# Patient Record
Sex: MALE | Race: WHITE | NOT HISPANIC OR LATINO | URBAN - METROPOLITAN AREA
[De-identification: names, ages, dates, MRNs, and addresses within clinical notes are randomized per-mention and may not be internally consistent; named-entity substitution may affect disease eponyms.]

---

## 2024-03-13 ENCOUNTER — EMERGENCY (EMERGENCY)
Facility: HOSPITAL | Age: 24
LOS: 1 days | Discharge: HOME | End: 2024-03-13
Attending: EMERGENCY MEDICINE
Payer: MEDICARE

## 2024-03-13 VITALS
HEART RATE: 94 BPM | TEMPERATURE: 98 F | OXYGEN SATURATION: 98 % | HEIGHT: 68 IN | WEIGHT: 309.31 LBS | DIASTOLIC BLOOD PRESSURE: 74 MMHG | RESPIRATION RATE: 18 BRPM | SYSTOLIC BLOOD PRESSURE: 109 MMHG

## 2024-03-13 LAB
ALBUMIN SERPL ELPH-MCNC: 4.3 G/DL — SIGNIFICANT CHANGE UP (ref 3.3–5.2)
ALP SERPL-CCNC: 110 U/L — SIGNIFICANT CHANGE UP (ref 40–120)
ALT FLD-CCNC: 145 U/L — HIGH
ANION GAP SERPL CALC-SCNC: 20 MMOL/L — HIGH (ref 5–17)
AST SERPL-CCNC: 82 U/L — HIGH
BASOPHILS # BLD AUTO: 0.03 K/UL — SIGNIFICANT CHANGE UP (ref 0–0.2)
BASOPHILS NFR BLD AUTO: 0.6 % — SIGNIFICANT CHANGE UP (ref 0–2)
BILIRUB SERPL-MCNC: 1.5 MG/DL — SIGNIFICANT CHANGE UP (ref 0.4–2)
BUN SERPL-MCNC: 8.8 MG/DL — SIGNIFICANT CHANGE UP (ref 8–20)
CALCIUM SERPL-MCNC: 9.4 MG/DL — SIGNIFICANT CHANGE UP (ref 8.4–10.5)
CHLORIDE SERPL-SCNC: 90 MMOL/L — LOW (ref 96–108)
CO2 SERPL-SCNC: 25 MMOL/L — SIGNIFICANT CHANGE UP (ref 22–29)
CREAT SERPL-MCNC: 1.07 MG/DL — SIGNIFICANT CHANGE UP (ref 0.5–1.3)
EGFR: 98 ML/MIN/1.73M2 — SIGNIFICANT CHANGE UP
EOSINOPHIL # BLD AUTO: 0.09 K/UL — SIGNIFICANT CHANGE UP (ref 0–0.5)
EOSINOPHIL NFR BLD AUTO: 1.7 % — SIGNIFICANT CHANGE UP (ref 0–6)
GLUCOSE SERPL-MCNC: 79 MG/DL — SIGNIFICANT CHANGE UP (ref 70–99)
HCT VFR BLD CALC: 46 % — SIGNIFICANT CHANGE UP (ref 39–50)
HGB BLD-MCNC: 15.6 G/DL — SIGNIFICANT CHANGE UP (ref 13–17)
IMM GRANULOCYTES NFR BLD AUTO: 0.2 % — SIGNIFICANT CHANGE UP (ref 0–0.9)
LIDOCAIN IGE QN: 23 U/L — SIGNIFICANT CHANGE UP (ref 22–51)
LYMPHOCYTES # BLD AUTO: 2.09 K/UL — SIGNIFICANT CHANGE UP (ref 1–3.3)
LYMPHOCYTES # BLD AUTO: 40 % — SIGNIFICANT CHANGE UP (ref 13–44)
MCHC RBC-ENTMCNC: 26.8 PG — LOW (ref 27–34)
MCHC RBC-ENTMCNC: 33.9 GM/DL — SIGNIFICANT CHANGE UP (ref 32–36)
MCV RBC AUTO: 79 FL — LOW (ref 80–100)
MONOCYTES # BLD AUTO: 0.53 K/UL — SIGNIFICANT CHANGE UP (ref 0–0.9)
MONOCYTES NFR BLD AUTO: 10.2 % — SIGNIFICANT CHANGE UP (ref 2–14)
NEUTROPHILS # BLD AUTO: 2.47 K/UL — SIGNIFICANT CHANGE UP (ref 1.8–7.4)
NEUTROPHILS NFR BLD AUTO: 47.3 % — SIGNIFICANT CHANGE UP (ref 43–77)
PLATELET # BLD AUTO: 267 K/UL — SIGNIFICANT CHANGE UP (ref 150–400)
POTASSIUM SERPL-MCNC: 3.8 MMOL/L — SIGNIFICANT CHANGE UP (ref 3.5–5.3)
POTASSIUM SERPL-SCNC: 3.8 MMOL/L — SIGNIFICANT CHANGE UP (ref 3.5–5.3)
PROT SERPL-MCNC: 7.7 G/DL — SIGNIFICANT CHANGE UP (ref 6.6–8.7)
RBC # BLD: 5.82 M/UL — HIGH (ref 4.2–5.8)
RBC # FLD: 14.1 % — SIGNIFICANT CHANGE UP (ref 10.3–14.5)
SODIUM SERPL-SCNC: 135 MMOL/L — SIGNIFICANT CHANGE UP (ref 135–145)
WBC # BLD: 5.22 K/UL — SIGNIFICANT CHANGE UP (ref 3.8–10.5)
WBC # FLD AUTO: 5.22 K/UL — SIGNIFICANT CHANGE UP (ref 3.8–10.5)

## 2024-03-13 PROCEDURE — 96361 HYDRATE IV INFUSION ADD-ON: CPT

## 2024-03-13 PROCEDURE — 93971 EXTREMITY STUDY: CPT | Mod: 26,RT

## 2024-03-13 PROCEDURE — 71046 X-RAY EXAM CHEST 2 VIEWS: CPT | Mod: 26

## 2024-03-13 PROCEDURE — 71046 X-RAY EXAM CHEST 2 VIEWS: CPT

## 2024-03-13 PROCEDURE — 83690 ASSAY OF LIPASE: CPT

## 2024-03-13 PROCEDURE — 74177 CT ABD & PELVIS W/CONTRAST: CPT | Mod: 26,MC

## 2024-03-13 PROCEDURE — 80053 COMPREHEN METABOLIC PANEL: CPT

## 2024-03-13 PROCEDURE — 99285 EMERGENCY DEPT VISIT HI MDM: CPT | Mod: 25

## 2024-03-13 PROCEDURE — 36415 COLL VENOUS BLD VENIPUNCTURE: CPT

## 2024-03-13 PROCEDURE — 96375 TX/PRO/DX INJ NEW DRUG ADDON: CPT

## 2024-03-13 PROCEDURE — 99283 EMERGENCY DEPT VISIT LOW MDM: CPT

## 2024-03-13 PROCEDURE — 99285 EMERGENCY DEPT VISIT HI MDM: CPT | Mod: FS

## 2024-03-13 PROCEDURE — 96374 THER/PROPH/DIAG INJ IV PUSH: CPT | Mod: XU

## 2024-03-13 PROCEDURE — 85025 COMPLETE CBC W/AUTO DIFF WBC: CPT

## 2024-03-13 PROCEDURE — 93971 EXTREMITY STUDY: CPT

## 2024-03-13 PROCEDURE — 74177 CT ABD & PELVIS W/CONTRAST: CPT | Mod: MC

## 2024-03-13 RX ORDER — FAMOTIDINE 10 MG/ML
20 INJECTION INTRAVENOUS ONCE
Refills: 0 | Status: COMPLETED | OUTPATIENT
Start: 2024-03-13 | End: 2024-03-13

## 2024-03-13 RX ORDER — SODIUM CHLORIDE 9 MG/ML
1000 INJECTION INTRAMUSCULAR; INTRAVENOUS; SUBCUTANEOUS ONCE
Refills: 0 | Status: COMPLETED | OUTPATIENT
Start: 2024-03-13 | End: 2024-03-13

## 2024-03-13 RX ORDER — ONDANSETRON 8 MG/1
4 TABLET, FILM COATED ORAL ONCE
Refills: 0 | Status: COMPLETED | OUTPATIENT
Start: 2024-03-13 | End: 2024-03-13

## 2024-03-13 RX ORDER — METOCLOPRAMIDE HCL 10 MG
10 TABLET ORAL ONCE
Refills: 0 | Status: COMPLETED | OUTPATIENT
Start: 2024-03-13 | End: 2024-03-13

## 2024-03-13 RX ORDER — IOHEXOL 300 MG/ML
30 INJECTION, SOLUTION INTRAVENOUS ONCE
Refills: 0 | Status: COMPLETED | OUTPATIENT
Start: 2024-03-13 | End: 2024-03-13

## 2024-03-13 RX ORDER — ONDANSETRON 8 MG/1
4 TABLET, FILM COATED ORAL ONCE
Refills: 0 | Status: DISCONTINUED | OUTPATIENT
Start: 2024-03-13 | End: 2024-03-21

## 2024-03-13 RX ADMIN — SODIUM CHLORIDE 1000 MILLILITER(S): 9 INJECTION INTRAMUSCULAR; INTRAVENOUS; SUBCUTANEOUS at 17:38

## 2024-03-13 RX ADMIN — Medication 10 MILLIGRAM(S): at 20:06

## 2024-03-13 RX ADMIN — IOHEXOL 30 MILLILITER(S): 300 INJECTION, SOLUTION INTRAVENOUS at 19:08

## 2024-03-13 RX ADMIN — SODIUM CHLORIDE 1000 MILLILITER(S): 9 INJECTION INTRAMUSCULAR; INTRAVENOUS; SUBCUTANEOUS at 18:38

## 2024-03-13 RX ADMIN — ONDANSETRON 4 MILLIGRAM(S): 8 TABLET, FILM COATED ORAL at 17:38

## 2024-03-13 RX ADMIN — FAMOTIDINE 20 MILLIGRAM(S): 10 INJECTION INTRAVENOUS at 17:39

## 2024-03-13 RX ADMIN — SODIUM CHLORIDE 1000 MILLILITER(S): 9 INJECTION INTRAMUSCULAR; INTRAVENOUS; SUBCUTANEOUS at 19:15

## 2024-03-13 NOTE — ED ADULT TRIAGE NOTE - RESPIRATORY RATE (BREATHS/MIN)
I have personally seen and examined this patient.  I have fully participated in the care of this patient. I have reviewed all pertinent clinical information, including history, physical exam, plan and the Resident’s note and agree except as noted. 18

## 2024-03-13 NOTE — ED PROVIDER NOTE - CLINICAL SUMMARY MEDICAL DECISION MAKING FREE TEXT BOX
23 y/o male with recent gastric bypass surgery at [facility] presents to the ED c/o nausea and vomiting. us of right leg negative for dvt, pending ct of abdomen and pelvis with oral and iv contrast to evaluate for abdominal pathology after gastric sleeve, pending urine to determine if uti, pt signed out to oncoming team pending results. 25 y/o male with recent gastric bypass surgery at [facility] presents to the ED c/o nausea and vomiting. us of right leg negative for dvt, pending ct of abdomen and pelvis with oral and iv contrast to evaluate for abdominal pathology after gastric sleeve, pending urine to determine if uti, pt signed out to oncoming team pending results.    bariatrics consulted, recommended PO challnege. Pt able to tolerate PO can follow up with aura in office in AM and GI    Pt reassessed, pt feeling better at this time, vss, pt able to walk, talk and vocalized plan of action. Discussed in depth and explained to pt in depth the next steps that need to be taken including proper follow up with PCP or specialists. All incidental findings were discussed with pt as well. Pt verbalized their concerns and all questions were answered. Pt understands dispo and wants discharge. Given good instructions when to return to ED, strict return precautions and importance of f/u.

## 2024-03-13 NOTE — ED PROVIDER NOTE - PATIENT PORTAL LINK FT
You can access the FollowMyHealth Patient Portal offered by [facility] by registering at the following website: [email or web address]. By joining [employer]’s FollowMyHealth portal, you will also be able to view your health information using other applications (apps) compatible with our system.

## 2024-03-13 NOTE — ED ADULT NURSE REASSESSMENT NOTE - NS ED NURSE REASSESS COMMENT FT1
Report received from off-going RN at 19:30, VSS. Respirations even and unlabored. Patient safety maintained. Patient able to tolerate 300 ml of oral contrast. Provider aware.

## 2024-03-13 NOTE — ED PROVIDER NOTE - CARE PROVIDERS DIRECT ADDRESSES
,[email or web address]

## 2024-03-13 NOTE — ED PROVIDER NOTE - ATTENDING APP SHARED VISIT CONTRIBUTION OF CARE
I, Ronni Cabrera, performed the initial face to face bedside interview with this patient regarding history of present illness, review of symptoms and relevant past medical, social and family history.  I completed an independent physical examination.  I was the initial provider who evaluated this patient. I have signed out the follow up of any pending tests (i.e. labs, radiological studies) to the ACP.  I have communicated the patient’s plan of care and disposition with the ACP.

## 2024-03-13 NOTE — ED PROVIDER NOTE - CARE PROVIDER_API CALL
Yaakov Santamaria [place]  [facility]  [address] [place] [address]  Phone: [phone]  Fax: [phone]  Follow Up Time:     Prashant Islas  Gastroenterology  [address], [address]  [place] [address]  Phone: [phone]  Fax: [phone]  Follow Up Time:

## 2024-03-13 NOTE — ED PROVIDER NOTE - OBJECTIVE STATEMENT
25 y/o male with recent gastric bypass surgery at [facility] presents to the ED c/o nausea and vomiting. Notes after the surgery he had issues with eating, notes he followed up with the surgeon and was advised to eat smaller portions. Notes has been having issues tolerating po but worsening the past 2 days. Also notes after the surgery had thigh pain and was concerned about blood clot. Also has noted that after the surgery he had urinary retention which was told secondary to the anesthesia, had schaffer placed and followed up with urology. Notes to have a uti which he was treated for with abx, which he finished. Denies chest pain, sob, lightheadedness, dizziness.

## 2024-03-13 NOTE — ED ADULT TRIAGE NOTE - CHIEF COMPLAINT QUOTE
Pt c/o nausea and vomiting x 1 day, unable to tolerate PO or medications, had sleeve surgery 1 month ago

## 2024-03-13 NOTE — ED PROVIDER NOTE - PHYSICAL EXAMINATION
General-alert and oriented to person place and time, nontoxic appearing, pleasant cooperative, NAD  HEENT-normocephalic, atraumatic, NT to palp, EOMI, PERRLA, no conjunctival injections,   Neck- supple, trach midline, No JVD, no LAD  Chest- Nt to palp, no reproducible pain  Cardio-s1,s2 present, regular rate and rhythm  Resp- talks in full sentences, symmetrical chest rise, CTA bilat, no evidence of wheezes, rhonchi noted  Abdomen- bowel sounds presnt in all 4 quadrants, soft, NT/ND, no guarding, no rebound tenderness  MSK- moves all extremities, no tenderness to right thigh  Pulses- 2+ DP/PT  Back- nt to palp of cervical, thoracic, lumbar spine, nt to palp of paraspinal m., No CVA tenderness  Neuro- no focal deficits, sensation intact

## 2024-03-13 NOTE — CONSULT NOTE ADULT - ATTENDING COMMENTS
Seen in ED on evening of 3/13/24  Patient presents with difficulty tolerating solid food since sleeve gastrectomy, also frequent diarrhea  Abdomen soft  CT reviewed - no evidence of stricture or other abnormality related to sleeve gastrectomy  Tolerated liquid diet and was discharge home from ED  Patient to followup with me in office on 3/14/24

## 2024-03-13 NOTE — ED PROVIDER NOTE - ATTENDING SHARED VISIT SELECTOR YES
Patient's chart reviewed, please contact to schedule OA colonoscopy with Leonel Valdez MD.    Screening Criteria  Age: 48 year old Patient meets age criteria.  PCP:  Dawson River MD  Personal H/O Colon CA or Polyps: Patient does NOT have a personal history of colon polyps or colon cancer  Family H/O Colon CA: YES - Maternal aunt at age 60  GI Symptoms: No  Most recent colon procedure No prior Flexi or Colonoscopy  Concerns for taking prep at home(mobility, etc): No    ALLERGIES:  No Known Allergies    Medications:  Anticoagulation (examples - coumadin, heparin, lovenox, xarelto, pradaxa): No  Platelet Modifying (examples - Plavix, aspirin, nsaids, Aggrenox) : No  Concerns for sedation. On Chronic long acting narcotics, Methadone, Suboxone, antianxiety like xanax, klonazepam: No  Review of other medications indicate - diuretic, Lisinopril-HCTZ and Oral antidiabetic  BMI: Estimated body mass index is 36.52 kg/(m^2) as calculated from the following:    Height as of an earlier encounter on 3/17/17: 5' 4\" (1.626 m).    Weight as of an earlier encounter on 3/17/17: 96.5 kg.:more than 45 No  Is the patient over 300 lbs Weight:   Wt Readings from Last 1 Encounters:   03/17/17 96.5 kg   :  No  On Oxygen:  No    Past Medical History:  Past Medical History:   Diagnosis Date   • Dyslipidemia    • HTN (hypertension) 2009   • Low testosterone 2011    Hypogonadism, dose increase 2012   • Obesity    • Polycythemia 2013   • Type 2 diabetes mellitus with diabetic nephropathy      History of clotting disorder (i.e., VWF or ITP)? No    Past Surgical History:  Past Surgical History:   Procedure Laterality Date   • NO PAST SURGERIES         Other Concerns: none noted    Prior Labs: If abnormal creatinine/electrolytes, then will need Nulytely prep  Creatinine   Date Value Ref Range Status   03/03/2017 0.67 0.67 - 1.17 mg/dL Final     BUN   Date Value Ref Range Status   03/03/2017 11 6 - 20 mg/dL Final     Sodium   Date Value Ref Range  Status   03/03/2017 139 135 - 145 mmol/L Final     Potassium   Date Value Ref Range Status   03/03/2017 4.5 3.4 - 5.1 mmol/L Final     Chloride   Date Value Ref Range Status   03/03/2017 99 98 - 107 mmol/L Final     Carbon Dioxide   Date Value Ref Range Status   03/03/2017 28 21 - 32 mmol/L Final     Glucose   Date Value Ref Range Status   03/03/2017 142 (H) 65 - 99 mg/dL Final     CALCIUM   Date Value Ref Range Status   03/03/2017 9.1 8.4 - 10.2 mg/dL Final        SCHEDULING:  OK to schedule open access colonoscopy, if no then will need office visit: Yes  Physician Scheduled with: Leonel Valdez MD  Diagnosis code from O/A order:  Colon Cancer Screening Z12.11  Location: Patient Preference  Sedation: Moderate sedation    Prep: Nulytely  Pre-procedure orders placed for oa colonoscopy.      Yes

## 2024-03-13 NOTE — ED ADULT NURSE NOTE - OBJECTIVE STATEMENT
pt to ED c/o n/v. Pt states he has been unable to keep anything down since his gastric sleeve surgery Feb. 1st. Pt states his dr "is aware." pt to ED c/o n/v. Pt states he has been unable to keep anything down since his gastric sleeve surgery Feb. 1st. Pt states his dr "is aware." Pt not actively vomiting at this time.

## 2024-03-13 NOTE — CONSULT NOTE ADULT - ASSESSMENT
24M with PO intolerance s/p sleeve gastrectomy    - No acute surgical intervention at this time  - Recommend UGI to evaluate swallowing mechanics (ordered)  - GI consult for possible EGD  - Patient should subsequently f/u with own surgeon  - Will follow up above 24M with PO intolerance s/p sleeve gastrectomy    - No acute surgical intervention at this time  - Recommend UGI to evaluate swallowing mechanics (ordered)  - GI consult for possible EGD  - Patient should subsequently f/u with own surgeon  - Will follow up above    Update 3/14/24 @0059  Discussed with patient and family the option of attempting PO challenge and outpatient follow up with GI for further motility evaluation. Patient favored this option and was subsequently able to tolerate PO without issue.

## 2024-03-13 NOTE — CONSULT NOTE ADULT - SUBJECTIVE AND OBJECTIVE BOX
HPI:      PAST MEDICAL & SURGICAL HISTORY:      REVIEW OF SYSTEMS      General:	    Skin/Breast:  	  Ophthalmologic:  	  ENMT:	    Respiratory and Thorax:  	  Cardiovascular:	    Gastrointestinal:	    Genitourinary:	    Musculoskeletal:	    Neurological:	    Psychiatric:	    Hematology/Lymphatics:	    Endocrine:	    Allergic/Immunologic:	    MEDICATIONS  (STANDING):  ondansetron Injectable 4 milliGRAM(s) IV Push once    MEDICATIONS  (PRN):      Allergies    No Known Allergies    Intolerances        SOCIAL HISTORY:    FAMILY HISTORY:      Vital Signs Last 24 Hrs  T(C): 36.6 (13 Mar 2024 15:12), Max: 36.6 (13 Mar 2024 15:12)  T(F): 97.9 (13 Mar 2024 15:12), Max: 97.9 (13 Mar 2024 15:12)  HR: 94 (13 Mar 2024 15:12) (94 - 94)  BP: 109/74 (13 Mar 2024 15:12) (109/74 - 109/74)  BP(mean): --  RR: 18 (13 Mar 2024 15:12) (18 - 18)  SpO2: 98% (13 Mar 2024 15:12) (98% - 98%)    Parameters below as of 13 Mar 2024 15:12  Patient On (Oxygen Delivery Method): room air        PHYSICAL EXAM:      Constitutional:    Eyes:    ENMT:    Neck:    Breasts:    Back:    Respiratory:    Cardiovascular:    Gastrointestinal:    Genitourinary:    Rectal:    Extremities:    Vascular:    Neurological:    Skin:    Lymph Nodes:    Musculoskeletal:    Psychiatric:        LABS:                        15.6   5.22  )-----------( 267      ( 13 Mar 2024 16:50 )             46.0     03-13    135  |  90<L>  |  8.8  ----------------------------<  79  3.8   |  25.0  |  1.07    Ca    9.4      13 Mar 2024 16:50    TPro  7.7  /  Alb  4.3  /  TBili  1.5  /  DBili  x   /  AST  82<H>  /  ALT  145<H>  /  AlkPhos  110  03-13      Urinalysis Basic - ( 13 Mar 2024 16:50 )    Color: x / Appearance: x / SG: x / pH: x  Gluc: 79 mg/dL / Ketone: x  / Bili: x / Urobili: x   Blood: x / Protein: x / Nitrite: x   Leuk Esterase: x / RBC: x / WBC x   Sq Epi: x / Non Sq Epi: x / Bacteria: x      RADIOLOGY & ADDITIONAL STUDIES:    < from: CT Abdomen and Pelvis w/ Oral Cont and w/ IV Cont (03.13.24 @ 21:53) >  PROCEDURE:  CT of the Abdomen and Pelvis was performed.  Sagittal and coronalreformats were performed.    FINDINGS:  LOWER CHEST: There is a focal airspace opacity in the right lower lobe of   the lung. This is indeterminant. In the setting of recent surgery this   could represent a focus of aspiration. Recommend follow-up noncontrast   chest CT to assess for resolution.    LIVER: Within normal limits.  BILE DUCTS: Normal caliber.  GALLBLADDER: Within normal limits.  SPLEEN: Within normal limits.  PANCREAS: Within normal limits.  ADRENALS: Within normal limits.  KIDNEYS/URETERS: Within normal limits.    BLADDER: Within normal limits.  REPRODUCTIVE ORGANS: Prostate within normal limits.    BOWEL: Status post sleeve gastrectomy. Normal postsurgical appearance. No   bowel obstruction. Appendix is normal.  PERITONEUM: No ascites.  VESSELS: Within normal limits.  RETROPERITONEUM/LYMPH NODES: No lymphadenopathy.  ABDOMINAL WALL: Within normal limits.  BONES: Within normal limits.    IMPRESSION:    There is a focal airspace opacity in the right lower lobe of the lung.   This is indeterminant. In the setting of recent surgery this could   represent a focus of aspiration. Recommend follow-up noncontrast chest CT   to assess for resolution.    No acute finding in the abdomen and pelvis.    < end of copied text >   HPI: 24M with no significant PMH presents 6wk s/p sleeve gastrectomy performed at [facility] with progressive PO intolerance a/w N/V. Symptoms started approx 2 wks after surgery. When he was seen by his surgeon, they recommended he have full liquid diet and suggested he may require an UGI. He has not been able to tolerate the diet and now can barely hold down PO without vomiting shortly after. He is still making urine and has been having BMs. No hematemesis, fevers, chills.       PAST MEDICAL & SURGICAL HISTORY:  No significant PMH/PSH      MEDICATIONS  (STANDING):  ondansetron Injectable 4 milliGRAM(s) IV Push once    MEDICATIONS  (PRN):      Allergies  No Known Allergies  Intolerances      SOCIAL HISTORY:      Vital Signs Last 24 Hrs  T(C): 36.6 (13 Mar 2024 15:12), Max: 36.6 (13 Mar 2024 15:12)  T(F): 97.9 (13 Mar 2024 15:12), Max: 97.9 (13 Mar 2024 15:12)  HR: 94 (13 Mar 2024 15:12) (94 - 94)  BP: 109/74 (13 Mar 2024 15:12) (109/74 - 109/74)  BP(mean): --  RR: 18 (13 Mar 2024 15:12) (18 - 18)  SpO2: 98% (13 Mar 2024 15:12) (98% - 98%)    Parameters below as of 13 Mar 2024 15:12  Patient On (Oxygen Delivery Method): room air      PHYSICAL EXAM:  Constitutional: uncomfortable appearing  ENMT:  Respiratory: no increased work of breathing, no conversational dyspnea  Gastrointestinal: obese, nondistended, tender to palpation in epigastrium, well healed surgical scars      LABS:                        15.6   5.22  )-----------( 267      ( 13 Mar 2024 16:50 )             46.0     03-13    135  |  90<L>  |  8.8  ----------------------------<  79  3.8   |  25.0  |  1.07    Ca    9.4      13 Mar 2024 16:50    TPro  7.7  /  Alb  4.3  /  TBili  1.5  /  DBili  x   /  AST  82<H>  /  ALT  145<H>  /  AlkPhos  110  03-13      Urinalysis Basic - ( 13 Mar 2024 16:50 )    Color: x / Appearance: x / SG: x / pH: x  Gluc: 79 mg/dL / Ketone: x  / Bili: x / Urobili: x   Blood: x / Protein: x / Nitrite: x   Leuk Esterase: x / RBC: x / WBC x   Sq Epi: x / Non Sq Epi: x / Bacteria: x      RADIOLOGY & ADDITIONAL STUDIES:    < from: CT Abdomen and Pelvis w/ Oral Cont and w/ IV Cont (03.13.24 @ 21:53) >  PROCEDURE:  CT of the Abdomen and Pelvis was performed.  Sagittal and coronalreformats were performed.    FINDINGS:  LOWER CHEST: There is a focal airspace opacity in the right lower lobe of   the lung. This is indeterminant. In the setting of recent surgery this   could represent a focus of aspiration. Recommend follow-up noncontrast   chest CT to assess for resolution.    LIVER: Within normal limits.  BILE DUCTS: Normal caliber.  GALLBLADDER: Within normal limits.  SPLEEN: Within normal limits.  PANCREAS: Within normal limits.  ADRENALS: Within normal limits.  KIDNEYS/URETERS: Within normal limits.    BLADDER: Within normal limits.  REPRODUCTIVE ORGANS: Prostate within normal limits.    BOWEL: Status post sleeve gastrectomy. Normal postsurgical appearance. No   bowel obstruction. Appendix is normal.  PERITONEUM: No ascites.  VESSELS: Within normal limits.  RETROPERITONEUM/LYMPH NODES: No lymphadenopathy.  ABDOMINAL WALL: Within normal limits.  BONES: Within normal limits.    IMPRESSION:    There is a focal airspace opacity in the right lower lobe of the lung.   This is indeterminant. In the setting of recent surgery this could   represent a focus of aspiration. Recommend follow-up noncontrast chest CT   to assess for resolution.    No acute finding in the abdomen and pelvis.    < end of copied text >

## 2024-03-14 ENCOUNTER — APPOINTMENT (OUTPATIENT)
Dept: SURGERY | Facility: CLINIC | Age: 24
End: 2024-03-14
Payer: MEDICAID

## 2024-03-14 VITALS
SYSTOLIC BLOOD PRESSURE: 103 MMHG | TEMPERATURE: 98 F | OXYGEN SATURATION: 96 % | RESPIRATION RATE: 18 BRPM | HEART RATE: 76 BPM | DIASTOLIC BLOOD PRESSURE: 66 MMHG

## 2024-03-14 VITALS
WEIGHT: 308 LBS | BODY MASS INDEX: 48.34 KG/M2 | SYSTOLIC BLOOD PRESSURE: 109 MMHG | TEMPERATURE: 97.8 F | DIASTOLIC BLOOD PRESSURE: 75 MMHG | OXYGEN SATURATION: 98 % | HEART RATE: 63 BPM | RESPIRATION RATE: 14 BRPM | HEIGHT: 67 IN

## 2024-03-14 DIAGNOSIS — K90.49 MALABSORPTION DUE TO INTOLERANCE, NOT ELSEWHERE CLASSIFIED: ICD-10-CM

## 2024-03-14 DIAGNOSIS — Z98.84 BARIATRIC SURGERY STATUS: ICD-10-CM

## 2024-03-14 PROCEDURE — 99204 OFFICE O/P NEW MOD 45 MIN: CPT

## 2024-03-18 NOTE — HISTORY OF PRESENT ILLNESS
[de-identified] : Mr. MARIA is a 24 year old man s/p sleeve gastrectomy with difficulty tolerating oral solid food. He was seen in [facility] ED last night - he was discharged home after successfully tolerating liquids. Reports occasional reflux symptoms. Reports pain with solid food intake. Reports occasional regurgitation to solid food and, less frequently, with protein shake intake. Today, the patient denies pain. Denies fever/chills.   CT reviewed - no stricture or other abnormality identified to account for symptoms

## 2024-03-18 NOTE — ASSESSMENT
[FreeTextEntry1] : Mr. MARIA is a 24 year old man s/p sleeve gastrectomy with difficulty tolerating oral solid food.  Care discussed with patient, his mother, and his surgeon, Dr. Terry at [facility]. Will plan for upper GI x-ray.  Recommend full liquid diet - will then advance as tolerated at next visit

## 2024-03-19 ENCOUNTER — EMERGENCY (EMERGENCY)
Facility: HOSPITAL | Age: 24
LOS: 1 days | Discharge: HOME | End: 2024-03-19
Attending: EMERGENCY MEDICINE
Payer: MEDICARE

## 2024-03-19 VITALS
HEIGHT: 68 IN | WEIGHT: 307.99 LBS | RESPIRATION RATE: 17 BRPM | TEMPERATURE: 98 F | HEART RATE: 65 BPM | OXYGEN SATURATION: 100 % | SYSTOLIC BLOOD PRESSURE: 103 MMHG | DIASTOLIC BLOOD PRESSURE: 69 MMHG

## 2024-03-19 LAB
ALBUMIN SERPL ELPH-MCNC: 4.3 G/DL — SIGNIFICANT CHANGE UP (ref 3.3–5.2)
ALP SERPL-CCNC: 103 U/L — SIGNIFICANT CHANGE UP (ref 40–120)
ALT FLD-CCNC: 181 U/L — HIGH
ANION GAP SERPL CALC-SCNC: 21 MMOL/L — HIGH (ref 5–17)
AST SERPL-CCNC: 97 U/L — HIGH
BASOPHILS # BLD AUTO: 0.02 K/UL — SIGNIFICANT CHANGE UP (ref 0–0.2)
BASOPHILS NFR BLD AUTO: 0.4 % — SIGNIFICANT CHANGE UP (ref 0–2)
BILIRUB SERPL-MCNC: 1.6 MG/DL — SIGNIFICANT CHANGE UP (ref 0.4–2)
BUN SERPL-MCNC: 7.9 MG/DL — LOW (ref 8–20)
CALCIUM SERPL-MCNC: 9.1 MG/DL — SIGNIFICANT CHANGE UP (ref 8.4–10.5)
CHLORIDE SERPL-SCNC: 93 MMOL/L — LOW (ref 96–108)
CO2 SERPL-SCNC: 24 MMOL/L — SIGNIFICANT CHANGE UP (ref 22–29)
CREAT SERPL-MCNC: 0.87 MG/DL — SIGNIFICANT CHANGE UP (ref 0.5–1.3)
EGFR: 122 ML/MIN/1.73M2 — SIGNIFICANT CHANGE UP
EOSINOPHIL # BLD AUTO: 0.04 K/UL — SIGNIFICANT CHANGE UP (ref 0–0.5)
EOSINOPHIL NFR BLD AUTO: 0.8 % — SIGNIFICANT CHANGE UP (ref 0–6)
GLUCOSE SERPL-MCNC: 82 MG/DL — SIGNIFICANT CHANGE UP (ref 70–99)
HCT VFR BLD CALC: 45.1 % — SIGNIFICANT CHANGE UP (ref 39–50)
HGB BLD-MCNC: 15 G/DL — SIGNIFICANT CHANGE UP (ref 13–17)
IMM GRANULOCYTES NFR BLD AUTO: 0.4 % — SIGNIFICANT CHANGE UP (ref 0–0.9)
LYMPHOCYTES # BLD AUTO: 1.8 K/UL — SIGNIFICANT CHANGE UP (ref 1–3.3)
LYMPHOCYTES # BLD AUTO: 33.8 % — SIGNIFICANT CHANGE UP (ref 13–44)
MCHC RBC-ENTMCNC: 26.6 PG — LOW (ref 27–34)
MCHC RBC-ENTMCNC: 33.3 GM/DL — SIGNIFICANT CHANGE UP (ref 32–36)
MCV RBC AUTO: 80 FL — SIGNIFICANT CHANGE UP (ref 80–100)
MONOCYTES # BLD AUTO: 0.54 K/UL — SIGNIFICANT CHANGE UP (ref 0–0.9)
MONOCYTES NFR BLD AUTO: 10.2 % — SIGNIFICANT CHANGE UP (ref 2–14)
NEUTROPHILS # BLD AUTO: 2.9 K/UL — SIGNIFICANT CHANGE UP (ref 1.8–7.4)
NEUTROPHILS NFR BLD AUTO: 54.4 % — SIGNIFICANT CHANGE UP (ref 43–77)
PLATELET # BLD AUTO: 275 K/UL — SIGNIFICANT CHANGE UP (ref 150–400)
POTASSIUM SERPL-MCNC: 4.2 MMOL/L — SIGNIFICANT CHANGE UP (ref 3.5–5.3)
POTASSIUM SERPL-SCNC: 4.2 MMOL/L — SIGNIFICANT CHANGE UP (ref 3.5–5.3)
PROT SERPL-MCNC: 7.2 G/DL — SIGNIFICANT CHANGE UP (ref 6.6–8.7)
RBC # BLD: 5.64 M/UL — SIGNIFICANT CHANGE UP (ref 4.2–5.8)
RBC # FLD: 14.6 % — HIGH (ref 10.3–14.5)
SODIUM SERPL-SCNC: 137 MMOL/L — SIGNIFICANT CHANGE UP (ref 135–145)
WBC # BLD: 5.32 K/UL — SIGNIFICANT CHANGE UP (ref 3.8–10.5)
WBC # FLD AUTO: 5.32 K/UL — SIGNIFICANT CHANGE UP (ref 3.8–10.5)

## 2024-03-19 PROCEDURE — 99285 EMERGENCY DEPT VISIT HI MDM: CPT | Mod: GC

## 2024-03-19 PROCEDURE — 93010 ELECTROCARDIOGRAM REPORT: CPT

## 2024-03-19 RX ORDER — ONDANSETRON 8 MG/1
4 TABLET, FILM COATED ORAL ONCE
Refills: 0 | Status: COMPLETED | OUTPATIENT
Start: 2024-03-19 | End: 2024-03-19

## 2024-03-19 RX ORDER — SODIUM CHLORIDE 9 MG/ML
1000 INJECTION INTRAMUSCULAR; INTRAVENOUS; SUBCUTANEOUS ONCE
Refills: 0 | Status: COMPLETED | OUTPATIENT
Start: 2024-03-19 | End: 2024-03-19

## 2024-03-19 RX ORDER — IOHEXOL 300 MG/ML
30 INJECTION, SOLUTION INTRAVENOUS ONCE
Refills: 0 | Status: COMPLETED | OUTPATIENT
Start: 2024-03-19 | End: 2024-03-20

## 2024-03-19 RX ADMIN — ONDANSETRON 4 MILLIGRAM(S): 8 TABLET, FILM COATED ORAL at 19:59

## 2024-03-19 RX ADMIN — SODIUM CHLORIDE 1000 MILLILITER(S): 9 INJECTION INTRAMUSCULAR; INTRAVENOUS; SUBCUTANEOUS at 19:58

## 2024-03-19 NOTE — CONSULT NOTE ADULT - SUBJECTIVE AND OBJECTIVE BOX
SURGERY CONSULT  ==============================================================================================================  HPI: 24y Male s/p sleeve gastrectomy at the beginning of February re-consults the ED complaining of diffuse abdominal pain and PO intolerance.  States that since his surgery has not been able to tolerate food or liquids. Has tried to drink water ~3Oz at a time with poor tolerance and emesis/spit.   Reports last BM last week, passing gas today.  Seen on 3/13 in the ED with negative work-up and scheduled for UGIS in 2 days.        PAST MEDICAL & SURGICAL HISTORY:  S/P gastric sleeve procedure        Home Meds: Home Medications:    Allergies: Allergies    No Known Allergies    Intolerances      Soc:   Advanced Directives: Presumed Full Code     CURRENT MEDICATIONS:   --------------------------------------------------------------------------------------  Neurologic Medications    Respiratory Medications    Cardiovascular Medications    Gastrointestinal Medications    Genitourinary Medications    Hematologic/Oncologic Medications    Antimicrobial/Immunologic Medications    Endocrine/Metabolic Medications    Topical/Other Medications  iohexol 300 mG (iodine)/mL Oral Solution 30 milliLiter(s) Oral once    --------------------------------------------------------------------------------------    VITAL SIGNS, INS/OUTS (last 24 hours):  --------------------------------------------------------------------------------------  ICU Vital Signs Last 24 Hrs  T(C): 36.8 (19 Mar 2024 17:51), Max: 36.8 (19 Mar 2024 17:51)  T(F): 98.3 (19 Mar 2024 17:51), Max: 98.3 (19 Mar 2024 17:51)  HR: 65 (19 Mar 2024 17:51) (65 - 65)  BP: 103/69 (19 Mar 2024 17:51) (103/69 - 103/69)  BP(mean): --  ABP: --  ABP(mean): --  RR: 17 (19 Mar 2024 17:51) (17 - 17)  SpO2: 100% (19 Mar 2024 17:51) (100% - 100%)    O2 Parameters below as of 19 Mar 2024 17:51  Patient On (Oxygen Delivery Method): room air          I&O's Summary    --------------------------------------------------------------------------------------    PHYSICAL EXAM:  GENERAL: NAD, well-groomed, well-developed  HEAD:  Atraumatic, Normocephalic  EYES: EOMI, PERRLA, conjunctiva and sclera clear  NECK: Supple, No JVD  CHEST/LUNG: non-labored breathing on room air.   HEART: Regular rate and rhythm; S1/S2  ABDOMEN: Soft, Nondistended, diffusely tender without rebound, guarding or rigidity.   VASCULAR: Normal pulses, Normal capillary refill  EXTREMITIES:  2+ Peripheral Pulses, No cyanosis, No edema  SKIN: Warm, Intact     LABS  --------------------------------------------------------------------------------------  Labs:  CAPILLARY BLOOD GLUCOSE                              15.0   5.32  )-----------( 275      ( 19 Mar 2024 20:00 )             45.1       Auto Neutrophil %: 54.4 % (03-19-24 @ 20:00)  Auto Immature Granulocyte %: 0.4 % (03-19-24 @ 20:00)    03-19    137  |  93<L>  |  7.9<L>  ----------------------------<  82  4.2   |  24.0  |  0.87      Calcium: 9.1 mg/dL (03-19-24 @ 20:00)      LFTs:             7.2  | 1.6  | 97       ------------------[103     ( 19 Mar 2024 20:00 )  4.3  | x    | 181         Lipase:x      Amylase:x             Coags:            Urinalysis Basic - ( 19 Mar 2024 20:00 )    Color: x / Appearance: x / SG: x / pH: x  Gluc: 82 mg/dL / Ketone: x  / Bili: x / Urobili: x   Blood: x / Protein: x / Nitrite: x   Leuk Esterase: x / RBC: x / WBC x   Sq Epi: x / Non Sq Epi: x / Bacteria: x          --------------------------------------------------------------------------------------

## 2024-03-19 NOTE — ED PROVIDER NOTE - CLINICAL SUMMARY MEDICAL DECISION MAKING FREE TEXT BOX
24 year old male presents to the ED for eval. Patient is 7 weeks s/p gastric sleeve. Vitals stable. Patient appears uncomfortable but is alert and oriented. Examination benign. Lungs clear bilaterally. Will obtain labs, EKG and UA. GI consulted. Note that patient was not sent in by Dr. Taylor office. Recommend PO challenge at the ED. Will reassess. 24 year old male presents to the ED for eval. Patient is 7 weeks s/p gastric sleeve. Vitals stable. Patient appears uncomfortable but is alert and oriented. Examination benign. Lungs clear bilaterally. Will obtain labs, EKG and UA. GI consulted. Note that patient was not sent in by Dr. Taylor office. Recommend PO challenge at the ED. Will reassess.    Ruthy TOMAS: Patient with significant improvement of symptoms after Maalox.  Tolerating p.o.  CT without any acute findings.  Cleared by surgical team.  Patient ready for discharge, return precautions given.

## 2024-03-19 NOTE — ED PROVIDER NOTE - PATIENT PORTAL LINK FT
You can access the FollowMyHealth Patient Portal offered by [facility] by registering at the following website: [email or web address]. By joining [employer]’s FollowMyHealth portal, you will also be able to view your health information using other applications (apps) compatible with our system.

## 2024-03-19 NOTE — ED ADULT NURSE NOTE - OBJECTIVE STATEMENT
Presents to ed with complaints of abdominal pain, patient is status post gastric sleeve feb 2024, reports nausea after eating, patient reports dr. lagunas sent him to ed to be evaluated. Denies sob, chest pain, no other complaints at this time.

## 2024-03-19 NOTE — ED ADULT TRIAGE NOTE - CHIEF COMPLAINT QUOTE
pt aunt reports pt was sent by MD Hartley for "evaluation of gastroparesis" , +N/V, dehydration and decreased intake; hx gastric sleeve 2/2024 . pt hx "cognitive delays"

## 2024-03-19 NOTE — CONSULT NOTE ADULT - ASSESSMENT
23 yo M with PO intolerance after sleeve gastreomy 7 weeks ago.  Recent work-up negative and unable to tolerate PO since then  Scheduled for UGIS in 2 days.  Lbas review with improving hypochloremia slight anion gap 21, no evidence of infection or bleeding.     Plan:  No emergent surgical intervention  Recommend CT w/IV/PO contrast to re-asses for structural complication  IF CT negative can have PO challenge from surgical standpoint  Recommend GI evaluation

## 2024-03-19 NOTE — ED PROVIDER NOTE - NSFOLLOWUPINSTRUCTIONS_ED_ALL_ED_FT
- Follow up with your doctor within 2-3 days.   - Follow up with Surgeon.   - Bring results with you to the appointment.   - Take Maalox 30ml every 6 hours as needed for pain. Maalox is available over the counter.   - Return to the ED for any new or worsening symptoms.     Abdominal Pain    Many things can cause abdominal pain. Many times, abdominal pain is not caused by a disease and will improve without treatment. Your health care provider will do a physical exam to determine if there is a dangerous cause of your pain; blood tests and imaging may help determine the cause of your pain. However, in many cases, no cause may be found and you may need further testing as an outpatient. Monitor your abdominal pain for any changes.     SEEK IMMEDIATE MEDICAL CARE IF YOU HAVE ANY OF THE FOLLOWING SYMPTOMS: worsening abdominal pain, uncontrollable vomiting, profuse diarrhea, inability to have bowel movements or pass gas, black or bloody stools, fever accompanying chest pain or back pain, or fainting. These symptoms may represent a serious problem that is an emergency. Do not wait to see if the symptoms will go away. Get medical help right away. Call 911 and do not drive yourself to the hospital.

## 2024-03-19 NOTE — ED PROVIDER NOTE - PHYSICAL EXAMINATION
Gen: NAD, AOx3  Head: NCAT  HEENT: EOMI, oral mucosa moist, normal conjunctiva, neck supple  Lung: CTAB, no respiratory distress  CV: rrr, no murmur, Normal perfusion  Abd: soft, NTND  MSK: right thigh pain to palpation  Neuro: No focal neurologic deficits  Skin: No rash   Psych: normal affect Gen: NAD, AOx3  Head: NCAT  HEENT: EOMI, oral mucosa moist, normal conjunctiva, neck supple  Lung: CTAB, no respiratory distress  CV: rrr, no murmur, Normal perfusion  Abd: soft, NTND  Neuro: No focal neurologic deficits  Skin: No rash   Psych: normal affect

## 2024-03-19 NOTE — ED ADULT NURSE NOTE - CAS DISCH TRANSFER METHOD
Prelim angio    Preserved LV function - probable underlying LVH    No obstructive CAD    Focus needs to be on blood sugar control    IV hydration post procedure    Potentially home later today if all goes well with recovery - that will depend upon how she feels and how her sugars are doing uber/Transportation service

## 2024-03-19 NOTE — ED ADULT NURSE REASSESSMENT NOTE - NS ED NURSE REASSESS COMMENT FT1
Pt placed on CM as per MD order. NSR, rate 65, SpO2 100% on room air. No acute distress noted at this time.

## 2024-03-19 NOTE — ED PROVIDER NOTE - ATTENDING CONTRIBUTION TO CARE
I, Neville Francis MD, personally saw the patient with the resident, and completed the key components of the history and physical exam. I then discussed the management plan with the resident.  Patient with past medical history of recent gastric sleeve that [facility] is presenting with nausea, vomiting and decreased ability to tolerate p.o.  Symptoms are present after surgery.  Patient has been following up with Dr. lagunas, who has patient scheduled for endoscopy in 2 days.  Patient's aunt called the office today as patient was having trouble tolerating p.o. also told to come to ED for evaluation.  Patient recently had CT scan that did not show any acute intra-abdominal pathology.  There were no reported change in symptoms from when he was last in the hospital.  On exam here he has no significant abdominal tenderness to palpation.  Despite recent surgery and vomiting, with no change in symptoms and recent CT scan being benign, I do not suspect small bowel obstruction at this time.  He also has no abdominal tenderness on exam.  Surgical team concern for possible gastroparesis which may be causing his symptoms.  Will check for dehydration, BIBI.  Case was discussed with Dr. lagunas, recommend obtaining lab work and p.o. challenge with liquid diet.  If workup is unremarkable, patient can be discharged to home with follow-up for endoscopy in 2 days.  Case was discussed with patient's aunt who is agreeable.

## 2024-03-19 NOTE — ED PROVIDER NOTE - OBJECTIVE STATEMENT
24 year old male presents to the ED for eval. Patient is 7 weeks s/p gastric sleeve done at [facility] [email or web address]] with nausea, vomiting and PO intolerance. Symptoms started 2 weeks after the surgery and have progressively worsened. Was seen by his surgeon and told to go on a full liquid diet, which failed. Saw Dr. Hartley last week and told to attempt smaller portions - due for an UGI next week as per patient. Denies any fever, chills, chest pain, SOB, melena, or hematemesis. Last bowel movement a week ago. Also c/o right thigh "spasms". 24 year old male presents to the ED for eval. Patient is 7 weeks s/p gastric sleeve done at [facility] with nausea, vomiting and PO intolerance. Symptoms started 2 weeks after the surgery and have progressively worsened. Was seen by his surgeon and told to go on a full liquid diet, which failed. Saw Dr. Hartley last week and told to attempt smaller portions - due for an UGI next week as per patient. Denies any fever, chills, chest pain, SOB, melena, or hematemesis. Last bowel movement a week ago.

## 2024-03-20 VITALS
DIASTOLIC BLOOD PRESSURE: 74 MMHG | SYSTOLIC BLOOD PRESSURE: 120 MMHG | RESPIRATION RATE: 20 BRPM | OXYGEN SATURATION: 100 % | HEART RATE: 70 BPM | TEMPERATURE: 97 F

## 2024-03-20 PROCEDURE — 74177 CT ABD & PELVIS W/CONTRAST: CPT | Mod: MC

## 2024-03-20 PROCEDURE — 85025 COMPLETE CBC W/AUTO DIFF WBC: CPT

## 2024-03-20 PROCEDURE — 74177 CT ABD & PELVIS W/CONTRAST: CPT | Mod: 26,MC

## 2024-03-20 PROCEDURE — 99285 EMERGENCY DEPT VISIT HI MDM: CPT | Mod: 25

## 2024-03-20 PROCEDURE — 96375 TX/PRO/DX INJ NEW DRUG ADDON: CPT

## 2024-03-20 PROCEDURE — 96376 TX/PRO/DX INJ SAME DRUG ADON: CPT

## 2024-03-20 PROCEDURE — 36415 COLL VENOUS BLD VENIPUNCTURE: CPT

## 2024-03-20 PROCEDURE — 96374 THER/PROPH/DIAG INJ IV PUSH: CPT | Mod: XU

## 2024-03-20 PROCEDURE — 93005 ELECTROCARDIOGRAM TRACING: CPT

## 2024-03-20 PROCEDURE — 80053 COMPREHEN METABOLIC PANEL: CPT

## 2024-03-20 RX ORDER — ONDANSETRON 8 MG/1
4 TABLET, FILM COATED ORAL ONCE
Refills: 0 | Status: COMPLETED | OUTPATIENT
Start: 2024-03-20 | End: 2024-03-20

## 2024-03-20 RX ORDER — FAMOTIDINE 10 MG/ML
20 INJECTION INTRAVENOUS ONCE
Refills: 0 | Status: COMPLETED | OUTPATIENT
Start: 2024-03-20 | End: 2024-03-20

## 2024-03-20 RX ADMIN — Medication 30 MILLILITER(S): at 04:31

## 2024-03-20 RX ADMIN — IOHEXOL 30 MILLILITER(S): 300 INJECTION, SOLUTION INTRAVENOUS at 01:13

## 2024-03-20 RX ADMIN — FAMOTIDINE 20 MILLIGRAM(S): 10 INJECTION INTRAVENOUS at 04:30

## 2024-03-20 RX ADMIN — ONDANSETRON 4 MILLIGRAM(S): 8 TABLET, FILM COATED ORAL at 02:12

## 2024-03-21 ENCOUNTER — OUTPATIENT (OUTPATIENT)
Dept: OUTPATIENT SERVICES | Facility: HOSPITAL | Age: 24
LOS: 1 days | End: 2024-03-21
Payer: MEDICARE

## 2024-03-21 ENCOUNTER — NON-APPOINTMENT (OUTPATIENT)
Age: 24
End: 2024-03-21

## 2024-03-21 DIAGNOSIS — Z98.84 BARIATRIC SURGERY STATUS: ICD-10-CM

## 2024-03-21 PROBLEM — Z90.3 ACQUIRED ABSENCE OF STOMACH [PART OF]: Chronic | Status: ACTIVE | Noted: 2024-03-19

## 2024-03-21 PROCEDURE — 74240 X-RAY XM UPR GI TRC 1CNTRST: CPT

## 2024-03-21 PROCEDURE — 74240 X-RAY XM UPR GI TRC 1CNTRST: CPT | Mod: 26

## 2024-03-22 ENCOUNTER — NON-APPOINTMENT (OUTPATIENT)
Age: 24
End: 2024-03-22

## 2024-03-25 ENCOUNTER — INPATIENT (INPATIENT)
Facility: HOSPITAL | Age: 24
LOS: 9 days | Discharge: HOME | DRG: 391 | End: 2024-04-04
Attending: GENERAL ACUTE CARE HOSPITAL | Admitting: EMERGENCY MEDICINE
Payer: MEDICARE

## 2024-03-25 ENCOUNTER — TRANSCRIPTION ENCOUNTER (OUTPATIENT)
Age: 24
End: 2024-03-25

## 2024-03-25 VITALS
TEMPERATURE: 98 F | WEIGHT: 296.74 LBS | DIASTOLIC BLOOD PRESSURE: 80 MMHG | OXYGEN SATURATION: 98 % | SYSTOLIC BLOOD PRESSURE: 111 MMHG | HEART RATE: 88 BPM | RESPIRATION RATE: 20 BRPM | HEIGHT: 68 IN

## 2024-03-25 LAB
ALBUMIN SERPL ELPH-MCNC: 4.4 G/DL — SIGNIFICANT CHANGE UP (ref 3.3–5.2)
ALP SERPL-CCNC: 98 U/L — SIGNIFICANT CHANGE UP (ref 40–120)
ALT FLD-CCNC: 233 U/L — HIGH
ANION GAP SERPL CALC-SCNC: 21 MMOL/L — HIGH (ref 5–17)
APTT BLD: 29.5 SEC — SIGNIFICANT CHANGE UP (ref 24.5–35.6)
AST SERPL-CCNC: 135 U/L — HIGH
BASOPHILS # BLD AUTO: 0.02 K/UL — SIGNIFICANT CHANGE UP (ref 0–0.2)
BASOPHILS NFR BLD AUTO: 0.4 % — SIGNIFICANT CHANGE UP (ref 0–2)
BILIRUB SERPL-MCNC: 2.1 MG/DL — HIGH (ref 0.4–2)
BLD GP AB SCN SERPL QL: SIGNIFICANT CHANGE UP
BUN SERPL-MCNC: 8 MG/DL — SIGNIFICANT CHANGE UP (ref 8–20)
CALCIUM SERPL-MCNC: 9.3 MG/DL — SIGNIFICANT CHANGE UP (ref 8.4–10.5)
CHLORIDE SERPL-SCNC: 96 MMOL/L — SIGNIFICANT CHANGE UP (ref 96–108)
CO2 SERPL-SCNC: 25 MMOL/L — SIGNIFICANT CHANGE UP (ref 22–29)
CREAT SERPL-MCNC: 0.85 MG/DL — SIGNIFICANT CHANGE UP (ref 0.5–1.3)
EGFR: 123 ML/MIN/1.73M2 — SIGNIFICANT CHANGE UP
EOSINOPHIL # BLD AUTO: 0.03 K/UL — SIGNIFICANT CHANGE UP (ref 0–0.5)
EOSINOPHIL NFR BLD AUTO: 0.6 % — SIGNIFICANT CHANGE UP (ref 0–6)
GLUCOSE SERPL-MCNC: 83 MG/DL — SIGNIFICANT CHANGE UP (ref 70–99)
HCT VFR BLD CALC: 46.7 % — SIGNIFICANT CHANGE UP (ref 39–50)
HGB BLD-MCNC: 15.4 G/DL — SIGNIFICANT CHANGE UP (ref 13–17)
IMM GRANULOCYTES NFR BLD AUTO: 0.4 % — SIGNIFICANT CHANGE UP (ref 0–0.9)
INR BLD: 1.2 RATIO — HIGH (ref 0.85–1.18)
LIDOCAIN IGE QN: 24 U/L — SIGNIFICANT CHANGE UP (ref 22–51)
LYMPHOCYTES # BLD AUTO: 1.72 K/UL — SIGNIFICANT CHANGE UP (ref 1–3.3)
LYMPHOCYTES # BLD AUTO: 36.5 % — SIGNIFICANT CHANGE UP (ref 13–44)
MCHC RBC-ENTMCNC: 26.1 PG — LOW (ref 27–34)
MCHC RBC-ENTMCNC: 33 GM/DL — SIGNIFICANT CHANGE UP (ref 32–36)
MCV RBC AUTO: 79.3 FL — LOW (ref 80–100)
MONOCYTES # BLD AUTO: 0.52 K/UL — SIGNIFICANT CHANGE UP (ref 0–0.9)
MONOCYTES NFR BLD AUTO: 11 % — SIGNIFICANT CHANGE UP (ref 2–14)
NEUTROPHILS # BLD AUTO: 2.4 K/UL — SIGNIFICANT CHANGE UP (ref 1.8–7.4)
NEUTROPHILS NFR BLD AUTO: 51.1 % — SIGNIFICANT CHANGE UP (ref 43–77)
PLATELET # BLD AUTO: 292 K/UL — SIGNIFICANT CHANGE UP (ref 150–400)
POTASSIUM SERPL-MCNC: 3.6 MMOL/L — SIGNIFICANT CHANGE UP (ref 3.5–5.3)
POTASSIUM SERPL-SCNC: 3.6 MMOL/L — SIGNIFICANT CHANGE UP (ref 3.5–5.3)
PROT SERPL-MCNC: 7.6 G/DL — SIGNIFICANT CHANGE UP (ref 6.6–8.7)
PROTHROM AB SERPL-ACNC: 13.2 SEC — HIGH (ref 9.5–13)
RBC # BLD: 5.89 M/UL — HIGH (ref 4.2–5.8)
RBC # FLD: 14.8 % — HIGH (ref 10.3–14.5)
SODIUM SERPL-SCNC: 141 MMOL/L — SIGNIFICANT CHANGE UP (ref 135–145)
WBC # BLD: 4.71 K/UL — SIGNIFICANT CHANGE UP (ref 3.8–10.5)
WBC # FLD AUTO: 4.71 K/UL — SIGNIFICANT CHANGE UP (ref 3.8–10.5)

## 2024-03-25 PROCEDURE — 93010 ELECTROCARDIOGRAM REPORT: CPT

## 2024-03-25 PROCEDURE — 99285 EMERGENCY DEPT VISIT HI MDM: CPT | Mod: GC

## 2024-03-25 PROCEDURE — 71045 X-RAY EXAM CHEST 1 VIEW: CPT | Mod: 26

## 2024-03-25 RX ORDER — FAMOTIDINE 10 MG/ML
20 INJECTION INTRAVENOUS ONCE
Refills: 0 | Status: COMPLETED | OUTPATIENT
Start: 2024-03-25 | End: 2024-03-25

## 2024-03-25 RX ORDER — SODIUM CHLORIDE 9 MG/ML
2000 INJECTION, SOLUTION INTRAVENOUS ONCE
Refills: 0 | Status: COMPLETED | OUTPATIENT
Start: 2024-03-25 | End: 2024-03-25

## 2024-03-25 RX ORDER — DIATRIZOATE MEGLUMINE 180 MG/ML
30 INJECTION, SOLUTION INTRAVESICAL ONCE
Refills: 0 | Status: COMPLETED | OUTPATIENT
Start: 2024-03-25 | End: 2024-03-25

## 2024-03-25 RX ORDER — MORPHINE SULFATE 50 MG/1
4 CAPSULE, EXTENDED RELEASE ORAL ONCE
Refills: 0 | Status: DISCONTINUED | OUTPATIENT
Start: 2024-03-25 | End: 2024-03-25

## 2024-03-25 RX ORDER — ONDANSETRON 8 MG/1
4 TABLET, FILM COATED ORAL ONCE
Refills: 0 | Status: COMPLETED | OUTPATIENT
Start: 2024-03-25 | End: 2024-03-25

## 2024-03-25 RX ORDER — ACETAMINOPHEN 500 MG
1000 TABLET ORAL ONCE
Refills: 0 | Status: COMPLETED | OUTPATIENT
Start: 2024-03-25 | End: 2024-03-25

## 2024-03-25 RX ADMIN — FAMOTIDINE 20 MILLIGRAM(S): 10 INJECTION INTRAVENOUS at 21:38

## 2024-03-25 RX ADMIN — Medication 400 MILLIGRAM(S): at 21:12

## 2024-03-25 RX ADMIN — DIATRIZOATE MEGLUMINE 30 MILLILITER(S): 180 INJECTION, SOLUTION INTRAVESICAL at 21:38

## 2024-03-25 RX ADMIN — ONDANSETRON 4 MILLIGRAM(S): 8 TABLET, FILM COATED ORAL at 21:13

## 2024-03-25 RX ADMIN — MORPHINE SULFATE 4 MILLIGRAM(S): 50 CAPSULE, EXTENDED RELEASE ORAL at 21:12

## 2024-03-25 RX ADMIN — SODIUM CHLORIDE 2000 MILLILITER(S): 9 INJECTION, SOLUTION INTRAVENOUS at 21:12

## 2024-03-25 NOTE — ED PROVIDER NOTE - CLINICAL SUMMARY MEDICAL DECISION MAKING FREE TEXT BOX
24-year-old male past medical history including morbid obesity, gastric sleeve performed 1 month ago presenting for nausea, vomiting, epigastric pain.    Getting CT abdomen with PO/IV contrast. , labs, will treat symptoms.

## 2024-03-25 NOTE — ED PROVIDER NOTE - OBJECTIVE STATEMENT
24-year-old male past medical history including morbid obesity, gastric sleeve performed 1 month ago presenting for nausea, vomiting, epigastric pain.  Started roughly 2 weeks after his surgery.  Has been getting worse.  Reports he has been feeling lightheaded due to only able to keep down 4 oz of fluid a day.  Not eating.  Attempting milk mixed with banana but unable to tolerate even a protein shake.  Has been here multiple times.  Seen by bariatrics and recommending GI at last visit 6 days ago.  Denying any fever, chills, chest pain, blood in vomit.

## 2024-03-25 NOTE — ED ADULT NURSE NOTE - OBJECTIVE STATEMENT
Pt is AAOX4 but is mentally delayed. Pt mother present at bedside. Pt had a gastric sleeve one month ago and since then has been having nausea, vomiting and epigastric discomfort

## 2024-03-25 NOTE — ED PROVIDER NOTE - ATTENDING CONTRIBUTION TO CARE
24-year-old male past medical history including morbid obesity, gastric sleeve performed 1 month ago at [facility] p/w nausea, vomiting, and unable to tolerate PO. Patient was seen twice already for same thing. Patient had CT scan negative. Patient had outpatient upper GI series that was negative. patient unable to tolerate PO. Lab showed no leukocytosis, LFT and bilirubin up trending from prior. CT and ultrasound showed no acute finding. Patient seen by bariatric, no surgical intervention. patient placed in Obs for GI consult in the morning.

## 2024-03-25 NOTE — ED ADULT TRIAGE NOTE - CHIEF COMPLAINT QUOTE
From home c/o abdominal pain with nausea & vomiting for months, denies any diarrhea, No appetite, unable to tolerate food

## 2024-03-25 NOTE — ED PROVIDER NOTE - PHYSICAL EXAMINATION
General: uncomfortable appearing, NAD  Head: NC, AT  EENT: EOMI, no scleral icterus  Cardiac: RRR, no apparent murmurs, no lower extremity edema  Respiratory: CTABL, no respiratory distress   Abdomen: soft, ND, epigastric tenderness, nonperitonitic  MSK/Vascular: full ROM, soft compartments, warm extremities  Neuro: AAOx3, sensation to light touch intact  Psych: calm, cooperative

## 2024-03-26 DIAGNOSIS — R11.2 NAUSEA WITH VOMITING, UNSPECIFIED: ICD-10-CM

## 2024-03-26 LAB
ALBUMIN SERPL ELPH-MCNC: 3.6 G/DL — SIGNIFICANT CHANGE UP (ref 3.3–5.2)
ALBUMIN SERPL ELPH-MCNC: 4 G/DL — SIGNIFICANT CHANGE UP (ref 3.3–5.2)
ALP SERPL-CCNC: 82 U/L — SIGNIFICANT CHANGE UP (ref 40–120)
ALP SERPL-CCNC: 89 U/L — SIGNIFICANT CHANGE UP (ref 40–120)
ALT FLD-CCNC: 185 U/L — HIGH
ALT FLD-CCNC: 206 U/L — HIGH
AMORPH CRY # UR COMP ASSIST: PRESENT
ANION GAP SERPL CALC-SCNC: 20 MMOL/L — HIGH (ref 5–17)
APPEARANCE UR: ABNORMAL
AST SERPL-CCNC: 106 U/L — HIGH
AST SERPL-CCNC: 119 U/L — HIGH
BACTERIA # UR AUTO: ABNORMAL /HPF
BASOPHILS # BLD AUTO: 0.02 K/UL — SIGNIFICANT CHANGE UP (ref 0–0.2)
BASOPHILS NFR BLD AUTO: 0.5 % — SIGNIFICANT CHANGE UP (ref 0–2)
BILIRUB DIRECT SERPL-MCNC: 1.1 MG/DL — HIGH (ref 0–0.3)
BILIRUB INDIRECT FLD-MCNC: 0.8 MG/DL — SIGNIFICANT CHANGE UP (ref 0.2–1)
BILIRUB SERPL-MCNC: 1.8 MG/DL — SIGNIFICANT CHANGE UP (ref 0.4–2)
BILIRUB SERPL-MCNC: 2.2 MG/DL — HIGH (ref 0.4–2)
BILIRUB UR-MCNC: ABNORMAL
BUN SERPL-MCNC: 7.3 MG/DL — LOW (ref 8–20)
CALCIUM SERPL-MCNC: 8.9 MG/DL — SIGNIFICANT CHANGE UP (ref 8.4–10.5)
CAST: 4 /LPF — SIGNIFICANT CHANGE UP (ref 0–4)
CHLORIDE SERPL-SCNC: 95 MMOL/L — LOW (ref 96–108)
CO2 SERPL-SCNC: 23 MMOL/L — SIGNIFICANT CHANGE UP (ref 22–29)
COLOR SPEC: ABNORMAL
CREAT SERPL-MCNC: 0.76 MG/DL — SIGNIFICANT CHANGE UP (ref 0.5–1.3)
DIFF PNL FLD: ABNORMAL
EGFR: 127 ML/MIN/1.73M2 — SIGNIFICANT CHANGE UP
EOSINOPHIL # BLD AUTO: 0.02 K/UL — SIGNIFICANT CHANGE UP (ref 0–0.5)
EOSINOPHIL NFR BLD AUTO: 0.5 % — SIGNIFICANT CHANGE UP (ref 0–6)
GLUCOSE BLDC GLUCOMTR-MCNC: 86 MG/DL — SIGNIFICANT CHANGE UP (ref 70–99)
GLUCOSE SERPL-MCNC: 91 MG/DL — SIGNIFICANT CHANGE UP (ref 70–99)
GLUCOSE UR QL: NEGATIVE MG/DL — SIGNIFICANT CHANGE UP
HCT VFR BLD CALC: 43.2 % — SIGNIFICANT CHANGE UP (ref 39–50)
HGB BLD-MCNC: 14.3 G/DL — SIGNIFICANT CHANGE UP (ref 13–17)
IMM GRANULOCYTES NFR BLD AUTO: 0.3 % — SIGNIFICANT CHANGE UP (ref 0–0.9)
KETONES UR-MCNC: 80 MG/DL
LEUKOCYTE ESTERASE UR-ACNC: ABNORMAL
LIDOCAIN IGE QN: 20 U/L — LOW (ref 22–51)
LYMPHOCYTES # BLD AUTO: 1.37 K/UL — SIGNIFICANT CHANGE UP (ref 1–3.3)
LYMPHOCYTES # BLD AUTO: 37.5 % — SIGNIFICANT CHANGE UP (ref 13–44)
MCHC RBC-ENTMCNC: 26.5 PG — LOW (ref 27–34)
MCHC RBC-ENTMCNC: 33.1 GM/DL — SIGNIFICANT CHANGE UP (ref 32–36)
MCV RBC AUTO: 80 FL — SIGNIFICANT CHANGE UP (ref 80–100)
MONOCYTES # BLD AUTO: 0.47 K/UL — SIGNIFICANT CHANGE UP (ref 0–0.9)
MONOCYTES NFR BLD AUTO: 12.9 % — SIGNIFICANT CHANGE UP (ref 2–14)
NEUTROPHILS # BLD AUTO: 1.76 K/UL — LOW (ref 1.8–7.4)
NEUTROPHILS NFR BLD AUTO: 48.3 % — SIGNIFICANT CHANGE UP (ref 43–77)
NITRITE UR-MCNC: POSITIVE
PH UR: 6.5 — SIGNIFICANT CHANGE UP (ref 5–8)
PLATELET # BLD AUTO: 276 K/UL — SIGNIFICANT CHANGE UP (ref 150–400)
POTASSIUM SERPL-MCNC: 3.6 MMOL/L — SIGNIFICANT CHANGE UP (ref 3.5–5.3)
POTASSIUM SERPL-SCNC: 3.6 MMOL/L — SIGNIFICANT CHANGE UP (ref 3.5–5.3)
PROT SERPL-MCNC: 6.5 G/DL — LOW (ref 6.6–8.7)
PROT SERPL-MCNC: 7.1 G/DL — SIGNIFICANT CHANGE UP (ref 6.6–8.7)
PROT UR-MCNC: 30 MG/DL
RBC # BLD: 5.4 M/UL — SIGNIFICANT CHANGE UP (ref 4.2–5.8)
RBC # FLD: 15.1 % — HIGH (ref 10.3–14.5)
RBC CASTS # UR COMP ASSIST: 17 /HPF — HIGH (ref 0–4)
SODIUM SERPL-SCNC: 138 MMOL/L — SIGNIFICANT CHANGE UP (ref 135–145)
SP GR SPEC: >1.03 — HIGH (ref 1–1.03)
SQUAMOUS # UR AUTO: 2 /HPF — SIGNIFICANT CHANGE UP (ref 0–5)
UROBILINOGEN FLD QL: 2 MG/DL (ref 0.2–1)
WBC # BLD: 3.65 K/UL — LOW (ref 3.8–10.5)
WBC # FLD AUTO: 3.65 K/UL — LOW (ref 3.8–10.5)
WBC UR QL: 1 /HPF — SIGNIFICANT CHANGE UP (ref 0–5)

## 2024-03-26 PROCEDURE — 74177 CT ABD & PELVIS W/CONTRAST: CPT | Mod: 26,MC

## 2024-03-26 PROCEDURE — 76705 ECHO EXAM OF ABDOMEN: CPT | Mod: 26

## 2024-03-26 PROCEDURE — 93010 ELECTROCARDIOGRAM REPORT: CPT

## 2024-03-26 PROCEDURE — 43235 EGD DIAGNOSTIC BRUSH WASH: CPT | Mod: 59

## 2024-03-26 PROCEDURE — 99223 1ST HOSP IP/OBS HIGH 75: CPT

## 2024-03-26 PROCEDURE — 99223 1ST HOSP IP/OBS HIGH 75: CPT | Mod: FS,25

## 2024-03-26 RX ORDER — KETOROLAC TROMETHAMINE 30 MG/ML
15 SYRINGE (ML) INJECTION ONCE
Refills: 0 | Status: DISCONTINUED | OUTPATIENT
Start: 2024-03-26 | End: 2024-03-26

## 2024-03-26 RX ORDER — MORPHINE SULFATE 50 MG/1
4 CAPSULE, EXTENDED RELEASE ORAL ONCE
Refills: 0 | Status: DISCONTINUED | OUTPATIENT
Start: 2024-03-26 | End: 2024-03-26

## 2024-03-26 RX ORDER — SODIUM CHLORIDE 9 MG/ML
1000 INJECTION, SOLUTION INTRAVENOUS
Refills: 0 | Status: DISCONTINUED | OUTPATIENT
Start: 2024-03-26 | End: 2024-03-30

## 2024-03-26 RX ORDER — ONDANSETRON 8 MG/1
4 TABLET, FILM COATED ORAL ONCE
Refills: 0 | Status: COMPLETED | OUTPATIENT
Start: 2024-03-26 | End: 2024-03-27

## 2024-03-26 RX ORDER — PREGABALIN 225 MG/1
1000 CAPSULE ORAL DAILY
Refills: 0 | Status: DISCONTINUED | OUTPATIENT
Start: 2024-03-26 | End: 2024-03-26

## 2024-03-26 RX ORDER — ONDANSETRON 8 MG/1
4 TABLET, FILM COATED ORAL ONCE
Refills: 0 | Status: COMPLETED | OUTPATIENT
Start: 2024-03-26 | End: 2024-03-26

## 2024-03-26 RX ORDER — PREGABALIN 225 MG/1
1000 CAPSULE ORAL ONCE
Refills: 0 | Status: COMPLETED | OUTPATIENT
Start: 2024-03-26 | End: 2024-03-26

## 2024-03-26 RX ORDER — PANTOPRAZOLE SODIUM 20 MG/1
40 TABLET, DELAYED RELEASE ORAL EVERY 12 HOURS
Refills: 0 | Status: DISCONTINUED | OUTPATIENT
Start: 2024-03-26 | End: 2024-04-04

## 2024-03-26 RX ORDER — ACETAMINOPHEN 500 MG
1000 TABLET ORAL ONCE
Refills: 0 | Status: COMPLETED | OUTPATIENT
Start: 2024-03-26 | End: 2024-03-27

## 2024-03-26 RX ORDER — ACETAMINOPHEN 500 MG
1000 TABLET ORAL ONCE
Refills: 0 | Status: COMPLETED | OUTPATIENT
Start: 2024-03-26 | End: 2024-03-26

## 2024-03-26 RX ADMIN — MORPHINE SULFATE 4 MILLIGRAM(S): 50 CAPSULE, EXTENDED RELEASE ORAL at 00:22

## 2024-03-26 RX ADMIN — ONDANSETRON 4 MILLIGRAM(S): 8 TABLET, FILM COATED ORAL at 04:34

## 2024-03-26 RX ADMIN — SODIUM CHLORIDE 100 MILLILITER(S): 9 INJECTION, SOLUTION INTRAVENOUS at 13:19

## 2024-03-26 RX ADMIN — ONDANSETRON 4 MILLIGRAM(S): 8 TABLET, FILM COATED ORAL at 10:59

## 2024-03-26 RX ADMIN — Medication 400 MILLIGRAM(S): at 21:22

## 2024-03-26 RX ADMIN — Medication 15 MILLIGRAM(S): at 17:00

## 2024-03-26 RX ADMIN — PANTOPRAZOLE SODIUM 40 MILLIGRAM(S): 20 TABLET, DELAYED RELEASE ORAL at 17:00

## 2024-03-26 RX ADMIN — PREGABALIN 1000 MICROGRAM(S): 225 CAPSULE ORAL at 17:00

## 2024-03-26 RX ADMIN — Medication 0.5 MILLIGRAM(S): at 13:16

## 2024-03-26 NOTE — CONSULT NOTE ADULT - ASSESSMENT
- F/U RUQ U/S  A: 23 yo M s/p sleeve gastrectomy 2/2024 who presents with complaints of PO intolerance, nausea and small volume bilious emesis.    Plan:   - Recommend follow up with outpatient surgeon   - No acute surgical intervention at this time     Discussed with Bariatric  Surgeon- Dr. Santamaria A: 25 yo M s/p sleeve gastrectomy 2/2024 who presents with complaints of PO intolerance, nausea and small volume bilious emesis.    Plan:   - No acute surgical intervention at this time   - Will follow if admitted    Discussed with Bariatric  Surgeon- Dr. Santamaria

## 2024-03-26 NOTE — CONSULT NOTE ADULT - ASSESSMENT
23 yo M s/p sleeve gastrectomy 2/2024 who presents with complaints of PO intolerance, nausea and small volume bilious emesis.     Nausea, vomiting, epigastric pain  Unable to tolerate oral intake   CT  A/P wnl, no acute intrabdominal pathology. RUQ U/S with no evidence of cholelithiasis and /or acute cholecystitis. AST//185 , normal T bili, normal INR, albumin, no thrombocytopenia.         25 yo M s/p sleeve gastrectomy 2/2024 who presents with complaints of PO intolerance, nausea and small volume bilious emesis.     Nausea, vomiting, epigastric pain  Unable to tolerate oral intake   S/p sleeve gastrectomy 2/202, symptoms started 2 weeks post procedure. Unable to tolerate oral intake, minimal intake <3 oz at one sitting. NB bilious emesis, reported having blood in the emesis once and is now resolved per patient   CT  A/P wnl, no acute intrabdominal pathology. RUQ U/S with no evidence of cholelithiasis and /or acute cholecystitis. AST//185 , normal T bili, normal INR, albumin, no thrombocytopenia.     Plan:  keep NPO  IV fluids for hydration  Protonix 40 mg IV BID   Monitor renal function, electrolytes.  Replete electrolytes as needed. Avoid hypotension.   Zofran as needed for nausea, and vomiting  Will plan for EGD today pending endo suite logistics.   Avoid Narcotics, last dose of Morphine ~ 1AM this morning. Gastric emptying study ordered for tomorrow for further evaluation ?nerve injury post procedure. Gastric emptying study ordered for tomorrow to give a min of 24 hours post Morphine dose.  Reglan 10 mg every 8 hours around the clock, dose to initiate after gastric emptying study   Plan d/w the patient

## 2024-03-26 NOTE — PATIENT PROFILE ADULT - FALL HARM RISK - UNIVERSAL INTERVENTIONS
Bed in lowest position, wheels locked, appropriate side rails in place/Call bell, personal items and telephone in reach/Instruct patient to call for assistance before getting out of bed or chair/Non-slip footwear when patient is out of bed/Moro to call system/Physically safe environment - no spills, clutter or unnecessary equipment/Purposeful Proactive Rounding/Room/bathroom lighting operational, light cord in reach

## 2024-03-26 NOTE — H&P ADULT - NSHPLABSRESULTS_GEN_ALL_CORE
14.3   3.65  )-----------( 276      ( 26 Mar 2024 11:14 )             43.2   03-25    141  |  96  |  8.0  ----------------------------<  83  3.6   |  25.0  |  0.85    Ca    9.3      25 Mar 2024 21:05  Phos  3.9     03-25  Mg     1.8     03-25    TPro  6.5<L>  /  Alb  3.6  /  TBili  1.8  /  DBili  1.1<H>  /  AST  106<H>  /  ALT  185<H>  /  AlkPhos  82  03-26        < from: CT Abdomen and Pelvis w/ Oral Cont and w/ IV Cont (03.26.24 @ 00:16) >  Expected gastric sleeve postoperative changes.  No bowel obstruction.  Continued mild groundglass opacities in theright lower lobe similar to   prior study.    < end of copied text >

## 2024-03-26 NOTE — CONSULT NOTE ADULT - NS ATTEND AMEND GEN_ALL_CORE FT
I reviewed the upper GI series on [place].  The stomach appears to have a normal configuration post sleeve.  There is rapid emptying from the stomach into the duodenum.  This leads me to believe that the patient may have an element of gastroparesis perhaps from vagal nerve injury intraoperatively.  He also seems quite anxious and some of the symptoms could be supratentorial.  Therefore I will complete his evaluation with an endoscopy today and if unremarkable then a solid and liquid phase gastric emptying study tomorrow.

## 2024-03-26 NOTE — H&P ADULT - NSHPPHYSICALEXAM_GEN_ALL_CORE
Vital Signs Last 24 Hrs  T(C): 36.8 (26 Mar 2024 10:50), Max: 36.8 (25 Mar 2024 19:35)  T(F): 98.3 (26 Mar 2024 10:50), Max: 98.3 (26 Mar 2024 10:50)  HR: 81 (26 Mar 2024 10:50) (78 - 88)  BP: 123/96 (26 Mar 2024 10:50) (111/80 - 127/87)  BP(mean): --  RR: 20 (26 Mar 2024 10:50) (20 - 20)  SpO2: 97% (26 Mar 2024 10:50) (97% - 98%)    Parameters below as of 25 Mar 2024 19:35  Patient On (Oxygen Delivery Method): room air    PHYSICAL EXAM:   General: uncomfortable appearing,   HEENT: mm dry , no scleral icterus  Cardiac: RRR, no apparent murmurs, no lower extremity edema  Respiratory: CTABL, no respiratory distress   Abdomen: soft,  epigastric tenderness, nonperitonitic, not distended , bs present   MSK/Vascular: full ROM, soft compartments, warm extremities  Neuro: AAOx3, sensation to light touch intact, motor grossly intact   Psych: calm, cooperative

## 2024-03-26 NOTE — CONSULT NOTE ADULT - SUBJECTIVE AND OBJECTIVE BOX
Bariatric Surgery Consult    Consulting attending: Dr Santamaria       HPI: 23 yo M s/p sleeve gastrectomy 2/2024     Patient most recently seen in ED on 3/19 with similar complaints.  UGI study performed, wnl. Now with slightly elevated LFTs Tbili/Alk phos/AST/ALT of 2.1/98/135/233 respectively         PAST MEDICAL HISTORY:  Obesity           PAST SURGICAL HISTORY:  Sleeve gastrectomy       MEDICATIONS:        ALLERGIES:  No Known Allergies        VITALS & I/Os:  Vital Signs Last 24 Hrs  T(C): 36.8 (25 Mar 2024 19:35), Max: 36.8 (25 Mar 2024 19:35)  T(F): 98.2 (25 Mar 2024 19:35), Max: 98.2 (25 Mar 2024 19:35)  HR: 88 (25 Mar 2024 19:35) (88 - 88)  BP: 111/80 (25 Mar 2024 19:35) (111/80 - 111/80)  BP(mean): --  RR: 20 (25 Mar 2024 19:35) (20 - 20)  SpO2: 98% (25 Mar 2024 19:35) (98% - 98%)    Parameters below as of 25 Mar 2024 19:35  Patient On (Oxygen Delivery Method): room air        I&O's Summary        PHYSICAL EXAM:  Constitutional: patient resting comfortably in bed, in no acute distress  HEENT: EOMI / PERRL b/l, no active drainage or redness  Neck: No JVD, full ROM without pain  Respiratory: respirations are unlabored, no accessory muscle use, no conversational dyspnea  Cardiovascular: regular rate & rhythm  Gastrointestinal: Abdomen soft, non-tender, non-distended, no rebound tenderness / guarding  Neurological: GCS: 15 (4/5/6). A&O x 3; no gross sensory / motor / coordination deficits  Psychiatric: Normal mood, normal affect  Musculoskeletal: No joint pain, swelling or deformity; no limitation of movement  Vascular:        LABS:                        15.4   4.71  )-----------( 292      ( 25 Mar 2024 21:05 )             46.7     03-25    141  |  96  |  8.0  ----------------------------<  83  3.6   |  25.0  |  0.85    Ca    9.3      25 Mar 2024 21:05  Phos  3.9     03-25  Mg     1.8     03-25    TPro  7.6  /  Alb  4.4  /  TBili  2.1<H>  /  DBili  x   /  AST  135<H>  /  ALT  233<H>  /  AlkPhos  98  03-25    Lactate:    PT/INR - ( 25 Mar 2024 21:05 )   PT: 13.2 sec;   INR: 1.20 ratio         PTT - ( 25 Mar 2024 21:05 )  PTT:29.5 sec          Urinalysis Basic - ( 25 Mar 2024 21:05 )    Color: x / Appearance: x / SG: x / pH: x  Gluc: 83 mg/dL / Ketone: x  / Bili: x / Urobili: x   Blood: x / Protein: x / Nitrite: x   Leuk Esterase: x / RBC: x / WBC x   Sq Epi: x / Non Sq Epi: x / Bacteria: x          IMAGING:  < from: CT Abdomen and Pelvis w/ Oral Cont and w/ IV Cont (03.26.24 @ 00:16) >    FINDINGS:    LOWER CHEST:Continued mild groundglass opacities in the right lower lobe   similar to prior study.    LIVER: Steatosis. Stable left hepatic lobe hypodensities. Consider   nonemergent MR if not performed previously.  BILE DUCTS: Normal caliber.  GALLBLADDER: Within normal limits.  SPLEEN: Within normal limits.  PANCREAS: Within normal limits.  ADRENALS: Within normal limits.  KIDNEYS/URETERS: Within normal limits.    BLADDER: Within normal limits.  REPRODUCTIVE ORGANS: Prostate within normal limits.    BOWEL:Expected gastric sleeve postoperative changes. No acute bowel   inflammatory change or obstruction though evaluation of colonic loops is   limited secondary to inadequate distension. Normal appendix.  PERITONEUM: No ascites.  VESSELS:  Within normal limits.  RETROPERITONEUM: No lymphadenopathy.  ABDOMINAL WALL: Within normal limits.  BONES: Within normal limits.    IMPRESSION:    Expected gastric sleeve postoperative changes.    No bowel obstruction.    Continued mild groundglass opacities in theright lower lobe similar to   prior study.                                                                                 Bariatric Surgery Consult    Consulting attending: Dr Santamaria       HPI: 23 yo M s/p sleeve gastrectomy 2/2024 who presents with complaints of PO intolerance, nausea and small volume bilious emesis.  Denies any fever/chills at this time. Patient most recently seen in ED on 3/19 with similar complaints.  UGI study performed at this time , wnl. Now with slightly elevated LFTs Tbili/Alk phos/AST/ALT of 2.1/98/135/233 respectively. CT  A/P wnl, no acute intrabdominal pathology. RUQ U/S with no evidence of cholelithiasis and /or acute cholecystitis.         PAST MEDICAL HISTORY:  Obesity           PAST SURGICAL HISTORY:  Sleeve gastrectomy       MEDICATIONS:        ALLERGIES:  No Known Allergies        VITALS & I/Os:  Vital Signs Last 24 Hrs  T(C): 36.8 (25 Mar 2024 19:35), Max: 36.8 (25 Mar 2024 19:35)  T(F): 98.2 (25 Mar 2024 19:35), Max: 98.2 (25 Mar 2024 19:35)  HR: 88 (25 Mar 2024 19:35) (88 - 88)  BP: 111/80 (25 Mar 2024 19:35) (111/80 - 111/80)  BP(mean): --  RR: 20 (25 Mar 2024 19:35) (20 - 20)  SpO2: 98% (25 Mar 2024 19:35) (98% - 98%)    Parameters below as of 25 Mar 2024 19:35  Patient On (Oxygen Delivery Method): room air        I&O's Summary        PHYSICAL EXAM:  Constitutional: patient resting comfortably in bed, in no acute distress  Respiratory: respirations are unlabored, no accessory muscle use, no conversational dyspnea  Gastrointestinal: Abdomen soft, TTP in epigastrium,   non-distended, no rebound tenderness / guarding  Neurological: A&O x 3    LABS:                        15.4   4.71  )-----------( 292      ( 25 Mar 2024 21:05 )             46.7     03-25    141  |  96  |  8.0  ----------------------------<  83  3.6   |  25.0  |  0.85    Ca    9.3      25 Mar 2024 21:05  Phos  3.9     03-25  Mg     1.8     03-25    TPro  7.6  /  Alb  4.4  /  TBili  2.1<H>  /  DBili  x   /  AST  135<H>  /  ALT  233<H>  /  AlkPhos  98  03-25    Lactate:    PT/INR - ( 25 Mar 2024 21:05 )   PT: 13.2 sec;   INR: 1.20 ratio         PTT - ( 25 Mar 2024 21:05 )  PTT:29.5 sec          Urinalysis Basic - ( 25 Mar 2024 21:05 )    Color: x / Appearance: x / SG: x / pH: x  Gluc: 83 mg/dL / Ketone: x  / Bili: x / Urobili: x   Blood: x / Protein: x / Nitrite: x   Leuk Esterase: x / RBC: x / WBC x   Sq Epi: x / Non Sq Epi: x / Bacteria: x          IMAGING:  < from: CT Abdomen and Pelvis w/ Oral Cont and w/ IV Cont (03.26.24 @ 00:16) >    FINDINGS:    LOWER CHEST:Continued mild groundglass opacities in the right lower lobe   similar to prior study.    LIVER: Steatosis. Stable left hepatic lobe hypodensities. Consider   nonemergent MR if not performed previously.  BILE DUCTS: Normal caliber.  GALLBLADDER: Within normal limits.  SPLEEN: Within normal limits.  PANCREAS: Within normal limits.  ADRENALS: Within normal limits.  KIDNEYS/URETERS: Within normal limits.    BLADDER: Within normal limits.  REPRODUCTIVE ORGANS: Prostate within normal limits.    BOWEL:Expected gastric sleeve postoperative changes. No acute bowel   inflammatory change or obstruction though evaluation of colonic loops is   limited secondary to inadequate distension. Normal appendix.  PERITONEUM: No ascites.  VESSELS:  Within normal limits.  RETROPERITONEUM: No lymphadenopathy.  ABDOMINAL WALL: Within normal limits.  BONES: Within normal limits.    IMPRESSION:    Expected gastric sleeve postoperative changes.    No bowel obstruction.    Continued mild groundglass opacities in the right lower lobe similar to   prior study.

## 2024-03-26 NOTE — CONSULT NOTE ADULT - ATTENDING COMMENTS
Patient admitted with plan for gastric emptying study  Surgery team to follow Patient admitted to observation unit with plan for gastric emptying study  Surgery team to follow

## 2024-03-26 NOTE — H&P ADULT - ASSESSMENT
24-year-old male past medical history including morbid obesity, anxiety / depression , was previously on topiramate 25 mg po bid and bupropion  qd but patient states he stopped them a while ago prior to surgery because it didnt make him feel good. s/p gastric sleeve performed 1 month ago in end of feb 2024.  now presenting for nausea, vomiting, epigastric pain / intractable / consistent / unable to tolerate anything po.   seen by gi , plan for egd today and gastric emptying study in am       > intractable n/ v / s/p gastric sleeve surgery 1 month ago at [facility]   - < from: CT Abdomen and Pelvis w/ Oral Cont and w/ IV Cont (03.26.24 @ 00:16) >expected gastric sleeve postoperative changes.No bowel obstruction.Continued mild groundglass opacities in theright lower lobe similar to prior study  - < from: US Abdomen Upper Quadrant Right (03.26.24 @ 03:02) >Limited study due to extensive shadowing bowel gas.No evidence of cholelithiasis or cholecystitis. Hepatic steatosis.  - seen by bariatric sx dr. chavarria , no acute interventions ,    - monitor electrolytes/ replete   - has a follow up appointment tomorrow at [facility]   - seen by gi , plan for egd later today   - plan for gastric emptying study in am , NO NARCOTICS/ REGLAN  FOR 24 HOURS   - keep npo   - ivf   - zofran prn   - iv ppis   - unable to tolerate any pills but can take liquids   - will add ativan prn iv     >dehydration / elevated agap / likely due to n/v  - ivf   - repeat labs   - monitor     > hepatic steatosis   - noted on  imaging   - monitor lfts     >hx of anxiety / depression   - was previously on topiramate 25 mg po bid and bupropion  qd  patient states he stopped them a while ago prior to surgery because it didnt make him feel good.   - has not followed with his psychiatrist for couple of months   - will need op follow up with      > dvt ppx: scds     > plan of care discussed with patient, mother , aunt , gi

## 2024-03-26 NOTE — CONSULT NOTE ADULT - SUBJECTIVE AND OBJECTIVE BOX
HISTORY OF PRESENT ILLNESS: 23 yo M s/p sleeve gastrectomy 2/2024 who presents with complaints of PO intolerance, nausea and small volume bilious emesis.  Patient reports worsening symptoms which started 2 weeks after sleeve gastrectomy. He reports difficulty tolerating thick liquids (smoothie) and thin liquids, unable to tolerate 3 oz of liquids at one sitting. He denies  fever, chills, diarrhea, constipation. Last BM 4 days ago. Patient was recently seen in ED on 3/19 with similar complaints and his UGI study was reported normal at that time.   CT  A/P wnl, no acute intrabdominal pathology. RUQ U/S with no evidence of cholelithiasis and /or acute cholecystitis. AST//185 , normal T bili, normal INR, albumin, no thrombocytopenia.     Review of Systems:  . Constitutional: No fever, chills  . HEENT: Negative  · Respiratory and Thorax: No shortness of breath, no cough  · Cardiovascular: No chest pain, palpitation, no dizziness  · Gastrointestinal: see above  · Genitourinary: No hematuria  · Musculoskeletal: Negative  · Neurological: negative  · Psychiatric: no agitation, no anxiety      PAST MEDICAL/SURGICAL HISTORY:  S/P gastric sleeve procedure      SOCIAL HISTORY:  - TOBACCO: Denies  - ALCOHOL: Denies  - ILLICIT DRUG USE: Denies    FAMILY HISTORY:  No known history of gastrointestinal or liver disease;      HOME MEDICATIONS:    INPATIENT MEDICATIONS:  MEDICATIONS  (STANDING):  dextrose 5% + lactated ringers. 1000 milliLiter(s) (100 mL/Hr) IV Continuous <Continuous>    MEDICATIONS  (PRN):    ALLERGIES:  No Known Allergies    T(C): 36.4 (03-26-24 @ 08:21), Max: 36.8 (03-25-24 @ 19:35)  HR: 78 (03-26-24 @ 08:21) (78 - 88)  BP: 127/87 (03-26-24 @ 08:21) (111/80 - 127/87)  RR: 20 (03-26-24 @ 08:21) (20 - 20)  SpO2: 97% (03-26-24 @ 08:21) (97% - 98%)      PHYSICAL EXAM:    Constitutional: in no acute distress  Neuro: Awake alert, oriented  HEENT: anicteric sclerae  CV: regular rate, regular rhythm  Pulm/chest: lung sounds diminished bilaterally, no accessory muscle use noted  Abd: soft, obese, +epigastric tenderness, nontender, nondistended +bowel sounds. No rigidity, rebound tenderness, or guarding  Ext: no edema  Skin: warm, no jaundice   Psych: calm, cooperative      LABS:             15.4   4.71  )-----------( 292      ( 03-25 @ 21:05 )             46.7       PT/INR - ( 25 Mar 2024 21:05 )   PT: 13.2 sec;   INR: 1.20 ratio         PTT - ( 25 Mar 2024 21:05 )  PTT:29.5 sec  03-25    141  |  96  |  8.0  ----------------------------<  83  3.6   |  25.0  |  0.85    Ca    9.3      25 Mar 2024 21:05  Phos  3.9     03-25  Mg     1.8     03-25    TPro  6.5<L>  /  Alb  3.6  /  TBili  1.8  /  DBili  1.1<H>  /  AST  106<H>  /  ALT  185<H>  /  AlkPhos  82  03-26    LIVER FUNCTIONS - ( 26 Mar 2024 03:55 )  Alb: 3.6 g/dL / Pro: 6.5 g/dL / ALK PHOS: 82 U/L / ALT: 185 U/L / AST: 106 U/L / GGT: x             Lipase: 24 U/L (03-25-24 @ 21:05)      Urinalysis Basic - ( 25 Mar 2024 21:05 )    Color: x / Appearance: x / SG: x / pH: x  Gluc: 83 mg/dL / Ketone: x  / Bili: x / Urobili: x   Blood: x / Protein: x / Nitrite: x   Leuk Esterase: x / RBC: x / WBC x   Sq Epi: x / Non Sq Epi: x / Bacteria: x      < from: US Abdomen Upper Quadrant Right (03.26.24 @ 03:02) >  FINDINGS:  Liver: Homogeneously increased echotexture.  Bile ducts: Normal caliber. Common bile duct measures 3 mm.  Gallbladder: Within normal limits. No cholelithiasis, wall thickening or   pericholecystic fluid.  Pancreas: For the visualized due to shadowing bowel gas.  Right kidney: 11.8 cm. No hydronephrosis.  Ascites: None.  IVC: Visualized portions are within normal limits.    IMPRESSION:  Limited study due to extensive shadowing bowel gas.    No evidence of cholelithiasis or cholecystitis. Hepatic steatosis.    < end of copied text >      < from: CT Abdomen and Pelvis w/ Oral Cont and w/ IV Cont (03.26.24 @ 00:16) >  FINDINGS:    LOWER CHEST:Continued mild groundglass opacities in the right lower lobe   similar to prior study.    LIVER: Steatosis. Stable left hepatic lobe hypodensities. Consider   nonemergent MR if not performed previously.  BILE DUCTS: Normal caliber.  GALLBLADDER: Within normal limits.  SPLEEN: Within normal limits.  PANCREAS: Within normal limits.  ADRENALS: Within normal limits.  KIDNEYS/URETERS: Within normal limits.    BLADDER: Within normal limits.  REPRODUCTIVE ORGANS: Prostate within normal limits.    BOWEL: Expected gastric sleeve postoperative changes. No acute bowel   inflammatory change or obstruction though evaluation of colonic loops is   limited secondary to inadequate distension. Normal appendix.  PERITONEUM: No ascites.  VESSELS:  Within normal limits.  RETROPERITONEUM: No lymphadenopathy.  ABDOMINAL WALL: Within normal limits.  BONES: Within normal limits.    IMPRESSION:    Expected gastric sleeve postoperative changes.    No bowel obstruction.    Continued mild ground glass opacities in the right lower lobe similar to   prior study.    Additional findings as mentioned above.    < end of copied text >   HISTORY OF PRESENT ILLNESS: 25 yo M s/p sleeve gastrectomy 2/2024 who presents with complaints of PO intolerance, nausea and volume bilious emesis.  Patient reports worsening symptoms which started 2 weeks after sleeve gastrectomy. He reports difficulty tolerating thick liquids (smoothie) and thin liquids, unable to tolerate 3 oz of liquids at one sitting. Patient denies similar symptoms prior to surgery. He denies  fever, chills, diarrhea, constipation. Last BM 4 days ago. Patient was recently seen in ED on 3/19 with similar complaints and his UGI study was reported normal at that time.   In ED, CT  A/P wnl, no acute intrabdominal pathology. RUQ U/S with no evidence of cholelithiasis and /or acute cholecystitis. AST//185 , normal T bili, normal INR, albumin, no thrombocytopenia.     Review of Systems:  . Constitutional: No fever, chills  . HEENT: Negative  · Respiratory and Thorax: No shortness of breath, no cough  · Cardiovascular: No chest pain, palpitation, no dizziness  · Gastrointestinal: see above  · Genitourinary: No hematuria  · Musculoskeletal: Negative  · Neurological: negative  · Psychiatric: no agitation, no anxiety      PAST MEDICAL/SURGICAL HISTORY:  S/P gastric sleeve procedure      SOCIAL HISTORY:  - TOBACCO: Denies  - ALCOHOL: Denies  - ILLICIT DRUG USE: Denies    FAMILY HISTORY:  No known history of gastrointestinal or liver disease;      HOME MEDICATIONS:    INPATIENT MEDICATIONS:  MEDICATIONS  (STANDING):  dextrose 5% + lactated ringers. 1000 milliLiter(s) (100 mL/Hr) IV Continuous <Continuous>    MEDICATIONS  (PRN):    ALLERGIES:  No Known Allergies    T(C): 36.4 (03-26-24 @ 08:21), Max: 36.8 (03-25-24 @ 19:35)  HR: 78 (03-26-24 @ 08:21) (78 - 88)  BP: 127/87 (03-26-24 @ 08:21) (111/80 - 127/87)  RR: 20 (03-26-24 @ 08:21) (20 - 20)  SpO2: 97% (03-26-24 @ 08:21) (97% - 98%)      PHYSICAL EXAM:    Constitutional: in no acute distress  Neuro: Awake alert, oriented  HEENT: anicteric sclerae  CV: regular rate, regular rhythm  Pulm/chest: lung sounds diminished bilaterally, no accessory muscle use noted  Abd: soft, obese, +epigastric tenderness, nontender, nondistended +bowel sounds. No rigidity, rebound tenderness, or guarding  Ext: no edema  Skin: warm, no jaundice   Psych: calm, cooperative      LABS:             15.4   4.71  )-----------( 292      ( 03-25 @ 21:05 )             46.7       PT/INR - ( 25 Mar 2024 21:05 )   PT: 13.2 sec;   INR: 1.20 ratio         PTT - ( 25 Mar 2024 21:05 )  PTT:29.5 sec  03-25    141  |  96  |  8.0  ----------------------------<  83  3.6   |  25.0  |  0.85    Ca    9.3      25 Mar 2024 21:05  Phos  3.9     03-25  Mg     1.8     03-25    TPro  6.5<L>  /  Alb  3.6  /  TBili  1.8  /  DBili  1.1<H>  /  AST  106<H>  /  ALT  185<H>  /  AlkPhos  82  03-26    LIVER FUNCTIONS - ( 26 Mar 2024 03:55 )  Alb: 3.6 g/dL / Pro: 6.5 g/dL / ALK PHOS: 82 U/L / ALT: 185 U/L / AST: 106 U/L / GGT: x             Lipase: 24 U/L (03-25-24 @ 21:05)      Urinalysis Basic - ( 25 Mar 2024 21:05 )    Color: x / Appearance: x / SG: x / pH: x  Gluc: 83 mg/dL / Ketone: x  / Bili: x / Urobili: x   Blood: x / Protein: x / Nitrite: x   Leuk Esterase: x / RBC: x / WBC x   Sq Epi: x / Non Sq Epi: x / Bacteria: x      < from: US Abdomen Upper Quadrant Right (03.26.24 @ 03:02) >  FINDINGS:  Liver: Homogeneously increased echotexture.  Bile ducts: Normal caliber. Common bile duct measures 3 mm.  Gallbladder: Within normal limits. No cholelithiasis, wall thickening or   pericholecystic fluid.  Pancreas: For the visualized due to shadowing bowel gas.  Right kidney: 11.8 cm. No hydronephrosis.  Ascites: None.  IVC: Visualized portions are within normal limits.    IMPRESSION:  Limited study due to extensive shadowing bowel gas.    No evidence of cholelithiasis or cholecystitis. Hepatic steatosis.    < end of copied text >      < from: CT Abdomen and Pelvis w/ Oral Cont and w/ IV Cont (03.26.24 @ 00:16) >  FINDINGS:    LOWER CHEST:Continued mild groundglass opacities in the right lower lobe   similar to prior study.    LIVER: Steatosis. Stable left hepatic lobe hypodensities. Consider   nonemergent MR if not performed previously.  BILE DUCTS: Normal caliber.  GALLBLADDER: Within normal limits.  SPLEEN: Within normal limits.  PANCREAS: Within normal limits.  ADRENALS: Within normal limits.  KIDNEYS/URETERS: Within normal limits.    BLADDER: Within normal limits.  REPRODUCTIVE ORGANS: Prostate within normal limits.    BOWEL: Expected gastric sleeve postoperative changes. No acute bowel   inflammatory change or obstruction though evaluation of colonic loops is   limited secondary to inadequate distension. Normal appendix.  PERITONEUM: No ascites.  VESSELS:  Within normal limits.  RETROPERITONEUM: No lymphadenopathy.  ABDOMINAL WALL: Within normal limits.  BONES: Within normal limits.    IMPRESSION:    Expected gastric sleeve postoperative changes.    No bowel obstruction.    Continued mild ground glass opacities in the right lower lobe similar to   prior study.    Additional findings as mentioned above.    < end of copied text >

## 2024-03-26 NOTE — ED CDU PROVIDER DISPOSITION NOTE - ATTENDING CONTRIBUTION TO CARE
On observation for management of intractable vomiting and difficulty eating.  Seen by bariatric surgery and cleared.  GI consultation recommending nuclear gastric emptying study.  Admitted to hospital for IV fluid hydration and continued workup

## 2024-03-26 NOTE — H&P ADULT - HISTORY OF PRESENT ILLNESS
24-year-old male past medical history including morbid obesity, anxiety / depression , was previously on topiramate 25 mg po bid and bupropion  qd but patient states he stopped them a while ago prior to surgery because it didnt make him feel good. s/p gastric sleeve performed 1 month ago in end of feb 2024.  now presenting for nausea, vomiting, epigastric pain / intractable / consistent / unable to tolerate anything po. states Started roughly 2 weeks after his surgery.  Has been getting worse.  Reports he has been feeling lightheaded due to only able to keep down 4 oz of fluid a day.  Not eating.  Attempting milk mixed with banana but unable to tolerate even a protein shake.  Has been here multiple times.  Seen by bariatrics and recommending GI at last visit 6 days ago.  Denying any fever, chills, chest pain, blood in vomit. saw his  bariatric surgeon  couple of weeks ago at [facility]  , no concern for any perf as per patient. has a follow up appointment tomorrow at [facility].   mother at bedside / aunt on phone

## 2024-03-26 NOTE — ED CDU PROVIDER INITIAL DAY NOTE - CLINICAL SUMMARY MEDICAL DECISION MAKING FREE TEXT BOX
24-year-old male past medical history including morbid obesity, gastric sleeve performed 1 month ago at [facility] p/w nausea, vomiting, and unable to tolerate PO. Patient was seen twice already for same thing. Patient had CT scan negative. Patient had outpatient upper GI series that was negative. patient unable to tolerate PO. Lab showed no leukocytosis, LFT and bilirubin up trending from prior. CT and ultrasound showed no acute finding. Patient seen by bariatric, no surgical intervention. patient placed in Obs for GI consult in the morning.

## 2024-03-26 NOTE — ED CDU PROVIDER DISPOSITION NOTE - CLINICAL COURSE
Patient s/p gastric sleeve surgery x 1 month ago, here for intractable vomiting, inability to tolerate PO, labs indicative of elevated anion gap.  Seen by bariatric surgery and GI, plan for NM gastric emptying study 3/17.  Will plan for IVF hydration, rpt labs, IV zofran.  Of note patient has been in ED total of three visits this month with similar complaints.  CT and US unremarkable for any post operative complications.  As per patient after surgery went into urinary retention and developed UTI and PNA, treated with antibiotics.

## 2024-03-27 LAB
ALBUMIN SERPL ELPH-MCNC: 3.6 G/DL — SIGNIFICANT CHANGE UP (ref 3.3–5.2)
ALP SERPL-CCNC: 77 U/L — SIGNIFICANT CHANGE UP (ref 40–120)
ALT FLD-CCNC: 177 U/L — HIGH
ANION GAP SERPL CALC-SCNC: 12 MMOL/L — SIGNIFICANT CHANGE UP (ref 5–17)
APPEARANCE UR: CLEAR — SIGNIFICANT CHANGE UP
AST SERPL-CCNC: 94 U/L — HIGH
BILIRUB SERPL-MCNC: 1.9 MG/DL — SIGNIFICANT CHANGE UP (ref 0.4–2)
BILIRUB UR-MCNC: ABNORMAL
BUN SERPL-MCNC: 6.7 MG/DL — LOW (ref 8–20)
CALCIUM SERPL-MCNC: 8.5 MG/DL — SIGNIFICANT CHANGE UP (ref 8.4–10.5)
CHLORIDE SERPL-SCNC: 98 MMOL/L — SIGNIFICANT CHANGE UP (ref 96–108)
CO2 SERPL-SCNC: 28 MMOL/L — SIGNIFICANT CHANGE UP (ref 22–29)
COLOR SPEC: SIGNIFICANT CHANGE UP
CREAT SERPL-MCNC: 0.77 MG/DL — SIGNIFICANT CHANGE UP (ref 0.5–1.3)
CULTURE RESULTS: NO GROWTH — SIGNIFICANT CHANGE UP
DIFF PNL FLD: ABNORMAL
EGFR: 127 ML/MIN/1.73M2 — SIGNIFICANT CHANGE UP
GLUCOSE SERPL-MCNC: 119 MG/DL — HIGH (ref 70–99)
GLUCOSE UR QL: NEGATIVE MG/DL — SIGNIFICANT CHANGE UP
HCT VFR BLD CALC: 39.7 % — SIGNIFICANT CHANGE UP (ref 39–50)
HGB BLD-MCNC: 13.3 G/DL — SIGNIFICANT CHANGE UP (ref 13–17)
KETONES UR-MCNC: 15 MG/DL
LEUKOCYTE ESTERASE UR-ACNC: ABNORMAL
MAGNESIUM SERPL-MCNC: 1.8 MG/DL — SIGNIFICANT CHANGE UP (ref 1.8–2.6)
MCHC RBC-ENTMCNC: 26.8 PG — LOW (ref 27–34)
MCHC RBC-ENTMCNC: 33.5 GM/DL — SIGNIFICANT CHANGE UP (ref 32–36)
MCV RBC AUTO: 80 FL — SIGNIFICANT CHANGE UP (ref 80–100)
NITRITE UR-MCNC: POSITIVE
PH UR: 7 — SIGNIFICANT CHANGE UP (ref 5–8)
PHOSPHATE SERPL-MCNC: 4.4 MG/DL — SIGNIFICANT CHANGE UP (ref 2.4–4.7)
PLATELET # BLD AUTO: 214 K/UL — SIGNIFICANT CHANGE UP (ref 150–400)
POTASSIUM SERPL-MCNC: 3 MMOL/L — LOW (ref 3.5–5.3)
POTASSIUM SERPL-SCNC: 3 MMOL/L — LOW (ref 3.5–5.3)
PROT SERPL-MCNC: 6.1 G/DL — LOW (ref 6.6–8.7)
PROT UR-MCNC: SIGNIFICANT CHANGE UP MG/DL
RBC # BLD: 4.96 M/UL — SIGNIFICANT CHANGE UP (ref 4.2–5.8)
RBC # FLD: 15 % — HIGH (ref 10.3–14.5)
SODIUM SERPL-SCNC: 138 MMOL/L — SIGNIFICANT CHANGE UP (ref 135–145)
SP GR SPEC: 1.02 — SIGNIFICANT CHANGE UP (ref 1–1.03)
SPECIMEN SOURCE: SIGNIFICANT CHANGE UP
UROBILINOGEN FLD QL: 4 MG/DL (ref 0.2–1)
WBC # BLD: 3.64 K/UL — LOW (ref 3.8–10.5)
WBC # FLD AUTO: 3.64 K/UL — LOW (ref 3.8–10.5)

## 2024-03-27 PROCEDURE — 99232 SBSQ HOSP IP/OBS MODERATE 35: CPT

## 2024-03-27 PROCEDURE — 99233 SBSQ HOSP IP/OBS HIGH 50: CPT

## 2024-03-27 PROCEDURE — 99231 SBSQ HOSP IP/OBS SF/LOW 25: CPT | Mod: FS

## 2024-03-27 RX ORDER — LIDOCAINE HCL 20 MG/ML
5 VIAL (ML) INJECTION ONCE
Refills: 0 | Status: COMPLETED | OUTPATIENT
Start: 2024-03-27 | End: 2024-03-27

## 2024-03-27 RX ORDER — CEFTRIAXONE 500 MG/1
INJECTION, POWDER, FOR SOLUTION INTRAMUSCULAR; INTRAVENOUS
Refills: 0 | Status: DISCONTINUED | OUTPATIENT
Start: 2024-03-27 | End: 2024-03-27

## 2024-03-27 RX ORDER — CEFTRIAXONE 500 MG/1
1000 INJECTION, POWDER, FOR SOLUTION INTRAMUSCULAR; INTRAVENOUS ONCE
Refills: 0 | Status: COMPLETED | OUTPATIENT
Start: 2024-03-27 | End: 2024-03-27

## 2024-03-27 RX ORDER — LIDOCAINE 4 G/100G
1 CREAM TOPICAL ONCE
Refills: 0 | Status: DISCONTINUED | OUTPATIENT
Start: 2024-03-27 | End: 2024-03-27

## 2024-03-27 RX ORDER — POTASSIUM CHLORIDE 20 MEQ
10 PACKET (EA) ORAL
Refills: 0 | Status: COMPLETED | OUTPATIENT
Start: 2024-03-27 | End: 2024-03-27

## 2024-03-27 RX ORDER — TAMSULOSIN HYDROCHLORIDE 0.4 MG/1
0.4 CAPSULE ORAL AT BEDTIME
Refills: 0 | Status: DISCONTINUED | OUTPATIENT
Start: 2024-03-27 | End: 2024-04-04

## 2024-03-27 RX ORDER — ONDANSETRON 8 MG/1
4 TABLET, FILM COATED ORAL ONCE
Refills: 0 | Status: COMPLETED | OUTPATIENT
Start: 2024-03-28 | End: 2024-03-28

## 2024-03-27 RX ORDER — CEFTRIAXONE 500 MG/1
1000 INJECTION, POWDER, FOR SOLUTION INTRAMUSCULAR; INTRAVENOUS EVERY 24 HOURS
Refills: 0 | Status: COMPLETED | OUTPATIENT
Start: 2024-03-28 | End: 2024-03-30

## 2024-03-27 RX ORDER — CEFTRIAXONE 500 MG/1
INJECTION, POWDER, FOR SOLUTION INTRAMUSCULAR; INTRAVENOUS
Refills: 0 | Status: COMPLETED | OUTPATIENT
Start: 2024-03-27 | End: 2024-03-31

## 2024-03-27 RX ORDER — ONDANSETRON 8 MG/1
4 TABLET, FILM COATED ORAL EVERY 4 HOURS
Refills: 0 | Status: DISCONTINUED | OUTPATIENT
Start: 2024-03-27 | End: 2024-04-04

## 2024-03-27 RX ORDER — PROCHLORPERAZINE MALEATE 5 MG
5 TABLET ORAL EVERY 4 HOURS
Refills: 0 | Status: DISCONTINUED | OUTPATIENT
Start: 2024-03-27 | End: 2024-03-28

## 2024-03-27 RX ADMIN — Medication 100 MILLIEQUIVALENT(S): at 13:03

## 2024-03-27 RX ADMIN — PANTOPRAZOLE SODIUM 40 MILLIGRAM(S): 20 TABLET, DELAYED RELEASE ORAL at 17:57

## 2024-03-27 RX ADMIN — SODIUM CHLORIDE 100 MILLILITER(S): 9 INJECTION, SOLUTION INTRAVENOUS at 07:43

## 2024-03-27 RX ADMIN — ONDANSETRON 4 MILLIGRAM(S): 8 TABLET, FILM COATED ORAL at 22:45

## 2024-03-27 RX ADMIN — CEFTRIAXONE 1000 MILLIGRAM(S): 500 INJECTION, POWDER, FOR SOLUTION INTRAMUSCULAR; INTRAVENOUS at 13:06

## 2024-03-27 RX ADMIN — Medication 100 MILLIEQUIVALENT(S): at 11:10

## 2024-03-27 RX ADMIN — ONDANSETRON 4 MILLIGRAM(S): 8 TABLET, FILM COATED ORAL at 09:56

## 2024-03-27 RX ADMIN — Medication 5 MILLILITER(S): at 13:03

## 2024-03-27 RX ADMIN — ONDANSETRON 4 MILLIGRAM(S): 8 TABLET, FILM COATED ORAL at 00:18

## 2024-03-27 RX ADMIN — Medication 100 MILLIEQUIVALENT(S): at 15:11

## 2024-03-27 RX ADMIN — Medication 0.5 MILLIGRAM(S): at 11:38

## 2024-03-27 RX ADMIN — Medication 400 MILLIGRAM(S): at 10:38

## 2024-03-27 RX ADMIN — SODIUM CHLORIDE 100 MILLILITER(S): 9 INJECTION, SOLUTION INTRAVENOUS at 22:44

## 2024-03-27 RX ADMIN — PANTOPRAZOLE SODIUM 40 MILLIGRAM(S): 20 TABLET, DELAYED RELEASE ORAL at 04:56

## 2024-03-27 NOTE — PROGRESS NOTE ADULT - ASSESSMENT
25 y/o M s/p sleeve gastrectomy 2/2024 who presents with complaints of PO intolerance, nausea and small volume bilious emesis.     #Nausea, vomiting, epigastric pain  #S/P sleeve gastrectomy 2/2024   #Elevated liver enzymes   CT A/P w/ PO and IV Cont (03.26.24)- Stable left hepatic lobe hypodensities. Expected gastric sleeve postoperative changes.  US Abdomen Upper Quadrant Right (03.26.24)- Hepatic steatosis.  EGD (03.26.24)- normal mucosa in esophagus/duodenum, evidence of band gastroplasty in stomach.     - Will attempt NM gastric emptying study tomorrow 3/28/24. Please give 7am dose of Zofran prior to study   - IV fluids for hydration  - PPI BID  - Monitor renal function, electrolytes.  Replete electrolytes as needed. Avoid hypotension.   - Zofran as needed for nausea, and vomiting  - Avoid Narcotics  - Reglan 10 mg every 8 hours standing, dose to initiate after gastric emptying study   - Trend LFTs. Avoid hepatotoxins  - MRI as an outpatient to assess liver findings     #Hepatic steatosis   - Trend LFTs  - Maintain normal BMI  - Alcohol abstinence  - Low fat diet (when applicable)   - Follow up with hepatologist Dr. Johnson for further assessment and monitoring   _________________________________________________________________  Assessment and recommendations are final when note is signed by the attending physician.

## 2024-03-27 NOTE — PROGRESS NOTE ADULT - SUBJECTIVE AND OBJECTIVE BOX
CC: Follow up     INTERVAL HPI/OVERNIGHT EVENTS: Patient seen and examined, complaints of nausea and vomiting overnight. Abdominal pain. Urinary retention >450 with difficulty urinating and penile discharge      Vital Signs Last 24 Hrs  T(C): 36.5 (27 Mar 2024 10:41), Max: 36.9 (26 Mar 2024 16:44)  T(F): 97.7 (27 Mar 2024 10:41), Max: 98.5 (26 Mar 2024 16:44)  HR: 71 (27 Mar 2024 10:41) (57 - 84)  BP: 157/89 (27 Mar 2024 10:41) (115/66 - 157/89)  BP(mean): --  RR: 18 (27 Mar 2024 10:41) (18 - 20)  SpO2: 98% (27 Mar 2024 10:41) (94% - 99%)    Parameters below as of 27 Mar 2024 10:41  Patient On (Oxygen Delivery Method): room air        PHYSICAL EXAM:    GENERAL: NAD, AOX3  HEAD:  Atraumatic, Normocephalic  EYES: conjunctiva and sclera clear  ENMT: Moist mucous membranes  CHEST/LUNG: Clear to auscultation bilaterally; No rales, rhonchi, wheezing, or rubs  HEART: Regular rate and rhythm; No murmurs, rubs, or gallops  ABDOMEN: Soft, Suprapubic tenderness , Nondistended; Bowel sounds present  EXTREMITIES:  2+ Peripheral Pulses, No clubbing, cyanosis, or edema        MEDICATIONS  (STANDING):  dextrose 5% + lactated ringers. 1000 milliLiter(s) (100 mL/Hr) IV Continuous <Continuous>  lidocaine 2% Jelly 5 milliLiter(s) IntraUrethral once  pantoprazole  Injectable 40 milliGRAM(s) IV Push every 12 hours  potassium chloride  10 mEq/100 mL IVPB 10 milliEquivalent(s) IV Intermittent every 1 hour  tamsulosin 0.4 milliGRAM(s) Oral at bedtime    MEDICATIONS  (PRN):  LORazepam   Injectable 0.5 milliGRAM(s) IV Push every 8 hours PRN Anxiety  ondansetron Injectable 4 milliGRAM(s) IV Push every 4 hours PRN Nausea and/or Vomiting  prochlorperazine   Injectable 5 milliGRAM(s) IntraMuscular every 4 hours PRN Nausea/vomiting resistant to zofran      Allergies    No Known Allergies    Intolerances          LABS:                          13.3   3.64  )-----------( 214      ( 27 Mar 2024 08:44 )             39.7     03-27    138  |  98  |  6.7<L>  ----------------------------<  119<H>  3.0<L>   |  28.0  |  0.77    Ca    8.5      27 Mar 2024 08:44  Phos  4.4     03-27  Mg     1.8     03-27    TPro  6.1<L>  /  Alb  3.6  /  TBili  1.9  /  DBili  x   /  AST  94<H>  /  ALT  177<H>  /  AlkPhos  77  03-27    PT/INR - ( 25 Mar 2024 21:05 )   PT: 13.2 sec;   INR: 1.20 ratio         PTT - ( 25 Mar 2024 21:05 )  PTT:29.5 sec  Urinalysis Basic - ( 27 Mar 2024 08:44 )    Color: x / Appearance: x / SG: x / pH: x  Gluc: 119 mg/dL / Ketone: x  / Bili: x / Urobili: x   Blood: x / Protein: x / Nitrite: x   Leuk Esterase: x / RBC: x / WBC x   Sq Epi: x / Non Sq Epi: x / Bacteria: x        RADIOLOGY & ADDITIONAL TESTS:

## 2024-03-27 NOTE — PROGRESS NOTE ADULT - SUBJECTIVE AND OBJECTIVE BOX
Subjective: Patient seen and examined at bedside, + multiple episodes of vomiting overnight.     MEDICATIONS  (STANDING):  acetaminophen   IVPB .. 1000 milliGRAM(s) IV Intermittent once  dextrose 5% + lactated ringers. 1000 milliLiter(s) (100 mL/Hr) IV Continuous <Continuous>  pantoprazole  Injectable 40 milliGRAM(s) IV Push every 12 hours    MEDICATIONS  (PRN):  LORazepam   Injectable 0.5 milliGRAM(s) IV Push every 8 hours PRN Anxiety  ondansetron Injectable 4 milliGRAM(s) IV Push every 4 hours PRN Nausea and/or Vomiting  prochlorperazine   Injectable 5 milliGRAM(s) IntraMuscular every 4 hours PRN Nausea/vomiting resistant to zofran    Vital Signs Last 24 Hrs  T(C): 36.6 (27 Mar 2024 05:10), Max: 36.9 (26 Mar 2024 16:44)  T(F): 97.9 (27 Mar 2024 05:10), Max: 98.5 (26 Mar 2024 16:44)  HR: 57 (27 Mar 2024 05:10) (57 - 84)  BP: 128/82 (27 Mar 2024 05:10) (115/66 - 143/86)  BP(mean): --  RR: 18 (27 Mar 2024 05:10) (18 - 20)  SpO2: 95% (27 Mar 2024 05:10) (94% - 99%)  Parameters below as of 27 Mar 2024 05:10  Patient On (Oxygen Delivery Method): room air    Physical Exam:  Constitutional: NAD  HEENT: PERRL, EOMI  Respiratory: Respirations non-labored, no accessory muscle use  Gastrointestinal: Soft, + epigastric tenderness, non-distended  Extremities: Moves all 4 extremities     LABS:  3/27 labs pending     A: Patient is a 23 yo M with a hx of sleeve gastrectomy, with po intolerance.     Plan:   Gastric emptying study today   NPO, IVFs

## 2024-03-27 NOTE — PROGRESS NOTE ADULT - ASSESSMENT
The patient is a 24 year old male with a past medical history of obesity, anxiety and depression status post recent Gastric sleeve surgery who presented to the Er with complaints of abdominal pain, nausea vomiting and inability to tolerate PO. Noted to have transaminitis in the ER. CT of the abdomen and pelvis with no acute findings. RUQ was negative. Seen by GI, status post EGD normal.     Assessment/Plan:    1. Abdominal pain  - Post gastric sleeve 1 month prior  - CT abdomen and pelvis negative  - RUQ ultrasound negative  - EGD normal  - Seen by Bariatric surgery; no acute intervention   - Gastric emptying study cancelled this morning, unable to tolerate PO contrast. Rescheduled for tomorrow ( no narcotics/reglan 24 hours prior)   NPO after midnight   - IV PPI    2 Anion gap metabolic acidosis  - Secondary to hypovolemia  - Improved with IVF  - Monitor CMP    3. Acute urinary retention  - Suspected secondary to UTI  - Rodriguez placement, UA and Urine culture  - Start Empiric IV Rocephin  - Was recently prescribed antibiotics by urology however due to N/V was unable to complete course     4. Anxiety and depression  -  previously on topiramate 25 mg po bid and bupropion  qd stopped prior to surgery     5. Hepatic steatosis  - Elevation in LFTS  - CT abdomen and RUQ negative for acute process     VTE_ SCDS    Plan discussed with patient, RN and mother at bedside    The patient is a 24 year old male with a past medical history of obesity, anxiety and depression status post recent Gastric sleeve surgery who presented to the Er with complaints of abdominal pain, nausea vomiting and inability to tolerate PO. Noted to have transaminitis in the ER. CT of the abdomen and pelvis with no acute findings. RUQ was negative. Seen by GI, status post EGD normal.     Assessment/Plan:    1. Abdominal pain  - Post gastric sleeve 1 month prior  - CT abdomen and pelvis negative  - RUQ ultrasound negative  - EGD normal  - Seen by Bariatric surgery; no acute intervention   - Gastric emptying study cancelled this morning, unable to tolerate PO contrast. Rescheduled for tomorrow ( no narcotics/reglan 24 hours prior)   NPO after midnight   - IV PPI    2 Anion gap metabolic acidosis  - Secondary to hypovolemia  - Improved with IVF  - Monitor CMP    3. Acute urinary retention  - Suspected secondary to UTI  - Rodriguez placement, UA and Urine culture  - Start Empiric IV Rocephin  - Was recently prescribed antibiotics by urology however due to N/V was unable to complete course     4. Anxiety and depression  -  previously on topiramate 25 mg po bid and bupropion  qd stopped prior to surgery     5. Hepatic steatosis  - Elevation in LFTS  - CT abdomen and RUQ negative for acute process   - MRI as outpatient and follow up with hepatology     VTE_ SCDS    Plan discussed with patient, RN and mother at bedside

## 2024-03-27 NOTE — PROGRESS NOTE ADULT - SUBJECTIVE AND OBJECTIVE BOX
Chief Complaint:  Patient is a 24y old  Male who presents with a chief complaint of intractable n/v (27 Mar 2024 06:20)      HPI/ 24 hr events: Patient seen and examined at bedside. Gastric emptying study was postponed due to nausea and vomiting overnight. Patient feeling better now after Zofran, wants to try for study tomorrow. Denies diarrhea, abdominal pain. Vitals are overall stable. Yesterday an EGD was performed that showed normal mucosa in esophagus/duodenum, evidence of band gastroplasty in stomach.     REVIEW OF SYSTEMS:   General: Negative  HEENT: Negative  CV: Negative  Respiratory: Negative  GI: See HPI  : Negative  MSK: Negative  Hematologic: Negative  Skin: Negative    MEDICATIONS:   MEDICATIONS  (STANDING):  dextrose 5% + lactated ringers. 1000 milliLiter(s) (100 mL/Hr) IV Continuous <Continuous>  lidocaine 2% Jelly 5 milliLiter(s) IntraUrethral once  pantoprazole  Injectable 40 milliGRAM(s) IV Push every 12 hours  potassium chloride  10 mEq/100 mL IVPB 10 milliEquivalent(s) IV Intermittent every 1 hour  tamsulosin 0.4 milliGRAM(s) Oral at bedtime    MEDICATIONS  (PRN):  LORazepam   Injectable 0.5 milliGRAM(s) IV Push every 8 hours PRN Anxiety  ondansetron Injectable 4 milliGRAM(s) IV Push every 4 hours PRN Nausea and/or Vomiting  prochlorperazine   Injectable 5 milliGRAM(s) IntraMuscular every 4 hours PRN Nausea/vomiting resistant to zofran            DIET:  Diet, NPO after Midnight:      NPO Start Date: 27-Mar-2024,   NPO Start Time: 23:59  Except Medications (03-27-24 @ 11:18) [Active]          ALLERGIES:   Allergies    No Known Allergies    Intolerances        VITAL SIGNS:   Vital Signs Last 24 Hrs  T(C): 36.5 (27 Mar 2024 10:41), Max: 36.9 (26 Mar 2024 16:44)  T(F): 97.7 (27 Mar 2024 10:41), Max: 98.5 (26 Mar 2024 16:44)  HR: 71 (27 Mar 2024 10:41) (57 - 84)  BP: 157/89 (27 Mar 2024 10:41) (115/66 - 157/89)  BP(mean): --  RR: 18 (27 Mar 2024 10:41) (18 - 20)  SpO2: 98% (27 Mar 2024 10:41) (94% - 99%)    Parameters below as of 27 Mar 2024 10:41  Patient On (Oxygen Delivery Method): room air      I&O's Summary    26 Mar 2024 07:01  -  27 Mar 2024 07:00  --------------------------------------------------------  IN: 0 mL / OUT: 500 mL / NET: -500 mL        PHYSICAL EXAM:   GENERAL:  No acute distress  HEENT:  NC/AT, conjunctiva clear, sclera anicteric  CHEST:  No increased effort  HEART:  Regular rate  ABDOMEN:  Soft, epigastric ttp, non-distended, normoactive bowel sounds, no rebound or guarding  EXTREMITIES: No edema  SKIN:  Warm, dry  NEURO:  Calm, cooperative    LABS:                        13.3   3.64  )-----------( 214      ( 27 Mar 2024 08:44 )             39.7     Hemoglobin: 13.3 g/dL (03-27-24 @ 08:44)  Hemoglobin: 14.3 g/dL (03-26-24 @ 11:14)  Hemoglobin: 15.4 g/dL (03-25-24 @ 21:05)    03-27    138  |  98  |  6.7<L>  ----------------------------<  119<H>  3.0<L>   |  28.0  |  0.77    Ca    8.5      27 Mar 2024 08:44  Phos  4.4     03-27  Mg     1.8     03-27    TPro  6.1<L>  /  Alb  3.6  /  TBili  1.9  /  DBili  x   /  AST  94<H>  /  ALT  177<H>  /  AlkPhos  77  03-27    LIVER FUNCTIONS - ( 27 Mar 2024 08:44 )  Alb: 3.6 g/dL / Pro: 6.1 g/dL / ALK PHOS: 77 U/L / ALT: 177 U/L / AST: 94 U/L / GGT: x             PT/INR - ( 25 Mar 2024 21:05 )   PT: 13.2 sec;   INR: 1.20 ratio         PTT - ( 25 Mar 2024 21:05 )  PTT:29.5 sec                                        RADIOLOGY & ADDITIONAL STUDIES:      ACC: [id number] EXAM:  CT ABDOMEN AND PELVIS OC IC   ORDERED BY: GLENNA DUNCAN     PROCEDURE DATE:  03/26/2024          INTERPRETATION:  CLINICAL INFORMATION: Abdominal pain.    COMPARISON: CT abdomen and pelvis 3/20/2024.    PROCEDURE:  CT of the Abdomen and Pelvis was performed with intravenous contrast.  Intravenous contrast: 90 ml Omnipaque 350. 10 ml discarded.  Oral contrast: positive contrast was administered.  Sagittal and coronal reformats were performed.    FINDINGS:    LOWER CHEST:Continued mild groundglass opacities in the right lower lobe   similar to prior study.    LIVER: Steatosis. Stable left hepatic lobe hypodensities. Consider   nonemergent MR if not performed previously.  BILE DUCTS: Normal caliber.  GALLBLADDER: Within normal limits.  SPLEEN: Within normal limits.  PANCREAS: Within normal limits.  ADRENALS: Within normal limits.  KIDNEYS/URETERS: Within normal limits.    BLADDER: Within normal limits.  REPRODUCTIVE ORGANS: Prostate within normal limits.    BOWEL: Expected gastric sleeve postoperative changes. No acute bowel   inflammatory change or obstruction though evaluation of colonic loops is   limited secondary to inadequate distension. Normal appendix.  PERITONEUM: No ascites.  VESSELS:  Within normal limits.  RETROPERITONEUM: No lymphadenopathy.  ABDOMINAL WALL: Within normal limits.  BONES: Within normal limits.    IMPRESSION:    Expected gastric sleeve postoperative changes.    No bowel obstruction.    Continued mild groundglass opacities in theright lower lobe similar to   prior study.    Additional findings as mentioned above.    --- End of Report ---            AMIE GRECO MD; Attending Radiologist  This document has been electronically signed. Mar 26 2024  1:16AM  03-26-24 @ 00:16    -- -- --   ACC: [id number] EXAM:  US ABDOMEN RT UPR QUADRANT   ORDERED BY: GLENNA DUNCAN     PROCEDURE DATE:  03/26/2024          INTERPRETATION:  CLINICAL INFORMATION: Elevated bilirubin, LFTs and   epigastric pain.    COMPARISON: CT abdomen pelvis 3/26/2024 and 3/20/2024    TECHNIQUE: Sonography of the right upper quadrant.    FINDINGS:  Liver: Homogeneously increased echotexture.  Bile ducts: Normal caliber. Common bile duct measures 3 mm.  Gallbladder: Within normal limits. No cholelithiasis, wall thickening or   pericholecystic fluid.  Pancreas: For the visualized due to shadowing bowel gas.  Right kidney: 11.8 cm. No hydronephrosis.  Ascites: None.  IVC: Visualized portions are within normal limits.    IMPRESSION:  Limited study due to extensive shadowing bowel gas.    No evidence of cholelithiasis or cholecystitis. Hepatic steatosis.    --- End of Report ---            SAMMI SERNA MD; Attending Radiologist  This document has been electronically signed. Mar 26 2024  3:14AM              EGD Report  Date: 3/26/2024      Findings:    Esophagus Mucosa Normal mucosa was noted in the whole esophagus.    Stomach Lumen Evidence of a previous band gastroplasty was seen in the whole    stomach. Endoscopic findings were consistent with normal post-operative anatomy.    Duodenum Mucosa Normal mucosa was noted in the whole examined duodenum.        Impressions:    Normal mucosa in the whole esophagus.    Previous Surgery in the whole stomach.    Normal mucosa in the whole examined duodenum.        Plan:    Keep NPO for now      Dennis Caldwell MD    Version 1, Electronically signed on 3/26/2024 4:09:18 PM by Dennis Caldwell MD

## 2024-03-28 ENCOUNTER — APPOINTMENT (OUTPATIENT)
Dept: SURGERY | Facility: CLINIC | Age: 24
End: 2024-03-28

## 2024-03-28 DIAGNOSIS — R11.2 NAUSEA WITH VOMITING, UNSPECIFIED: ICD-10-CM

## 2024-03-28 LAB
ALBUMIN SERPL ELPH-MCNC: 3.5 G/DL — SIGNIFICANT CHANGE UP (ref 3.3–5.2)
ALP SERPL-CCNC: 76 U/L — SIGNIFICANT CHANGE UP (ref 40–120)
ALT FLD-CCNC: 174 U/L — HIGH
ANION GAP SERPL CALC-SCNC: 12 MMOL/L — SIGNIFICANT CHANGE UP (ref 5–17)
AST SERPL-CCNC: 96 U/L — HIGH
BASOPHILS # BLD AUTO: 0.03 K/UL — SIGNIFICANT CHANGE UP (ref 0–0.2)
BASOPHILS NFR BLD AUTO: 0.9 % — SIGNIFICANT CHANGE UP (ref 0–2)
BILIRUB SERPL-MCNC: 1.8 MG/DL — SIGNIFICANT CHANGE UP (ref 0.4–2)
BUN SERPL-MCNC: 4.1 MG/DL — LOW (ref 8–20)
CALCIUM SERPL-MCNC: 8.3 MG/DL — LOW (ref 8.4–10.5)
CHLORIDE SERPL-SCNC: 100 MMOL/L — SIGNIFICANT CHANGE UP (ref 96–108)
CO2 SERPL-SCNC: 27 MMOL/L — SIGNIFICANT CHANGE UP (ref 22–29)
CREAT SERPL-MCNC: 0.63 MG/DL — SIGNIFICANT CHANGE UP (ref 0.5–1.3)
CULTURE RESULTS: NO GROWTH — SIGNIFICANT CHANGE UP
EGFR: 135 ML/MIN/1.73M2 — SIGNIFICANT CHANGE UP
EOSINOPHIL # BLD AUTO: 0 K/UL — SIGNIFICANT CHANGE UP (ref 0–0.5)
EOSINOPHIL NFR BLD AUTO: 0 % — SIGNIFICANT CHANGE UP (ref 0–6)
GLUCOSE SERPL-MCNC: 111 MG/DL — HIGH (ref 70–99)
HCT VFR BLD CALC: 39.4 % — SIGNIFICANT CHANGE UP (ref 39–50)
HGB BLD-MCNC: 13.1 G/DL — SIGNIFICANT CHANGE UP (ref 13–17)
LYMPHOCYTES # BLD AUTO: 0.86 K/UL — LOW (ref 1–3.3)
LYMPHOCYTES # BLD AUTO: 25.4 % — SIGNIFICANT CHANGE UP (ref 13–44)
MAGNESIUM SERPL-MCNC: 1.6 MG/DL — SIGNIFICANT CHANGE UP (ref 1.6–2.6)
MCHC RBC-ENTMCNC: 26.7 PG — LOW (ref 27–34)
MCHC RBC-ENTMCNC: 33.2 GM/DL — SIGNIFICANT CHANGE UP (ref 32–36)
MCV RBC AUTO: 80.4 FL — SIGNIFICANT CHANGE UP (ref 80–100)
MONOCYTES # BLD AUTO: 0.21 K/UL — SIGNIFICANT CHANGE UP (ref 0–0.9)
MONOCYTES NFR BLD AUTO: 6.2 % — SIGNIFICANT CHANGE UP (ref 2–14)
NEUTROPHILS # BLD AUTO: 2.17 K/UL — SIGNIFICANT CHANGE UP (ref 1.8–7.4)
NEUTROPHILS NFR BLD AUTO: 64 % — SIGNIFICANT CHANGE UP (ref 43–77)
PLATELET # BLD AUTO: 212 K/UL — SIGNIFICANT CHANGE UP (ref 150–400)
POTASSIUM SERPL-MCNC: 3.2 MMOL/L — LOW (ref 3.5–5.3)
POTASSIUM SERPL-SCNC: 3.2 MMOL/L — LOW (ref 3.5–5.3)
PROT SERPL-MCNC: 5.9 G/DL — LOW (ref 6.6–8.7)
RBC # BLD: 4.9 M/UL — SIGNIFICANT CHANGE UP (ref 4.2–5.8)
RBC # FLD: 15 % — HIGH (ref 10.3–14.5)
SODIUM SERPL-SCNC: 139 MMOL/L — SIGNIFICANT CHANGE UP (ref 135–145)
SPECIMEN SOURCE: SIGNIFICANT CHANGE UP
WBC # BLD: 3.39 K/UL — LOW (ref 3.8–10.5)
WBC # FLD AUTO: 3.39 K/UL — LOW (ref 3.8–10.5)

## 2024-03-28 PROCEDURE — 99232 SBSQ HOSP IP/OBS MODERATE 35: CPT

## 2024-03-28 PROCEDURE — 99233 SBSQ HOSP IP/OBS HIGH 50: CPT

## 2024-03-28 PROCEDURE — 99233 SBSQ HOSP IP/OBS HIGH 50: CPT | Mod: FS

## 2024-03-28 RX ORDER — SENNA PLUS 8.6 MG/1
2 TABLET ORAL AT BEDTIME
Refills: 0 | Status: DISCONTINUED | OUTPATIENT
Start: 2024-03-28 | End: 2024-04-04

## 2024-03-28 RX ORDER — POTASSIUM CHLORIDE 20 MEQ
10 PACKET (EA) ORAL
Refills: 0 | Status: COMPLETED | OUTPATIENT
Start: 2024-03-28 | End: 2024-03-28

## 2024-03-28 RX ORDER — POLYETHYLENE GLYCOL 3350 17 G/17G
17 POWDER, FOR SOLUTION ORAL DAILY
Refills: 0 | Status: DISCONTINUED | OUTPATIENT
Start: 2024-03-28 | End: 2024-04-04

## 2024-03-28 RX ORDER — METOCLOPRAMIDE HCL 10 MG
10 TABLET ORAL EVERY 8 HOURS
Refills: 0 | Status: DISCONTINUED | OUTPATIENT
Start: 2024-03-28 | End: 2024-03-30

## 2024-03-28 RX ADMIN — ONDANSETRON 4 MILLIGRAM(S): 8 TABLET, FILM COATED ORAL at 22:53

## 2024-03-28 RX ADMIN — PANTOPRAZOLE SODIUM 40 MILLIGRAM(S): 20 TABLET, DELAYED RELEASE ORAL at 16:46

## 2024-03-28 RX ADMIN — ONDANSETRON 4 MILLIGRAM(S): 8 TABLET, FILM COATED ORAL at 09:06

## 2024-03-28 RX ADMIN — TAMSULOSIN HYDROCHLORIDE 0.4 MILLIGRAM(S): 0.4 CAPSULE ORAL at 23:26

## 2024-03-28 RX ADMIN — Medication 100 MILLIEQUIVALENT(S): at 10:12

## 2024-03-28 RX ADMIN — Medication 10 MILLIGRAM(S): at 14:20

## 2024-03-28 RX ADMIN — Medication 0.5 MILLIGRAM(S): at 22:53

## 2024-03-28 RX ADMIN — ONDANSETRON 4 MILLIGRAM(S): 8 TABLET, FILM COATED ORAL at 05:13

## 2024-03-28 RX ADMIN — Medication 0.5 MILLIGRAM(S): at 12:13

## 2024-03-28 RX ADMIN — SODIUM CHLORIDE 100 MILLILITER(S): 9 INJECTION, SOLUTION INTRAVENOUS at 17:02

## 2024-03-28 RX ADMIN — Medication 100 MILLIEQUIVALENT(S): at 08:41

## 2024-03-28 RX ADMIN — POLYETHYLENE GLYCOL 3350 17 GRAM(S): 17 POWDER, FOR SOLUTION ORAL at 15:39

## 2024-03-28 RX ADMIN — PANTOPRAZOLE SODIUM 40 MILLIGRAM(S): 20 TABLET, DELAYED RELEASE ORAL at 05:13

## 2024-03-28 RX ADMIN — Medication 10 MILLIGRAM(S): at 16:46

## 2024-03-28 RX ADMIN — Medication 100 MILLIEQUIVALENT(S): at 10:07

## 2024-03-28 RX ADMIN — CEFTRIAXONE 1000 MILLIGRAM(S): 500 INJECTION, POWDER, FOR SOLUTION INTRAMUSCULAR; INTRAVENOUS at 12:13

## 2024-03-28 RX ADMIN — Medication 10 MILLIGRAM(S): at 21:57

## 2024-03-28 NOTE — PROGRESS NOTE ADULT - ASSESSMENT
The patient is a 24 year old male with a past medical history of obesity, anxiety and depression status post recent Gastric sleeve surgery who presented to the Er with complaints of abdominal pain, nausea vomiting and inability to tolerate PO. Noted to have transaminitis in the ER. CT of the abdomen and pelvis with no acute findings. RUQ was negative. Seen by GI, status post EGD normal.     Assessment/Plan:    1. Abdominal pain  - Post gastric sleeve 1 month prior  - CT abdomen and pelvis negative  - RUQ ultrasound negative  - EGD normal  - Seen by Bariatric surgery; no acute intervention   - Gastric emptying study cancelled this morning, unable to tolerate PO contrast.  - Discussed with Dr Aldrich, Start Reglan IV  - IVF  - Bowel regimen with suppository for constipation     2 Anion gap metabolic acidosis  - Secondary to hypovolemia  - Improved with IVF  - Monitor CMP    3. Acute urinary retention  - Suspected secondary to UTI  - Rodriguez day 2  - IV Rocephin day 2  - FOllow up Blood and urine cultures  - TOV In AM   - Was recently prescribed antibiotics by urology however due to N/V was unable to complete course     4. Anxiety and depression  -  previously on topiramate 25 mg po bid and bupropion  qd stopped prior to surgery     5. Hepatic steatosis  - Elevation in LFTS  - CT abdomen and RUQ negative for acute process   - MRI as outpatient and follow up with hepatology     VTE_ SCDS     The patient is a 24 year old male with a past medical history of obesity, anxiety and depression status post recent Gastric sleeve surgery who presented to the Er with complaints of abdominal pain, nausea vomiting and inability to tolerate PO. Noted to have transaminitis in the ER. CT of the abdomen and pelvis with no acute findings. RUQ was negative. Seen by GI, status post EGD normal.     Assessment/Plan:    1. Abdominal pain  - Post gastric sleeve 1 month prior  - CT abdomen and pelvis negative  - RUQ ultrasound negative  - EGD normal  - Seen by Bariatric surgery; no acute intervention   - Gastric emptying study cancelled this morning, unable to tolerate PO contrast.  - Discussed with Dr Aldrich, Start Reglan IV  - IVF  - Bowel regimen with suppository for constipation     2 Anion gap metabolic acidosis  - Secondary to hypovolemia  - Improved with IVF  - Monitor CMP    3. Acute urinary retention  - Suspected secondary to UTI  - Rodriguez day 2  - IV Rocephin day 2  - FOllow up Blood and urine cultures  - TOV In AM   - Was recently prescribed antibiotics by urology however due to N/V was unable to complete course     4. Anxiety and depression  -  previously on topiramate 25 mg po bid and bupropion  qd stopped prior to surgery  - BH consulted      5. Hepatic steatosis  - Elevation in LFTS  - CT abdomen and RUQ negative for acute process   - MRI as outpatient and follow up with hepatology     VTE_ SCDS

## 2024-03-28 NOTE — PROGRESS NOTE ADULT - ASSESSMENT
ASSESSMENT: 23yo M s/p sleeve gastrectomy now with PO intolerance.    PLAN:  - gastric emptying study today  - NPO, IVF  - antiemetics as needed  - rest of plan pending results of emptying study today

## 2024-03-28 NOTE — PROGRESS NOTE ADULT - SUBJECTIVE AND OBJECTIVE BOX
Chief Complaint:  Patient is a 24y old  Male who presents with a chief complaint of intractable n/v (28 Mar 2024 07:06)      HPI/ 24 hr events: Patient seen and examined at bedside. He was retching this morning when brought down to nuclear medicine so he was unable to go for the test. He has not had a BM in several days. He is not tolerating much of his diet. Denies abdominal pain. Vitals are overall stable.       REVIEW OF SYSTEMS:   General: Negative  HEENT: Negative  CV: Negative  Respiratory: Negative  GI: See HPI  : Negative  MSK: Negative  Hematologic: Negative  Skin: Negative    MEDICATIONS:   MEDICATIONS  (STANDING):  cefTRIAXone Injectable.      cefTRIAXone Injectable. 1000 milliGRAM(s) IV Push every 24 hours  dextrose 5% + lactated ringers. 1000 milliLiter(s) (100 mL/Hr) IV Continuous <Continuous>  pantoprazole  Injectable 40 milliGRAM(s) IV Push every 12 hours  tamsulosin 0.4 milliGRAM(s) Oral at bedtime    MEDICATIONS  (PRN):  LORazepam   Injectable 0.5 milliGRAM(s) IV Push every 8 hours PRN Anxiety  ondansetron Injectable 4 milliGRAM(s) IV Push every 4 hours PRN Nausea and/or Vomiting  prochlorperazine   Injectable 5 milliGRAM(s) IntraMuscular every 4 hours PRN Nausea/vomiting resistant to zofran            DIET:  Diet, NPO after Midnight:      NPO Start Date: 28-Mar-2024,   NPO Start Time: 23:59 (03-28-24 @ 09:30) [Active]          ALLERGIES:   Allergies    No Known Allergies    Intolerances        VITAL SIGNS:   Vital Signs Last 24 Hrs  T(C): 36.7 (28 Mar 2024 04:53), Max: 36.7 (27 Mar 2024 21:13)  T(F): 98.1 (28 Mar 2024 04:53), Max: 98.1 (28 Mar 2024 04:53)  HR: 99 (28 Mar 2024 04:53) (59 - 99)  BP: 153/91 (28 Mar 2024 04:53) (118/75 - 153/91)  BP(mean): --  RR: 18 (28 Mar 2024 04:53) (18 - 18)  SpO2: 95% (28 Mar 2024 04:53) (95% - 97%)    Parameters below as of 28 Mar 2024 04:53  Patient On (Oxygen Delivery Method): room air      I&O's Summary    27 Mar 2024 07:01  -  28 Mar 2024 07:00  --------------------------------------------------------  IN: 0 mL / OUT: 1300 mL / NET: -1300 mL        PHYSICAL EXAM:   GENERAL:  No acute distress  HEENT:  NC/AT, conjunctiva clear, sclera anicteric  CHEST:  No increased effort  HEART:  Regular rate  ABDOMEN:  Soft, non-tender, non-distended, normoactive bowel sounds, no rebound or guarding  EXTREMITIES: No edema  SKIN:  Warm, dry  NEURO:  Calm, cooperative    LABS:                        13.1   3.39  )-----------( 212      ( 28 Mar 2024 06:02 )             39.4     Hemoglobin: 13.1 g/dL (03-28-24 @ 06:02)  Hemoglobin: 13.3 g/dL (03-27-24 @ 08:44)  Hemoglobin: 14.3 g/dL (03-26-24 @ 11:14)  Hemoglobin: 15.4 g/dL (03-25-24 @ 21:05)    03-28    139  |  100  |  4.1<L>  ----------------------------<  111<H>  3.2<L>   |  27.0  |  0.63    Ca    8.3<L>      28 Mar 2024 06:02  Phos  4.4     03-27  Mg     1.6     03-28    TPro  5.9<L>  /  Alb  3.5  /  TBili  1.8  /  DBili  x   /  AST  96<H>  /  ALT  174<H>  /  AlkPhos  76  03-28    LIVER FUNCTIONS - ( 28 Mar 2024 06:02 )  Alb: 3.5 g/dL / Pro: 5.9 g/dL / ALK PHOS: 76 U/L / ALT: 174 U/L / AST: 96 U/L / GGT: x                 Culture - Urine (collected 26 Mar 2024 14:35)  Source: Clean Catch Clean Catch (Midstream)  Final Report (27 Mar 2024 14:35):    No growth                                          RADIOLOGY & ADDITIONAL STUDIES:

## 2024-03-28 NOTE — PROGRESS NOTE ADULT - SUBJECTIVE AND OBJECTIVE BOX
CC: Follow up     INTERVAL HPI/OVERNIGHT EVENTS: Patient seen and examined, was unable to tolerate Gastric emptying study this morning. States that he starts getting nauseous and throwing up as soon as he eats something. Has generalized abdominal discomfort with no BM since last friday     Vital Signs Last 24 Hrs  T(C): 36.5 (28 Mar 2024 11:29), Max: 36.7 (27 Mar 2024 21:13)  T(F): 97.7 (28 Mar 2024 11:29), Max: 98.1 (28 Mar 2024 04:53)  HR: 64 (28 Mar 2024 11:29) (59 - 99)  BP: 131/93 (28 Mar 2024 11:29) (118/75 - 153/91)  BP(mean): --  RR: 18 (28 Mar 2024 11:29) (18 - 18)  SpO2: 98% (28 Mar 2024 11:29) (95% - 98%)    Parameters below as of 28 Mar 2024 11:29  Patient On (Oxygen Delivery Method): room air        PHYSICAL EXAM:    GENERAL: NAD, AOX3  HEAD:  Atraumatic, Normocephalic  EYESconjunctiva and sclera clear  ENMT: Moist mucous membranes  CHEST/LUNG: Clear to auscultation bilaterally; No rales, rhonchi, wheezing, or rubs  HEART: Regular rate and rhythm; No murmurs, rubs, or gallops  ABDOMEN: Soft, Nontender, Nondistended; Bowel sounds present  EXTREMITIES:  2+ Peripheral Pulses, No clubbing, cyanosis, or edema        MEDICATIONS  (STANDING):  cefTRIAXone Injectable. 1000 milliGRAM(s) IV Push every 24 hours  cefTRIAXone Injectable.      dextrose 5% + lactated ringers. 1000 milliLiter(s) (100 mL/Hr) IV Continuous <Continuous>  pantoprazole  Injectable 40 milliGRAM(s) IV Push every 12 hours  tamsulosin 0.4 milliGRAM(s) Oral at bedtime    MEDICATIONS  (PRN):  LORazepam   Injectable 0.5 milliGRAM(s) IV Push every 8 hours PRN Anxiety  ondansetron Injectable 4 milliGRAM(s) IV Push every 4 hours PRN Nausea and/or Vomiting  prochlorperazine   Injectable 5 milliGRAM(s) IntraMuscular every 4 hours PRN Nausea/vomiting resistant to zofran      Allergies    No Known Allergies    Intolerances          LABS:                          13.1   3.39  )-----------( 212      ( 28 Mar 2024 06:02 )             39.4     03-28    139  |  100  |  4.1<L>  ----------------------------<  111<H>  3.2<L>   |  27.0  |  0.63    Ca    8.3<L>      28 Mar 2024 06:02  Phos  4.4     03-27  Mg     1.6     03-28    TPro  5.9<L>  /  Alb  3.5  /  TBili  1.8  /  DBili  x   /  AST  96<H>  /  ALT  174<H>  /  AlkPhos  76  03-28      Urinalysis Basic - ( 28 Mar 2024 06:02 )    Color: x / Appearance: x / SG: x / pH: x  Gluc: 111 mg/dL / Ketone: x  / Bili: x / Urobili: x   Blood: x / Protein: x / Nitrite: x   Leuk Esterase: x / RBC: x / WBC x   Sq Epi: x / Non Sq Epi: x / Bacteria: x        RADIOLOGY & ADDITIONAL TESTS:

## 2024-03-28 NOTE — PROGRESS NOTE ADULT - ASSESSMENT
25 y/o M s/p sleeve gastrectomy 2/2024 who presents with complaints of PO intolerance, nausea and small volume bilious emesis.     #Nausea, vomiting, epigastric pain  #S/P sleeve gastrectomy 2/2024   #Elevated liver enzymes   #Constipation   CT A/P w/ PO and IV Cont (03.26.24)- Stable left hepatic lobe hypodensities. Expected gastric sleeve postoperative changes.  US Abdomen Upper Quadrant Right (03.26.24)- Hepatic steatosis.  EGD (03.26.24)- normal mucosa in esophagus/duodenum, evidence of band gastroplasty in stomach.     - Per nuclear unable to complete study until Monday 4/1/24, if patient still in house will plan for then. Hold reglan x 48 hours   - IV fluids for hydration  - PPI BID  - Monitor renal function, electrolytes.  Replete electrolytes as needed. Avoid hypotension.   - Zofran as needed for nausea, and vomiting  - Avoid Narcotics  - Reglan 10 mg every 8 hours standing  - Trend LFTs. Avoid hepatotoxins  - MRI as an outpatient to assess liver findings   - Suppositories for constipation until able to tolerate PO, check abdominal XR     #Hepatic steatosis   - Trend LFTs  - Maintain normal BMI  - Alcohol abstinence  - Low fat diet (when applicable)   - Follow up with hepatologist Dr. Johnson for further assessment and monitoring     Discussed with hospitalist Dr. Carrizales  _________________________________________________________________  Assessment and recommendations are final when note is signed by the attending physician.

## 2024-03-28 NOTE — PROGRESS NOTE ADULT - SUBJECTIVE AND OBJECTIVE BOX
SUBJECTIVE: Resting comfortably, no acute events. Pt going down today for gastric emptying study. Hemodynamically stable, no acute events overnight.     Vitals:  T(C): 36.7 (03-28-24 @ 04:53), Max: 36.7 (03-27-24 @ 21:13)  T(F): 98.1 (03-28-24 @ 04:53), Max: 98.1 (03-28-24 @ 04:53)  HR: 99 (03-28-24 @ 04:53) (59 - 99)  BP: 153/91 (03-28-24 @ 04:53) (118/75 - 157/89)  ABP: --  ABP(mean): --  RR: 18 (03-28-24 @ 04:53) (18 - 18)  SpO2: 95% (03-28-24 @ 04:53) (95% - 98%)    LABS:  cret                        13.1   3.39  )-----------( 212      ( 28 Mar 2024 06:02 )             39.4     03-28    139  |  100  |  4.1<L>  ----------------------------<  111<H>  3.2<L>   |  27.0  |  0.63    Ca    8.3<L>      28 Mar 2024 06:02  Phos  4.4     03-27  Mg     1.6     03-28    TPro  5.9<L>  /  Alb  3.5  /  TBili  1.8  /  DBili  x   /  AST  96<H>  /  ALT  174<H>  /  AlkPhos  76  03-28        Physical Exam:  Constitutional: NAD  HEENT: PERRL, EOMI  Respiratory: Respirations non-labored, no accessory muscle use  Gastrointestinal: Soft, + epigastric tenderness, non-distended  Extremities: Moves all 4 extremities

## 2024-03-29 DIAGNOSIS — G47.33 OBSTRUCTIVE SLEEP APNEA (ADULT) (PEDIATRIC): Chronic | ICD-10-CM

## 2024-03-29 DIAGNOSIS — Z90.3 ACQUIRED ABSENCE OF STOMACH [PART OF]: Chronic | ICD-10-CM

## 2024-03-29 LAB
ALBUMIN SERPL ELPH-MCNC: 3.6 G/DL — SIGNIFICANT CHANGE UP (ref 3.3–5.2)
ALP SERPL-CCNC: 77 U/L — SIGNIFICANT CHANGE UP (ref 40–120)
ALT FLD-CCNC: 219 U/L — HIGH
ANION GAP SERPL CALC-SCNC: 13 MMOL/L — SIGNIFICANT CHANGE UP (ref 5–17)
AST SERPL-CCNC: 139 U/L — HIGH
BASOPHILS # BLD AUTO: 0.02 K/UL — SIGNIFICANT CHANGE UP (ref 0–0.2)
BASOPHILS NFR BLD AUTO: 0.7 % — SIGNIFICANT CHANGE UP (ref 0–2)
BILIRUB SERPL-MCNC: 2 MG/DL — SIGNIFICANT CHANGE UP (ref 0.4–2)
BUN SERPL-MCNC: 3 MG/DL — LOW (ref 8–20)
CALCIUM SERPL-MCNC: 8.5 MG/DL — SIGNIFICANT CHANGE UP (ref 8.4–10.5)
CHLORIDE SERPL-SCNC: 97 MMOL/L — SIGNIFICANT CHANGE UP (ref 96–108)
CO2 SERPL-SCNC: 26 MMOL/L — SIGNIFICANT CHANGE UP (ref 22–29)
CREAT SERPL-MCNC: 0.59 MG/DL — SIGNIFICANT CHANGE UP (ref 0.5–1.3)
EGFR: 137 ML/MIN/1.73M2 — SIGNIFICANT CHANGE UP
EOSINOPHIL # BLD AUTO: 0.04 K/UL — SIGNIFICANT CHANGE UP (ref 0–0.5)
EOSINOPHIL NFR BLD AUTO: 1.4 % — SIGNIFICANT CHANGE UP (ref 0–6)
GLUCOSE BLDC GLUCOMTR-MCNC: 105 MG/DL — HIGH (ref 70–99)
GLUCOSE SERPL-MCNC: 110 MG/DL — HIGH (ref 70–99)
HCT VFR BLD CALC: 40.7 % — SIGNIFICANT CHANGE UP (ref 39–50)
HGB BLD-MCNC: 13.4 G/DL — SIGNIFICANT CHANGE UP (ref 13–17)
IMM GRANULOCYTES NFR BLD AUTO: 0.3 % — SIGNIFICANT CHANGE UP (ref 0–0.9)
LYMPHOCYTES # BLD AUTO: 0.97 K/UL — LOW (ref 1–3.3)
LYMPHOCYTES # BLD AUTO: 33.6 % — SIGNIFICANT CHANGE UP (ref 13–44)
MAGNESIUM SERPL-MCNC: 1.5 MG/DL — LOW (ref 1.6–2.6)
MCHC RBC-ENTMCNC: 26.5 PG — LOW (ref 27–34)
MCHC RBC-ENTMCNC: 32.9 GM/DL — SIGNIFICANT CHANGE UP (ref 32–36)
MCV RBC AUTO: 80.6 FL — SIGNIFICANT CHANGE UP (ref 80–100)
MONOCYTES # BLD AUTO: 0.34 K/UL — SIGNIFICANT CHANGE UP (ref 0–0.9)
MONOCYTES NFR BLD AUTO: 11.8 % — SIGNIFICANT CHANGE UP (ref 2–14)
NEUTROPHILS # BLD AUTO: 1.51 K/UL — LOW (ref 1.8–7.4)
NEUTROPHILS NFR BLD AUTO: 52.2 % — SIGNIFICANT CHANGE UP (ref 43–77)
PLATELET # BLD AUTO: 225 K/UL — SIGNIFICANT CHANGE UP (ref 150–400)
POTASSIUM SERPL-MCNC: 3.5 MMOL/L — SIGNIFICANT CHANGE UP (ref 3.5–5.3)
POTASSIUM SERPL-SCNC: 3.5 MMOL/L — SIGNIFICANT CHANGE UP (ref 3.5–5.3)
PROT SERPL-MCNC: 6.2 G/DL — LOW (ref 6.6–8.7)
RBC # BLD: 5.05 M/UL — SIGNIFICANT CHANGE UP (ref 4.2–5.8)
RBC # FLD: 15 % — HIGH (ref 10.3–14.5)
SODIUM SERPL-SCNC: 136 MMOL/L — SIGNIFICANT CHANGE UP (ref 135–145)
WBC # BLD: 2.89 K/UL — LOW (ref 3.8–10.5)
WBC # FLD AUTO: 2.89 K/UL — LOW (ref 3.8–10.5)

## 2024-03-29 PROCEDURE — 99233 SBSQ HOSP IP/OBS HIGH 50: CPT

## 2024-03-29 PROCEDURE — 99223 1ST HOSP IP/OBS HIGH 75: CPT

## 2024-03-29 PROCEDURE — 99232 SBSQ HOSP IP/OBS MODERATE 35: CPT

## 2024-03-29 RX ORDER — HYDROXYZINE HCL 10 MG
25 TABLET ORAL ONCE
Refills: 0 | Status: DISCONTINUED | OUTPATIENT
Start: 2024-03-29 | End: 2024-04-02

## 2024-03-29 RX ORDER — MAGNESIUM SULFATE 500 MG/ML
2 VIAL (ML) INJECTION ONCE
Refills: 0 | Status: COMPLETED | OUTPATIENT
Start: 2024-03-29 | End: 2024-03-29

## 2024-03-29 RX ADMIN — SODIUM CHLORIDE 100 MILLILITER(S): 9 INJECTION, SOLUTION INTRAVENOUS at 03:36

## 2024-03-29 RX ADMIN — PANTOPRAZOLE SODIUM 40 MILLIGRAM(S): 20 TABLET, DELAYED RELEASE ORAL at 05:32

## 2024-03-29 RX ADMIN — CEFTRIAXONE 1000 MILLIGRAM(S): 500 INJECTION, POWDER, FOR SOLUTION INTRAMUSCULAR; INTRAVENOUS at 12:08

## 2024-03-29 RX ADMIN — PANTOPRAZOLE SODIUM 40 MILLIGRAM(S): 20 TABLET, DELAYED RELEASE ORAL at 17:03

## 2024-03-29 RX ADMIN — Medication 1 ENEMA: at 12:23

## 2024-03-29 RX ADMIN — SODIUM CHLORIDE 100 MILLILITER(S): 9 INJECTION, SOLUTION INTRAVENOUS at 12:23

## 2024-03-29 RX ADMIN — Medication 25 GRAM(S): at 15:48

## 2024-03-29 NOTE — BH CONSULTATION LIAISON ASSESSMENT NOTE - SUMMARY
Patient is a 24 year old male, domiciled in [place], [place] with mother, aunt, and cousins, unemployed, past medical history of obesity, RADHA, gastric sleeve (2/1/24), past psychiatric history of anxiety, depression, PTSD, and autism spectrum d/o, no prior psychiatric hospitalizations, OPP include therapist and psychiatrist based in [place], was previously prescribed Topamax, Wellbutrin, and Klonopin most recently but non-compliant due to reported side effects, no history of SA/NSSIB, substance history significant for past use of ?hemp, no legal history, remote family history of anxiety and depression, admitted to [facility] for transaminitis, abdominal pain, and intractable nausea/vomiting. Psychiatry consulted due to anxiety.     Assessment:  Patient presenting with symptoms of anxiety that has gradually worsened since gastric sleeve operation in February 2024 that he states manifests as nausea/vomiting. He states he was initially able to tolerate liquids but this has gradually become difficult for him as well due to the nausea. He reports at baseline he has had anxiety "for years" but symptoms have escalated significantly since the procedure.     Recommendations:   Patient is a 24 year old male, domiciled in [place], [place] with mother, aunt, and cousins, unemployed, past medical history of obesity, RADHA, gastric sleeve (2/1/24), past psychiatric history of anxiety, depression, PTSD, and autism spectrum d/o, no prior psychiatric hospitalizations, OPP include therapist and psychiatrist based in [place], was previously prescribed Topamax, Wellbutrin, and Klonopin most recently but non-compliant due to reported side effects, no history of SA/NSSIB, substance history significant for past use of ?hemp, no legal history, remote family history of anxiety and depression, admitted to [facility] for transaminitis, abdominal pain, and intractable nausea/vomiting. Psychiatry consulted due to anxiety.     Assessment:  Patient presenting with symptoms of anxiety that has gradually worsened since gastric sleeve operation in February 2024 that he states manifests as nausea/vomiting. He states he was initially able to tolerate liquids but this has gradually become difficult for him as well due to the nausea. He reports at baseline he has had anxiety "for years" but symptoms have escalated significantly since the procedure. Will recommend Vistaril 50 mg IM q6 hrs PRN anxiety and restlessness to target anxiety as well as nausea/vomiting.     Recommendations:  - Vistaril 50 mg IM q6 hrs prn anxiety / restlessness   Patient is a 24 year old male, domiciled in [place], [place] with mother, aunt, and cousins, unemployed, past medical history of obesity, RADHA, gastric sleeve (2/1/24), past psychiatric history of anxiety, depression, PTSD, and autism spectrum d/o, no prior psychiatric hospitalizations, OPP include therapist and psychiatrist based in [place], was previously prescribed Topamax, Wellbutrin, and Klonopin most recently but non-compliant due to reported side effects, no history of SA/NSSIB, substance history significant for past use of ?hemp, no legal history, remote family history of anxiety and depression, admitted to [facility] for transaminitis, abdominal pain, and intractable nausea/vomiting. Psychiatry consulted due to anxiety.     Assessment:  Patient presenting with symptoms of anxiety that has gradually worsened since gastric sleeve operation in February 2024 that he states manifests as nausea/vomiting. He states he was initially able to tolerate liquids but this has gradually become difficult for him as well due to the nausea. He reports at baseline he has had anxiety "for years" but symptoms have escalated significantly since the procedure. Will recommend Vistaril 50 mg IM q6 hrs PRN anxiety and restlessness to target anxiety as well as nausea/vomiting and discontinue PRN Ativan.     Recommendations:  - START Vistaril 50 mg IM q6 hrs prn anxiety / restlessness    - DISCONTINUE Ativan 0.5 mg IVP q 8 hrs

## 2024-03-29 NOTE — BH CONSULTATION LIAISON ASSESSMENT NOTE - NSBHCHARTREVIEWLAB_PSY_A_CORE FT
03-28    139  |  100  |  4.1<L>  ----------------------------<  111<H>  3.2<L>   |  27.0  |  0.63    Ca    8.3<L>      28 Mar 2024 06:02  Mg     1.6     03-28    TPro  5.9<L>  /  Alb  3.5  /  TBili  1.8  /  DBili  x   /  AST  96<H>  /  ALT  174<H>  /  AlkPhos  76  03-28

## 2024-03-29 NOTE — BH CONSULTATION LIAISON ASSESSMENT NOTE - RISK ASSESSMENT
Level of Chronic Risk for harm to self: low-moderate  Level of Acute Risk for harm to self: low    Risk Factors:  -Medical comorbidities  -Psychiatric hx   -Substance use hx    Protective Factors:   -Identifies reasons for living   -Access to health care  -No prior attempts   -No current substance use

## 2024-03-29 NOTE — BH CONSULTATION LIAISON ASSESSMENT NOTE - NSICDXPASTMEDICALHX_GEN_ALL_CORE_FT
PAST MEDICAL HISTORY:  Anxiety and depression     Obesity     Obstructive sleep apnea     S/P gastric sleeve procedure

## 2024-03-29 NOTE — PROGRESS NOTE ADULT - SUBJECTIVE AND OBJECTIVE BOX
Chief Complaint:  Patient is a 24y old  Male who presents with a chief complaint of intractable n/v (29 Mar 2024 08:31)      HPI/ 24 hr events: Patient seen and examined at bedside. He reportedly had 2 episodes of vomiting yesterday. He has not tried breakfast yet. He refused the AM dose of reglan and senna yesterday. No BM yet. Vitals are overall stable.       REVIEW OF SYSTEMS:   General: Negative  HEENT: Negative  CV: Negative  Respiratory: Negative  GI: See HPI  : Negative  MSK: Negative  Hematologic: Negative  Skin: Negative    MEDICATIONS:   MEDICATIONS  (STANDING):  bisacodyl Suppository 10 milliGRAM(s) Rectal daily  cefTRIAXone Injectable.      cefTRIAXone Injectable. 1000 milliGRAM(s) IV Push every 24 hours  dextrose 5% + lactated ringers. 1000 milliLiter(s) (100 mL/Hr) IV Continuous <Continuous>  metoclopramide Injectable 10 milliGRAM(s) IV Push every 8 hours  pantoprazole  Injectable 40 milliGRAM(s) IV Push every 12 hours  polyethylene glycol 3350 17 Gram(s) Oral daily  saline laxative (FLEET) Rectal Enema 1 Enema Rectal once  senna 2 Tablet(s) Oral at bedtime  tamsulosin 0.4 milliGRAM(s) Oral at bedtime    MEDICATIONS  (PRN):  LORazepam   Injectable 0.5 milliGRAM(s) IV Push every 8 hours PRN Anxiety  ondansetron Injectable 4 milliGRAM(s) IV Push every 4 hours PRN Nausea and/or Vomiting            DIET:          ALLERGIES:   Allergies    No Known Allergies    Intolerances        VITAL SIGNS:   Vital Signs Last 24 Hrs  T(C): 36.8 (29 Mar 2024 08:17), Max: 36.9 (28 Mar 2024 16:52)  T(F): 98.2 (29 Mar 2024 08:17), Max: 98.4 (28 Mar 2024 16:52)  HR: 74 (29 Mar 2024 08:17) (63 - 80)  BP: 116/72 (29 Mar 2024 08:17) (116/72 - 144/88)  BP(mean): 87 (29 Mar 2024 08:17) (87 - 87)  RR: 20 (29 Mar 2024 08:17) (18 - 20)  SpO2: 94% (29 Mar 2024 08:17) (94% - 98%)    Parameters below as of 29 Mar 2024 08:17  Patient On (Oxygen Delivery Method): room air      I&O's Summary    28 Mar 2024 07:01  -  29 Mar 2024 07:00  --------------------------------------------------------  IN: 0 mL / OUT: 450 mL / NET: -450 mL        PHYSICAL EXAM:   GENERAL:  No acute distress  HEENT:  NC/AT, conjunctiva clear, sclera anicteric  CHEST:  No increased effort  HEART:  Regular rate  ABDOMEN:  Soft, obese non-tender, non-distended, normoactive bowel sounds, no rebound or guarding  EXTREMITIES: No edema  SKIN:  Warm, dry  NEURO:  Calm, cooperative    LABS:                        13.1   3.39  )-----------( 212      ( 28 Mar 2024 06:02 )             39.4     Hemoglobin: 13.1 g/dL (03-28-24 @ 06:02)  Hemoglobin: 13.3 g/dL (03-27-24 @ 08:44)  Hemoglobin: 14.3 g/dL (03-26-24 @ 11:14)    03-28    139  |  100  |  4.1<L>  ----------------------------<  111<H>  3.2<L>   |  27.0  |  0.63    Ca    8.3<L>      28 Mar 2024 06:02  Mg     1.6     03-28    TPro  5.9<L>  /  Alb  3.5  /  TBili  1.8  /  DBili  x   /  AST  96<H>  /  ALT  174<H>  /  AlkPhos  76  03-28    LIVER FUNCTIONS - ( 28 Mar 2024 06:02 )  Alb: 3.5 g/dL / Pro: 5.9 g/dL / ALK PHOS: 76 U/L / ALT: 174 U/L / AST: 96 U/L / GGT: x                 Culture - Blood (collected 27 Mar 2024 12:44)  Source: .Blood Blood-Peripheral  Preliminary Report (28 Mar 2024 20:01):    No growth at 24 hours    Culture - Blood (collected 27 Mar 2024 12:38)  Source: .Blood Blood-Peripheral  Preliminary Report (28 Mar 2024 20:01):    No growth at 24 hours                                            RADIOLOGY & ADDITIONAL STUDIES:

## 2024-03-29 NOTE — CONSULT NOTE ADULT - ASSESSMENT
23 yo pleasant male being consulted by [facility] for leucopenia  Pt admitted to [facility] for intractable nausea and Vomitting       Abdominal pain  Post gastric sleeve 1 month prior  CT abdomen and pelvis negative  RUQ ultrasound negative  EGD normal  Seen by Bariatric surgery; no acute intervention   Gastric emptying study was cancelled twice as patient was unable to tolerate contrast due to nausea   gastric emptying study on monday 4/1   antimeric per medicine   IVF  Gastro following      Leukopenia mild   unclear baseline  WBC 3.64, 3.99, today 2.89  Cont to trend  ANC 1.51 today  +UTI  on antibiotics  Keep ANC >1000   No previous blood work in [facility] for comparison   Check TSH, B12, Folate, CODI and Hepatitis panel  If leukopenia persists will f/u outpt for fullwork up including a flow cytometry    Follow up with Dr Hendricks as outpt if WBC cont to trend down for a full work up including Flow Cytometry    Pt discussed with Dr eHndricks    Thank you for consulting [facility]  [phone]

## 2024-03-29 NOTE — BH CONSULTATION LIAISON ASSESSMENT NOTE - NSBHCHARTREVIEWINVESTIGATE_PSY_A_CORE FT
< from: 12 Lead ECG (03.26.24 @ 08:24) >      Ventricular Rate 78 BPM    Atrial Rate 78 BPM    P-R Interval 146 ms    QRS Duration 96 ms    Q-T Interval 382 ms    QTC Calculation(Bazett) 435 ms    P Axis 63 degrees    R Axis 30 degrees    T Axis 49 degrees    Diagnosis Line Normal sinus rhythmwith sinus arrhythmia  Low voltage QRS  poor r wave progression  Normal ECG    Confirmed by REBECCA TANG ([id number]) on 3/27/2024 10:00:49 PM    < end of copied text >

## 2024-03-29 NOTE — PROGRESS NOTE ADULT - SUBJECTIVE AND OBJECTIVE BOX
Patient seen and examined at bedside. No acute events overnight. Was not able to undergo gastric emptying study yesterday due to vomiting. He mentions he had further nausea/emesis overnight.     Vitals:  Vital Signs Last 24 Hrs  T(C): 36.8 (29 Mar 2024 08:17), Max: 36.9 (28 Mar 2024 16:52)  T(F): 98.2 (29 Mar 2024 08:17), Max: 98.4 (28 Mar 2024 16:52)  HR: 74 (29 Mar 2024 08:17) (63 - 80)  BP: 116/72 (29 Mar 2024 08:17) (116/72 - 144/88)  BP(mean): 87 (29 Mar 2024 08:17) (87 - 87)  RR: 20 (29 Mar 2024 08:17) (18 - 20)  SpO2: 94% (29 Mar 2024 08:17) (94% - 98%)    Parameters below as of 29 Mar 2024 08:17  Patient On (Oxygen Delivery Method): room air        Labs:  03-28    139  |  100  |  4.1<L>  ----------------------------<  111<H>  3.2<L>   |  27.0  |  0.63    Ca    8.3<L>      28 Mar 2024 06:02  Phos  4.4     03-27  Mg     1.6     03-28    TPro  5.9<L>  /  Alb  3.5  /  TBili  1.8  /  DBili  x   /  AST  96<H>  /  ALT  174<H>  /  AlkPhos  76  03-28                            13.1   3.39  )-----------( 212      ( 28 Mar 2024 06:02 )             39.4       Exam:  Gen: pt lying in bed, alert, in NAD  Resp: unlabored  CVS: RRR  Abd: soft, NT, ND  Ext: moving all extremities spontaneously, sensation intact, pulses 2+

## 2024-03-29 NOTE — PROGRESS NOTE ADULT - ASSESSMENT
The patient is a 24 year old male with a past medical history of obesity, anxiety and depression status post recent Gastric sleeve surgery who presented to the Er with complaints of abdominal pain, nausea vomiting and inability to tolerate PO. Noted to have transaminitis in the ER. CT of the abdomen and pelvis with no acute findings. RUQ was negative. Seen by GI, status post EGD normal.     Assessment/Plan:    1. Abdominal pain  - Post gastric sleeve 1 month prior  - CT abdomen and pelvis negative  - RUQ ultrasound negative  - EGD normal  - Seen by Bariatric surgery; no acute intervention   - Gastric emptying study was cancelled twice as patient was unable to tolerate contrast due to nausea   - Started reglan yesterday. Patient is rescheduled for the gastric emptying study on monday 4/1; IV Reglan must be held 48 hours prior to study   - IVF  - Bowel regimen with suppository for constipation, enema ordered today     2 Anion gap metabolic acidosis  - Secondary to hypovolemia  - Improved with IVF  - Monitor CMP    3. Acute urinary retention  - Suspected secondary to UTI  - Schaffer day 3  - IV Rocephin day 3  - Blood and urine cultures negative  - Remove schaffer for TOV In AM once constipation improves  - Was recently prescribed antibiotics by urology however due to N/V was unable to complete course     4. Anxiety and depression  -  previously on topiramate 25 mg po bid and bupropion  qd stopped prior to surgery  - BH following    5. Hepatic steatosis  - Elevation in LFTS  - CT abdomen and RUQ negative for acute process   - MRI as outpatient and follow up with hepatology     VTE_ SCDS   The patient is a 24 year old male with a past medical history of obesity, anxiety and depression status post recent Gastric sleeve surgery who presented to the Er with complaints of abdominal pain, nausea vomiting and inability to tolerate PO. Noted to have transaminitis in the ER. CT of the abdomen and pelvis with no acute findings. RUQ was negative. Seen by GI, status post EGD normal.     Assessment/Plan:    1. Abdominal pain  - Post gastric sleeve 1 month prior  - CT abdomen and pelvis negative  - RUQ ultrasound negative  - EGD normal  - Seen by Bariatric surgery; no acute intervention   - Gastric emptying study was cancelled twice as patient was unable to tolerate contrast due to nausea   - Started reglan yesterday. Patient is rescheduled for the gastric emptying study on monday 4/1; IV Reglan must be held 48 hours prior to study   - IVF  - Bowel regimen with suppository for constipation, enema ordered today     2 Anion gap metabolic acidosis  - Secondary to hypovolemia  - Improved with IVF  - Monitor CMP    3. Acute urinary retention  - Suspected secondary to UTI  - Schaffer day 3  - IV Rocephin day 3  - Blood and urine cultures negative  - Remove schaffer for TOV In AM once constipation improves  - Was recently prescribed antibiotics by urology however due to N/V was unable to complete course     4. Anxiety and depression  -  previously on topiramate 25 mg po bid and bupropion  qd stopped prior to surgery  - BH following    5. Hepatic steatosis  - Elevation in LFTS  - CT abdomen and RUQ negative for acute process   - MRI as outpatient and follow up with hepatology     6. Worsening neutropenia  - No previous blood work in [facility] for comparison  - Hematology consulted       VTE_ SCDS   The patient is a 24 year old male with a past medical history of obesity, anxiety and depression status post recent Gastric sleeve surgery who presented to the Er with complaints of abdominal pain, nausea vomiting and inability to tolerate PO. Noted to have transaminitis in the ER. CT of the abdomen and pelvis with no acute findings. RUQ was negative. Seen by GI, status post EGD normal.     Assessment/Plan:    1. Abdominal pain  - Post gastric sleeve 1 month prior  - CT abdomen and pelvis negative  - RUQ ultrasound negative  - EGD normal  - Seen by Bariatric surgery; no acute intervention   - Gastric emptying study was cancelled twice as patient was unable to tolerate contrast due to nausea   - Started reglan yesterday. Patient is rescheduled for the gastric emptying study on monday 4/1; IV Reglan must be held 48 hours prior to study   - IVF  - Bowel regimen with suppository for constipation, enema ordered today     2 Anion gap metabolic acidosis  - Secondary to hypovolemia  - Improved with IVF  - Monitor CMP    3. Acute urinary retention  - Suspected secondary to UTI  - Schaffer day 3  - IV Rocephin day 3  - Blood and urine cultures negative  - Remove schaffer for TOV In AM once constipation improves  - Was recently prescribed antibiotics by urology however due to N/V was unable to complete course     4. Anxiety and depression  -  previously on topiramate 25 mg po bid and bupropion  qd stopped prior to surgery  - BH following    5. Hepatic steatosis  - Elevation in LFTS  - CT abdomen and RUQ negative for acute process   - MRI as outpatient and follow up with hepatology     6. Worsening neutropenia  - No previous blood work in [facility] for comparison  - Discussed with Dr Hendricks, Check TSH, B12, Folate, CODI and Hepatitis panel  - To follow up in AM       VTE_ SCDS   The patient is a 24 year old male with a past medical history of obesity, anxiety and depression status post recent Gastric sleeve surgery who presented to the Er with complaints of abdominal pain, nausea vomiting and inability to tolerate PO. Noted to have transaminitis in the ER. CT of the abdomen and pelvis with no acute findings. RUQ was negative. Seen by GI, status post EGD normal.     Assessment/Plan:    1. Abdominal pain  - Post gastric sleeve 1 month prior  - CT abdomen and pelvis negative  - RUQ ultrasound negative  - EGD normal  - Seen by Bariatric surgery; no acute intervention   - Gastric emptying study was cancelled twice as patient was unable to tolerate contrast due to nausea   - Started reglan yesterday. Patient is rescheduled for the gastric emptying study on monday 4/1; IV Reglan must be held 48 hours prior to study   - IVF  - Bowel regimen with suppository for constipation, enema ordered today     2 Anion gap metabolic acidosis  - Secondary to hypovolemia  - Improved with IVF  - Monitor CMP    3. Acute urinary retention  - Suspected secondary to UTI  - Schaffer day 3  - IV Rocephin day 3  - Blood and urine cultures negative  - Remove schaffer for TOV In AM once constipation improves  - Was recently prescribed antibiotics by urology however due to N/V was unable to complete course     4. Anxiety and depression  -  previously on topiramate 25 mg po bid and bupropion  qd stopped prior to surgery  - BH following    5. Hepatic steatosis  - Elevation in LFTS  - CT abdomen and RUQ negative for acute process   - MRI as outpatient and follow up with hepatology     6. Worsening neutropenia  - No previous blood work in [facility] for comparison  - Discussed with Dr Hendricks, Check TSH, B12, Folate, CODI and Hepatitis panel  - To follow up in AM       7. Hypomagnesemia  - Supplement MgSO4    VTE_ SCDS

## 2024-03-29 NOTE — BH CONSULTATION LIAISON ASSESSMENT NOTE - CURRENT MEDICATION
MEDICATIONS  (STANDING):  bisacodyl Suppository 10 milliGRAM(s) Rectal daily  cefTRIAXone Injectable.      cefTRIAXone Injectable. 1000 milliGRAM(s) IV Push every 24 hours  dextrose 5% + lactated ringers. 1000 milliLiter(s) (100 mL/Hr) IV Continuous <Continuous>  metoclopramide Injectable 10 milliGRAM(s) IV Push every 8 hours  pantoprazole  Injectable 40 milliGRAM(s) IV Push every 12 hours  polyethylene glycol 3350 17 Gram(s) Oral daily  saline laxative (FLEET) Rectal Enema 1 Enema Rectal once  senna 2 Tablet(s) Oral at bedtime  tamsulosin 0.4 milliGRAM(s) Oral at bedtime    MEDICATIONS  (PRN):  LORazepam   Injectable 0.5 milliGRAM(s) IV Push every 8 hours PRN Anxiety  ondansetron Injectable 4 milliGRAM(s) IV Push every 4 hours PRN Nausea and/or Vomiting

## 2024-03-29 NOTE — BH CONSULTATION LIAISON ASSESSMENT NOTE - NSBHCONSULTFOLLOWAFTERCARE_PSY_A_CORE FT
patient to follow up with outpatient psychiatrist and therapist in [place], [place] upon discharge  patient to follow up with outpatient psychiatrist and therapist upon discharge

## 2024-03-29 NOTE — CONSULT NOTE ADULT - SUBJECTIVE AND OBJECTIVE BOX
The patient is a 24 year old male with a past medical history of obesity, anxiety and depression status post recent Gastric sleeve surgery who presented to the Er with complaints of abdominal pain, nausea vomiting and inability to tolerate PO. Noted to have transaminitis in the ER. CT of the abdomen and pelvis with no acute findings. RUQ was negative. Seen by GI, status post EGD normal.   [facility] was consulted for unexplained leukopenia    Allergies    No Known Allergies    Intolerances      PAST MEDICAL & SURGICAL HISTORY:  S/P gastric sleeve procedure      Obesity      Obstructive sleep apnea      Anxiety and depression      H/O gastric sleeve    Home Medications:  Atarax 25 mg oral tablet: 1 tab(s) orally 3 times a day as needed for  anxiety (26 Mar 2024 12:04)  clonazePAM 0.5 mg oral tablet, disintegratin tab(s) orally every 8 hours as needed for  anxiety (26 Mar 2024 12:04)  cyanocobalamin 1000 mcg/15 mL oral liquid: 15 milliliter(s) orally once a day (26 Mar 2024 12:04)  famotidine 20 mg oral tablet: 1 tab(s) orally 2 times a day (26 Mar 2024 12:04)  Flomax 0.4 mg oral capsule: 1 cap(s) orally once a day (26 Mar 2024 12:04)  gabapentin 100 mg oral tablet: 1 tab(s) orally 3 times a day (26 Mar 2024 12:05)  oxycodone-acetaminophen 5 mg-325 mg oral tablet: 1 tab(s) orally every 6 hours as needed for pain (26 Mar 2024 12:05)  simethicone 80 mg oral tablet, chewable: 1 tab(s) chewed 2 times a day as needed for spasms (26 Mar 2024 12:05)  Zofran 4 mg/5 mL oral solution: 5 milliliter(s) orally every 8 hours as needed for  nausea/ vomiting (26 Mar 2024 12:05)      MEDICATIONS  (STANDING):  bisacodyl Suppository 10 milliGRAM(s) Rectal daily  cefTRIAXone Injectable. 1000 milliGRAM(s) IV Push every 24 hours  cefTRIAXone Injectable.      dextrose 5% + lactated ringers. 1000 milliLiter(s) (100 mL/Hr) IV Continuous <Continuous>  metoclopramide Injectable 10 milliGRAM(s) IV Push every 8 hours  pantoprazole  Injectable 40 milliGRAM(s) IV Push every 12 hours  polyethylene glycol 3350 17 Gram(s) Oral daily  senna 2 Tablet(s) Oral at bedtime  tamsulosin 0.4 milliGRAM(s) Oral at bedtime    MEDICATIONS  (PRN):  LORazepam   Injectable 0.5 milliGRAM(s) IV Push every 8 hours PRN Anxiety  ondansetron Injectable 4 milliGRAM(s) IV Push every 4 hours PRN Nausea and/or Vomiting    Vital Signs Last 24 Hrs  T(C): 37 (29 Mar 2024 10:52), Max: 37 (29 Mar 2024 10:52)  T(F): 98.6 (29 Mar 2024 10:52), Max: 98.6 (29 Mar 2024 10:52)  HR: 71 (29 Mar 2024 10:52) (63 - 80)  BP: 141/93 (29 Mar 2024 10:52) (116/72 - 144/88)  BP(mean): 87 (29 Mar 2024 08:17) (87 - 87)  RR: 19 (29 Mar 2024 10:52) (18 - 20)  SpO2: 96% (29 Mar 2024 10:52) (94% - 98%)    Parameters below as of 29 Mar 2024 08:17  Patient On (Oxygen Delivery Method): room air    Labs  CBC                        13.4   2.89  )-----------( 225      ( 29 Mar 2024 10:08 )             40.7     Chem      136  |  97  |  3.0<L>  ----------------------------<  110<H>  3.5   |  26.0  |  0.59    Ca    8.5      29 Mar 2024 10:08  Mg     1.5         TPro  6.2<L>  /  Alb  3.6  /  TBili  2.0  /  DBili  x   /  AST  139<H>  /  ALT  219<H>  /  AlkPhos  77      PHYSICAL EXAM:    GENERAL: NAD, AOX3, Obese gastric sleeve surgery  HEAD:  Atraumatic, Normocephalic  EYES:conjunctiva and sclera clear  ENMT: Moist mucous membranes  NECK: Supple  CHEST/LUNG: Clear to auscultation bilaterally; No rales, rhonchi, wheezing, or rubs  HEART: Regular rate and rhythm; No murmurs, rubs, or gallops  ABDOMEN: Soft, Nontender, Nondistended; Bowel sounds present   + Rodriguez for retention  EXTREMITIES:  2+ Peripheral Pulses, No clubbing, cyanosis, or edema

## 2024-03-29 NOTE — PROGRESS NOTE ADULT - ASSESSMENT
5yo M s/p sleeve gastrectomy now with PO intolerance.    PLAN:  -no operative intervention planned  -f/u GI reccs - started on reglan and zofran yesterday, assess if tolerates fulls, if not gastric emptying on 4/1  -continue care per primary  -discussed with attending surgeon  -encouraged patient to follow-up with his primary surgeon

## 2024-03-29 NOTE — BH CONSULTATION LIAISON ASSESSMENT NOTE - HPI (INCLUDE ILLNESS QUALITY, SEVERITY, DURATION, TIMING, CONTEXT, MODIFYING FACTORS, ASSOCIATED SIGNS AND SYMPTOMS)
Patient is a 24 year old male, domiciled in [place], [place] with mother, aunt, and cousins, unemployed, past medical history of obesity, RADHA, gastric sleeve (2/1/24), past psychiatric history of anxiety, depression, PTSD, and autism spectrum d/o, no prior psychiatric hospitalizations, OPP include therapist and psychiatrist based in [place], was previously prescribed Topamax, Wellbutrin, and Klonopin most recently but non-compliant due to reported side effects, no history of SA/NSSIB, substance history significant for past use of ?hemp, no legal history, remote family history of anxiety and depression, admitted to [facility] for transaminitis, abdominal pain, and intractable nausea/vomiting. Psychiatry consulted due to anxiety.     On encounter today, patient presents as cooperative and friendly on approach. Throughout encounter, pt intermittently hiccuping, at times retching. He states that since February 1st when he had gastric sleeve operation, he has had difficulty with eating and nausea/vomiting. He states that there were catheter complications after his operation as well which resulted in an infection that he was prescribed antibiotics for, but was unable to finish prescribed oral antibiotics due to nausea. He states over the past three weeks, his symptoms seemed to have worsened and he has "spiraled." He states he has even attempted to eat baby food for nutrition. He states that he has been able to drink fluids and shakes, but this has decreased as well due to nausea. He also reports low mood, difficulty with sleep (states he typically wears CPAP at night but is unable to due to recent surgery), and low motivation. He denies suicidal ideation, intent, or plan. Denies HI/AVH. No symptoms of titus elicited.  Patient is a 24 year old male, domiciled in [place], [place] with mother, aunt, and cousins, unemployed, past medical history of obesity, RADHA, gastric sleeve (2/1/24), past psychiatric history of anxiety, depression, PTSD, and autism spectrum d/o, no prior psychiatric hospitalizations, OPP include therapist and psychiatrist based in [place], was previously prescribed Topamax, Wellbutrin, and Klonopin most recently but non-compliant due to reported side effects, no history of SA/NSSIB, substance history significant for past use of ?hemp, no legal history, remote family history of anxiety and depression, admitted to [facility] for transaminitis, abdominal pain, and intractable nausea/vomiting. Psychiatry consulted due to anxiety.     On encounter today, patient presents as cooperative and friendly on approach. Throughout encounter, pt intermittently hiccuping, at times retching. He states that since February 1st when he had gastric sleeve operation, he has had difficulty with eating and nausea/vomiting. He states that there were catheter complications after his operation as well which resulted in an infection that he was prescribed antibiotics for, but was unable to finish prescribed oral antibiotics due to nausea. He states over the past three weeks, his symptoms seemed to have worsened and he has "spiraled." He states he has even attempted to eat baby food for nutrition. He states that he has been able to drink fluids and shakes, but this has decreased as well due to nausea. He also reports low mood, difficulty with sleep (states he typically wears CPAP at night but is unable to due to recent surgery), and low motivation. He denies suicidal ideation, intent, or plan. Denies HI/AVH. No symptoms of titus elicited.     With patient's consent, spoke with patient's mother David ([phone]) for collateral information. She states that patient has been struggling with anxiety significantly since procedure as stated above. She states that he has had difficulty with urinating and having bowel movements as well. She states that "he feels like he can't do anything." She states that he has stopped taking his medications for anxiety due to nausea/vomiting. She states that she was recently admitted at [facility], and although pt currently domiciled in [place], she suggested that he come here due to positive experience here herself.

## 2024-03-29 NOTE — PROGRESS NOTE ADULT - ASSESSMENT
23 y/o M s/p sleeve gastrectomy 2/2024 who presents with complaints of PO intolerance, nausea and small volume bilious emesis.     #Nausea, vomiting, epigastric pain  #S/P sleeve gastrectomy 2/2024   #Elevated liver enzymes   #Constipation   CT A/P w/ PO and IV Cont (03.26.24)- Stable left hepatic lobe hypodensities. Expected gastric sleeve postoperative changes.  US Abdomen Upper Quadrant Right (03.26.24)- Hepatic steatosis.  EGD (03.26.24)- normal mucosa in esophagus/duodenum, evidence of band gastroplasty in stomach.     - Per nuclear unable to complete study until Monday 4/1/24, if patient still in house will plan for then. Hold reglan x 48 hours   - IV fluids for hydration  - PPI BID  - Monitor renal function, electrolytes.  Replete electrolytes as needed. Avoid hypotension.   - Zofran as needed for nausea, and vomiting  - Avoid Narcotics  - Reglan 10 mg every 8 hours standing  - Trend LFTs. Avoid hepatotoxins  - MRI as an outpatient to assess liver findings   - Suppositories for constipation until able to tolerate PO, check abdominal XR     #Hepatic steatosis   - Trend LFTs  - Maintain normal BMI  - Alcohol abstinence  - Low fat diet (when applicable)   - Follow up with hepatologist Dr. Johnson for further assessment and monitoring   _________________________________________________________________  Assessment and recommendations are final when note is signed by the attending physician.

## 2024-03-29 NOTE — PROGRESS NOTE ADULT - SUBJECTIVE AND OBJECTIVE BOX
CC: Follow up     INTERVAL HPI/OVERNIGHT EVENTS: Patient seen and examined, has complaints of [place]. States everytime he looks at medication or food he feels nauseous and is unable to tolerate. No BM since yesterday       Vital Signs Last 24 Hrs  T(C): 37 (29 Mar 2024 10:52), Max: 37 (29 Mar 2024 10:52)  T(F): 98.6 (29 Mar 2024 10:52), Max: 98.6 (29 Mar 2024 10:52)  HR: 71 (29 Mar 2024 10:52) (63 - 80)  BP: 141/93 (29 Mar 2024 10:52) (116/72 - 144/88)  BP(mean): 87 (29 Mar 2024 08:17) (87 - 87)  RR: 19 (29 Mar 2024 10:52) (18 - 20)  SpO2: 96% (29 Mar 2024 10:52) (94% - 98%)    Parameters below as of 29 Mar 2024 08:17  Patient On (Oxygen Delivery Method): room air        PHYSICAL EXAM:    GENERAL: NAD, AOX3, Obese  HEAD:  Atraumatic, Normocephalic  EYES:conjunctiva and sclera clear  ENMT: Moist mucous membranes  NECK: Supple  CHEST/LUNG: Clear to auscultation bilaterally; No rales, rhonchi, wheezing, or rubs  HEART: Regular rate and rhythm; No murmurs, rubs, or gallops  ABDOMEN: Soft, Nontender, Nondistended; Bowel sounds present  + Hernandez   EXTREMITIES:  2+ Peripheral Pulses, No clubbing, cyanosis, or edema        MEDICATIONS  (STANDING):  bisacodyl Suppository 10 milliGRAM(s) Rectal daily  cefTRIAXone Injectable.      cefTRIAXone Injectable. 1000 milliGRAM(s) IV Push every 24 hours  dextrose 5% + lactated ringers. 1000 milliLiter(s) (100 mL/Hr) IV Continuous <Continuous>  metoclopramide Injectable 10 milliGRAM(s) IV Push every 8 hours  pantoprazole  Injectable 40 milliGRAM(s) IV Push every 12 hours  polyethylene glycol 3350 17 Gram(s) Oral daily  senna 2 Tablet(s) Oral at bedtime  tamsulosin 0.4 milliGRAM(s) Oral at bedtime    MEDICATIONS  (PRN):  LORazepam   Injectable 0.5 milliGRAM(s) IV Push every 8 hours PRN Anxiety  ondansetron Injectable 4 milliGRAM(s) IV Push every 4 hours PRN Nausea and/or Vomiting      Allergies    No Known Allergies    Intolerances          LABS:                          13.4   2.89  )-----------( 225      ( 29 Mar 2024 10:08 )             40.7     03-29    136  |  97  |  3.0<L>  ----------------------------<  110<H>  3.5   |  26.0  |  0.59    Ca    8.5      29 Mar 2024 10:08  Mg     1.5     03-29    TPro  6.2<L>  /  Alb  3.6  /  TBili  2.0  /  DBili  x   /  AST  139<H>  /  ALT  219<H>  /  AlkPhos  77  03-29      Urinalysis Basic - ( 29 Mar 2024 10:08 )    Color: x / Appearance: x / SG: x / pH: x  Gluc: 110 mg/dL / Ketone: x  / Bili: x / Urobili: x   Blood: x / Protein: x / Nitrite: x   Leuk Esterase: x / RBC: x / WBC x   Sq Epi: x / Non Sq Epi: x / Bacteria: x        RADIOLOGY & ADDITIONAL TESTS:   CC: Follow up     INTERVAL HPI/OVERNIGHT EVENTS: Patient seen and examined, has complaints of n[place]. States everytime he looks at medication or food he feels nauseous and is unable to tolerate. No BM since yesterday. patient unable to tolerate PO medications- not taking flomax or senna       Vital Signs Last 24 Hrs  T(C): 37 (29 Mar 2024 10:52), Max: 37 (29 Mar 2024 10:52)  T(F): 98.6 (29 Mar 2024 10:52), Max: 98.6 (29 Mar 2024 10:52)  HR: 71 (29 Mar 2024 10:52) (63 - 80)  BP: 141/93 (29 Mar 2024 10:52) (116/72 - 144/88)  BP(mean): 87 (29 Mar 2024 08:17) (87 - 87)  RR: 19 (29 Mar 2024 10:52) (18 - 20)  SpO2: 96% (29 Mar 2024 10:52) (94% - 98%)    Parameters below as of 29 Mar 2024 08:17  Patient On (Oxygen Delivery Method): room air        PHYSICAL EXAM:    GENERAL: NAD, AOX3, Obese  HEAD:  Atraumatic, Normocephalic  EYES:conjunctiva and sclera clear  ENMT: Moist mucous membranes  NECK: Supple  CHEST/LUNG: Clear to auscultation bilaterally; No rales, rhonchi, wheezing, or rubs  HEART: Regular rate and rhythm; No murmurs, rubs, or gallops  ABDOMEN: Soft, Nontender, Nondistended; Bowel sounds present  + Hernandez   EXTREMITIES:  2+ Peripheral Pulses, No clubbing, cyanosis, or edema        MEDICATIONS  (STANDING):  bisacodyl Suppository 10 milliGRAM(s) Rectal daily  cefTRIAXone Injectable.      cefTRIAXone Injectable. 1000 milliGRAM(s) IV Push every 24 hours  dextrose 5% + lactated ringers. 1000 milliLiter(s) (100 mL/Hr) IV Continuous <Continuous>  metoclopramide Injectable 10 milliGRAM(s) IV Push every 8 hours  pantoprazole  Injectable 40 milliGRAM(s) IV Push every 12 hours  polyethylene glycol 3350 17 Gram(s) Oral daily  senna 2 Tablet(s) Oral at bedtime  tamsulosin 0.4 milliGRAM(s) Oral at bedtime    MEDICATIONS  (PRN):  LORazepam   Injectable 0.5 milliGRAM(s) IV Push every 8 hours PRN Anxiety  ondansetron Injectable 4 milliGRAM(s) IV Push every 4 hours PRN Nausea and/or Vomiting      Allergies    No Known Allergies    Intolerances          LABS:                          13.4   2.89  )-----------( 225      ( 29 Mar 2024 10:08 )             40.7     03-29    136  |  97  |  3.0<L>  ----------------------------<  110<H>  3.5   |  26.0  |  0.59    Ca    8.5      29 Mar 2024 10:08  Mg     1.5     03-29    TPro  6.2<L>  /  Alb  3.6  /  TBili  2.0  /  DBili  x   /  AST  139<H>  /  ALT  219<H>  /  AlkPhos  77  03-29      Urinalysis Basic - ( 29 Mar 2024 10:08 )    Color: x / Appearance: x / SG: x / pH: x  Gluc: 110 mg/dL / Ketone: x  / Bili: x / Urobili: x   Blood: x / Protein: x / Nitrite: x   Leuk Esterase: x / RBC: x / WBC x   Sq Epi: x / Non Sq Epi: x / Bacteria: x        RADIOLOGY & ADDITIONAL TESTS:

## 2024-03-30 LAB
ALBUMIN SERPL ELPH-MCNC: 3.6 G/DL — SIGNIFICANT CHANGE UP (ref 3.3–5.2)
ALP SERPL-CCNC: 79 U/L — SIGNIFICANT CHANGE UP (ref 40–120)
ALT FLD-CCNC: 243 U/L — HIGH
ANION GAP SERPL CALC-SCNC: 12 MMOL/L — SIGNIFICANT CHANGE UP (ref 5–17)
AST SERPL-CCNC: 147 U/L — HIGH
BASOPHILS # BLD AUTO: 0.02 K/UL — SIGNIFICANT CHANGE UP (ref 0–0.2)
BASOPHILS NFR BLD AUTO: 0.6 % — SIGNIFICANT CHANGE UP (ref 0–2)
BILIRUB SERPL-MCNC: 2.2 MG/DL — HIGH (ref 0.4–2)
BUN SERPL-MCNC: <3 MG/DL — LOW (ref 8–20)
CALCIUM SERPL-MCNC: 8.4 MG/DL — SIGNIFICANT CHANGE UP (ref 8.4–10.5)
CHLORIDE SERPL-SCNC: 98 MMOL/L — SIGNIFICANT CHANGE UP (ref 96–108)
CO2 SERPL-SCNC: 27 MMOL/L — SIGNIFICANT CHANGE UP (ref 22–29)
CREAT SERPL-MCNC: 0.51 MG/DL — SIGNIFICANT CHANGE UP (ref 0.5–1.3)
EGFR: 143 ML/MIN/1.73M2 — SIGNIFICANT CHANGE UP
EOSINOPHIL # BLD AUTO: 0.05 K/UL — SIGNIFICANT CHANGE UP (ref 0–0.5)
EOSINOPHIL NFR BLD AUTO: 1.5 % — SIGNIFICANT CHANGE UP (ref 0–6)
FOLATE SERPL-MCNC: 4.8 NG/ML — SIGNIFICANT CHANGE UP
GLUCOSE SERPL-MCNC: 112 MG/DL — HIGH (ref 70–99)
HAV IGM SER-ACNC: SIGNIFICANT CHANGE UP
HBV CORE IGM SER-ACNC: SIGNIFICANT CHANGE UP
HBV SURFACE AG SER-ACNC: SIGNIFICANT CHANGE UP
HCT VFR BLD CALC: 40.9 % — SIGNIFICANT CHANGE UP (ref 39–50)
HCV AB S/CO SERPL IA: 0.16 S/CO — SIGNIFICANT CHANGE UP (ref 0–0.99)
HCV AB SERPL-IMP: SIGNIFICANT CHANGE UP
HGB BLD-MCNC: 13.5 G/DL — SIGNIFICANT CHANGE UP (ref 13–17)
IMM GRANULOCYTES NFR BLD AUTO: 0.3 % — SIGNIFICANT CHANGE UP (ref 0–0.9)
LYMPHOCYTES # BLD AUTO: 0.95 K/UL — LOW (ref 1–3.3)
LYMPHOCYTES # BLD AUTO: 29 % — SIGNIFICANT CHANGE UP (ref 13–44)
MAGNESIUM SERPL-MCNC: 1.8 MG/DL — SIGNIFICANT CHANGE UP (ref 1.6–2.6)
MCHC RBC-ENTMCNC: 26.4 PG — LOW (ref 27–34)
MCHC RBC-ENTMCNC: 33 GM/DL — SIGNIFICANT CHANGE UP (ref 32–36)
MCV RBC AUTO: 80 FL — SIGNIFICANT CHANGE UP (ref 80–100)
MONOCYTES # BLD AUTO: 0.45 K/UL — SIGNIFICANT CHANGE UP (ref 0–0.9)
MONOCYTES NFR BLD AUTO: 13.7 % — SIGNIFICANT CHANGE UP (ref 2–14)
NEUTROPHILS # BLD AUTO: 1.8 K/UL — SIGNIFICANT CHANGE UP (ref 1.8–7.4)
NEUTROPHILS NFR BLD AUTO: 54.9 % — SIGNIFICANT CHANGE UP (ref 43–77)
PHOSPHATE SERPL-MCNC: 3.4 MG/DL — SIGNIFICANT CHANGE UP (ref 2.4–4.7)
PLATELET # BLD AUTO: 209 K/UL — SIGNIFICANT CHANGE UP (ref 150–400)
POTASSIUM SERPL-MCNC: 3.4 MMOL/L — LOW (ref 3.5–5.3)
POTASSIUM SERPL-SCNC: 3.4 MMOL/L — LOW (ref 3.5–5.3)
PROT SERPL-MCNC: 6.3 G/DL — LOW (ref 6.6–8.7)
RBC # BLD: 5.11 M/UL — SIGNIFICANT CHANGE UP (ref 4.2–5.8)
RBC # FLD: 14.9 % — HIGH (ref 10.3–14.5)
SODIUM SERPL-SCNC: 137 MMOL/L — SIGNIFICANT CHANGE UP (ref 135–145)
TSH SERPL-MCNC: 4.03 UIU/ML — SIGNIFICANT CHANGE UP (ref 0.27–4.2)
VIT B12 SERPL-MCNC: 1355 PG/ML — HIGH (ref 232–1245)
WBC # BLD: 3.28 K/UL — LOW (ref 3.8–10.5)
WBC # FLD AUTO: 3.28 K/UL — LOW (ref 3.8–10.5)

## 2024-03-30 PROCEDURE — 99233 SBSQ HOSP IP/OBS HIGH 50: CPT

## 2024-03-30 PROCEDURE — 99232 SBSQ HOSP IP/OBS MODERATE 35: CPT

## 2024-03-30 PROCEDURE — 74018 RADEX ABDOMEN 1 VIEW: CPT | Mod: 26

## 2024-03-30 PROCEDURE — 99231 SBSQ HOSP IP/OBS SF/LOW 25: CPT | Mod: FS

## 2024-03-30 RX ORDER — SODIUM CHLORIDE 9 MG/ML
1000 INJECTION, SOLUTION INTRAVENOUS
Refills: 0 | Status: DISCONTINUED | OUTPATIENT
Start: 2024-03-30 | End: 2024-03-31

## 2024-03-30 RX ORDER — POTASSIUM CHLORIDE 20 MEQ
10 PACKET (EA) ORAL
Refills: 0 | Status: COMPLETED | OUTPATIENT
Start: 2024-03-30 | End: 2024-03-30

## 2024-03-30 RX ADMIN — PANTOPRAZOLE SODIUM 40 MILLIGRAM(S): 20 TABLET, DELAYED RELEASE ORAL at 17:38

## 2024-03-30 RX ADMIN — Medication 100 MILLIEQUIVALENT(S): at 18:00

## 2024-03-30 RX ADMIN — Medication 100 MILLIEQUIVALENT(S): at 16:05

## 2024-03-30 RX ADMIN — CEFTRIAXONE 1000 MILLIGRAM(S): 500 INJECTION, POWDER, FOR SOLUTION INTRAMUSCULAR; INTRAVENOUS at 12:20

## 2024-03-30 RX ADMIN — SODIUM CHLORIDE 60 MILLILITER(S): 9 INJECTION, SOLUTION INTRAVENOUS at 17:14

## 2024-03-30 RX ADMIN — Medication 0.5 MILLIGRAM(S): at 01:44

## 2024-03-30 RX ADMIN — ONDANSETRON 4 MILLIGRAM(S): 8 TABLET, FILM COATED ORAL at 17:38

## 2024-03-30 RX ADMIN — ONDANSETRON 4 MILLIGRAM(S): 8 TABLET, FILM COATED ORAL at 01:44

## 2024-03-30 RX ADMIN — Medication 100 MILLIEQUIVALENT(S): at 17:07

## 2024-03-30 RX ADMIN — PANTOPRAZOLE SODIUM 40 MILLIGRAM(S): 20 TABLET, DELAYED RELEASE ORAL at 06:42

## 2024-03-30 RX ADMIN — SODIUM CHLORIDE 100 MILLILITER(S): 9 INJECTION, SOLUTION INTRAVENOUS at 08:05

## 2024-03-30 NOTE — PROGRESS NOTE ADULT - SUBJECTIVE AND OBJECTIVE BOX
CHIEF COMPLAINT/INTERVAL HISTORY:    Patient is a 24y old  Male who presents with a chief complaint of intractable n/v (30 Mar 2024 11:08)    SUBJECTIVE & OBJECTIVE: Pt seen and examined at bedside. No overnight events. Refusing enema or suppository Refusing schaffer to be removed. Reports he is able to keep down jello. Refusing reglan.    ROS: No chest pain, palpitations, SOB, light headedness, dizziness, headache,  fevers/chills, dysuria.    ICU Vital Signs Last 24 Hrs  T(C): 36.8 (30 Mar 2024 10:46), Max: 36.9 (30 Mar 2024 05:05)  T(F): 98.2 (30 Mar 2024 10:46), Max: 98.5 (30 Mar 2024 05:05)  HR: 66 (30 Mar 2024 10:46) (65 - 84)  BP: 145/95 (30 Mar 2024 10:46) (114/75 - 145/95)  RR: 18 (30 Mar 2024 10:46) (18 - 18)  SpO2: 97% (30 Mar 2024 10:46) (96% - 97%)      MEDICATIONS  (STANDING):  bisacodyl Suppository 10 milliGRAM(s) Rectal daily  dextrose 5% + lactated ringers. 1000 milliLiter(s) (100 mL/Hr) IV Continuous <Continuous>  hydrOXYzine hydrochloride Injectable 25 milliGRAM(s) IntraMuscular once  pantoprazole  Injectable 40 milliGRAM(s) IV Push every 12 hours  polyethylene glycol 3350 17 Gram(s) Oral daily  potassium chloride  10 mEq/100 mL IVPB 10 milliEquivalent(s) IV Intermittent every 1 hour  senna 2 Tablet(s) Oral at bedtime  tamsulosin 0.4 milliGRAM(s) Oral at bedtime    MEDICATIONS  (PRN):  LORazepam   Injectable 0.5 milliGRAM(s) IV Push every 8 hours PRN Anxiety  ondansetron Injectable 4 milliGRAM(s) IV Push every 4 hours PRN Nausea and/or Vomiting      LABS:                        13.5   3.28  )-----------( 209      ( 30 Mar 2024 10:40 )             40.9     03-30    137  |  98  |  <3.0<L>  ----------------------------<  112<H>  3.4<L>   |  27.0  |  0.51    Ca    8.4      30 Mar 2024 10:40  Phos  3.4     03-30  Mg     1.8     03-30    TPro  6.3<L>  /  Alb  3.6  /  TBili  2.2<H>  /  DBili  x   /  AST  147<H>  /  ALT  243<H>  /  AlkPhos  79  03-30      Urinalysis Basic - ( 30 Mar 2024 10:40 )    Color: x / Appearance: x / SG: x / pH: x  Gluc: 112 mg/dL / Ketone: x  / Bili: x / Urobili: x   Blood: x / Protein: x / Nitrite: x   Leuk Esterase: x / RBC: x / WBC x   Sq Epi: x / Non Sq Epi: x / Bacteria: x    PHYSICAL EXAM:    GENERAL: young male, morbidly obese, NAD  HEAD:  Atraumatic, Normocephalic  EYES: EOMI, PERRLA, conjunctiva and sclera clear  ENMT: Moist mucous membranes  NECK: Supple   NERVOUS SYSTEM:  Alert & Oriented X3, Motor Strength 5/5 B/L upper and lower extremities   CHEST/LUNG: diminished breath sounds  HEART: Regular rate and rhythm; + S1/S2  ABDOMEN: Soft, generalized tenderness, obese; Bowel sounds present  EXTREMITIES:  no pedal edema

## 2024-03-30 NOTE — PROGRESS NOTE ADULT - SUBJECTIVE AND OBJECTIVE BOX
The patient is a 24 year old male with a past medical history of obesity, anxiety and depression, sleep apnes status post recent Gastric sleeve surgery who presented to the Er with complaints of abdominal pain, nausea vomiting and inability to tolerate PO. Noted to have transaminitis in the ER. CT of the abdomen and pelvis with no acute findings. RUQ was negative. Seen by GI, status post EGD normal.   [facility] was consulted for unexplained leukopenia    3/30/2024  Pt seen this a at bedside.  Pt resting comfortably.  Still having episodes of nausea and vomiting.  Pt states he was able to tolerate jello.  No acute events overnight.  Afebrile    MEDICATIONS  (STANDING):  bisacodyl Suppository 10 milliGRAM(s) Rectal daily  cefTRIAXone Injectable.      cefTRIAXone Injectable. 1000 milliGRAM(s) IV Push every 24 hours  dextrose 5% + lactated ringers. 1000 milliLiter(s) (100 mL/Hr) IV Continuous <Continuous>  hydrOXYzine hydrochloride Injectable 25 milliGRAM(s) IntraMuscular once  metoclopramide Injectable 10 milliGRAM(s) IV Push every 8 hours  pantoprazole  Injectable 40 milliGRAM(s) IV Push every 12 hours  polyethylene glycol 3350 17 Gram(s) Oral daily  senna 2 Tablet(s) Oral at bedtime  tamsulosin 0.4 milliGRAM(s) Oral at bedtime    MEDICATIONS  (PRN):  LORazepam   Injectable 0.5 milliGRAM(s) IV Push every 8 hours PRN Anxiety  ondansetron Injectable 4 milliGRAM(s) IV Push every 4 hours PRN Nausea and/or Vomiting      Vital Signs Last 24 Hrs  T(C): 36.8 (30 Mar 2024 10:46), Max: 36.9 (30 Mar 2024 05:05)  T(F): 98.2 (30 Mar 2024 10:46), Max: 98.5 (30 Mar 2024 05:05)  HR: 66 (30 Mar 2024 10:46) (65 - 84)  BP: 145/95 (30 Mar 2024 10:46) (114/75 - 145/95)  BP(mean): --  RR: 18 (30 Mar 2024 10:46) (18 - 18)  SpO2: 97% (30 Mar 2024 10:46) (96% - 97%)      Labs  CBC                                       13.4   2.89  )-----------( 225      ( 29 Mar 2024 10:08 )             40.7     Chem  03-29    136  |  97  |  3.0<L>  ----------------------------<  110<H>  3.5   |  26.0  |  0.59    Ca    8.5      29 Mar 2024 10:08  Mg     1.5     03-29    TPro  6.2<L>  /  Alb  3.6  /  TBili  2.0  /  DBili  x   /  AST  139<H>  /  ALT  219<H>  /  AlkPhos  77  03-29    PHYSICAL EXAM:  GENERAL: NAD, AOX3, Obese gastric sleeve surgery  HEAD:  Atraumatic, Normocephalic  EYES:conjunctiva and sclera clear  ENMT: Moist mucous membranes  NECK: Supple  CHEST/LUNG: Clear to auscultation bilaterally; No rales, rhonchi, wheezing, or rubs  HEART: Regular rate and rhythm; No murmurs, rubs, or gallops  ABDOMEN: Soft, Nontender, Nondistended; Bowel sounds present   + Rodriguez for retention  EXTREMITIES:  2+ Peripheral Pulses, No clubbing, cyanosis, or edema

## 2024-03-30 NOTE — PROGRESS NOTE ADULT - SUBJECTIVE AND OBJECTIVE BOX
Patient is a 24y old  Male who presents with a chief complaint of intractable n/v (30 Mar 2024 00:40)      HPI:  24-year-old male, seen and examined at bedside. He was able to tolerate jello yesterday but had vomiting after yogurt today. Still with constipation, pt refused dulcolax supp, senna and miralax yesterday, only did fleet enema yearsy. He is passing gas, c/o diffuse abdominal cramping.       REVIEW OF SYSTEMS:  Constitutional: No fever, weight loss or fatigue  ENMT:  No difficulty hearing, tinnitus, vertigo; No sinus or throat pain  Respiratory: No cough, wheezing, chills or hemoptysis  Cardiovascular: No chest pain, palpitations, dizziness or leg swelling  Gastrointestinal: See HPI  Musculoskeletal: No joint pain or swelling; No muscle, back or extremity pain    PAST MEDICAL & SURGICAL HISTORY:  S/P gastric sleeve procedure      Obesity      Obstructive sleep apnea      Anxiety and depression      H/O gastric sleeve          FAMILY HISTORY:      SOCIAL HISTORY:  Smoking Status: [ ] Current, [ ] Former, [ ] Never  Pack Years:  [  ] EtOH  [  ] IVDA    MEDICATIONS:  MEDICATIONS  (STANDING):  bisacodyl Suppository 10 milliGRAM(s) Rectal daily  cefTRIAXone Injectable.      cefTRIAXone Injectable. 1000 milliGRAM(s) IV Push every 24 hours  dextrose 5% + lactated ringers. 1000 milliLiter(s) (100 mL/Hr) IV Continuous <Continuous>  hydrOXYzine hydrochloride Injectable 25 milliGRAM(s) IntraMuscular once  metoclopramide Injectable 10 milliGRAM(s) IV Push every 8 hours  pantoprazole  Injectable 40 milliGRAM(s) IV Push every 12 hours  polyethylene glycol 3350 17 Gram(s) Oral daily  senna 2 Tablet(s) Oral at bedtime  tamsulosin 0.4 milliGRAM(s) Oral at bedtime    MEDICATIONS  (PRN):  LORazepam   Injectable 0.5 milliGRAM(s) IV Push every 8 hours PRN Anxiety  ondansetron Injectable 4 milliGRAM(s) IV Push every 4 hours PRN Nausea and/or Vomiting      Allergies    No Known Allergies    Intolerances        Vital Signs Last 24 Hrs  T(C): 36.9 (30 Mar 2024 05:05), Max: 37 (29 Mar 2024 10:52)  T(F): 98.5 (30 Mar 2024 05:05), Max: 98.6 (29 Mar 2024 10:52)  HR: 84 (30 Mar 2024 05:05) (65 - 84)  BP: 135/85 (30 Mar 2024 05:05) (114/75 - 141/93)  BP(mean): --  RR: 18 (30 Mar 2024 05:05) (18 - 19)  SpO2: 96% (30 Mar 2024 05:05) (96% - 97%)        03-29 @ 07:01  -  03-30 @ 07:00  --------------------------------------------------------  IN: 1000 mL / OUT: 300 mL / NET: 700 mL          PHYSICAL EXAM:    General:  in no acute distress  HEENT: MMM, conjunctiva and sclera clear  Gastrointestinal: Soft, obese, non-tender non-distended; Normal bowel sounds; No rebound or guarding  Extremities: Normal range of motion, No clubbing, cyanosis or edema  Neurological: Alert and oriented x3  Skin: Warm and dry. No obvious rash      LABS:                        13.4   2.89  )-----------( 225      ( 29 Mar 2024 10:08 )             40.7     29 Mar 2024 10:08    136    |  97     |  3.0    ----------------------------<  110    3.5     |  26.0   |  0.59     Ca    8.5        29 Mar 2024 10:08  Mg     1.5       29 Mar 2024 10:08    TPro  6.2    /  Alb  3.6    /  TBili  2.0    /  DBili  x      /  AST  139    /  ALT  219    /  AlkPhos  77     / Amylase x      /Lipase x      29 Mar 2024 10:08              RADIOLOGY & ADDITIONAL STUDIES:     < from: US Abdomen Upper Quadrant Right (03.26.24 @ 03:02) >    ACC: [id number] EXAM:  US ABDOMEN RT UPR QUADRANT   ORDERED BY: GLENNA DUNCAN     PROCEDURE DATE:  03/26/2024          INTERPRETATION:  CLINICAL INFORMATION: Elevated bilirubin, LFTs and   epigastric pain.    COMPARISON: CT abdomen pelvis 3/26/2024 and 3/20/2024    TECHNIQUE: Sonography of the right upper quadrant.    FINDINGS:  Liver: Homogeneously increased echotexture.  Bile ducts: Normal caliber. Common bile duct measures 3 mm.  Gallbladder: Within normal limits. No cholelithiasis, wall thickening or   pericholecystic fluid.  Pancreas: For the visualized due to shadowing bowel gas.  Right kidney: 11.8 cm. No hydronephrosis.  Ascites: None.  IVC: Visualized portions are within normal limits.    IMPRESSION:  Limited study due to extensive shadowing bowel gas.    No evidence of cholelithiasis or cholecystitis. Hepatic steatosis.    --- End of Report ---            SAMMI SERNA MD; Attending Radiologist  This document has been electronically signed. Mar 26 2024  3:14AM    < end of copied text >      < from: CT Abdomen and Pelvis w/ Oral Cont and w/ IV Cont (03.26.24 @ 00:16) >    ACC: [id number] EXAM:  CT ABDOMEN AND PELVIS OC IC   ORDERED BY: GLENNA DUNCAN     PROCEDURE DATE:  03/26/2024          INTERPRETATION:  CLINICAL INFORMATION: Abdominal pain.    COMPARISON: CT abdomen and pelvis 3/20/2024.    PROCEDURE:  CT of the Abdomen and Pelvis was performed with intravenous contrast.  Intravenous contrast: 90 ml Omnipaque 350. 10 ml discarded.  Oral contrast: positive contrast was administered.  Sagittal and coronal reformats were performed.    FINDINGS:    LOWER CHEST:Continued mild groundglass opacities in the right lower lobe   similar to prior study.    LIVER: Steatosis. Stable left hepatic lobe hypodensities. Consider   nonemergent MR if not performed previously.  BILE DUCTS: Normal caliber.  GALLBLADDER: Within normal limits.  SPLEEN: Within normal limits.  PANCREAS: Within normal limits.  ADRENALS: Within normal limits.  KIDNEYS/URETERS: Within normal limits.    BLADDER: Within normal limits.  REPRODUCTIVE ORGANS: Prostate within normal limits.    BOWEL:Expected gastric sleeve postoperative changes. No acute bowel   inflammatory change or obstruction though evaluation of colonic loops is   limited secondary to inadequate distension. Normal appendix.  PERITONEUM: No ascites.  VESSELS:  Within normal limits.  RETROPERITONEUM: No lymphadenopathy.  ABDOMINAL WALL: Within normal limits.  BONES: Within normal limits.    IMPRESSION:    Expected gastric sleeve postoperative changes.    No bowel obstruction.    Continued mild groundglass opacities in theright lower lobe similar to   prior study.    Additional findings as mentioned above.    --- End of Report ---            AMIE GRECO MD; Attending Radiologist  This document has been electronically signed. Mar 26 2024  1:16AM    < end of copied text >

## 2024-03-30 NOTE — PROGRESS NOTE ADULT - ASSESSMENT
Assessment: 25yo M s/p sleeve gastrectomy now with PO intolerance.    PLAN:  -no operative intervention planned  -f/u GI reccs - started on reglan and zofran yesterday, assess if tolerates fulls, if not gastric emptying on 4/1  -continue care per primary  -discussed with attending surgeon  -encouraged patient to follow-up with his primary surgeon        Assessment: 25yo M s/p sleeve gastrectomy now with PO intolerance.    PLAN:  -no operative intervention planned  -f/u GI reccs - started on reglan and zofran yesterday, assess if tolerates fulls, if not gastric emptying on 4/1  -continue care per primary  -discussed with attending surgeon

## 2024-03-30 NOTE — PROGRESS NOTE ADULT - ASSESSMENT
The patient is a 24 year old male with a past medical history of obesity, anxiety and depression status post recent Gastric sleeve surgery who presented to the Er with complaints of abdominal pain, nausea vomiting and inability to tolerate PO. Noted to have transaminitis in the ER. CT of the abdomen and pelvis with no acute findings. RUQ was negative. Seen by GI, status post EGD normal.     Assessment/Plan:    1. Abdominal pain  - Post gastric sleeve 1 month prior  - CT abdomen and pelvis negative  - RUQ ultrasound negative  - EGD normal  - Seen by Bariatric surgery; no acute intervention   - Gastric emptying study was cancelled twice as patient was unable to tolerate contrast due to nausea   - Started reglan yesterday. Patient is rescheduled for the gastric emptying study on monday 4/1; IV Reglan must be held 48 hours prior to study   - IVF  - Bowel regimen with suppository for constipation, enema ordered today     2 Anion gap metabolic acidosis  - Secondary to hypovolemia  - Improved with IVF  - Monitor CMP    3. Acute urinary retention  - Suspected secondary to UTI  - Schaffer day 3  - IV Rocephin day 3  - Blood and urine cultures negative  - Remove schaffer for TOV In AM once constipation improves  - Was recently prescribed antibiotics by urology however due to N/V was unable to complete course     4. Anxiety and depression  -  previously on topiramate 25 mg po bid and bupropion  qd stopped prior to surgery  - BH following    5. Hepatic steatosis  - worsening transaminitis  - CT abdomen and RUQ negative for acute process   - MRI as outpatient and follow up with hepatology   - F/u further GI recs    6. Neutropenia  - WBC improved to 3.2 today and ANC 1.8  - No previous blood work in [facility] for comparison  - f/u CODI  - possible outpatient flow cytometry  - hem/onc recs appreciated    7. Hypokalemia due to GI losses  - Supplement potassium  - monitor electrolytes    DVT ppx - SCDs    Dispo- Remains acute; bowel regimen. Pain control. GI follow up.    Plan discussed with patient, RN   The patient is a 24 year old male with a past medical history of obesity, anxiety and depression status post recent Gastric sleeve surgery who presented to the Er with complaints of abdominal pain, nausea vomiting and inability to tolerate PO. Noted to have transaminitis in the ER. CT of the abdomen and pelvis with no acute findings. RUQ was negative. Seen by GI, status post EGD normal.     1. Abdominal pain  - Post gastric sleeve 1 month prior  - CT abdomen and pelvis negative  - RUQ ultrasound negative  - EGD normal  - Seen by Bariatric surgery; no acute intervention   - Gastric emptying study was cancelled twice as patient was unable to tolerate contrast due to nausea   - Refused IV reglan  - Continue IV zofran  - continue PPI  - clear liquid diet; unable to tolerate advancement in diet  - repeat KUB  - Patient is rescheduled for the gastric emptying study on monday 4/1   - Bowel regimen with suppository and enema for constipation, patient refused both  - GI follow up    2 Anion gap metabolic acidosis - resolved  - Secondary to starvation ketoacidosis   - resolved with IVF  - Monitor CMP    3. Acute urinary retention  - Suspected secondary to UTI  - completed 3 days of abx  - Blood and urine cultures negative  - continue flomax  - voiding trial after BM    4. Anxiety and depression  - previously on topiramate 25 mg po bid and bupropion  qd stopped prior to surgery  - refusing enemas, removal of schaffer, reglan  - Ativan PRN  - BH following    5. Hepatic steatosis  - worsening transaminitis  - CT abdomen and RUQ negative for acute process   - MRI as outpatient and follow up with hepatology   - F/u further GI recs    6. Neutropenia  - WBC improved to 3.2 today and ANC 1.8  - No previous blood work in [facility] for comparison  - f/u CODI  - possible outpatient flow cytometry  - hem/onc recs appreciated    7. Hypokalemia due to GI losses  - Supplement potassium  - monitor electrolytes    DVT ppx - SCDs    Dispo- Remains acute; bowel regimen. Pain control. GI follow up.    Plan discussed with patient, RN

## 2024-03-30 NOTE — PROGRESS NOTE ADULT - SUBJECTIVE AND OBJECTIVE BOX
Patient seen and examined at bedside. FARIHA, continues to have intermittent n/v with minimal PO tolerance. Was unable to do gastric emptying study again yesterday. No other acute complaints.    MEDICATIONS  (STANDING):  bisacodyl Suppository 10 milliGRAM(s) Rectal daily  cefTRIAXone Injectable. 1000 milliGRAM(s) IV Push every 24 hours  cefTRIAXone Injectable.      dextrose 5% + lactated ringers. 1000 milliLiter(s) (100 mL/Hr) IV Continuous <Continuous>  hydrOXYzine hydrochloride Injectable 25 milliGRAM(s) IntraMuscular once  metoclopramide Injectable 10 milliGRAM(s) IV Push every 8 hours  pantoprazole  Injectable 40 milliGRAM(s) IV Push every 12 hours  polyethylene glycol 3350 17 Gram(s) Oral daily  senna 2 Tablet(s) Oral at bedtime  tamsulosin 0.4 milliGRAM(s) Oral at bedtime    MEDICATIONS  (PRN):  LORazepam   Injectable 0.5 milliGRAM(s) IV Push every 8 hours PRN Anxiety  ondansetron Injectable 4 milliGRAM(s) IV Push every 4 hours PRN Nausea and/or Vomiting      Vital Signs Last 24 Hrs  T(C): 36.8 (29 Mar 2024 17:17), Max: 37 (29 Mar 2024 10:52)  T(F): 98.2 (29 Mar 2024 17:17), Max: 98.6 (29 Mar 2024 10:52)  HR: 65 (29 Mar 2024 17:17) (65 - 80)  BP: 114/75 (29 Mar 2024 17:17) (114/75 - 144/88)  BP(mean): 87 (29 Mar 2024 08:17) (87 - 87)  RR: 18 (29 Mar 2024 17:17) (18 - 20)  SpO2: 97% (29 Mar 2024 17:17) (94% - 97%)    Parameters below as of 29 Mar 2024 08:17  Patient On (Oxygen Delivery Method): room air                            13.4   2.89  )-----------( 225      ( 29 Mar 2024 10:08 )             40.7   03-29    136  |  97  |  3.0<L>  ----------------------------<  110<H>  3.5   |  26.0  |  0.59    Ca    8.5      29 Mar 2024 10:08  Mg     1.5     03-29    TPro  6.2<L>  /  Alb  3.6  /  TBili  2.0  /  DBili  x   /  AST  139<H>  /  ALT  219<H>  /  AlkPhos  77  03-29  I&O's Detail    28 Mar 2024 07:01  -  29 Mar 2024 07:00  --------------------------------------------------------  IN:  Total IN: 0 mL    OUT:    Voided (mL): 450 mL  Total OUT: 450 mL    Total NET: -450 mL      29 Mar 2024 07:01  -  30 Mar 2024 00:43  --------------------------------------------------------  IN:    dextrose 5% + lactated ringers: 500 mL  Total IN: 500 mL    OUT:    Voided (mL): 300 mL  Total OUT: 300 mL    Total NET: 200 mL          Exam:  Gen: pt lying in bed, alert, in NAD  Resp: unlabored  CVS: RRR  Abd: soft, NT, ND  Ext: moving all extremities spontaneously, sensation intact, pulses 2+

## 2024-03-30 NOTE — PROGRESS NOTE ADULT - ASSESSMENT
25 yo pleasant male being consulted by [facility] for leucopenia  Pt admitted to [facility] for intractable nausea and Vomitting       Abdominal pain  Post gastric sleeve 1 month prior  CT abdomen and pelvis negative  RUQ ultrasound negative  EGD normal  Seen by Bariatric surgery; no acute intervention   Gastric emptying study was cancelled twice as patient was unable to tolerate contrast due to nausea   gastric emptying study scheduled for Monday 4/1   antimeric per medicine   IVF  Gastro following      Leukopenia mild   unclear baseline  WBC 3.64, 3.99, 2.89, today  Cont to trend  ANC 1.51 today  +UTI  on antibiotics  Keep ANC >1000   No previous blood work in [facility] for comparison   Check TSH, B12, Folate, CODI and Hepatitis panel  If leukopenia persists will f/u outpt for full work up including a flow cytometry    Follow up with Dr Hendricks as out pt if WBC cont to trend down for a full work up including Flow Cytometry        Thank you for consulting [facility]  [phone]           23 yo pleasant male being consulted by [facility] for leucopenia  Pt admitted to [facility] for intractable nausea and Vomitting       Abdominal pain  Post gastric sleeve 1 month prior  CT abdomen and pelvis negative  RUQ ultrasound negative  EGD normal  Seen by Bariatric surgery; no acute intervention   Gastric emptying study was cancelled twice as patient was unable to tolerate contrast due to nausea   gastric emptying study scheduled for Monday 4/1   antimeric per medicine   IVF  Gastro following      Leukopenia mild   unclear baseline  WBC 3.64, 3.99, 2.89, today 3.28  Cont to trend  ANC 1.51 today 1.80  +UTI  on antibiotics  Keep ANC >1000   No previous blood work in [facility] for comparison   Check TSH 4.03, B12 1355, Folate 4.8, CODI pending and Hepatitis panel pending  If leukopenia persists will f/u outpt for full work up including a flow cytometry    Follow up with Dr Hendricks as out pt if WBC cont to trend down for a full work up including Flow Cytometry        Thank you for consulting [facility]  [phone]

## 2024-03-30 NOTE — PROGRESS NOTE ADULT - ASSESSMENT
23 y/o M s/p sleeve gastrectomy 2/2024 who presents with complaints of PO intolerance, nausea and small volume bilious emesis and constipation.     #Nausea, vomiting, epigastric pain  #S/P sleeve gastrectomy 2/2024   #Elevated liver enzymes   #Constipation   CT A/P w/ PO and IV Cont (03.26.24)- Stable left hepatic lobe hypodensities. Expected gastric sleeve postoperative changes.  US Abdomen Upper Quadrant Right (03.26.24)- Hepatic steatosis.  EGD (03.26.24)- normal mucosa in esophagus/duodenum, evidence of band gastroplasty in stomach.     - Per nuclear unable to complete study until Monday 4/1/24, if patient still in house will plan for then. Hold reglan x 48 hours   - IV fluids for hydration  - PPI BID  - Monitor renal function, electrolytes.  Replete electrolytes as needed. Avoid hypotension.   - Zofran as needed for nausea, and vomiting  - Avoid Narcotics  - Reglan 10 mg every 8 hours standing  - Trend LFTs. Avoid hepatotoxins  - MRI as an outpatient to assess liver findings   - Suppositories for constipation until able to tolerate PO, check abdominal XR     #Hepatic steatosis   - Trend LFTs  - Maintain normal BMI  - Alcohol abstinence  - Low fat diet (when applicable)   - Follow up with hepatologist Dr. Johnson for further assessment and monitoring      25 y/o M s/p sleeve gastrectomy 2/2024 who presents with complaints of PO intolerance, nausea and small volume bilious emesis and constipation.     #Nausea, vomiting, epigastric pain  #S/P sleeve gastrectomy 2/2024   #Elevated liver enzymes   #Constipation   CT A/P w/ PO and IV Cont (03.26.24)- Stable left hepatic lobe hypodensities. Expected gastric sleeve postoperative changes.  US Abdomen Upper Quadrant Right (03.26.24)- Hepatic steatosis.  EGD (03.26.24)- normal mucosa in esophagus/duodenum, evidence of band gastroplasty in stomach.     - Per nuclear unable to complete study until Monday 4/1/24, if patient still in house will plan for then. Hold reglan x 48 hours   - IV fluids for hydration  - PPI BID  - Monitor renal function, electrolytes.  Replete electrolytes as needed. Avoid hypotension.   - Zofran as needed for nausea, and vomiting  - Avoid Narcotics  - Reglan 10 mg every 8 hours standing, hold 48 hours prior to Gastric emptying study, planned for 4/1  - Trend LFTs. Avoid hepatotoxins  - MRI as an outpatient to assess liver findings   - Suppositories for constipation until able to tolerate PO, check abdominal XR   - Vomiting with full liquids, will go back to clear liquid diet     #Hepatic steatosis   - Trend LFTs  - Maintain normal BMI  - Alcohol abstinence  - Low fat diet (when applicable)   - Follow up with hepatologist Dr. Johnson for further assessment and monitoring

## 2024-03-31 LAB
ACANTHOCYTES BLD QL SMEAR: SLIGHT — SIGNIFICANT CHANGE UP
ALBUMIN SERPL ELPH-MCNC: 3.6 G/DL — SIGNIFICANT CHANGE UP (ref 3.3–5.2)
ALP SERPL-CCNC: 76 U/L — SIGNIFICANT CHANGE UP (ref 40–120)
ALT FLD-CCNC: 260 U/L — HIGH
ANION GAP SERPL CALC-SCNC: 13 MMOL/L — SIGNIFICANT CHANGE UP (ref 5–17)
ANISOCYTOSIS BLD QL: SLIGHT — SIGNIFICANT CHANGE UP
AST SERPL-CCNC: 162 U/L — HIGH
BASOPHILS # BLD AUTO: 0 K/UL — SIGNIFICANT CHANGE UP (ref 0–0.2)
BASOPHILS NFR BLD AUTO: 0 % — SIGNIFICANT CHANGE UP (ref 0–2)
BILIRUB DIRECT SERPL-MCNC: 1.4 MG/DL — HIGH (ref 0–0.3)
BILIRUB INDIRECT FLD-MCNC: 1.2 MG/DL — HIGH (ref 0.2–1)
BILIRUB SERPL-MCNC: 2.6 MG/DL — HIGH (ref 0.4–2)
BUN SERPL-MCNC: 3.2 MG/DL — LOW (ref 8–20)
CALCIUM SERPL-MCNC: 8.5 MG/DL — SIGNIFICANT CHANGE UP (ref 8.4–10.5)
CHLORIDE SERPL-SCNC: 94 MMOL/L — LOW (ref 96–108)
CO2 SERPL-SCNC: 25 MMOL/L — SIGNIFICANT CHANGE UP (ref 22–29)
CREAT SERPL-MCNC: 0.45 MG/DL — LOW (ref 0.5–1.3)
EGFR: 149 ML/MIN/1.73M2 — SIGNIFICANT CHANGE UP
ELLIPTOCYTES BLD QL SMEAR: SIGNIFICANT CHANGE UP
EOSINOPHIL # BLD AUTO: 0.11 K/UL — SIGNIFICANT CHANGE UP (ref 0–0.5)
EOSINOPHIL NFR BLD AUTO: 3.6 % — SIGNIFICANT CHANGE UP (ref 0–6)
GIANT PLATELETS BLD QL SMEAR: PRESENT — SIGNIFICANT CHANGE UP
GLUCOSE SERPL-MCNC: 103 MG/DL — HIGH (ref 70–99)
HCT VFR BLD CALC: 42.5 % — SIGNIFICANT CHANGE UP (ref 39–50)
HGB BLD-MCNC: 14 G/DL — SIGNIFICANT CHANGE UP (ref 13–17)
LYMPHOCYTES # BLD AUTO: 0.83 K/UL — LOW (ref 1–3.3)
LYMPHOCYTES # BLD AUTO: 26.1 % — SIGNIFICANT CHANGE UP (ref 13–44)
MAGNESIUM SERPL-MCNC: 1.7 MG/DL — SIGNIFICANT CHANGE UP (ref 1.6–2.6)
MANUAL SMEAR VERIFICATION: SIGNIFICANT CHANGE UP
MCHC RBC-ENTMCNC: 26.8 PG — LOW (ref 27–34)
MCHC RBC-ENTMCNC: 32.9 GM/DL — SIGNIFICANT CHANGE UP (ref 32–36)
MCV RBC AUTO: 81.3 FL — SIGNIFICANT CHANGE UP (ref 80–100)
MICROCYTES BLD QL: SLIGHT — SIGNIFICANT CHANGE UP
MONOCYTES # BLD AUTO: 0.09 K/UL — SIGNIFICANT CHANGE UP (ref 0–0.9)
MONOCYTES NFR BLD AUTO: 2.7 % — SIGNIFICANT CHANGE UP (ref 2–14)
MYELOCYTES NFR BLD: 0.9 % — HIGH (ref 0–0)
NEUTROPHILS # BLD AUTO: 2 K/UL — SIGNIFICANT CHANGE UP (ref 1.8–7.4)
NEUTROPHILS NFR BLD AUTO: 63.1 % — SIGNIFICANT CHANGE UP (ref 43–77)
OVALOCYTES BLD QL SMEAR: SIGNIFICANT CHANGE UP
PHOSPHATE SERPL-MCNC: 4.1 MG/DL — SIGNIFICANT CHANGE UP (ref 2.4–4.7)
PLAT MORPH BLD: ABNORMAL
PLATELET # BLD AUTO: 134 K/UL — LOW (ref 150–400)
POIKILOCYTOSIS BLD QL AUTO: SIGNIFICANT CHANGE UP
POLYCHROMASIA BLD QL SMEAR: SLIGHT — SIGNIFICANT CHANGE UP
POTASSIUM SERPL-MCNC: 3.6 MMOL/L — SIGNIFICANT CHANGE UP (ref 3.5–5.3)
POTASSIUM SERPL-SCNC: 3.6 MMOL/L — SIGNIFICANT CHANGE UP (ref 3.5–5.3)
PROT SERPL-MCNC: 6.3 G/DL — LOW (ref 6.6–8.7)
RBC # BLD: 5.23 M/UL — SIGNIFICANT CHANGE UP (ref 4.2–5.8)
RBC # FLD: 15.2 % — HIGH (ref 10.3–14.5)
RBC BLD AUTO: ABNORMAL
SMUDGE CELLS # BLD: PRESENT — SIGNIFICANT CHANGE UP
SODIUM SERPL-SCNC: 132 MMOL/L — LOW (ref 135–145)
VARIANT LYMPHS # BLD: 3.6 % — SIGNIFICANT CHANGE UP (ref 0–6)
WBC # BLD: 3.17 K/UL — LOW (ref 3.8–10.5)
WBC # FLD AUTO: 3.17 K/UL — LOW (ref 3.8–10.5)

## 2024-03-31 PROCEDURE — 99233 SBSQ HOSP IP/OBS HIGH 50: CPT

## 2024-03-31 PROCEDURE — 99232 SBSQ HOSP IP/OBS MODERATE 35: CPT

## 2024-03-31 PROCEDURE — 99231 SBSQ HOSP IP/OBS SF/LOW 25: CPT | Mod: FS

## 2024-03-31 RX ORDER — ONDANSETRON 8 MG/1
4 TABLET, FILM COATED ORAL ONCE
Refills: 0 | Status: DISCONTINUED | OUTPATIENT
Start: 2024-03-31 | End: 2024-04-04

## 2024-03-31 RX ADMIN — SODIUM CHLORIDE 60 MILLILITER(S): 9 INJECTION, SOLUTION INTRAVENOUS at 08:07

## 2024-03-31 RX ADMIN — PANTOPRAZOLE SODIUM 40 MILLIGRAM(S): 20 TABLET, DELAYED RELEASE ORAL at 21:25

## 2024-03-31 RX ADMIN — SODIUM CHLORIDE 60 MILLILITER(S): 9 INJECTION, SOLUTION INTRAVENOUS at 10:24

## 2024-03-31 RX ADMIN — TAMSULOSIN HYDROCHLORIDE 0.4 MILLIGRAM(S): 0.4 CAPSULE ORAL at 21:26

## 2024-03-31 RX ADMIN — SENNA PLUS 2 TABLET(S): 8.6 TABLET ORAL at 21:26

## 2024-03-31 RX ADMIN — PANTOPRAZOLE SODIUM 40 MILLIGRAM(S): 20 TABLET, DELAYED RELEASE ORAL at 08:07

## 2024-03-31 NOTE — PROGRESS NOTE ADULT - SUBJECTIVE AND OBJECTIVE BOX
Patient is a 24y old  Male who presents with a chief complaint of intractable n/v (31 Mar 2024 00:35)      HPI:  24-year-old male seen and examined at bedside. Still vomiting today, despite downgrading diet to clear liquids yesterday. He was previously refusing laxatives and enemas but used a fleet enema yesterday which helped him have a BM yesterday, no blood.       REVIEW OF SYSTEMS:  Constitutional: No fever, weight loss or fatigue  ENMT:  No difficulty hearing, tinnitus, vertigo; No sinus or throat pain  Respiratory: No cough, wheezing, chills or hemoptysis  Cardiovascular: No chest pain, palpitations, dizziness or leg swelling  Gastrointestinal: See hpi   Skin: No itching, burning, rashes or lesions   Musculoskeletal: No joint pain or swelling; No muscle, back or extremity pain    PAST MEDICAL & SURGICAL HISTORY:  S/P gastric sleeve procedure      Obesity      Obstructive sleep apnea      Anxiety and depression      H/O gastric sleeve          FAMILY HISTORY:      SOCIAL HISTORY:  Smoking Status: [ ] Current, [ ] Former, [ ] Never  Pack Years:  [  ] EtOH  [  ] IVDA    MEDICATIONS:  MEDICATIONS  (STANDING):  bisacodyl Suppository 10 milliGRAM(s) Rectal daily  dextrose 5% + sodium chloride 0.45%. 1000 milliLiter(s) (60 mL/Hr) IV Continuous <Continuous>  hydrOXYzine hydrochloride Injectable 25 milliGRAM(s) IntraMuscular once  pantoprazole  Injectable 40 milliGRAM(s) IV Push every 12 hours  polyethylene glycol 3350 17 Gram(s) Oral daily  senna 2 Tablet(s) Oral at bedtime  tamsulosin 0.4 milliGRAM(s) Oral at bedtime    MEDICATIONS  (PRN):  LORazepam   Injectable 0.5 milliGRAM(s) IV Push every 8 hours PRN Anxiety  ondansetron Injectable 4 milliGRAM(s) IV Push every 4 hours PRN Nausea and/or Vomiting  ondansetron Injectable 4 milliGRAM(s) IV Push once PRN prior to gastrin emptying      Allergies    No Known Allergies    Intolerances        Vital Signs Last 24 Hrs  T(C): 36.8 (31 Mar 2024 10:50), Max: 36.8 (31 Mar 2024 10:50)  T(F): 98.2 (31 Mar 2024 10:50), Max: 98.2 (31 Mar 2024 10:50)  HR: 96 (31 Mar 2024 10:50) (66 - 96)  BP: 122/80 (31 Mar 2024 10:50) (117/68 - 130/94)  BP(mean): --  RR: 18 (31 Mar 2024 10:50) (18 - 18)  SpO2: 97% (31 Mar 2024 10:50) (96% - 99%)    Parameters below as of 31 Mar 2024 10:50  Patient On (Oxygen Delivery Method): room air        03-30 @ 07:01  -  03-31 @ 07:00  --------------------------------------------------------  IN: 660 mL / OUT: 3250 mL / NET: -2590 mL    03-31 @ 07:01  - 03-31 @ 10:58  --------------------------------------------------------  IN: 0 mL / OUT: 400 mL / NET: -400 mL          PHYSICAL EXAM:    General:  in no acute distress  HEENT: MMM, conjunctiva and sclera clear  Gastrointestinal: obese, Soft, non-tender non-distended; Normal bowel sounds; No rebound or guarding  Extremities: Normal range of motion, No clubbing, cyanosis or edema  Neurological: Alert and oriented x3  Skin: Warm and dry. No obvious rash      LABS:                        14.0   3.17  )-----------( 134      ( 31 Mar 2024 09:53 )             42.5     30 Mar 2024 10:40    137    |  98     |  <3.0   ----------------------------<  112    3.4     |  27.0   |  0.51     Ca    8.4        30 Mar 2024 10:40  Phos  3.4       30 Mar 2024 10:40  Mg     1.8       30 Mar 2024 10:40    TPro  6.3    /  Alb  3.6    /  TBili  2.2    /  DBili  x      /  AST  147    /  ALT  243    /  AlkPhos  79     / Amylase x      /Lipase x      30 Mar 2024 10:40              RADIOLOGY & ADDITIONAL STUDIES:     < from: CT Abdomen and Pelvis w/ Oral Cont and w/ IV Cont (03.26.24 @ 00:16) >    ACC: [id number] EXAM:  CT ABDOMEN AND PELVIS OC IC   ORDERED BY: GLENNA DUNCAN     PROCEDURE DATE:  03/26/2024          INTERPRETATION:  CLINICAL INFORMATION: Abdominal pain.    COMPARISON: CT abdomen and pelvis 3/20/2024.    PROCEDURE:  CT of the Abdomen and Pelvis was performed with intravenous contrast.  Intravenous contrast: 90 ml Omnipaque 350. 10 ml discarded.  Oral contrast: positive contrast was administered.  Sagittal and coronal reformats were performed.    FINDINGS:    LOWER CHEST:Continued mild groundglass opacities in the right lower lobe   similar to prior study.    LIVER: Steatosis. Stable left hepatic lobe hypodensities. Consider   nonemergent MR if not performed previously.  BILE DUCTS: Normal caliber.  GALLBLADDER: Within normal limits.  SPLEEN: Within normal limits.  PANCREAS: Within normal limits.  ADRENALS: Within normal limits.  KIDNEYS/URETERS: Within normal limits.    BLADDER: Within normal limits.  REPRODUCTIVE ORGANS: Prostate within normal limits.    BOWEL:Expected gastric sleeve postoperative changes. No acute bowel   inflammatory change or obstruction though evaluation of colonic loops is   limited secondary to inadequate distension. Normal appendix.  PERITONEUM: No ascites.  VESSELS:  Within normal limits.  RETROPERITONEUM: No lymphadenopathy.  ABDOMINAL WALL: Within normal limits.  BONES: Within normal limits.    IMPRESSION:    Expected gastric sleeve postoperative changes.    No bowel obstruction.    Continued mild groundglass opacities in theright lower lobe similar to   prior study.    Additional findings as mentioned above.    --- End of Report ---            AMIE GRECO MD; Attending Radiologist  This document has been electronically signed. Mar 26 2024  1:16AM    < end of copied text >      < from: US Abdomen Upper Quadrant Right (03.26.24 @ 03:02) >    ACC: [id number] EXAM:  US ABDOMEN RT UPR QUADRANT   ORDERED BY: GLENNA DUNCAN     PROCEDURE DATE:  03/26/2024          INTERPRETATION:  CLINICAL INFORMATION: Elevated bilirubin, LFTs and   epigastric pain.    COMPARISON: CT abdomen pelvis 3/26/2024 and 3/20/2024    TECHNIQUE: Sonography of the right upper quadrant.    FINDINGS:  Liver: Homogeneously increased echotexture.  Bile ducts: Normal caliber. Common bile duct measures 3 mm.  Gallbladder: Within normal limits. No cholelithiasis, wall thickening or   pericholecystic fluid.  Pancreas: For the visualized due to shadowing bowel gas.  Right kidney: 11.8 cm. No hydronephrosis.  Ascites: None.  IVC: Visualized portions are within normal limits.    IMPRESSION:  Limited study due to extensive shadowing bowel gas.    No evidence of cholelithiasis or cholecystitis. Hepatic steatosis.    --- End of Report ---            ASMMI SERNA MD; Attending Radiologist  This document has been electronically signed. Mar 26 2024  3:14AM    < end of copied text >

## 2024-03-31 NOTE — PROGRESS NOTE ADULT - ASSESSMENT
The patient is a 24 year old male with a past medical history of obesity, anxiety and depression status post recent Gastric sleeve surgery who presented to the Er with complaints of abdominal pain, nausea vomiting and inability to tolerate PO. Noted to have transaminitis in the ER. CT of the abdomen and pelvis with no acute findings. RUQ was negative. Seen by GI, status post EGD normal.     1. Abdominal pain - improved  - large BM after enema  - Post gastric sleeve 1 month prior  - CT abdomen and pelvis negative  - RUQ ultrasound negative  - EGD normal  - Seen by Bariatric surgery; no acute intervention   - Gastric emptying study was cancelled twice as patient was unable to tolerate contrast due to nausea   - Refused IV reglan  - Continue IV zofran  - continue PPI  - clear liquid diet; unable to tolerate advancement in diet  - f/u KUB  - Patient is rescheduled for the gastric emptying study on monday 4/1   - GI follow up appreciated    2 Anion gap metabolic acidosis - resolved  - Secondary to starvation ketoacidosis   - resolved with IVF  - Monitor CMP    3. Acute urinary retention  - Suspected secondary to UTI  - completed 3 days of abx  - Blood and urine cultures negative  - continue flomax  - refusing voiding trial    4. Anxiety and depression  - previously on topiramate 25 mg po bid and bupropion  qd stopped prior to surgery  - intermittently refusing care  - ativan PRN  - BH following    5. Hepatic steatosis  - worsening transaminitis  - CT abdomen and RUQ negative for acute process   - MRI as outpatient and follow up with hepatology   - GI on board; trend LFTS    6. Neutropenia  - WBC improved   - No previous blood work in [facility] for comparison  - f/u CODI  - possible outpatient flow cytometry  - hem/onc recs appreciated      DVT ppx - SCDs    Dispo- Remains acute; Gastric emptying study on 4/1.    Plan discussed with patient at length, GI, RN

## 2024-03-31 NOTE — PROGRESS NOTE ADULT - NS ATTEND OPT1A GEN_ALL_CORE
History/Exam/Medical decision making
History/Medical decision making
History/Medical decision making

## 2024-03-31 NOTE — PROGRESS NOTE ADULT - SUBJECTIVE AND OBJECTIVE BOX
The patient is a 24 year old male with a past medical history of obesity, anxiety and depression, sleep apnes status post recent Gastric sleeve surgery who presented to the Er with complaints of abdominal pain, nausea vomiting and inability to tolerate PO. Noted to have transaminitis in the ER. CT of the abdomen and pelvis with no acute findings. RUQ was negative. Seen by GI, status post EGD normal.   [facility] was consulted for unexplained leukopenia    3/31/2024  Pt seen this a at bedside.  Pt resting comfortably.  Still having episodes of nausea and vomiting. Pts diet downgraded to clears. Pt states he was able to tolerate jello.  No acute events overnight.  Afebrile      MEDICATIONS  (STANDING):  bisacodyl Suppository 10 milliGRAM(s) Rectal daily  dextrose 5% + sodium chloride 0.45%. 1000 milliLiter(s) (60 mL/Hr) IV Continuous <Continuous>  hydrOXYzine hydrochloride Injectable 25 milliGRAM(s) IntraMuscular once  pantoprazole  Injectable 40 milliGRAM(s) IV Push every 12 hours  polyethylene glycol 3350 17 Gram(s) Oral daily  senna 2 Tablet(s) Oral at bedtime  tamsulosin 0.4 milliGRAM(s) Oral at bedtime    MEDICATIONS  (PRN):  LORazepam   Injectable 0.5 milliGRAM(s) IV Push every 8 hours PRN Anxiety  ondansetron Injectable 4 milliGRAM(s) IV Push once PRN prior to gastrin emptying  ondansetron Injectable 4 milliGRAM(s) IV Push every 4 hours PRN Nausea and/or Vomiting      Vital Signs Last 24 Hrs  T(C): 36.8 (31 Mar 2024 10:50), Max: 36.8 (31 Mar 2024 10:50)  T(F): 98.2 (31 Mar 2024 10:50), Max: 98.2 (31 Mar 2024 10:50)  HR: 96 (31 Mar 2024 10:50) (66 - 96)  BP: 122/80 (31 Mar 2024 10:50) (117/68 - 130/94)  BP(mean): --  RR: 18 (31 Mar 2024 10:50) (18 - 18)  SpO2: 97% (31 Mar 2024 10:50) (96% - 99%)    Parameters below as of 31 Mar 2024 10:50  Patient On (Oxygen Delivery Method): room air      Labs  CBC                               14.0   3.17  )-----------( 134      ( 31 Mar 2024 09:53 )             42.5                        13.4   2.89  )-----------( 225      ( 29 Mar 2024 10:08 )             40.7     Chem  03-30    137  |  98  |  <3.0<L>  ----------------------------<  112<H>  3.4<L>   |  27.0  |  0.51    Ca    8.4      30 Mar 2024 10:40  Phos  3.4     03-30  Mg     1.8     03-30    TPro  6.3<L>  /  Alb  3.6  /  TBili  2.2<H>  /  DBili  x   /  AST  147<H>  /  ALT  243<H>  /  AlkPhos  79  03-30 03-29    136  |  97  |  3.0<L>  ----------------------------<  110<H>  3.5   |  26.0  |  0.59    Ca    8.5      29 Mar 2024 10:08  Mg     1.5     03-29    TPro  6.2<L>  /  Alb  3.6  /  TBili  2.0  /  DBili  x   /  AST  139<H>  /  ALT  219<H>  /  AlkPhos  77  03-29    PHYSICAL EXAM:  GENERAL: NAD, AOX3, Obese gastric sleeve surgery Feb 2024  HEAD:  Atraumatic, Normocephalic  EYES:conjunctiva and sclera clear  ENMT: Moist mucous membranes  NECK: Supple  CHEST/LUNG: Clear to auscultation bilaterally; No rales, rhonchi, wheezing, or rubs  HEART: Regular rate and rhythm; No murmurs, rubs, or gallops  ABDOMEN: Soft, Nontender, Nondistended; Bowel sounds present   + Rodriguez for retention  EXTREMITIES:  2+ Peripheral Pulses, No clubbing, cyanosis, or edema

## 2024-03-31 NOTE — PROGRESS NOTE ADULT - ASSESSMENT
25yo M s/p sleeve gastrectomy now with PO intolerance.    PLAN:    -no operative intervention   -Appreciate GI recs  -Will follow along  -continue care per primary    Pt evaluated with senior resident and attending

## 2024-03-31 NOTE — PROGRESS NOTE ADULT - ASSESSMENT
The patient is a 24 year old male with a past medical history of obesity, anxiety and depression status post recent Gastric sleeve surgery who presented to the Er with complaints of abdominal pain, nausea vomiting and inability to tolerate PO. Noted to have transaminitis in the ER. CT of the abdomen and pelvis with no acute findings. RUQ was negative.      #Nausea, vomiting, epigastric pain  #S/P sleeve gastrectomy 2/2024   #Elevated liver enzymes   #Constipation     CT A/P w/ PO and IV Cont (03.26.24)- Stable left hepatic lobe hypodensities. Expected gastric sleeve postoperative changes.  US Abdomen Upper Quadrant Right (03.26.24)- Hepatic steatosis.  EGD (03.26.24)- normal mucosa in esophagus/duodenum, evidence of band gastroplasty in stomach.     - Per nuclear unable to complete study until Monday 4/1/24, planning for gastric emptying study tomorrow  - Hold reglan x 48 hours   - IV fluids for hydration  - PPI BID  - Monitor renal function, electrolytes.  Replete electrolytes as needed. Avoid hypotension.   - Zofran as needed for nausea, and vomiting  - Avoid Narcotics  - Continue Clear liquid diet at this time  - Continue fleet enema, dulcolax supp prn for constipation      #Hepatic steatosis   - Maintain normal BMI  - Alcohol abstinence  - Low fat diet (when applicable)   - Trend LFTs. Avoid hepatotoxins  - MRI as an outpatient to assess liver findings   - Follow up with hepatologist Dr. Johnson for further assessment and monitoring

## 2024-03-31 NOTE — PROGRESS NOTE ADULT - SUBJECTIVE AND OBJECTIVE BOX
INTERVAL HPI/OVERNIGHT EVENTS: Patient complaining of nausea and vomiting. His diet was downgraded to clears.    MEDICATIONS  (STANDING):  bisacodyl Suppository 10 milliGRAM(s) Rectal daily  dextrose 5% + sodium chloride 0.45%. 1000 milliLiter(s) (60 mL/Hr) IV Continuous <Continuous>  hydrOXYzine hydrochloride Injectable 25 milliGRAM(s) IntraMuscular once  pantoprazole  Injectable 40 milliGRAM(s) IV Push every 12 hours  polyethylene glycol 3350 17 Gram(s) Oral daily  senna 2 Tablet(s) Oral at bedtime  tamsulosin 0.4 milliGRAM(s) Oral at bedtime    MEDICATIONS  (PRN):  LORazepam   Injectable 0.5 milliGRAM(s) IV Push every 8 hours PRN Anxiety  ondansetron Injectable 4 milliGRAM(s) IV Push every 4 hours PRN Nausea and/or Vomiting      Vital Signs Last 24 Hrs  T(C): 36.7 (30 Mar 2024 15:30), Max: 36.9 (30 Mar 2024 05:05)  T(F): 98 (30 Mar 2024 15:30), Max: 98.5 (30 Mar 2024 05:05)  HR: 66 (30 Mar 2024 15:30) (66 - 84)  BP: 130/94 (30 Mar 2024 15:30) (130/94 - 145/95)  BP(mean): --  RR: 18 (30 Mar 2024 15:30) (18 - 18)  SpO2: 99% (30 Mar 2024 15:30) (96% - 99%)    Parameters below as of 30 Mar 2024 15:30  Patient On (Oxygen Delivery Method): room air        Physical Exam:    Respiratory:  no accessory muscle use    Cardiovascular: Regular rate     Gastrointestinal: Soft, non-tender, non distended        I&O's Detail    29 Mar 2024 07:01  -  30 Mar 2024 07:00  --------------------------------------------------------  IN:    dextrose 5% + lactated ringers: 1000 mL  Total IN: 1000 mL    OUT:    Voided (mL): 300 mL  Total OUT: 300 mL    Total NET: 700 mL      30 Mar 2024 07:01  -  31 Mar 2024 00:36  --------------------------------------------------------  IN:    dextrose 5% + sodium chloride 0.45%: 180 mL  Total IN: 180 mL    OUT:    Indwelling Catheter - Urethral (mL): 2250 mL  Total OUT: 2250 mL    Total NET: -2070 mL          LABS:                        13.5   3.28  )-----------( 209      ( 30 Mar 2024 10:40 )             40.9     03-30    137  |  98  |  <3.0<L>  ----------------------------<  112<H>  3.4<L>   |  27.0  |  0.51    Ca    8.4      30 Mar 2024 10:40  Phos  3.4     03-30  Mg     1.8     03-30    TPro  6.3<L>  /  Alb  3.6  /  TBili  2.2<H>  /  DBili  x   /  AST  147<H>  /  ALT  243<H>  /  AlkPhos  79  03-30      Urinalysis Basic - ( 30 Mar 2024 10:40 )    Color: x / Appearance: x / SG: x / pH: x  Gluc: 112 mg/dL / Ketone: x  / Bili: x / Urobili: x   Blood: x / Protein: x / Nitrite: x   Leuk Esterase: x / RBC: x / WBC x   Sq Epi: x / Non Sq Epi: x / Bacteria: x        RADIOLOGY & ADDITIONAL STUDIES:

## 2024-03-31 NOTE — PROGRESS NOTE ADULT - SUBJECTIVE AND OBJECTIVE BOX
CHIEF COMPLAINT/INTERVAL HISTORY:    Patient is a 24y old  Male who presents with a chief complaint of intractable n/v (31 Mar 2024 10:59)    SUBJECTIVE & OBJECTIVE: Pt seen and examined at bedside. No overnight events. Reports vomiting today but abdominal pain improved. Gastric emptying study tomorrow.    ROS: No chest pain, palpitations, SOB, light headedness, dizziness, headache, fevers/chills, abdominal pain, dysuria or increased urinary frequency.    ICU Vital Signs Last 24 Hrs  T(C): 36.8 (31 Mar 2024 10:50), Max: 36.8 (31 Mar 2024 10:50)  T(F): 98.2 (31 Mar 2024 10:50), Max: 98.2 (31 Mar 2024 10:50)  HR: 96 (31 Mar 2024 10:50) (66 - 96)  BP: 122/80 (31 Mar 2024 10:50) (117/68 - 130/94)  RR: 18 (31 Mar 2024 10:50) (18 - 18)  SpO2: 97% (31 Mar 2024 10:50) (96% - 99%)    O2 Parameters below as of 31 Mar 2024 10:50  Patient On (Oxygen Delivery Method): room air    MEDICATIONS  (STANDING):  bisacodyl Suppository 10 milliGRAM(s) Rectal daily  hydrOXYzine hydrochloride Injectable 25 milliGRAM(s) IntraMuscular once  pantoprazole  Injectable 40 milliGRAM(s) IV Push every 12 hours  polyethylene glycol 3350 17 Gram(s) Oral daily  senna 2 Tablet(s) Oral at bedtime  tamsulosin 0.4 milliGRAM(s) Oral at bedtime    MEDICATIONS  (PRN):  LORazepam   Injectable 0.5 milliGRAM(s) IV Push every 8 hours PRN Anxiety  ondansetron Injectable 4 milliGRAM(s) IV Push once PRN prior to gastrin emptying  ondansetron Injectable 4 milliGRAM(s) IV Push every 4 hours PRN Nausea and/or Vomiting      LABS:                        14.0   3.17  )-----------( 134      ( 31 Mar 2024 09:53 )             42.5     03-31    132<L>  |  94<L>  |  3.2<L>  ----------------------------<  103<H>  3.6   |  25.0  |  0.45<L>    Ca    8.5      31 Mar 2024 09:53  Phos  4.1     03-31  Mg     1.7     03-31    TPro  6.3<L>  /  Alb  3.6  /  TBili  2.6<H>  /  DBili  1.4<H>  /  AST  162<H>  /  ALT  260<H>  /  AlkPhos  76  03-31      Urinalysis Basic - ( 31 Mar 2024 09:53 )    Color: x / Appearance: x / SG: x / pH: x  Gluc: 103 mg/dL / Ketone: x  / Bili: x / Urobili: x   Blood: x / Protein: x / Nitrite: x   Leuk Esterase: x / RBC: x / WBC x   Sq Epi: x / Non Sq Epi: x / Bacteria: x    PHYSICAL EXAM:    GENERAL: young male, morbidly obese, NAD  HEAD:  Atraumatic, Normocephalic  EYES: EOMI, PERRLA, conjunctiva and sclera clear  ENMT: Moist mucous membranes  NECK: Supple   NERVOUS SYSTEM:  Alert & Oriented X3, Motor Strength 5/5 B/L upper and lower extremities   CHEST/LUNG: diminished breath sounds  HEART: Regular rate and rhythm; + S1/S2  ABDOMEN: Soft, generalized tenderness, obese; Bowel sounds present  EXTREMITIES:  no pedal edema

## 2024-03-31 NOTE — PROGRESS NOTE ADULT - ASSESSMENT
25 yo pleasant male being consulted by [facility] for leucopenia  Pt admitted to [facility] for intractable nausea and Vomitting       Abdominal pain  Post gastric sleeve Feb 2024  CT abdomen and pelvis negative  RUQ ultrasound negative  EGD normal  Seen by Bariatric surgery; no acute intervention   Gastric emptying study was cancelled twice as patient was unable to tolerate contrast due to nausea   gastric emptying study scheduled for Monday 4/1   antimeric per medicine   IVF  Gastro following      Leukopenia mild   unclear baseline  WBC 3.64, 3.99, 2.89, 3.28 today 3.19  Cont to trend  ANC 1.51,1.80  +UTI  on antibiotics  Keep ANC >1000   No previous blood work in [facility] for comparison   Check TSH 4.03, B12 1355, Folate 4.8, CODI pending and Hepatitis panel neg  If leukopenia persists will f/u outpt for full work up including a flow cytometry    Follow up with Dr Hendricks as out pt if WBC cont to trend down for a full work up including Flow Cytometry        Thank you for consulting [facility]  [phone]

## 2024-04-01 LAB
ANA TITR SER: NEGATIVE — SIGNIFICANT CHANGE UP
CULTURE RESULTS: SIGNIFICANT CHANGE UP
CULTURE RESULTS: SIGNIFICANT CHANGE UP
SPECIMEN SOURCE: SIGNIFICANT CHANGE UP
SPECIMEN SOURCE: SIGNIFICANT CHANGE UP

## 2024-04-01 PROCEDURE — 99232 SBSQ HOSP IP/OBS MODERATE 35: CPT | Mod: FS

## 2024-04-01 PROCEDURE — 99232 SBSQ HOSP IP/OBS MODERATE 35: CPT

## 2024-04-01 PROCEDURE — 99233 SBSQ HOSP IP/OBS HIGH 50: CPT

## 2024-04-01 RX ADMIN — PANTOPRAZOLE SODIUM 40 MILLIGRAM(S): 20 TABLET, DELAYED RELEASE ORAL at 17:41

## 2024-04-01 NOTE — DIETITIAN INITIAL EVALUATION ADULT - OTHER INFO
24 year old male with a past medical history of obesity, anxiety and depression status post recent Gastric sleeve surgery who presented to the Er with complaints of abdominal pain, nausea vomiting and inability to tolerate PO. Noted to have transaminitis in the ER. CT of the abdomen and pelvis with no acute findings. RUQ was negative. Seen by GI, status post EGD normal.

## 2024-04-01 NOTE — PROGRESS NOTE ADULT - SUBJECTIVE AND OBJECTIVE BOX
INTERVAL HPI/OVERNIGHT EVENTS:    Patient evaluated at bedside. No acute distress.  Nauseous and mild spit overnight. Off reglan for 48 hrs pending nuclear gastric study today.    MEDICATIONS  (STANDING):  bisacodyl Suppository 10 milliGRAM(s) Rectal daily  hydrOXYzine hydrochloride Injectable 25 milliGRAM(s) IntraMuscular once  pantoprazole  Injectable 40 milliGRAM(s) IV Push every 12 hours  polyethylene glycol 3350 17 Gram(s) Oral daily  senna 2 Tablet(s) Oral at bedtime  tamsulosin 0.4 milliGRAM(s) Oral at bedtime    MEDICATIONS  (PRN):  LORazepam   Injectable 0.5 milliGRAM(s) IV Push every 8 hours PRN Anxiety  ondansetron Injectable 4 milliGRAM(s) IV Push every 4 hours PRN Nausea and/or Vomiting  ondansetron Injectable 4 milliGRAM(s) IV Push once PRN prior to gastrin emptying      Vital Signs Last 24 Hrs  T(C): 36.9 (01 Apr 2024 04:32), Max: 36.9 (01 Apr 2024 04:32)  T(F): 98.4 (01 Apr 2024 04:32), Max: 98.4 (01 Apr 2024 04:32)  HR: 81 (01 Apr 2024 04:32) (55 - 96)  BP: 149/91 (01 Apr 2024 04:32) (122/80 - 149/91)  BP(mean): --  RR: 18 (01 Apr 2024 04:32) (18 - 18)  SpO2: 97% (01 Apr 2024 04:32) (97% - 98%)    Parameters below as of 01 Apr 2024 04:32  Patient On (Oxygen Delivery Method): room air        PHYSICAL EXAM:  General:  in no acute distress  HEENT: MMM, conjunctiva and sclera clear  Gastrointestinal: obese, Soft, non-tender non-distended; Normal bowel sounds; No rebound or guarding  Extremities: Normal range of motion, No clubbing, cyanosis or edema  Neurological: Alert and oriented x3  Skin: Warm and dry. No obvious rash      I&O's Detail    31 Mar 2024 07:01  -  01 Apr 2024 07:00  --------------------------------------------------------  IN:  Total IN: 0 mL    OUT:    Indwelling Catheter - Urethral (mL): 400 mL  Total OUT: 400 mL    Total NET: -400 mL          LABS:                        14.0   3.17  )-----------( 134      ( 31 Mar 2024 09:53 )             42.5     03-31    132<L>  |  94<L>  |  3.2<L>  ----------------------------<  103<H>  3.6   |  25.0  |  0.45<L>    Ca    8.5      31 Mar 2024 09:53  Phos  4.1     03-31  Mg     1.7     03-31    TPro  6.3<L>  /  Alb  3.6  /  TBili  2.6<H>  /  DBili  1.4<H>  /  AST  162<H>  /  ALT  260<H>  /  AlkPhos  76  03-31      Urinalysis Basic - ( 31 Mar 2024 09:53 )    Color: x / Appearance: x / SG: x / pH: x  Gluc: 103 mg/dL / Ketone: x  / Bili: x / Urobili: x   Blood: x / Protein: x / Nitrite: x   Leuk Esterase: x / RBC: x / WBC x   Sq Epi: x / Non Sq Epi: x / Bacteria: x        RADIOLOGY & ADDITIONAL STUDIES:

## 2024-04-01 NOTE — DIETITIAN INITIAL EVALUATION ADULT - PERTINENT LABORATORY DATA
03-31    132<L>  |  94<L>  |  3.2<L>  ----------------------------<  103<H>  3.6   |  25.0  |  0.45<L>    Ca    8.5      31 Mar 2024 09:53  Phos  4.1     03-31  Mg     1.7     03-31    TPro  6.3<L>  /  Alb  3.6  /  TBili  2.6<H>  /  DBili  1.4<H>  /  AST  162<H>  /  ALT  260<H>  /  AlkPhos  76  03-31

## 2024-04-01 NOTE — DIETITIAN INITIAL EVALUATION ADULT - ORAL INTAKE PTA/DIET HISTORY
Brief interview conducted, Pt reports inability to tolerate sufficient PO intake s/p gastric sleeve Feb 2024. Pt states tolerating only Jell-O in house, prior to admission wasn't able to keep any food/fluids down, experiencing dumping syndrome s/p ice cream and refusing to trial Ensure clear 2/2 sugar content. Pt trialed Ensure Max in the past and had been unable to tolerate as well. Initially Pt was planned for gastric emptying study on admission held 2/2 dry heaving, planned for today. Pt confirms ~83lb weight loss from Oct 2023 380lbs, current weight 297lbs. Aware constipation during admission, now resolved with last documented BM 3/30. Pt agreeable to trial sugar-free high protein Gelatein TID. RD to remain available.

## 2024-04-01 NOTE — PROGRESS NOTE ADULT - PROBLEM SELECTOR PLAN 1
Patient with  wretching. Nuclear med says unable to do the gastric empyting with active wretching. Nuclear medicine is not able to gastric emptying until 4/1  EGD negative   Will start zofran and reglan to see if pt can tolerate diet   NO BM in 6 days, will give dulcolax suppositories   I
Patient with  wretching. Nuclear med says unable to do the gastric empyting with active wretching. Nuclear medicine is not able to gastric emptying until 4/1  EGD negative   Will start zofran and reglan to see if pt can tolerate diet   NO BM in 6 days, will give dulcolax suppositories   I
Patient with retching - appears comfortable now.    Mother at bedside  Issues with radioisotope. Gastric emptying tomorrow   Patient requesting sugar free diet as sugar makes gives him nausea

## 2024-04-01 NOTE — PROGRESS NOTE ADULT - NS ATTEST RISK PROBLEM GEN_ALL_CORE FT
vomiting, recthing  EGD negative  gastric emptying study tomorrow   history from pt and mother at bedside   na 132, CMP ordered for tomorrow
retching, inability to tolerate po  K 3.2 . Being replaced. K ordered for tomorrow  I spoke to Dr Rodriguez regarding the plan - Try regland zofran and see if pt tolerates diet. Advanced as tolerated   I spoke to patient's aunt regarding the plan   CT scan- my interpretation- fatty liver , S/P gastric sleeve

## 2024-04-01 NOTE — DIETITIAN NUTRITION RISK NOTIFICATION - ADDITIONAL COMMENTS/DIETITIAN RECOMMENDATIONS
1) Consider TPN/PPN  2) Advance diet as tolerated: CLD ->FLD with Ensure Max  3) Rx MVI, vit B12, OsCal supplementation  4) RD to provide sugar-free high protein Gelatein TID  5) Bowel regimen PRN   6) Monitor weights daily for trend/accuracy

## 2024-04-01 NOTE — PROGRESS NOTE ADULT - ASSESSMENT
25 y/o M s/p sleeve gastrectomy 2/2024 who presents with complaints of PO intolerance, nausea and small volume bilious emesis and constipation.     #Nausea, vomiting, epigastric pain  #S/P sleeve gastrectomy 2/2024   #Elevated liver enzymes   #Constipation   CT A/P w/ PO and IV Cont (03.26.24)- Stable left hepatic lobe hypodensities. Expected gastric sleeve postoperative changes.  US Abdomen Upper Quadrant Right (03.26.24)- Hepatic steatosis.  EGD (03.26.24)- normal mucosa in esophagus/duodenum, evidence of band gastroplasty in stomach.     - Per nuclear no radioisotope available to complete today, will have to post-pone until tomorrow. Continue to hold reglan  - IV fluids for hydration  - PPI BID  - Monitor renal function, electrolytes.  Replete electrolytes as needed. Avoid hypotension.   - Zofran as needed for nausea, and vomiting  - Avoid Narcotics  - Trend LFTs. Avoid hepatotoxins  - MRI as an outpatient to assess liver findings   - Continue bowel regimen     #Hepatic steatosis   - Trend LFTs  - Maintain normal BMI  - Alcohol abstinence  - Low fat diet (when applicable)   - Follow up with hepatologist Dr. Johnson for further assessment and monitoring   _________________________________________________________________  Assessment and recommendations are final when note is signed by the attending physician.      23 y/o M s/p sleeve gastrectomy 2/2024 who presents with complaints of PO intolerance, nausea and small volume bilious emesis and constipation.     #Nausea, vomiting, epigastric pain  #S/P sleeve gastrectomy 2/2024   #Elevated liver enzymes   #Constipation   CT A/P w/ PO and IV Cont (03.26.24)- Stable left hepatic lobe hypodensities. Expected gastric sleeve postoperative changes.  US Abdomen Upper Quadrant Right (03.26.24)- Hepatic steatosis.  EGD (03.26.24)- normal mucosa in esophagus/duodenum, evidence of band gastroplasty in stomach.     - Per nuclear no radioisotope available to complete today, will have to post-pone until tomorrow. Continue to hold reglan  - IV fluids for hydration  - PPI BID  - Monitor renal function, electrolytes.  Replete electrolytes as needed. Avoid hypotension.   - Zofran as needed for nausea, and vomiting  - Avoid Narcotics  - Trend LFTs. Avoid hepatotoxins  - MRI as an outpatient to assess liver findings   - Continue bowel regimen   - Clear liquid diet as tolerated (he is requesting sugar free tray- diet changed to diabetic clears)     #Hepatic steatosis   - Trend LFTs  - Maintain normal BMI  - Alcohol abstinence  - Low fat diet (when applicable)   - Follow up with hepatologist Dr. Johnson for further assessment and monitoring   _________________________________________________________________  Assessment and recommendations are final when note is signed by the attending physician.

## 2024-04-01 NOTE — DIETITIAN INITIAL EVALUATION ADULT - PERTINENT MEDS FT
MEDICATIONS  (STANDING):  bisacodyl Suppository 10 milliGRAM(s) Rectal daily  hydrOXYzine hydrochloride Injectable 25 milliGRAM(s) IntraMuscular once  pantoprazole  Injectable 40 milliGRAM(s) IV Push every 12 hours  polyethylene glycol 3350 17 Gram(s) Oral daily  senna 2 Tablet(s) Oral at bedtime  tamsulosin 0.4 milliGRAM(s) Oral at bedtime    MEDICATIONS  (PRN):  LORazepam   Injectable 0.5 milliGRAM(s) IV Push every 8 hours PRN Anxiety  ondansetron Injectable 4 milliGRAM(s) IV Push every 4 hours PRN Nausea and/or Vomiting  ondansetron Injectable 4 milliGRAM(s) IV Push once PRN prior to gastrin emptying

## 2024-04-01 NOTE — DIETITIAN INITIAL EVALUATION ADULT - ADD RECOMMEND
Consider TPN/PPN for supplemental nutrition; RD to provide sugar-free high protein Gelatein TID; Advance diet as tolerated: CLD ->FLD with Ensure Max; Rx MVI, vit B12, OsCal; Bowel regimen PRN

## 2024-04-01 NOTE — PROGRESS NOTE ADULT - ASSESSMENT
The patient is a 24 year old male with a past medical history of obesity, anxiety and depression status post recent Gastric sleeve surgery who presented to the Er with complaints of abdominal pain, nausea vomiting and inability to tolerate PO. Noted to have transaminitis in the ER. CT of the abdomen and pelvis with no acute findings. RUQ was negative. Seen by GI, status post EGD normal.     1. Abdominal pain - improved  - large BM after enema; reports BM yesterday as well  - Post gastric sleeve 1 month prior  - CT abdomen and pelvis negative  - RUQ ultrasound negative  - EGD normal  - Seen by Bariatric surgery; no acute intervention   - Gastric emptying study was cancelled twice as patient was unable to tolerate contrast due to nausea   - Refused IV reglan  - Continue IV zofran  - continue PPI  - clear liquid diet; unable to tolerate advancement in diet  - KUB reviewed  - Patient is rescheduled for the gastric emptying study on monday 4/2 (no available radioactive isotopes)  - GI recs appreciated    2 Anion gap metabolic acidosis - resolved  - Secondary to starvation ketoacidosis   - resolved with IVF  - Monitor CMP; refused labs today    3. Acute urinary retention  - Suspected secondary to UTI  - completed 3 days of abx  - Blood and urine cultures negative  - continue flomax  - schaffer removed today    4. Anxiety and depression  - previously on topiramate 25 mg po bid and bupropion  qd stopped prior to surgery  - intermittently refusing care  - ativan PRN  - BH following    5. Hepatic steatosis  - worsening transaminitis  - CT abdomen and RUQ negative for acute process   - MRI as outpatient and follow up with hepatology   - GI on board; trend LFTS (Refused labs today)    6. Neutropenia  - WBC improved but refused labs today  - No previous blood work in [facility] for comparison  - f/u CODI  - possible outpatient flow cytometry  - hem/onc recs appreciated    7. Protein-calorie malnutrition  -unable to add supplements per dietary recs given inability to tolerate PO  -nutrition recs appreciated    DVT ppx - SCDs    Dispo- Remains acute; Gastric emptying study on 4/2. Voiding trial.    Plan discussed with patient, Dr. Aldrich, RN

## 2024-04-01 NOTE — DIETITIAN INITIAL EVALUATION ADULT - NS FNS DIET ORDER
Diet, NPO after Midnight:      NPO Start Date: 01-Apr-2024,   NPO Start Time: 23:59  Except Medications (04-01-24 @ 09:39)

## 2024-04-01 NOTE — DIETITIAN NUTRITION RISK NOTIFICATION - TREATMENT: THE FOLLOWING DIET HAS BEEN RECOMMENDED
Diet, NPO after Midnight:      NPO Start Date: 01-Apr-2024,   NPO Start Time: 23:59  Except Medications (04-01-24 @ 09:39) [Active]  Diet, Clear Liquid (03-30-24 @ 11:32) [Active]

## 2024-04-01 NOTE — PROGRESS NOTE ADULT - SUBJECTIVE AND OBJECTIVE BOX
CHIEF COMPLAINT/INTERVAL HISTORY:    Patient is a 24y old  Male who presents with a chief complaint of Nausea and vomiting     (01 Apr 2024 11:05)    SUBJECTIVE & OBJECTIVE: Pt seen and examined at bedside. No overnight events. Reports nausea and inability to tolerate PO meds.    ROS: No chest pain, palpitations, SOB, light headedness, dizziness, headache, fevers/chills, dysuria or increased urinary frequency.    ICU Vital Signs Last 24 Hrs  T(C): 36.7 (01 Apr 2024 11:18), Max: 36.9 (01 Apr 2024 04:32)  T(F): 98 (01 Apr 2024 11:18), Max: 98.4 (01 Apr 2024 04:32)  HR: 71 (01 Apr 2024 11:18) (55 - 81)  BP: 125/71 (01 Apr 2024 11:18) (125/71 - 149/91)  RR: 18 (01 Apr 2024 11:18) (18 - 18)  SpO2: 98% (01 Apr 2024 11:18) (97% - 98%)    O2 Parameters below as of 01 Apr 2024 11:18  Patient On (Oxygen Delivery Method): room air    MEDICATIONS  (STANDING):  bisacodyl Suppository 10 milliGRAM(s) Rectal daily  hydrOXYzine hydrochloride Injectable 25 milliGRAM(s) IntraMuscular once  pantoprazole  Injectable 40 milliGRAM(s) IV Push every 12 hours  polyethylene glycol 3350 17 Gram(s) Oral daily  senna 2 Tablet(s) Oral at bedtime  tamsulosin 0.4 milliGRAM(s) Oral at bedtime    MEDICATIONS  (PRN):  LORazepam   Injectable 0.5 milliGRAM(s) IV Push every 8 hours PRN Anxiety  ondansetron Injectable 4 milliGRAM(s) IV Push once PRN prior to gastrin emptying  ondansetron Injectable 4 milliGRAM(s) IV Push every 4 hours PRN Nausea and/or Vomiting      LABS:                        14.0   3.17  )-----------( 134      ( 31 Mar 2024 09:53 )             42.5     03-31    132<L>  |  94<L>  |  3.2<L>  ----------------------------<  103<H>  3.6   |  25.0  |  0.45<L>    Ca    8.5      31 Mar 2024 09:53  Phos  4.1     03-31  Mg     1.7     03-31    TPro  6.3<L>  /  Alb  3.6  /  TBili  2.6<H>  /  DBili  1.4<H>  /  AST  162<H>  /  ALT  260<H>  /  AlkPhos  76  03-31      Urinalysis Basic - ( 31 Mar 2024 09:53 )    Color: x / Appearance: x / SG: x / pH: x  Gluc: 103 mg/dL / Ketone: x  / Bili: x / Urobili: x   Blood: x / Protein: x / Nitrite: x   Leuk Esterase: x / RBC: x / WBC x   Sq Epi: x / Non Sq Epi: x / Bacteria: x    PHYSICAL EXAM:    GENERAL: young male, morbidly obese, NAD  HEAD:  Atraumatic, Normocephalic  EYES: EOMI, PERRLA, conjunctiva and sclera clear  ENMT: Moist mucous membranes  NECK: Supple   NERVOUS SYSTEM:  Alert & Oriented X3, Motor Strength 5/5 B/L upper and lower extremities   CHEST/LUNG: diminished breath sounds  HEART: Regular rate and rhythm; + S1/S2  ABDOMEN: Soft, generalized tenderness, obese; Bowel sounds present  EXTREMITIES:  no pedal edema

## 2024-04-01 NOTE — PROGRESS NOTE ADULT - ASSESSMENT
23yo M s/p sleeve gastrectomy now with PO intolerance.  Pending repeat emptying gastric study today    PLAN:  -no operative intervention   -Appreciate GI recs  -Will follow results of the test  -Patient encouraged to follow with primary surgeon  -continue care per primary

## 2024-04-01 NOTE — PROGRESS NOTE ADULT - SUBJECTIVE AND OBJECTIVE BOX
Chief Complaint:  Patient is a 24y old  Male who presents with a chief complaint of intractable n/v (01 Apr 2024 07:10)      HPI/ 24 hr events: Patient seen and examined at bedside. He notes he spit up this morning and had nausea overnight. He reports he had a BM. Vitals are overall stable. No labs available for today.       REVIEW OF SYSTEMS:   General: Negative  HEENT: Negative  CV: Negative  Respiratory: Negative  GI: See HPI  : Negative  MSK: Negative  Hematologic: Negative  Skin: Negative    MEDICATIONS:   MEDICATIONS  (STANDING):  bisacodyl Suppository 10 milliGRAM(s) Rectal daily  hydrOXYzine hydrochloride Injectable 25 milliGRAM(s) IntraMuscular once  pantoprazole  Injectable 40 milliGRAM(s) IV Push every 12 hours  polyethylene glycol 3350 17 Gram(s) Oral daily  senna 2 Tablet(s) Oral at bedtime  tamsulosin 0.4 milliGRAM(s) Oral at bedtime    MEDICATIONS  (PRN):  LORazepam   Injectable 0.5 milliGRAM(s) IV Push every 8 hours PRN Anxiety  ondansetron Injectable 4 milliGRAM(s) IV Push once PRN prior to gastrin emptying  ondansetron Injectable 4 milliGRAM(s) IV Push every 4 hours PRN Nausea and/or Vomiting            DIET:  Diet, NPO after Midnight:      NPO Start Date: 01-Apr-2024,   NPO Start Time: 23:59  Except Medications (04-01-24 @ 09:39) [Active]  Diet, Clear Liquid (03-30-24 @ 11:32) [Active]          ALLERGIES:   Allergies    No Known Allergies    Intolerances        VITAL SIGNS:   Vital Signs Last 24 Hrs  T(C): 36.9 (01 Apr 2024 04:32), Max: 36.9 (01 Apr 2024 04:32)  T(F): 98.4 (01 Apr 2024 04:32), Max: 98.4 (01 Apr 2024 04:32)  HR: 81 (01 Apr 2024 04:32) (55 - 96)  BP: 149/91 (01 Apr 2024 04:32) (122/80 - 149/91)  BP(mean): --  RR: 18 (01 Apr 2024 04:32) (18 - 18)  SpO2: 97% (01 Apr 2024 04:32) (97% - 98%)    Parameters below as of 01 Apr 2024 04:32  Patient On (Oxygen Delivery Method): room air      I&O's Summary    31 Mar 2024 07:01  -  01 Apr 2024 07:00  --------------------------------------------------------  IN: 0 mL / OUT: 400 mL / NET: -400 mL        PHYSICAL EXAM:   GENERAL:  No acute distress  HEENT:  NC/AT, conjunctiva clear, sclera anicteric  CHEST:  No increased effort  HEART:  Regular rate  ABDOMEN:  Soft, obese, non-tender, non-distended, normoactive bowel sounds, no rebound or guarding  EXTREMITIES: No edema  SKIN:  Warm, dry  NEURO:  Calm, cooperative    LABS:                        14.0   3.17  )-----------( 134      ( 31 Mar 2024 09:53 )             42.5     Hemoglobin: 14.0 g/dL (03-31-24 @ 09:53)  Hemoglobin: 13.5 g/dL (03-30-24 @ 10:40)  Hemoglobin: 13.4 g/dL (03-29-24 @ 10:08)    03-31    132<L>  |  94<L>  |  3.2<L>  ----------------------------<  103<H>  3.6   |  25.0  |  0.45<L>    Ca    8.5      31 Mar 2024 09:53  Phos  4.1     03-31  Mg     1.7     03-31    TPro  6.3<L>  /  Alb  3.6  /  TBili  2.6<H>  /  DBili  1.4<H>  /  AST  162<H>  /  ALT  260<H>  /  AlkPhos  76  03-31    LIVER FUNCTIONS - ( 31 Mar 2024 09:53 )  Alb: 3.6 g/dL / Pro: 6.3 g/dL / ALK PHOS: 76 U/L / ALT: 260 U/L / AST: 162 U/L / GGT: x                                             RADIOLOGY & ADDITIONAL STUDIES:                 Chief Complaint:  Patient is a 24y old  Male who presents with a chief complaint of intractable n/v (01 Apr 2024 07:10)      HPI/ 24 hr events: Patient seen and examined at bedside. He notes he spit up this morning and had nausea overnight. He reports he had a BM. Vitals are overall stable. No labs available for today.  Mother was at the bedside       REVIEW OF SYSTEMS:   General: Negative  HEENT: Negative  CV: Negative  Respiratory: Negative  GI: See HPI  : Negative  MSK: Negative  Hematologic: Negative  Skin: Negative    MEDICATIONS:   MEDICATIONS  (STANDING):  bisacodyl Suppository 10 milliGRAM(s) Rectal daily  hydrOXYzine hydrochloride Injectable 25 milliGRAM(s) IntraMuscular once  pantoprazole  Injectable 40 milliGRAM(s) IV Push every 12 hours  polyethylene glycol 3350 17 Gram(s) Oral daily  senna 2 Tablet(s) Oral at bedtime  tamsulosin 0.4 milliGRAM(s) Oral at bedtime    MEDICATIONS  (PRN):  LORazepam   Injectable 0.5 milliGRAM(s) IV Push every 8 hours PRN Anxiety  ondansetron Injectable 4 milliGRAM(s) IV Push once PRN prior to gastrin emptying  ondansetron Injectable 4 milliGRAM(s) IV Push every 4 hours PRN Nausea and/or Vomiting            DIET:  Diet, NPO after Midnight:      NPO Start Date: 01-Apr-2024,   NPO Start Time: 23:59  Except Medications (04-01-24 @ 09:39) [Active]  Diet, Clear Liquid (03-30-24 @ 11:32) [Active]          ALLERGIES:   Allergies    No Known Allergies    Intolerances        VITAL SIGNS:   Vital Signs Last 24 Hrs  T(C): 36.9 (01 Apr 2024 04:32), Max: 36.9 (01 Apr 2024 04:32)  T(F): 98.4 (01 Apr 2024 04:32), Max: 98.4 (01 Apr 2024 04:32)  HR: 81 (01 Apr 2024 04:32) (55 - 96)  BP: 149/91 (01 Apr 2024 04:32) (122/80 - 149/91)  BP(mean): --  RR: 18 (01 Apr 2024 04:32) (18 - 18)  SpO2: 97% (01 Apr 2024 04:32) (97% - 98%)    Parameters below as of 01 Apr 2024 04:32  Patient On (Oxygen Delivery Method): room air      I&O's Summary    31 Mar 2024 07:01  -  01 Apr 2024 07:00  --------------------------------------------------------  IN: 0 mL / OUT: 400 mL / NET: -400 mL        PHYSICAL EXAM:   GENERAL:  No acute distress  HEENT:  NC/AT, conjunctiva clear, sclera anicteric  CHEST:  No increased effort  HEART:  Regular rate  ABDOMEN:  Soft, obese, non-tender, non-distended, normoactive bowel sounds, no rebound or guarding  EXTREMITIES: No edema  SKIN:  Warm, dry  NEURO:  Calm, cooperative    LABS:                        14.0   3.17  )-----------( 134      ( 31 Mar 2024 09:53 )             42.5     Hemoglobin: 14.0 g/dL (03-31-24 @ 09:53)  Hemoglobin: 13.5 g/dL (03-30-24 @ 10:40)  Hemoglobin: 13.4 g/dL (03-29-24 @ 10:08)    03-31    132<L>  |  94<L>  |  3.2<L>  ----------------------------<  103<H>  3.6   |  25.0  |  0.45<L>    Ca    8.5      31 Mar 2024 09:53  Phos  4.1     03-31  Mg     1.7     03-31    TPro  6.3<L>  /  Alb  3.6  /  TBili  2.6<H>  /  DBili  1.4<H>  /  AST  162<H>  /  ALT  260<H>  /  AlkPhos  76  03-31    LIVER FUNCTIONS - ( 31 Mar 2024 09:53 )  Alb: 3.6 g/dL / Pro: 6.3 g/dL / ALK PHOS: 76 U/L / ALT: 260 U/L / AST: 162 U/L / GGT: x                                             RADIOLOGY & ADDITIONAL STUDIES:      < from: CT Abdomen and Pelvis w/ Oral Cont and w/ IV Cont (03.26.24 @ 00:16) >  MPRESSION:    Expected gastric sleeve postoperative changes.    No bowel obstruction.    Continued mild groundglass opacities in theright lower lobe similar to   prior study.    Additional findings as mentioned above.    --- End of Report ---        < end of copied text >

## 2024-04-01 NOTE — DIETITIAN INITIAL EVALUATION ADULT - ETIOLOGY
related to inability to meet sufficient protein-energy needs in setting of persistent N/V, intermittent constipation s/p gastric sleeve

## 2024-04-02 LAB — GLUCOSE BLDC GLUCOMTR-MCNC: 82 MG/DL — SIGNIFICANT CHANGE UP (ref 70–99)

## 2024-04-02 PROCEDURE — 99233 SBSQ HOSP IP/OBS HIGH 50: CPT

## 2024-04-02 PROCEDURE — 99232 SBSQ HOSP IP/OBS MODERATE 35: CPT

## 2024-04-02 PROCEDURE — 78264 GASTRIC EMPTYING IMG STUDY: CPT | Mod: 26

## 2024-04-02 RX ORDER — LIDOCAINE HCL 20 MG/ML
5 VIAL (ML) INJECTION ONCE
Refills: 0 | Status: COMPLETED | OUTPATIENT
Start: 2024-04-02 | End: 2024-04-02

## 2024-04-02 RX ORDER — OLANZAPINE 15 MG/1
2.5 TABLET, FILM COATED ORAL EVERY 8 HOURS
Refills: 0 | Status: DISCONTINUED | OUTPATIENT
Start: 2024-04-02 | End: 2024-04-04

## 2024-04-02 RX ORDER — ERYTHROMYCIN ETHYLSUCCINATE 400 MG
250 TABLET ORAL EVERY 8 HOURS
Refills: 0 | Status: DISCONTINUED | OUTPATIENT
Start: 2024-04-02 | End: 2024-04-03

## 2024-04-02 RX ORDER — LIDOCAINE HCL 20 MG/ML
5 VIAL (ML) INJECTION ONCE
Refills: 0 | Status: DISCONTINUED | OUTPATIENT
Start: 2024-04-02 | End: 2024-04-02

## 2024-04-02 RX ADMIN — OLANZAPINE 2.5 MILLIGRAM(S): 15 TABLET, FILM COATED ORAL at 22:15

## 2024-04-02 RX ADMIN — Medication 5 MILLILITER(S): at 18:13

## 2024-04-02 RX ADMIN — PANTOPRAZOLE SODIUM 40 MILLIGRAM(S): 20 TABLET, DELAYED RELEASE ORAL at 17:22

## 2024-04-02 NOTE — PROGRESS NOTE ADULT - ASSESSMENT
25 [facility] pleasant [facility]  Pt admitted to [facility] for intractable nausea and Vomitting       Abdominal pain  Post gastric sleeve Feb 2024  CT abdomen and pelvis negative  RUQ ultrasound negative  EGD normal  Seen by Bariatric surgery; no acute intervention   Gastric emptying study was cancelled twice as patient was unable to tolerate contrast due to nausea   gastric emptying study scheduled for today 4/2/2024  antimeric per medicine   IVF  Gastro following      Leukopenia mild   unclear baseline  WBC 3.64, 3.99, 2.89, 3.28  3.19 today 3.17  Cont to trend  ANC 1.51,1.80 today 2.0  +UTI  on antibiotics  Keep ANC >1000   No previous blood work in [facility] for comparison   Check TSH 4.03, B12 1355, Folate 4.8, CODI pending and Hepatitis panel neg  If leukopenia persists will f/u outpt for full work up including a flow cytometry    Follow up with Dr Hendricks as out pt if WBC cont to trend down for a full work up including Flow Cytometry        Thank you for consulting [facility]  [phone]

## 2024-04-02 NOTE — BH CONSULTATION LIAISON PROGRESS NOTE - NSBHASSESSMENTFT_PSY_ALL_CORE
Patient is a 24 year old male, domiciled in [place], [place] with mother, aunt, and cousins, unemployed, past medical history of obesity, RADHA, gastric sleeve (2/1/24), past psychiatric history of anxiety, depression, PTSD, and autism spectrum d/o, no prior psychiatric hospitalizations, OPP include therapist and psychiatrist based in [place], was previously prescribed Topamax, Wellbutrin, and Klonopin most recently but non-compliant due to reported side effects, no history of SA/NSSIB, substance history significant for past use of ?hemp, no legal history, remote family history of anxiety and depression, admitted to [facility] for transaminitis, abdominal pain, and intractable nausea/vomiting. Psychiatry consulted due to anxiety.     Assessment:  Patient presenting with symptoms of anxiety that has gradually worsened since gastric sleeve operation in February 2024 that he states manifests as nausea/vomiting. He states he was initially able to tolerate liquids but this has gradually become difficult for him as well due to the nausea. He reports at baseline he has had anxiety "for years" but symptoms have escalated significantly since the procedure.    4/2/24:  Patient seen and evaluated for follow up. Patient reports continued symptoms of anxiety and nausea as stated. Pending medical workup for organic cause of nausea. He reports poor sleep, poor appetite, amotivation, low mood, and significant anxiety. He denies SI/HI/AVH. Will recommend to discontinue PRN Ativan and PRN Vistaril. Recommend to start Zyprexa 2.5 mg IM q8hrs PRN to target anxiety as well as to utilize the anti-emetic properties of Zyprexa. Patient informed of and agreeable to plan.     Recommendations:  - START Zyprexa 2.5 mg IM q8 hrs PRN anxiety. Not to be given concomitantly with IM/IV benzodiazepines.   - DISCONTINUE Ativan 0.5 mg IVP q6 hrs PRN and Vistaril 25 mg IM PRN   - Patient is set up with outpatient providers for therapy and med management.

## 2024-04-02 NOTE — PROGRESS NOTE ADULT - SUBJECTIVE AND OBJECTIVE BOX
Subjective: Patient resting comfortably in bed, per RN no events or complaints, planned for gastric emptying challenge today      MEDICATIONS  (STANDING):  bisacodyl Suppository 10 milliGRAM(s) Rectal daily  hydrOXYzine hydrochloride Injectable 25 milliGRAM(s) IntraMuscular once  pantoprazole  Injectable 40 milliGRAM(s) IV Push every 12 hours  polyethylene glycol 3350 17 Gram(s) Oral daily  senna 2 Tablet(s) Oral at bedtime  tamsulosin 0.4 milliGRAM(s) Oral at bedtime    MEDICATIONS  (PRN):  LORazepam   Injectable 0.5 milliGRAM(s) IV Push every 8 hours PRN Anxiety  ondansetron Injectable 4 milliGRAM(s) IV Push every 4 hours PRN Nausea and/or Vomiting  ondansetron Injectable 4 milliGRAM(s) IV Push once PRN prior to gastrin emptying      Vital Signs Last 24 Hrs  T(C): 37.4 (02 Apr 2024 04:37), Max: 37.4 (02 Apr 2024 04:37)  T(F): 99.3 (02 Apr 2024 04:37), Max: 99.3 (02 Apr 2024 04:37)  HR: 87 (02 Apr 2024 04:37) (62 - 87)  BP: 112/65 (02 Apr 2024 04:37) (112/65 - 125/76)  BP(mean): --  RR: 18 (02 Apr 2024 04:37) (18 - 18)  SpO2: 98% (02 Apr 2024 04:37) (98% - 100%)    Parameters below as of 01 Apr 2024 17:06  Patient On (Oxygen Delivery Method): room air        Physical Exam:    Constitutional: NAD  HEENT: PERRL, EOMI  Respiratory: Respirations non-labored, no accessory muscle use  Gastrointestinal: Soft, non-distended        LABS:                        14.0   3.17  )-----------( 134      ( 31 Mar 2024 09:53 )             42.5     03-31    132<L>  |  94<L>  |  3.2<L>  ----------------------------<  103<H>  3.6   |  25.0  |  0.45<L>    Ca    8.5      31 Mar 2024 09:53  Phos  4.1     03-31  Mg     1.7     03-31    TPro  6.3<L>  /  Alb  3.6  /  TBili  2.6<H>  /  DBili  1.4<H>  /  AST  162<H>  /  ALT  260<H>  /  AlkPhos  76  03-31      Urinalysis Basic - ( 31 Mar 2024 09:53 )    Color: x / Appearance: x / SG: x / pH: x  Gluc: 103 mg/dL / Ketone: x  / Bili: x / Urobili: x   Blood: x / Protein: x / Nitrite: x   Leuk Esterase: x / RBC: x / WBC x   Sq Epi: x / Non Sq Epi: x / Bacteria: x        A: 23yo M s/p sleeve gastrectomy now with PO intolerance. Pending repeat emptying gastric study today    PLAN:  -no operative intervention   -Appreciate GI recs  -Will follow results of the test  -Patient encouraged to follow with primary surgeon  -continue care per primary

## 2024-04-02 NOTE — BH CONSULTATION LIAISON PROGRESS NOTE - CURRENT MEDICATION
MEDICATIONS  (STANDING):  bisacodyl Suppository 10 milliGRAM(s) Rectal daily  erythromycin     enteric coated 250 milliGRAM(s) Oral every 8 hours  hydrOXYzine hydrochloride Injectable 25 milliGRAM(s) IntraMuscular once  pantoprazole  Injectable 40 milliGRAM(s) IV Push every 12 hours  polyethylene glycol 3350 17 Gram(s) Oral daily  senna 2 Tablet(s) Oral at bedtime  tamsulosin 0.4 milliGRAM(s) Oral at bedtime    MEDICATIONS  (PRN):  LORazepam   Injectable 0.5 milliGRAM(s) IV Push every 8 hours PRN Anxiety  ondansetron Injectable 4 milliGRAM(s) IV Push once PRN prior to gastrin emptying  ondansetron Injectable 4 milliGRAM(s) IV Push every 4 hours PRN Nausea and/or Vomiting

## 2024-04-02 NOTE — BH CONSULTATION LIAISON PROGRESS NOTE - NSBHADMITIPOBS_PSY_A_CORE
"Patient presents for event heart monitor placement ordered by MD.  Patient was hooked up and patient instructions were given regarding electrode placement and how to use phone.  Patient was instructed on how long to wear monitor, how to send a \"manual event\", and how to properly send the unit back. [facility] contact information was provided to patient. Patient education provided about cardiology interpretation and primary provider will be notified of results.    Diagnosis taken from MD order.    Inés Millan, CCT, Cardiac CMA    " Routine observation

## 2024-04-02 NOTE — PROGRESS NOTE ADULT - SUBJECTIVE AND OBJECTIVE BOX
CHIEF COMPLAINT/INTERVAL HISTORY:    Patient is a 24y old  Male who presents with a chief complaint of intractable n/v (02 Apr 2024 13:28)    SUBJECTIVE & OBJECTIVE: Pt seen and examined at bedside. No overnight events. Emotional; unable to complete entire gastric emptying study due to vomiting per patient but results negative for gastroparesis. Psych follow up.    ROS: No chest pain, palpitations, SOB, light headedness, dizziness, headache, fevers/chills, dysuria or increased urinary frequency.    ICU Vital Signs Last 24 Hrs  T(C): 37.4 (02 Apr 2024 04:37), Max: 37.4 (02 Apr 2024 04:37)  T(F): 99.3 (02 Apr 2024 04:37), Max: 99.3 (02 Apr 2024 04:37)  HR: 87 (02 Apr 2024 04:37) (62 - 87)  BP: 112/65 (02 Apr 2024 04:37) (112/65 - 125/76)  RR: 18 (02 Apr 2024 04:37) (18 - 18)  SpO2: 98% (02 Apr 2024 04:37) (98% - 100%)    O2 Parameters below as of 01 Apr 2024 17:06  Patient On (Oxygen Delivery Method): room air    MEDICATIONS  (STANDING):  bisacodyl Suppository 10 milliGRAM(s) Rectal daily  erythromycin     enteric coated 250 milliGRAM(s) Oral every 8 hours  pantoprazole  Injectable 40 milliGRAM(s) IV Push every 12 hours  polyethylene glycol 3350 17 Gram(s) Oral daily  senna 2 Tablet(s) Oral at bedtime  tamsulosin 0.4 milliGRAM(s) Oral at bedtime    MEDICATIONS  (PRN):  OLANZapine Injectable 2.5 milliGRAM(s) IntraMuscular every 8 hours PRN anxiety  ondansetron Injectable 4 milliGRAM(s) IV Push once PRN prior to gastrin emptying  ondansetron Injectable 4 milliGRAM(s) IV Push every 4 hours PRN Nausea and/or Vomiting    CAPILLARY BLOOD GLUCOSE      POCT Blood Glucose.: 82 mg/dL (02 Apr 2024 07:45)    PHYSICAL EXAM:    GENERAL: young male, morbidly obese, NAD  HEAD:  Atraumatic, Normocephalic  EYES: EOMI, PERRLA, conjunctiva and sclera clear  ENMT: Moist mucous membranes  NECK: Supple   NERVOUS SYSTEM:  Alert & Oriented X3, Motor Strength 5/5 B/L upper and lower extremities   CHEST/LUNG: diminished breath sounds  HEART: Regular rate and rhythm; + S1/S2  ABDOMEN: Soft, generalized tenderness, obese; Bowel sounds present  EXTREMITIES:  no pedal edema

## 2024-04-02 NOTE — BH CONSULTATION LIAISON PROGRESS NOTE - NSBHCHARTREVIEWVS_PSY_A_CORE FT
Vital Signs Last 24 Hrs  T(C): 37.4 (02 Apr 2024 04:37), Max: 37.4 (02 Apr 2024 04:37)  T(F): 99.3 (02 Apr 2024 04:37), Max: 99.3 (02 Apr 2024 04:37)  HR: 87 (02 Apr 2024 04:37) (62 - 87)  BP: 112/65 (02 Apr 2024 04:37) (112/65 - 125/76)  BP(mean): --  RR: 18 (02 Apr 2024 04:37) (18 - 18)  SpO2: 98% (02 Apr 2024 04:37) (98% - 100%)    Parameters below as of 01 Apr 2024 17:06  Patient On (Oxygen Delivery Method): room air

## 2024-04-02 NOTE — BH CONSULTATION LIAISON PROGRESS NOTE - NSBHFUPINTERVALHXFT_PSY_A_CORE
Patient is a 24 year old male, domiciled in [place], [place] with mother, aunt, and cousins, unemployed, past medical history of obesity, RADHA, gastric sleeve (2/1/24), past psychiatric history of anxiety, depression, PTSD, and autism spectrum d/o, no prior psychiatric hospitalizations, OPP include therapist and psychiatrist based in [place], was previously prescribed Topamax, Wellbutrin, and Klonopin most recently but non-compliant due to reported side effects, no history of SA/NSSIB, substance history significant for past use of ?hemp, no legal history, remote family history of anxiety and depression, admitted to [facility] for transaminitis, abdominal pain, and intractable nausea/vomiting. Psychiatry consulted due to anxiety.     Patient seen and assessed for follow-up. Patient's mother at bedside and patient requested mother to stay throughout encounter. Patient endorses ongoing stressors including medical co-morbidities such as abdominal pain, nausea, and difficulty with urination. He also reports low mood, continued difficulty with sleep (states he typically wears CPAP at night but is unable to due to recent surgery), and low motivation. He reports on-going anxiety and low mood. Intermittently refusing select medications such as flomax, senna, and protonix - reportedly due to nausea.  He denies suicidal ideation, intent, or plan. Denies HI/AVH. No symptoms of titus elicited. He has not required PRN medications over the last 24 hours.

## 2024-04-02 NOTE — PROGRESS NOTE ADULT - SUBJECTIVE AND OBJECTIVE BOX
The patient is a 24 year old male with a past medical history of obesity, anxiety and depression, sleep apnes status post recent Gastric sleeve surgery who presented to the Er with complaints of abdominal pain, nausea vomiting and inability to tolerate PO. Noted to have transaminitis in the ER. CT of the abdomen and pelvis with no acute findings. RUQ was negative. Seen by GI, status post EGD normal.   [facility] was consulted for unexplained leukopenia    3/31/2024  Pt seen this a at bedside.  Pt resting comfortably.  No acute events overnight.  Afebrile.  Pt planned to have gastric emptying study challange today 4/2/2024    MEDICATIONS  (STANDING):  bisacodyl Suppository 10 milliGRAM(s) Rectal daily  hydrOXYzine hydrochloride Injectable 25 milliGRAM(s) IntraMuscular once  pantoprazole  Injectable 40 milliGRAM(s) IV Push every 12 hours  polyethylene glycol 3350 17 Gram(s) Oral daily  senna 2 Tablet(s) Oral at bedtime  tamsulosin 0.4 milliGRAM(s) Oral at bedtime    MEDICATIONS  (PRN):  LORazepam   Injectable 0.5 milliGRAM(s) IV Push every 8 hours PRN Anxiety  ondansetron Injectable 4 milliGRAM(s) IV Push every 4 hours PRN Nausea and/or Vomiting  ondansetron Injectable 4 milliGRAM(s) IV Push once PRN prior to gastrin emptying      Vital Signs Last 24 Hrs  T(C): 37.4 (02 Apr 2024 04:37), Max: 37.4 (02 Apr 2024 04:37)  T(F): 99.3 (02 Apr 2024 04:37), Max: 99.3 (02 Apr 2024 04:37)  HR: 87 (02 Apr 2024 04:37) (62 - 87)  BP: 112/65 (02 Apr 2024 04:37) (112/65 - 125/76)  BP(mean): --  RR: 18 (02 Apr 2024 04:37) (18 - 18)  SpO2: 98% (02 Apr 2024 04:37) (98% - 100%)    Parameters below as of 01 Apr 2024 17:06  Patient On (Oxygen Delivery Method): room air      Labs  CBC                                 14.0   3.17  )-----------( 134      ( 31 Mar 2024 09:53 )             42.5                        13.4   2.89  )-----------( 225      ( 29 Mar 2024 10:08 )             40.7     Chem      03-30    137  |  98  |  <3.0<L>  ----------------------------<  112<H>  3.4<L>   |  27.0  |  0.51    Ca    8.4      30 Mar 2024 10:40  Phos  3.4     03-30  Mg     1.8     03-30    TPro  6.3<L>  /  Alb  3.6  /  TBili  2.2<H>  /  DBili  x   /  AST  147<H>  /  ALT  243<H>  /  AlkPhos  79  03-30 03-29    136  |  97  |  3.0<L>  ----------------------------<  110<H>  3.5   |  26.0  |  0.59    Ca    8.5      29 Mar 2024 10:08  Mg     1.5     03-29    TPro  6.2<L>  /  Alb  3.6  /  TBili  2.0  /  DBili  x   /  AST  139<H>  /  ALT  219<H>  /  AlkPhos  77  03-29    PHYSICAL EXAM:  GENERAL: NAD, AOX3, Obese gastric sleeve surgery Feb 2024  HEAD:  Atraumatic, Normocephalic  EYES:conjunctiva and sclera clear  ENMT: Moist mucous membranes  NECK: Supple  CHEST/LUNG: Clear to auscultation bilaterally; No rales, rhonchi, wheezing, or rubs  HEART: Regular rate and rhythm; No murmurs, rubs, or gallops  ABDOMEN: Soft, Nontender, Nondistended; Bowel sounds present   + Rodriguez for retention  EXTREMITIES:  2+ Peripheral Pulses, No clubbing, cyanosis, or edema

## 2024-04-03 ENCOUNTER — TRANSCRIPTION ENCOUNTER (OUTPATIENT)
Age: 24
End: 2024-04-03

## 2024-04-03 LAB
ALBUMIN SERPL ELPH-MCNC: 4 G/DL — SIGNIFICANT CHANGE UP (ref 3.3–5.2)
ALP SERPL-CCNC: 86 U/L — SIGNIFICANT CHANGE UP (ref 40–120)
ALT FLD-CCNC: 372 U/L — HIGH
ANION GAP SERPL CALC-SCNC: 16 MMOL/L — SIGNIFICANT CHANGE UP (ref 5–17)
AST SERPL-CCNC: 234 U/L — HIGH
BASOPHILS # BLD AUTO: 0.03 K/UL — SIGNIFICANT CHANGE UP (ref 0–0.2)
BASOPHILS NFR BLD AUTO: 0.6 % — SIGNIFICANT CHANGE UP (ref 0–2)
BILIRUB DIRECT SERPL-MCNC: 1.9 MG/DL — HIGH (ref 0–0.3)
BILIRUB INDIRECT FLD-MCNC: 1.4 MG/DL — HIGH (ref 0.2–1)
BILIRUB SERPL-MCNC: 3.3 MG/DL — HIGH (ref 0.4–2)
BUN SERPL-MCNC: 8.7 MG/DL — SIGNIFICANT CHANGE UP (ref 8–20)
CALCIUM SERPL-MCNC: 9 MG/DL — SIGNIFICANT CHANGE UP (ref 8.4–10.5)
CHLORIDE SERPL-SCNC: 95 MMOL/L — LOW (ref 96–108)
CO2 SERPL-SCNC: 24 MMOL/L — SIGNIFICANT CHANGE UP (ref 22–29)
CREAT SERPL-MCNC: 0.43 MG/DL — LOW (ref 0.5–1.3)
EGFR: 151 ML/MIN/1.73M2 — SIGNIFICANT CHANGE UP
EOSINOPHIL # BLD AUTO: 0.09 K/UL — SIGNIFICANT CHANGE UP (ref 0–0.5)
EOSINOPHIL NFR BLD AUTO: 1.9 % — SIGNIFICANT CHANGE UP (ref 0–6)
GLUCOSE BLDC GLUCOMTR-MCNC: 71 MG/DL — SIGNIFICANT CHANGE UP (ref 70–99)
GLUCOSE SERPL-MCNC: 82 MG/DL — SIGNIFICANT CHANGE UP (ref 70–99)
HCT VFR BLD CALC: 44.3 % — SIGNIFICANT CHANGE UP (ref 39–50)
HGB BLD-MCNC: 14.8 G/DL — SIGNIFICANT CHANGE UP (ref 13–17)
IMM GRANULOCYTES NFR BLD AUTO: 1 % — HIGH (ref 0–0.9)
LYMPHOCYTES # BLD AUTO: 1.52 K/UL — SIGNIFICANT CHANGE UP (ref 1–3.3)
LYMPHOCYTES # BLD AUTO: 31.5 % — SIGNIFICANT CHANGE UP (ref 13–44)
MAGNESIUM SERPL-MCNC: 2 MG/DL — SIGNIFICANT CHANGE UP (ref 1.8–2.6)
MCHC RBC-ENTMCNC: 26.6 PG — LOW (ref 27–34)
MCHC RBC-ENTMCNC: 33.4 GM/DL — SIGNIFICANT CHANGE UP (ref 32–36)
MCV RBC AUTO: 79.7 FL — LOW (ref 80–100)
MONOCYTES # BLD AUTO: 0.66 K/UL — SIGNIFICANT CHANGE UP (ref 0–0.9)
MONOCYTES NFR BLD AUTO: 13.7 % — SIGNIFICANT CHANGE UP (ref 2–14)
NEUTROPHILS # BLD AUTO: 2.48 K/UL — SIGNIFICANT CHANGE UP (ref 1.8–7.4)
NEUTROPHILS NFR BLD AUTO: 51.3 % — SIGNIFICANT CHANGE UP (ref 43–77)
PHOSPHATE SERPL-MCNC: 4.6 MG/DL — SIGNIFICANT CHANGE UP (ref 2.4–4.7)
PLATELET # BLD AUTO: 246 K/UL — SIGNIFICANT CHANGE UP (ref 150–400)
POTASSIUM SERPL-MCNC: 3.4 MMOL/L — LOW (ref 3.5–5.3)
POTASSIUM SERPL-SCNC: 3.4 MMOL/L — LOW (ref 3.5–5.3)
PROT SERPL-MCNC: 7 G/DL — SIGNIFICANT CHANGE UP (ref 6.6–8.7)
RBC # BLD: 5.56 M/UL — SIGNIFICANT CHANGE UP (ref 4.2–5.8)
RBC # FLD: 14.8 % — HIGH (ref 10.3–14.5)
SODIUM SERPL-SCNC: 135 MMOL/L — SIGNIFICANT CHANGE UP (ref 135–145)
WBC # BLD: 4.83 K/UL — SIGNIFICANT CHANGE UP (ref 3.8–10.5)
WBC # FLD AUTO: 4.83 K/UL — SIGNIFICANT CHANGE UP (ref 3.8–10.5)

## 2024-04-03 PROCEDURE — 99232 SBSQ HOSP IP/OBS MODERATE 35: CPT

## 2024-04-03 PROCEDURE — 99231 SBSQ HOSP IP/OBS SF/LOW 25: CPT | Mod: FS

## 2024-04-03 RX ORDER — POTASSIUM CHLORIDE 20 MEQ
10 PACKET (EA) ORAL
Refills: 0 | Status: COMPLETED | OUTPATIENT
Start: 2024-04-03 | End: 2024-04-03

## 2024-04-03 RX ORDER — LIDOCAINE HCL 20 MG/ML
3 VIAL (ML) INJECTION ONCE
Refills: 0 | Status: COMPLETED | OUTPATIENT
Start: 2024-04-03 | End: 2024-04-03

## 2024-04-03 RX ADMIN — Medication 3 MILLILITER(S): at 17:15

## 2024-04-03 RX ADMIN — TAMSULOSIN HYDROCHLORIDE 0.4 MILLIGRAM(S): 0.4 CAPSULE ORAL at 23:03

## 2024-04-03 NOTE — PROGRESS NOTE ADULT - SUBJECTIVE AND OBJECTIVE BOX
Subjective:  Pt offers no new complaints.     MEDICATIONS  (STANDING):  bisacodyl Suppository 10 milliGRAM(s) Rectal daily  erythromycin     enteric coated 250 milliGRAM(s) Oral every 8 hours  pantoprazole  Injectable 40 milliGRAM(s) IV Push every 12 hours  polyethylene glycol 3350 17 Gram(s) Oral daily  senna 2 Tablet(s) Oral at bedtime  tamsulosin 0.4 milliGRAM(s) Oral at bedtime    MEDICATIONS  (PRN):  OLANZapine Injectable 2.5 milliGRAM(s) IntraMuscular every 8 hours PRN anxiety  ondansetron Injectable 4 milliGRAM(s) IV Push once PRN prior to gastrin emptying  ondansetron Injectable 4 milliGRAM(s) IV Push every 4 hours PRN Nausea and/or Vomiting      Vital Signs Last 24 Hrs  T(C): 36.6 (03 Apr 2024 05:08), Max: 36.9 (02 Apr 2024 16:38)  T(F): 97.9 (03 Apr 2024 05:08), Max: 98.4 (02 Apr 2024 16:38)  HR: 58 (03 Apr 2024 05:08) (58 - 73)  BP: 110/67 (03 Apr 2024 05:08) (110/67 - 124/77)  BP(mean): --  RR: 18 (03 Apr 2024 05:08) (18 - 18)  SpO2: 95% (03 Apr 2024 05:08) (95% - 100%)    Parameters below as of 02 Apr 2024 16:38  Patient On (Oxygen Delivery Method): room air        Physical Exam:    Constitutional: NAD  Gastrointestinal: Soft, obese, non-tender, non-distended      LABS:                        14.8   4.83  )-----------( 246      ( 03 Apr 2024 06:26 )             44.3     04-03    135  |  95<L>  |  8.7  ----------------------------<  82  3.4<L>   |  24.0  |  0.43<L>    Ca    9.0      03 Apr 2024 06:26  Phos  4.6     04-03  Mg     2.0     04-03    TPro  7.0  /  Alb  4.0  /  TBili  3.3<H>  /  DBili  1.9<H>  /  AST  234<H>  /  ALT  372<H>  /  AlkPhos  86  04-03      Urinalysis Basic - ( 03 Apr 2024 06:26 )    Color: x / Appearance: x / SG: x / pH: x  Gluc: 82 mg/dL / Ketone: x  / Bili: x / Urobili: x   Blood: x / Protein: x / Nitrite: x   Leuk Esterase: x / RBC: x / WBC x   Sq Epi: x / Non Sq Epi: x / Bacteria: x        A: 24M with history of gastric sleeve. Admitted with po intolerance. Gastric emptying study performed which did not reveal gastric dysmotility    Plan:   -Care as per primary team  -No surgical pathology that would require intervention or necessitate the pt being NPO

## 2024-04-03 NOTE — PROGRESS NOTE ADULT - NS ATTEND OPT1 GEN_ALL_CORE
I attest my time as attending is greater than 50% of the total combined time spent on qualifying patient care activities by the PA/NP and attending.
I independently performed the documented:
I independently performed the documented:
I attest my time as attending is greater than 50% of the total combined time spent on qualifying patient care activities by the PA/NP and attending.
I independently performed the documented:
I attest my time as attending is greater than 50% of the total combined time spent on qualifying patient care activities by the PA/NP and attending.

## 2024-04-03 NOTE — PROGRESS NOTE ADULT - SUBJECTIVE AND OBJECTIVE BOX
CHIEF COMPLAINT/INTERVAL HISTORY:    Patient is a 24y old  Male who presents with a chief complaint of intractable n/v (03 Apr 2024 10:59)    SUBJECTIVE & OBJECTIVE: Pt seen and examined at bedside. No overnight events. Refusing straight cath. LFTS and Bili uptrending daily.    ROS: Limited; not willing to engage with provider    ICU Vital Signs Last 24 Hrs  T(C): 36.4 (03 Apr 2024 10:32), Max: 36.9 (02 Apr 2024 16:38)  T(F): 97.6 (03 Apr 2024 10:32), Max: 98.4 (02 Apr 2024 16:38)  HR: 69 (03 Apr 2024 10:32) (58 - 73)  BP: 107/65 (03 Apr 2024 10:32) (107/65 - 124/77)  RR: 19 (03 Apr 2024 10:32) (18 - 19)  SpO2: 96% (03 Apr 2024 10:32) (95% - 100%)    O2 Parameters below as of 02 Apr 2024 16:38  Patient On (Oxygen Delivery Method): room air    MEDICATIONS  (STANDING):  bisacodyl Suppository 10 milliGRAM(s) Rectal daily  lidocaine 2% Jelly 3 milliLiter(s) IntraUrethral once  pantoprazole  Injectable 40 milliGRAM(s) IV Push every 12 hours  polyethylene glycol 3350 17 Gram(s) Oral daily  senna 2 Tablet(s) Oral at bedtime  tamsulosin 0.4 milliGRAM(s) Oral at bedtime    MEDICATIONS  (PRN):  OLANZapine Injectable 2.5 milliGRAM(s) IntraMuscular every 8 hours PRN anxiety  ondansetron Injectable 4 milliGRAM(s) IV Push once PRN prior to gastrin emptying  ondansetron Injectable 4 milliGRAM(s) IV Push every 4 hours PRN Nausea and/or Vomiting      LABS:                        14.8   4.83  )-----------( 246      ( 03 Apr 2024 06:26 )             44.3     04-03    135  |  95<L>  |  8.7  ----------------------------<  82  3.4<L>   |  24.0  |  0.43<L>    Ca    9.0      03 Apr 2024 06:26  Phos  4.6     04-03  Mg     2.0     04-03    TPro  7.0  /  Alb  4.0  /  TBili  3.3<H>  /  DBili  1.9<H>  /  AST  234<H>  /  ALT  372<H>  /  AlkPhos  86  04-03      Urinalysis Basic - ( 03 Apr 2024 06:26 )    Color: x / Appearance: x / SG: x / pH: x  Gluc: 82 mg/dL / Ketone: x  / Bili: x / Urobili: x   Blood: x / Protein: x / Nitrite: x   Leuk Esterase: x / RBC: x / WBC x   Sq Epi: x / Non Sq Epi: x / Bacteria: x        CAPILLARY BLOOD GLUCOSE      POCT Blood Glucose.: 71 mg/dL (03 Apr 2024 11:47)      RECENT CULTURES:      PHYSICAL EXAM:    GENERAL: young male, morbidly obese, sleepy  HEAD:  Atraumatic, Normocephalic  EYES: EOMI, PERRLA, conjunctiva and sclera clear  ENMT: Moist mucous membranes  NECK: Supple   CHEST/LUNG: diminished breath sounds  HEART: Regular rate and rhythm; + S1/S2  ABDOMEN: Soft, generalized tenderness, obese; Bowel sounds present  EXTREMITIES:  no pedal edema

## 2024-04-03 NOTE — PROGRESS NOTE ADULT - SUBJECTIVE AND OBJECTIVE BOX
Chief Complaint:  Patient is a 24y old  Male who presents with a chief complaint of intractable n/v     HPI/ 24 hr events: Patient seen and examined at bedside. Pt feeling well this morning. Tolerating diet. BM. Denies nausea, vomiting, diarrhea, abdominal pain, hematemesis, hematochezia, melena. Vitals are overall stable, no leukocytosis, Hgb, LFTs stable.         REVIEW OF SYSTEMS:   General: Negative  HEENT: Negative  CV: Negative  Respiratory: Negative  GI: See HPI  : Negative  MSK: Negative  Hematologic: Negative  Skin: Negative    MEDICATIONS:   MEDICATIONS  (STANDING):  bisacodyl Suppository 10 milliGRAM(s) Rectal daily  erythromycin     enteric coated 250 milliGRAM(s) Oral every 8 hours  pantoprazole  Injectable 40 milliGRAM(s) IV Push every 12 hours  polyethylene glycol 3350 17 Gram(s) Oral daily  potassium chloride  10 mEq/100 mL IVPB 10 milliEquivalent(s) IV Intermittent every 1 hour  senna 2 Tablet(s) Oral at bedtime  tamsulosin 0.4 milliGRAM(s) Oral at bedtime    MEDICATIONS  (PRN):  OLANZapine Injectable 2.5 milliGRAM(s) IntraMuscular every 8 hours PRN anxiety  ondansetron Injectable 4 milliGRAM(s) IV Push once PRN prior to gastrin emptying  ondansetron Injectable 4 milliGRAM(s) IV Push every 4 hours PRN Nausea and/or Vomiting            DIET:  Diet, Consistent Carbohydrate Clear Liquid (04-01-24 @ 12:07) [Active]          ALLERGIES:   Allergies    No Known Allergies    Intolerances        VITAL SIGNS:   Vital Signs Last 24 Hrs  T(C): 36.4 (03 Apr 2024 10:32), Max: 36.9 (02 Apr 2024 16:38)  T(F): 97.6 (03 Apr 2024 10:32), Max: 98.4 (02 Apr 2024 16:38)  HR: 69 (03 Apr 2024 10:32) (58 - 73)  BP: 107/65 (03 Apr 2024 10:32) (107/65 - 124/77)  BP(mean): --  RR: 19 (03 Apr 2024 10:32) (18 - 19)  SpO2: 96% (03 Apr 2024 10:32) (95% - 100%)    Parameters below as of 02 Apr 2024 16:38  Patient On (Oxygen Delivery Method): room air      I&O's Summary    02 Apr 2024 07:01  -  03 Apr 2024 07:00  --------------------------------------------------------  IN: 0 mL / OUT: 575 mL / NET: -575 mL        PHYSICAL EXAM:   GENERAL:  No acute distress  HEENT:  NC/AT, conjunctiva clear, sclera anicteric  CHEST:  No increased effort  HEART:  Regular rate  ABDOMEN:  Soft, non-tender, non-distended, positive bowel sounds, no rebound or guarding  EXTREMITIES: No edema  SKIN:  Warm, dry  NEURO:  Calm, cooperative    LABS:                        14.8   4.83  )-----------( 246      ( 03 Apr 2024 06:26 )             44.3     Hemoglobin: 14.8 g/dL (04-03-24 @ 06:26)    04-03    135  |  95<L>  |  8.7  ----------------------------<  82  3.4<L>   |  24.0  |  0.43<L>    Ca    9.0      03 Apr 2024 06:26  Phos  4.6     04-03  Mg     2.0     04-03    TPro  7.0  /  Alb  4.0  /  TBili  3.3<H>  /  DBili  1.9<H>  /  AST  234<H>  /  ALT  372<H>  /  AlkPhos  86  04-03    LIVER FUNCTIONS - ( 03 Apr 2024 06:26 )  Alb: 4.0 g/dL / Pro: 7.0 g/dL / ALK PHOS: 86 U/L / ALT: 372 U/L / AST: 234 U/L / GGT: x                                                     RADIOLOGY & ADDITIONAL STUDIES:         Chief Complaint:  Patient is a 24y old  Male who presents with a chief complaint of intractable n/v     HPI/ 24 hr events: Patient seen and examined at bedside. Drowsy but arousable. Pt feeling well this morning. Tolerating clear liquid diet. No BM overnight. Denies nausea, vomiting, diarrhea, abdominal pain, hematemesis, hematochezia, melena. Vitals are overall stable, no leukocytosis, Hgb stable.         REVIEW OF SYSTEMS:   General: Negative  HEENT: Negative  CV: Negative  Respiratory: Negative  GI: See HPI  : Negative  MSK: Negative  Hematologic: Negative  Skin: Negative    MEDICATIONS:   MEDICATIONS  (STANDING):  bisacodyl Suppository 10 milliGRAM(s) Rectal daily  erythromycin     enteric coated 250 milliGRAM(s) Oral every 8 hours  pantoprazole  Injectable 40 milliGRAM(s) IV Push every 12 hours  polyethylene glycol 3350 17 Gram(s) Oral daily  potassium chloride  10 mEq/100 mL IVPB 10 milliEquivalent(s) IV Intermittent every 1 hour  senna 2 Tablet(s) Oral at bedtime  tamsulosin 0.4 milliGRAM(s) Oral at bedtime    MEDICATIONS  (PRN):  OLANZapine Injectable 2.5 milliGRAM(s) IntraMuscular every 8 hours PRN anxiety  ondansetron Injectable 4 milliGRAM(s) IV Push once PRN prior to gastrin emptying  ondansetron Injectable 4 milliGRAM(s) IV Push every 4 hours PRN Nausea and/or Vomiting            DIET:  Diet, Consistent Carbohydrate Clear Liquid (04-01-24 @ 12:07) [Active]          ALLERGIES:   Allergies    No Known Allergies    Intolerances        VITAL SIGNS:   Vital Signs Last 24 Hrs  T(C): 36.4 (03 Apr 2024 10:32), Max: 36.9 (02 Apr 2024 16:38)  T(F): 97.6 (03 Apr 2024 10:32), Max: 98.4 (02 Apr 2024 16:38)  HR: 69 (03 Apr 2024 10:32) (58 - 73)  BP: 107/65 (03 Apr 2024 10:32) (107/65 - 124/77)  BP(mean): --  RR: 19 (03 Apr 2024 10:32) (18 - 19)  SpO2: 96% (03 Apr 2024 10:32) (95% - 100%)    Parameters below as of 02 Apr 2024 16:38  Patient On (Oxygen Delivery Method): room air      I&O's Summary    02 Apr 2024 07:01  -  03 Apr 2024 07:00  --------------------------------------------------------  IN: 0 mL / OUT: 575 mL / NET: -575 mL        PHYSICAL EXAM:   GENERAL:  No acute distress  HEENT:  NC/AT, conjunctiva clear, sclera anicteric  CHEST:  No increased effort  HEART:  Regular rate  ABDOMEN:  Soft, non-tender, obese, non-distended, positive bowel sounds, no rebound or guarding  EXTREMITIES: No edema  SKIN:  Warm, dry  NEURO:  Calm, cooperative    LABS:                        14.8   4.83  )-----------( 246      ( 03 Apr 2024 06:26 )             44.3     Hemoglobin: 14.8 g/dL (04-03-24 @ 06:26)    04-03    135  |  95<L>  |  8.7  ----------------------------<  82  3.4<L>   |  24.0  |  0.43<L>    Ca    9.0      03 Apr 2024 06:26  Phos  4.6     04-03  Mg     2.0     04-03    TPro  7.0  /  Alb  4.0  /  TBili  3.3<H>  /  DBili  1.9<H>  /  AST  234<H>  /  ALT  372<H>  /  AlkPhos  86  04-03    LIVER FUNCTIONS - ( 03 Apr 2024 06:26 )  Alb: 4.0 g/dL / Pro: 7.0 g/dL / ALK PHOS: 86 U/L / ALT: 372 U/L / AST: 234 U/L / GGT: x                                                     RADIOLOGY & ADDITIONAL STUDIES:          ACC: [id number] EXAM:  NM COMPUTER PROCESSING MIN30   ORDERED BY: SHERWIN PEREZ     ACC: [id number] EXAM:  NM GASTRIC EMPTY SOLID-LIQUID   ORDERED BY: SHERWIN PEREZ     PROCEDURE DATE:  04/02/2024          INTERPRETATION:  CLINICAL INFORMATION: 24 year old male status post   gastric sleeve procedure presents with nausea and vomiting; referred to   evaluate for gastroparesis.    RADIOPHARMACEUTICAL: 207 microcuries In-111 DTPA in 300 mL water; 1.0 mCi   99mTc-sulfur colloid in a standardized meal.    TECHNIQUE: Immediately before the beginning of the study, the patient's   fasting blood sugar was measured and was 82 mg/dL.    For the liquid phase of the test, dynamic imaging of the abdomen was   performed in the Chinese projection for 30 minutes following the oral   ingestion of 300 mL water mixed with In-111 DTPA. There was no   postprandial vomiting.    After completion of the liquid phase, the solid phase component of the   test was performed. The patient consumed 100% of the radiolabeled meal   consisting of 4 ounces of cooked egg whites, two slices of toasted white   bread with approximately 30 g of strawberry jam, and 4 ounces of water   over about 10 minutes. There was no postprandial vomiting.    Anterior and posterior one-minute images of the abdomen were obtained in   the supine position as soon as the patient finished the meal, and were   repeated 1, 2, 3 and 4 hours later. After calculation of the geometric   mean, and correction for isotope decay, the percent of activity remaining   in the stomach at 1, 2, 3 and 4 hours was determined.    MEDICATIONS: Within 24 hours before the test, the patient was not taking   any medications that could affect the test results.    COMPARISON: None    OTHER STUDIES USED FOR CORRELATION: None    FINDINGS:    Liquid phase component:    The T-1/2 of liquid emptying was 11 minutes. (Normal for liquids: ? 20   minutes.)    Solid phase component:           TIME              % RETENTION                  (NORMAL VALUES   SOLIDS)               0                           100%           1                              38%                                     (37-90%)           2                              7%                                       (30-60%)           3                              7%                                      -----------           4                              7%                                       (0-10%)    IMPRESSION:    Gastric emptying study demonstrates:    Normal gastric emptying of liquids.    Normal gastric emptying of solids.    No evidence of gastroparesis.    --- End of Report ---            JANET WOOD MD; Attending Nuclear Medicine  This document has been electronically signed. Apr 2 2024  2:37PM

## 2024-04-03 NOTE — DISCHARGE NOTE PROVIDER - DETAILS OF MALNUTRITION DIAGNOSIS/DIAGNOSES
This patient has been assessed with a concern for Malnutrition and was treated during this hospitalization for the following Nutrition diagnosis/diagnoses:     -  04/01/2024: Severe protein-calorie malnutrition   -  04/01/2024: Morbid obesity (BMI > 40)

## 2024-04-03 NOTE — DISCHARGE NOTE PROVIDER - NSDCMRMEDTOKEN_GEN_ALL_CORE_FT
Atarax 25 mg oral tablet: 1 tab(s) orally 3 times a day as needed for  anxiety  clonazePAM 0.5 mg oral tablet, disintegratin tab(s) orally every 8 hours as needed for  anxiety  cyanocobalamin 1000 mcg/15 mL oral liquid: 15 milliliter(s) orally once a day  famotidine 20 mg oral tablet: 1 tab(s) orally 2 times a day  Flomax 0.4 mg oral capsule: 1 cap(s) orally once a day  gabapentin 100 mg oral tablet: 1 tab(s) orally 3 times a day  oxycodone-acetaminophen 5 mg-325 mg oral tablet: 1 tab(s) orally every 6 hours as needed for pain  simethicone 80 mg oral tablet, chewable: 1 tab(s) chewed 2 times a day as needed for spasms  Zofran 4 mg/5 mL oral solution: 5 milliliter(s) orally every 8 hours as needed for  nausea/ vomiting   Atarax 25 mg oral tablet: 1 tab(s) orally 3 times a day as needed for  anxiety  cyanocobalamin 1000 mcg/15 mL oral liquid: 15 milliliter(s) orally once a day  famotidine 20 mg oral tablet: 1 tab(s) orally 2 times a day  Flomax 0.4 mg oral capsule: 1 cap(s) orally once a day  simethicone 80 mg oral tablet, chewable: 1 tab(s) chewed 2 times a day as needed for spasms  Zofran 4 mg/5 mL oral solution: 5 milliliter(s) orally every 8 hours as needed for  nausea/ vomiting

## 2024-04-03 NOTE — DISCHARGE NOTE PROVIDER - HOSPITAL COURSE
The patient is a 24 year old male with a past medical history of obesity, anxiety and depression status post recent Gastric sleeve surgery who presented to the Er with complaints of abdominal pain, nausea vomiting and inability to tolerate PO. Noted to have transaminitis in the ER. CT of the abdomen and pelvis with no acute findings. RUQ was negative. Seen by GI, status post EGD normal. Bariatric surgeon reviewed imaging; no structural abnormalities including strictures, no acute intervention. Hepatic steatosis evident by worsening transaminitis and tbili.  consulted 2/2 psychiatric component to symptoms.  recommended Zyprexa PRN. Anion gap metabolic acidosis resolved with IVF. Acute urinary retention likely 2/2 UTI, schaffer removed. Neutropenia noted during admission, with unknown baseline, heme/onc consulted. Rec f/u outpatient if leukopenia persists for full work up including a flow cytometry however conditioned resolved.     Dispo pending: GI Attending rec regarding worsening transaminitis + tbili. Possible MRCP  o/p f/u Dr. Hendricks IF leukopenia returns/worsens        The patient is a 24 year old male with a past medical history of obesity, anxiety and depression status post recent Gastric sleeve surgery who presented to the Er with complaints of abdominal pain, nausea vomiting and inability to tolerate PO. Noted to have transaminitis in the ER. CT of the abdomen and pelvis with no acute findings. RUQ was negative. Seen by GI, status post EGD normal. Bariatric surgeon reviewed imaging; no structural abnormalities including strictures, no acute intervention. Hepatic steatosis evident by worsening transaminitis and tbili.  consulted 2/2 psychiatric component to symptoms.  recommended Zyprexa PRN. Anion gap metabolic acidosis resolved with IVF. Acute urinary retention likely 2/2 UTI, schaffer removed. Neutropenia noted during admission, with unknown baseline, heme/onc consulted. Rec f/u outpatient if leukopenia persists for full work up including a flow cytometry however conditioned resolved. LFTS plateaued; discussed with GI no further inpatient interventions. Patient supposedly couldnt ambulate but actively storming around unit without dizziness or any assistance. D/C home.

## 2024-04-03 NOTE — PROGRESS NOTE ADULT - ASSESSMENT
25 y/o M s/p sleeve gastrectomy 2/2024 who presents with complaints of PO intolerance, nausea and small volume bilious emesis and constipation.       #Nausea, vomiting, epigastric pain  #S/P sleeve gastrectomy 2/2024   #Elevated liver enzymes   #Constipation   CT A/P w/ PO and IV Cont (03.26.24)- Stable left hepatic lobe hypodensities. Expected gastric sleeve postoperative changes.  US Abdomen Upper Quadrant Right (03.26.24)- Hepatic steatosis.  EGD (03.26.24)- normal mucosa in esophagus/duodenum, evidence of band gastroplasty in stomach.   NM Gastric Emptying (04.02.24):  Normal gastric emptying of liquids. Normal gastric emptying of solids. No evidence of gastroparesis.     As EGD, multiple imaging, and NM gastric emptying study cannot identify cause of patient's symptoms, it may be related to his recent sleeve gastrectomy. Patient agreeable to f/u with his surgeon from [facility] once discharged. GI to sign-off, please reconsult as needed, call for any questions or concerns.    - No GI cause of symptoms, requesting psych to reassess   - PPI BID for GI mucosal protection  - Monitor renal function, electrolytes.  Replete electrolytes as needed. Avoid hypotension.   - Zofran as needed for nausea, and vomiting  - Avoid Narcotics  - Trend LFTs. Avoid hepatotoxins  - MRI as an outpatient to assess liver findings   - Continue bowel regimen   - Clear liquid diet as tolerated (he is requesting sugar free tray- diet changed to diabetic clears)     #Hepatic steatosis   - Trend LFTs  - Maintain normal BMI  - Alcohol abstinence  - Low fat diet (when applicable)   - Follow up with hepatologist Dr. Johnson for further assessment and monitoring     -------------------------------------------------------------------------------  Note is incomplete until final attending attestation 25 y/o M s/p sleeve gastrectomy 2/2024 who presents with complaints of PO intolerance, nausea and small volume bilious emesis and constipation.       #Nausea, vomiting, epigastric pain  #S/P sleeve gastrectomy 2/2024   #Elevated liver enzymes   #Constipation   CT A/P w/ PO and IV Cont (03.26.24)- Stable left hepatic lobe hypodensities. Expected gastric sleeve postoperative changes.  US Abdomen Upper Quadrant Right (03.26.24)- Hepatic steatosis.  EGD (03.26.24)- normal mucosa in esophagus/duodenum, evidence of band gastroplasty in stomach.   NM Gastric Emptying (04.02.24):  Normal gastric emptying of liquids. Normal gastric emptying of solids. No evidence of gastroparesis.     As EGD, multiple imaging, and NM gastric emptying study cannot identify cause of patient's symptoms, it may be related to his recent sleeve gastrectomy. Patient agreeable to f/u with his surgeon from [facility] once discharged. GI to sign-off, please reconsult as needed, call for any questions or concerns.    - No GI cause of symptoms, requesting psych to reassess   - PPI BID for GI mucosal protection  - Monitor renal function, electrolytes.  Replete electrolytes as needed. Avoid hypotension.   - Zofran as needed for nausea, and vomiting  - Avoid Narcotics  - Today: T.Bili 3.3 /  /  and Hepatitis panel negative for A/B/C - may be drug induced (zofran)  - Trend LFTs. Avoid hepatotoxins  - MRI as an outpatient to assess liver findings   - Continue bowel regimen   - Clear liquid diet as tolerated (he is requesting sugar free tray- diet changed to diabetic clears)     #Hepatic steatosis   - Trend LFTs  - Maintain normal BMI  - Alcohol abstinence  - Low fat diet (when applicable)   - Follow up with hepatologist Dr. Johnson for further assessment and monitoring     -------------------------------------------------------------------------------  Note is incomplete until final attending attestation 23 y/o M s/p sleeve gastrectomy 2/2024 who presents with complaints of PO intolerance, nausea and small volume bilious emesis and constipation.       #Nausea, vomiting, epigastric pain  #S/P sleeve gastrectomy 2/2024   #Elevated liver enzymes   #Constipation   CT A/P w/ PO and IV Cont (03.26.24)- Stable left hepatic lobe hypodensities. Expected gastric sleeve postoperative changes.  US Abdomen Upper Quadrant Right (03.26.24)- Hepatic steatosis.  EGD (03.26.24)- normal mucosa in esophagus/duodenum, evidence of band gastroplasty in stomach.   NM Gastric Emptying (04.02.24):  Normal gastric emptying of liquids. Normal gastric emptying of solids. No evidence of gastroparesis.     As EGD, multiple imaging, and NM gastric emptying study cannot identify cause of patient's symptoms, it may be related to his recent sleeve gastrectomy. Patient agreeable to f/u with his surgeon from [facility] once discharged. GI to sign-off, please reconsult as needed, call for any questions or concerns.    - No GI cause of symptoms, requesting psych to reassess   - PPI BID for GI mucosal protection  - Monitor renal function, electrolytes.  Replete electrolytes as needed. Avoid hypotension.   - Zofran as needed for nausea, and vomiting  - Avoid Narcotics  - Today: T.Bili 3.3 /  /  and Hepatitis panel negative for A/B/C - may be drug induced (zofran)  - Trend LFTs. Avoid hepatotoxins  - MRI as an outpatient to assess liver findings   - Continue bowel regimen   - Clear liquid diet as tolerated (he is requesting sugar free tray- diet changed to diabetic clears)   - Follow up with GI Dr. Grace Aldrich once discharged     #Hepatic steatosis   - Trend LFTs  - Maintain normal BMI  - Alcohol abstinence  - Low fat diet (when applicable)   - Follow up with hepatologist Dr. Johnson for further assessment and monitoring     -------------------------------------------------------------------------------  Note is incomplete until final attending attestation

## 2024-04-03 NOTE — DISCHARGE NOTE PROVIDER - NSDCCPCAREPLAN_GEN_ALL_CORE_FT
PRINCIPAL DISCHARGE DIAGNOSIS  Diagnosis: Intractable nausea and vomiting  Assessment and Plan of Treatment: conditioned resolved  no acute findings on imaging, no surgical or acute intervention warranted per bariatric surgery team and GI team  Diet advanced  follow up out patient with GI as scheduled or sooner as needed      SECONDARY DISCHARGE DIAGNOSES  Diagnosis: High anion gap metabolic acidosis  Assessment and Plan of Treatment: likely a result of starvation ketoacidosis  resolved with IVF    Diagnosis: Leukopenia  Assessment and Plan of Treatment: resolved  if  leukopenia reoccurs advised to follow up with Dr. Hendricks outpatient heme/onc for further workuup complete with flow cytometry.    Diagnosis: Transaminitis  Assessment and Plan of Treatment:     Diagnosis: Hepatic steatosis  Assessment and Plan of Treatment:      PRINCIPAL DISCHARGE DIAGNOSIS  Diagnosis: Intractable nausea and vomiting  Assessment and Plan of Treatment: conditioned resolved  no acute findings on imaging, no surgical or acute intervention warranted per bariatric surgery team and GI team  Diet advanced  follow up out patient with GI as scheduled or sooner as needed      SECONDARY DISCHARGE DIAGNOSES  Diagnosis: High anion gap metabolic acidosis  Assessment and Plan of Treatment: likely a result of starvation ketoacidosis  resolved with IVF    Diagnosis: Leukopenia  Assessment and Plan of Treatment: resolved  if  leukopenia reoccurs advised to follow up with Dr. Hendricks outpatient heme/onc for further workuup complete with flow cytometry.    Diagnosis: Transaminitis  Assessment and Plan of Treatment: outpatient follow up for repeat labs  follow up with your PCP

## 2024-04-03 NOTE — PROGRESS NOTE ADULT - ASSESSMENT
The patient is a 24 year old male with a past medical history of obesity, anxiety and depression status post recent Gastric sleeve surgery who presented to the Er with complaints of abdominal pain, nausea vomiting and inability to tolerate PO. Noted to have transaminitis in the ER. CT of the abdomen and pelvis with no acute findings. RUQ was negative. Seen by GI, status post EGD normal.     1. Abdominal pain - improved  - Status Post gastric sleeve in Feb 2024  - CT abdomen and pelvis negative  - RUQ ultrasound negative  - EGD normal  - Gastric emptying study negative for gastroparesis  - Seen by Bariatric surgery; no acute intervention and no structural abnormalities including strictures  - Continue IV zofran  - continue PPI  - encourage clear liquid diet   - KUB reviewed; having bowel movements  - GI and bariatric surgery recs appreciated - no further interventions and concerns for psychiatric disease attributing to symptoms  -  follow up appreciated; add zyprexa PRN    2 Anion gap metabolic acidosis - resolved  - Secondary to starvation ketoacidosis   - resolved with IVF  - Monitor CMP; refused labs again    3. Acute urinary retention  - Suspected secondary to UTI  - completed 3 days of abx  - Blood and urine cultures negative  - continue flomax  - schaffer removed; straight cath yesterday  - continue bladder scan and serial straight cath    4. Anxiety and depression  - previously on topiramate 25 mg po bid and bupropion  qd stopped prior to surgery  - intermittently refusing care and medications  - started zyprexa PRN  -  recs appreciated    5. Hepatic steatosis  - worsening transaminitis and tbili  - CT abdomen and RUQ negative for acute process   - Possible inpatient MRCP; awaiting GI follow up     6. Neutropenia  - WBC improved   - No previous blood work in [facility] for comparison  - CODI negative  - hem/onc recs appreciated    7. Protein-calorie malnutrition  -unable to add supplements per dietary recs given inability to tolerate PO  -nutrition recs appreciated    DVT ppx - SCDs    Dispo- Remains acute; monitor LFTS. GI follow up to determine if MRCP is indicated.     Plan discussed with patient, GI SUSAN HEAD, Dr. Rubalcava, RN

## 2024-04-03 NOTE — PROGRESS NOTE ADULT - NS ATTEND AMEND GEN_ALL_CORE FT
Perhaps a little better after the large bowel movement from the enema.  Reglan has been DC'd we will again try a gastric emptying study with Zofran coverage tomorrow.
Since he is being noncompliant with the Reglan and says it makes him feel funny we will discontinue it.  Hopefully he will be able to tolerate a blood loss scan on Monday.
Unfortunately the nuclear medicine canceled him prior to trying Zofran.  Then they apparently ran out of [facility] for the study so it will be delayed until tomorrow.
Patient  not vomiting now, But states he was vomiting this am. Vomits immediately when liquids touch his tounge. Mother at bedside states he was tolerating ice, but did have some vomiting few minutes later.     ABdominal exam- BS, soft, non tender      A/P  vomiting, recthing  EGD negative  gastric emptying study tomorrow   history from pt and mother at bedside   na 132, CMP ordered for tomorrow
Patient with  retching.  Shweta this is gastroparesis, as pt starts to wretch as soon as food touches his tounge. Gets retching even without eating.       Nuclear med says unable to do the gastric emptying  with active retching. Nuclear medicine is not able to gastric emptying until 4/1  EGD negative   Will start zofran and reglan to see if pt can tolerate diet . If tolerates, can D/C home. If doesn't tolerate, then hold the reglan starting on saturday abd plan for gastric emptying study on monday  k is 3.2 , K to be replaced and K level ordered for tomorrow   plan discussed with Dr Anderson  Plan discussed with pt's aunt over the phone   NO BM in 6 days, will give dulcolax suppositories   I
Mr. Neal is a 24 year old gentleman with history of gastric sleeve 2/2024 who presented with nausea and vomiting, work up negative to date including negative gastric emptying study, in absence of additional findins suspect symptoms likely related to recent gastric surgery. Patient planning to follow with surgeon from [facility] upon discharge. No further work up planned inpatient from a GI stand point. Please do not hesitate to contact GI for further questions or concerns not previously addressed.
show

## 2024-04-03 NOTE — DISCHARGE NOTE PROVIDER - ATTENDING DISCHARGE PHYSICAL EXAMINATION:
PHYSICAL EXAM:    GENERAL: young male, morbidly obese, sitting in chair, NAD  HEAD:  Atraumatic, Normocephalic  EYES: EOMI, PERRLA, conjunctiva and sclera clear  ENMT: Moist mucous membranes  NECK: Supple   CHEST/LUNG: coarse breath sounds  HEART: Regular rate and rhythm; + S1/S2  ABDOMEN: Soft, obese; Bowel sounds present  EXTREMITIES:  no pedal edema

## 2024-04-04 ENCOUNTER — TRANSCRIPTION ENCOUNTER (OUTPATIENT)
Age: 24
End: 2024-04-04

## 2024-04-04 VITALS
SYSTOLIC BLOOD PRESSURE: 134 MMHG | OXYGEN SATURATION: 99 % | HEART RATE: 79 BPM | DIASTOLIC BLOOD PRESSURE: 83 MMHG | TEMPERATURE: 97 F | RESPIRATION RATE: 18 BRPM

## 2024-04-04 LAB
ALBUMIN SERPL ELPH-MCNC: 3.8 G/DL — SIGNIFICANT CHANGE UP (ref 3.3–5.2)
ALP SERPL-CCNC: 96 U/L — SIGNIFICANT CHANGE UP (ref 40–120)
ALT FLD-CCNC: 352 U/L — HIGH
ANION GAP SERPL CALC-SCNC: 17 MMOL/L — SIGNIFICANT CHANGE UP (ref 5–17)
AST SERPL-CCNC: 182 U/L — HIGH
BASOPHILS # BLD AUTO: 0.03 K/UL — SIGNIFICANT CHANGE UP (ref 0–0.2)
BASOPHILS NFR BLD AUTO: 0.6 % — SIGNIFICANT CHANGE UP (ref 0–2)
BILIRUB SERPL-MCNC: 3.3 MG/DL — HIGH (ref 0.4–2)
BUN SERPL-MCNC: 9 MG/DL — SIGNIFICANT CHANGE UP (ref 8–20)
CALCIUM SERPL-MCNC: 9 MG/DL — SIGNIFICANT CHANGE UP (ref 8.4–10.5)
CHLORIDE SERPL-SCNC: 96 MMOL/L — SIGNIFICANT CHANGE UP (ref 96–108)
CO2 SERPL-SCNC: 25 MMOL/L — SIGNIFICANT CHANGE UP (ref 22–29)
CREAT SERPL-MCNC: 0.49 MG/DL — LOW (ref 0.5–1.3)
EGFR: 145 ML/MIN/1.73M2 — SIGNIFICANT CHANGE UP
EOSINOPHIL # BLD AUTO: 0.06 K/UL — SIGNIFICANT CHANGE UP (ref 0–0.5)
EOSINOPHIL NFR BLD AUTO: 1.2 % — SIGNIFICANT CHANGE UP (ref 0–6)
GLUCOSE SERPL-MCNC: 84 MG/DL — SIGNIFICANT CHANGE UP (ref 70–99)
HCT VFR BLD CALC: 45.3 % — SIGNIFICANT CHANGE UP (ref 39–50)
HGB BLD-MCNC: 15.5 G/DL — SIGNIFICANT CHANGE UP (ref 13–17)
IMM GRANULOCYTES NFR BLD AUTO: 1.2 % — HIGH (ref 0–0.9)
LYMPHOCYTES # BLD AUTO: 1.54 K/UL — SIGNIFICANT CHANGE UP (ref 1–3.3)
LYMPHOCYTES # BLD AUTO: 31.5 % — SIGNIFICANT CHANGE UP (ref 13–44)
MAGNESIUM SERPL-MCNC: 2 MG/DL — SIGNIFICANT CHANGE UP (ref 1.6–2.6)
MCHC RBC-ENTMCNC: 27.4 PG — SIGNIFICANT CHANGE UP (ref 27–34)
MCHC RBC-ENTMCNC: 34.2 GM/DL — SIGNIFICANT CHANGE UP (ref 32–36)
MCV RBC AUTO: 80 FL — SIGNIFICANT CHANGE UP (ref 80–100)
MONOCYTES # BLD AUTO: 0.62 K/UL — SIGNIFICANT CHANGE UP (ref 0–0.9)
MONOCYTES NFR BLD AUTO: 12.7 % — SIGNIFICANT CHANGE UP (ref 2–14)
NEUTROPHILS # BLD AUTO: 2.58 K/UL — SIGNIFICANT CHANGE UP (ref 1.8–7.4)
NEUTROPHILS NFR BLD AUTO: 52.8 % — SIGNIFICANT CHANGE UP (ref 43–77)
PHOSPHATE SERPL-MCNC: 4.3 MG/DL — SIGNIFICANT CHANGE UP (ref 2.4–4.7)
PLATELET # BLD AUTO: 263 K/UL — SIGNIFICANT CHANGE UP (ref 150–400)
POTASSIUM SERPL-MCNC: 3.5 MMOL/L — SIGNIFICANT CHANGE UP (ref 3.5–5.3)
POTASSIUM SERPL-SCNC: 3.5 MMOL/L — SIGNIFICANT CHANGE UP (ref 3.5–5.3)
PROT SERPL-MCNC: 7.3 G/DL — SIGNIFICANT CHANGE UP (ref 6.6–8.7)
RBC # BLD: 5.66 M/UL — SIGNIFICANT CHANGE UP (ref 4.2–5.8)
RBC # FLD: 15 % — HIGH (ref 10.3–14.5)
SODIUM SERPL-SCNC: 138 MMOL/L — SIGNIFICANT CHANGE UP (ref 135–145)
WBC # BLD: 4.89 K/UL — SIGNIFICANT CHANGE UP (ref 3.8–10.5)
WBC # FLD AUTO: 4.89 K/UL — SIGNIFICANT CHANGE UP (ref 3.8–10.5)

## 2024-04-04 PROCEDURE — 80053 COMPREHEN METABOLIC PANEL: CPT

## 2024-04-04 PROCEDURE — 93005 ELECTROCARDIOGRAM TRACING: CPT

## 2024-04-04 PROCEDURE — 86850 RBC ANTIBODY SCREEN: CPT

## 2024-04-04 PROCEDURE — 84443 ASSAY THYROID STIM HORMONE: CPT

## 2024-04-04 PROCEDURE — 83735 ASSAY OF MAGNESIUM: CPT

## 2024-04-04 PROCEDURE — 86901 BLOOD TYPING SEROLOGIC RH(D): CPT

## 2024-04-04 PROCEDURE — 85025 COMPLETE CBC W/AUTO DIFF WBC: CPT

## 2024-04-04 PROCEDURE — 87086 URINE CULTURE/COLONY COUNT: CPT

## 2024-04-04 PROCEDURE — 86900 BLOOD TYPING SEROLOGIC ABO: CPT

## 2024-04-04 PROCEDURE — 76705 ECHO EXAM OF ABDOMEN: CPT

## 2024-04-04 PROCEDURE — 99285 EMERGENCY DEPT VISIT HI MDM: CPT | Mod: 25

## 2024-04-04 PROCEDURE — 80074 ACUTE HEPATITIS PANEL: CPT

## 2024-04-04 PROCEDURE — 82746 ASSAY OF FOLIC ACID SERUM: CPT

## 2024-04-04 PROCEDURE — 99232 SBSQ HOSP IP/OBS MODERATE 35: CPT

## 2024-04-04 PROCEDURE — 78264 GASTRIC EMPTYING IMG STUDY: CPT | Mod: MC

## 2024-04-04 PROCEDURE — 87040 BLOOD CULTURE FOR BACTERIA: CPT

## 2024-04-04 PROCEDURE — 80076 HEPATIC FUNCTION PANEL: CPT

## 2024-04-04 PROCEDURE — 82962 GLUCOSE BLOOD TEST: CPT

## 2024-04-04 PROCEDURE — 82248 BILIRUBIN DIRECT: CPT

## 2024-04-04 PROCEDURE — 85610 PROTHROMBIN TIME: CPT

## 2024-04-04 PROCEDURE — 36415 COLL VENOUS BLD VENIPUNCTURE: CPT

## 2024-04-04 PROCEDURE — 86038 ANTINUCLEAR ANTIBODIES: CPT

## 2024-04-04 PROCEDURE — 85730 THROMBOPLASTIN TIME PARTIAL: CPT

## 2024-04-04 PROCEDURE — 71045 X-RAY EXAM CHEST 1 VIEW: CPT

## 2024-04-04 PROCEDURE — 96374 THER/PROPH/DIAG INJ IV PUSH: CPT

## 2024-04-04 PROCEDURE — 74018 RADEX ABDOMEN 1 VIEW: CPT

## 2024-04-04 PROCEDURE — 81001 URINALYSIS AUTO W/SCOPE: CPT

## 2024-04-04 PROCEDURE — A9541: CPT

## 2024-04-04 PROCEDURE — 80048 BASIC METABOLIC PNL TOTAL CA: CPT

## 2024-04-04 PROCEDURE — 96375 TX/PRO/DX INJ NEW DRUG ADDON: CPT

## 2024-04-04 PROCEDURE — 97163 PT EVAL HIGH COMPLEX 45 MIN: CPT

## 2024-04-04 PROCEDURE — 99239 HOSP IP/OBS DSCHRG MGMT >30: CPT

## 2024-04-04 PROCEDURE — A9548: CPT

## 2024-04-04 PROCEDURE — 82607 VITAMIN B-12: CPT

## 2024-04-04 PROCEDURE — 85027 COMPLETE CBC AUTOMATED: CPT

## 2024-04-04 PROCEDURE — 96372 THER/PROPH/DIAG INJ SC/IM: CPT

## 2024-04-04 PROCEDURE — 83690 ASSAY OF LIPASE: CPT

## 2024-04-04 PROCEDURE — 84100 ASSAY OF PHOSPHORUS: CPT

## 2024-04-04 PROCEDURE — 74177 CT ABD & PELVIS W/CONTRAST: CPT | Mod: MC

## 2024-04-04 RX ORDER — ENOXAPARIN SODIUM 100 MG/ML
40 INJECTION SUBCUTANEOUS EVERY 24 HOURS
Refills: 0 | Status: DISCONTINUED | OUTPATIENT
Start: 2024-04-04 | End: 2024-04-04

## 2024-04-04 RX ORDER — CLONAZEPAM 1 MG
1 TABLET ORAL
Refills: 0 | DISCHARGE

## 2024-04-04 RX ORDER — GABAPENTIN 400 MG/1
1 CAPSULE ORAL
Refills: 0 | DISCHARGE

## 2024-04-04 RX ORDER — OXYCODONE AND ACETAMINOPHEN 5; 325 MG/1; MG/1
1 TABLET ORAL
Refills: 0 | DISCHARGE

## 2024-04-04 NOTE — PHYSICAL THERAPY INITIAL EVALUATION ADULT - IMPAIRMENTS CONTRIBUTING TO GAIT DEVIATIONS, PT EVAL
pt reports cramping in bilateral thighs and calves when standing. Pt also reports lightheadedness in standing./impaired balance/cognition/decreased strength

## 2024-04-04 NOTE — PHYSICAL THERAPY INITIAL EVALUATION ADULT - ADDITIONAL COMMENTS
Pt reports he lives with his mother in his Aunts home which has 3 [address] with 2 HR. Inside the house pt has all needs met on the first floor. Pt reports that prior to his gastric sleeve surgery he was Independent in all ADLs and functional mobility including ambulation and stairs without use of assistive devices. Pts mom reports that she is not physically capable to provide pt with assistance at home, and that the other relatives in the house all work full time and are unable to provide physical assistance to the pt.

## 2024-04-04 NOTE — PROGRESS NOTE ADULT - ASSESSMENT
23 yo pleasant male [facility]  Pt admitted to [facility] for intractable nausea and Vomitting       Abdominal pain  Post gastric sleeve Feb 2024  CT abdomen and pelvis negative  RUQ ultrasound negative  EGD normal  Seen by Bariatric surgery; no acute intervention   Gastric emptying study was cancelled twice as patient was unable to tolerate contrast due to nausea   gastric emptying study scheduled for today 4/2/2024  antimeric per medicine   IVF  Gastro following      Leukopenia mild   unclear baseline  WBC 3.64, 3.99, 2.89, 3.28  3.19 today 3.17  Cont to trend  ANC 1.51,1.80 today 2.0  +UTI  on antibiotics  Keep ANC >1000   No previous blood work in [facility] for comparison   Check TSH 4.03, B12 1355, Folate 4.8, CODI pending and Hepatitis panel neg  If leukopenia persists will f/u outpt for full work up including a flow cytometry    Follow up with Dr Hendricks as out pt if WBC cont to trend down for a full work up including Flow Cytometry        Thank you for consulting [facility]  [phone]           23 yo pleasant male being consulted by [facility] for leucopenia  Pt admitted to [facility] for intractable nausea and Vomitting       Abdominal pain  Post gastric sleeve Feb 2024  CT abdomen and pelvis negative  RUQ ultrasound negative  EGD normal  Seen by Bariatric surgery; no acute intervention   Gastric emptying study - normal   EGD - negative   antimeric per medicine   IVF  Gastro following- no further intervention.  f/u outpt       Leukopenia mild   unclear baseline  WBC 3.64, 3.99, 2.89, 3.28  3.19 today 3.17  Cont to trend  ANC 1.51,1.80 today 2.0  +UTI  on antibiotics  Keep ANC >1000   No previous blood work in [facility] for comparison   Check TSH 4.03, B12 1355, Folate 4.8, CODI pending and Hepatitis panel neg  If leukopenia persists will f/u outpt for full work up including a flow cytometry    Follow up with Dr Hendricks as out pt if WBC cont to trend down for a full work up including Flow Cytometry        Thank you for consulting [facility]  [phone]

## 2024-04-04 NOTE — PROGRESS NOTE ADULT - ASSESSMENT
The patient is a 24 year old male with a past medical history of obesity, anxiety and depression status post recent Gastric sleeve surgery who presented to the Er with complaints of abdominal pain, nausea vomiting and inability to tolerate PO. Noted to have transaminitis in the ER. CT of the abdomen and pelvis with no acute findings. RUQ was negative. Seen by GI, status post EGD normal.     1. Abdominal pain - improved  - Status Post gastric sleeve in Feb 2024  - CT abdomen and pelvis negative  - RUQ ultrasound negative  - EGD normal  - Gastric emptying study negative for gastroparesis  - Seen by Bariatric surgery; no acute intervention and no structural abnormalities including strictures  - Continue IV zofran  - continue PPI  - continue current diet  - KUB reviewed; having bowel movements  - GI and bariatric surgery recs appreciated - no further interventions and concerns for psychiatric disease attributing to symptoms  -  follow up appreciated; add zyprexa PRN    2 Anion gap metabolic acidosis - resolved  - Secondary to starvation ketoacidosis   - resolved with IVF  - Monitor CMP; refused labs again    3. Acute urinary retention  - Suspected secondary to UTI  - completed 3 days of abx  - Blood and urine cultures negative  - continue flomax  - schaffer removed; straight cath yesterday  - continue bladder scan and serial straight cath    4. Anxiety and depression  - previously on topiramate 25 mg po bid and bupropion  qd stopped prior to surgery  - intermittently refusing care and medications  - started zyprexa PRN  -  recs appreciated    5. Hepatic steatosis  - LFTs and bili plateaued; no further intervention per GI  - CT abdomen and RUQ negative for acute process     6. Neutropenia  - WBC improved   - No previous blood work in [facility] for comparison  - CODI negative  - hem/onc recs appreciated    7. Protein-calorie malnutrition  -unable to add supplements per dietary recs given inability to tolerate PO  -nutrition recs appreciated    8. Weakness  -check orthostatics given dizziness  -PT- CRESENCIO  -PMR eval    DVT ppx - add lovenox    Dispo- Medically stable for discharge. PT--> acute rehab. PMR consulted     Plan discussed with patient, mother and aunt via telephone, GI PA, ANTONIO, RN   The patient is a 24 year old male with a past medical history of obesity, anxiety and depression status post recent Gastric sleeve surgery who presented to the Er with complaints of abdominal pain, nausea vomiting and inability to tolerate PO. Noted to have transaminitis in the ER. CT of the abdomen and pelvis with no acute findings. RUQ was negative. Seen by GI, status post EGD normal.     1. Abdominal pain - improved  - Status Post gastric sleeve in Feb 2024  - CT abdomen and pelvis negative  - RUQ ultrasound negative  - EGD normal  - Gastric emptying study negative for gastroparesis  - Seen by Bariatric surgery; no acute intervention and no structural abnormalities including strictures  - Continue IV zofran  - continue PPI  - continue current diet  - KUB reviewed; having bowel movements  - GI and bariatric surgery recs appreciated - no further interventions and concerns for psychiatric disease attributing to symptoms  -  follow up appreciated; add zyprexa PRN    2 Anion gap metabolic acidosis - resolved  - Secondary to starvation ketoacidosis   - resolved with IVF  - Monitor CMP; refused labs again    3. Acute urinary retention  - Suspected secondary to UTI  - completed 3 days of abx  - Blood and urine cultures negative  - continue flomax  - schaffer removed; straight cath yesterday  - continue bladder scan and serial straight cath    4. Anxiety and depression  - previously on topiramate 25 mg po bid and bupropion  qd stopped prior to surgery  - intermittently refusing care and medications  - started zyprexa PRN  -  recs appreciated    5. Hepatic steatosis  - LFTs and bili plateaued; no further intervention per GI  - CT abdomen and RUQ negative for acute process     6. Neutropenia  - WBC improved   - No previous blood work in [facility] for comparison  - CODI negative  - hem/onc recs appreciated    7. Protein-calorie malnutrition  -unable to add supplements per dietary recs given inability to tolerate PO  -nutrition recs appreciated    8. Weakness  -check orthostatics given dizziness  -PT- CRESENCIO  -PMR eval    DVT ppx - add lovenox    Dispo- Medically stable for discharge. PT--> acute rehab but patient storming through hallways. D/c PMR evaluation. Discharge home in AM. Family notified.    Plan discussed with patient, mother and aunt via telephone, GI PA, CCM, RN

## 2024-04-04 NOTE — PHYSICAL THERAPY INITIAL EVALUATION ADULT - LEVEL OF INDEPENDENCE: STAIR NEGOTIATION, REHAB EVAL
due to pts unsteadyness on his feet, lightheadedness, and weakness. furthermore, pt unable to clear foot from floor more than 2 inches during standing marching with RW, therefore is unlikely to be able to ascend a 6 inch step. Unsafe to attempt stairs at this time./unable to perform

## 2024-04-04 NOTE — PROGRESS NOTE ADULT - ASSESSMENT
24M with history of gastric sleeve. Admitted with po intolerance. Gastric emptying study performed which did not reveal gastric dysmotility.  There is no anatomical or functional cause of nausea or difficulty with liquid/food identified     Plan:   - Care as per primary team  - No surgical pathology that would require intervention or necessitate the pt being NPO  - Encourage PO intake as tolerated

## 2024-04-04 NOTE — PROGRESS NOTE ADULT - SUBJECTIVE AND OBJECTIVE BOX
CHIEF COMPLAINT/INTERVAL HISTORY:    Patient is a 24y old  Male who presents with a chief complaint of intractable n/v (04 Apr 2024 09:41)    SUBJECTIVE & OBJECTIVE: Pt seen and examined at bedside. No overnight events. Tolerated clears yesterday; no vomiting. LFTS plateaued. Stable for d/c but unable to ambulate. PT - acute rehab.    ROS: No chest pain, palpitations, SOB, light headedness, dizziness, headache, fevers/chills, dysuria.    ICU Vital Signs Last 24 Hrs  T(C): 36.6 (04 Apr 2024 10:42), Max: 37.1 (04 Apr 2024 04:30)  T(F): 97.8 (04 Apr 2024 10:42), Max: 98.8 (04 Apr 2024 04:30)  HR: 56 (04 Apr 2024 10:42) (56 - 72)  BP: 106/68 (04 Apr 2024 10:42) (106/68 - 123/82)  RR: 18 (04 Apr 2024 10:42) (18 - 18)  SpO2: 95% (04 Apr 2024 10:42) (95% - 97%)    O2 Parameters below as of 04 Apr 2024 04:30  Patient On (Oxygen Delivery Method): room air    MEDICATIONS  (STANDING):  bisacodyl Suppository 10 milliGRAM(s) Rectal daily  multivitamin 1 Tablet(s) Oral daily  pantoprazole  Injectable 40 milliGRAM(s) IV Push every 12 hours  polyethylene glycol 3350 17 Gram(s) Oral daily  senna 2 Tablet(s) Oral at bedtime  tamsulosin 0.4 milliGRAM(s) Oral at bedtime    MEDICATIONS  (PRN):  OLANZapine Injectable 2.5 milliGRAM(s) IntraMuscular every 8 hours PRN anxiety  ondansetron Injectable 4 milliGRAM(s) IV Push once PRN prior to gastrin emptying  ondansetron Injectable 4 milliGRAM(s) IV Push every 4 hours PRN Nausea and/or Vomiting      LABS:                        15.5   4.89  )-----------( 263      ( 04 Apr 2024 11:20 )             45.3     04-04    138  |  96  |  9.0  ----------------------------<  84  3.5   |  25.0  |  0.49<L>    Ca    9.0      04 Apr 2024 11:20  Phos  4.3     04-04  Mg     2.0     04-04    TPro  7.3  /  Alb  3.8  /  TBili  3.3<H>  /  DBili  x   /  AST  182<H>  /  ALT  352<H>  /  AlkPhos  96  04-04      Urinalysis Basic - ( 04 Apr 2024 11:20 )    Color: x / Appearance: x / SG: x / pH: x  Gluc: 84 mg/dL / Ketone: x  / Bili: x / Urobili: x   Blood: x / Protein: x / Nitrite: x   Leuk Esterase: x / RBC: x / WBC x   Sq Epi: x / Non Sq Epi: x / Bacteria: x     CHIEF COMPLAINT/INTERVAL HISTORY:    Patient is a 24y old  Male who presents with a chief complaint of intractable n/v (04 Apr 2024 09:41)    SUBJECTIVE & OBJECTIVE: Pt seen and examined at bedside. No overnight events. Tolerated clears yesterday; no vomiting. LFTS plateaued. Stable for d/c but unable to ambulate. PT - acute rehab.    ROS: No chest pain, palpitations, SOB, light headedness, dizziness, headache, fevers/chills, dysuria.    ICU Vital Signs Last 24 Hrs  T(C): 36.6 (04 Apr 2024 10:42), Max: 37.1 (04 Apr 2024 04:30)  T(F): 97.8 (04 Apr 2024 10:42), Max: 98.8 (04 Apr 2024 04:30)  HR: 56 (04 Apr 2024 10:42) (56 - 72)  BP: 106/68 (04 Apr 2024 10:42) (106/68 - 123/82)  RR: 18 (04 Apr 2024 10:42) (18 - 18)  SpO2: 95% (04 Apr 2024 10:42) (95% - 97%)    O2 Parameters below as of 04 Apr 2024 04:30  Patient On (Oxygen Delivery Method): room air    MEDICATIONS  (STANDING):  bisacodyl Suppository 10 milliGRAM(s) Rectal daily  multivitamin 1 Tablet(s) Oral daily  pantoprazole  Injectable 40 milliGRAM(s) IV Push every 12 hours  polyethylene glycol 3350 17 Gram(s) Oral daily  senna 2 Tablet(s) Oral at bedtime  tamsulosin 0.4 milliGRAM(s) Oral at bedtime    MEDICATIONS  (PRN):  OLANZapine Injectable 2.5 milliGRAM(s) IntraMuscular every 8 hours PRN anxiety  ondansetron Injectable 4 milliGRAM(s) IV Push once PRN prior to gastrin emptying  ondansetron Injectable 4 milliGRAM(s) IV Push every 4 hours PRN Nausea and/or Vomiting      LABS:                        15.5   4.89  )-----------( 263      ( 04 Apr 2024 11:20 )             45.3     04-04    138  |  96  |  9.0  ----------------------------<  84  3.5   |  25.0  |  0.49<L>    Ca    9.0      04 Apr 2024 11:20  Phos  4.3     04-04  Mg     2.0     04-04    TPro  7.3  /  Alb  3.8  /  TBili  3.3<H>  /  DBili  x   /  AST  182<H>  /  ALT  352<H>  /  AlkPhos  96  04-04      Urinalysis Basic - ( 04 Apr 2024 11:20 )    Color: x / Appearance: x / SG: x / pH: x  Gluc: 84 mg/dL / Ketone: x  / Bili: x / Urobili: x   Blood: x / Protein: x / Nitrite: x   Leuk Esterase: x / RBC: x / WBC x   Sq Epi: x / Non Sq Epi: x / Bacteria: x      PHYSICAL EXAM:    GENERAL: young male, morbidly obese, sitting in chair, NAD  HEAD:  Atraumatic, Normocephalic  EYES: EOMI, PERRLA, conjunctiva and sclera clear  ENMT: Moist mucous membranes  NECK: Supple   CHEST/LUNG: coarse breath sounds  HEART: Regular rate and rhythm; + S1/S2  ABDOMEN: Soft, obese; Bowel sounds present  EXTREMITIES:  no pedal edema

## 2024-04-04 NOTE — CHART NOTE - NSCHARTNOTEFT_GEN_A_CORE
Called by RN for patient with breakthrough abdominal pain; requesting pain medication   Patient is a 24 year old male with PMH of morbid obesity s/p gastric sleeve one month ago and anxiety/depression; presented to the ED today with abdominal pain/nausea/vomiting; admitted to medicine for intractable nausea/vomiting and elevated anion gap due to dehydration  Patient otherwise asymptomatic, VSS, and NAD   Ordered toradol 15mg IVPx1   Continue to monitor   RN to notify provider with any changes in patient status
23 y/o M s/p sleeve gastrectomy 2/2024 who presents with complaints of PO intolerance, nausea and small volume bilious emesis and constipation.     #Nausea, vomiting, epigastric pain  #S/P sleeve gastrectomy 2/2024   #Elevated liver enzymes   #Constipation   CT A/P w/ PO and IV Cont (03.26.24)- Stable left hepatic lobe hypodensities. Expected gastric sleeve postoperative changes.  US Abdomen Upper Quadrant Right (03.26.24)- Hepatic steatosis.  EGD (03.26.24)- normal mucosa in esophagus/duodenum, evidence of band gastroplasty in stomach.     Patient currently undergoing gastric emptying study  Follow up rec pending results
Source: Patient [x ]  Family [ x]   other [ ] Pts mother at bedside    Current Diet:   Diet, Consistent Carbohydrate/No Snacks (04-03-24 @ 15:18)    Patient reports [ ] nausea  [ x] vomiting [x ] diarrhea [ ] constipation  [ ]chewing problems [ ] swallowing issues  [ ] other:     PO intake:  < 50% [x ]   50-75%  [ ]   %  [ ]  other :    Source for PO intake [x ] Patient [x ] family [ ] chart [ ] staff [ ] other    Current Weight:   (3/25) 296.7 lbs     % Weight Change: No recent weight documented, Pt OOB to chair, unable to obtain new weight     Pertinent Medications: MEDICATIONS  (STANDING):  bisacodyl Suppository 10 milliGRAM(s) Rectal daily  pantoprazole  Injectable 40 milliGRAM(s) IV Push every 12 hours  polyethylene glycol 3350 17 Gram(s) Oral daily  senna 2 Tablet(s) Oral at bedtime  tamsulosin 0.4 milliGRAM(s) Oral at bedtime    MEDICATIONS  (PRN):  OLANZapine Injectable 2.5 milliGRAM(s) IntraMuscular every 8 hours PRN anxiety  ondansetron Injectable 4 milliGRAM(s) IV Push every 4 hours PRN Nausea and/or Vomiting  ondansetron Injectable 4 milliGRAM(s) IV Push once PRN prior to gastrin emptying    Pertinent Labs: CBC Full  -  ( 04 Apr 2024 11:20 )  WBC Count : 4.89 K/uL  RBC Count : 5.66 M/uL  Hemoglobin : 15.5 g/dL  Hematocrit : 45.3 %  Platelet Count - Automated : 263 K/uL  Mean Cell Volume : 80.0 fl  Mean Cell Hemoglobin : 27.4 pg  Mean Cell Hemoglobin Concentration : 34.2 gm/dL  Auto Neutrophil # : 2.58 K/uL  Auto Lymphocyte # : 1.54 K/uL  Auto Monocyte # : 0.62 K/uL  Auto Eosinophil # : 0.06 K/uL  Auto Basophil # : 0.03 K/uL  Auto Neutrophil % : 52.8 %  Auto Lymphocyte % : 31.5 %  Auto Monocyte % : 12.7 %  Auto Eosinophil % : 1.2 %  Auto Basophil % : 0.6 %    04-04 Na138 mmol/L Glu 84 mg/dL K+ 3.5 mmol/L Cr  0.49 mg/dL<L> BUN 9.0 mg/dL Phos 4.3 mg/dL Alb 3.8 g/dL PAB n/a       Skin: No skin breakdown/edema noted     Nutrition focused physical exam not conducted - found signs of malnutrition [ ]absent [ ]present    Subcutaneous fat loss: [ ] Orbital fat pads region, [ ]Buccal fat region, [ ]Triceps region,  [ ]Ribs region    Muscle wasting: [ ]Temples region, [ ]Clavicle region, [ ]Shoulder region, [ ]Scapula region, [ ]Interosseous region,  [ ]thigh region, [ ]Calf region    Estimated Needs:   [x ] no change since previous assessment  [ ] recalculated:     Current Nutrition Diagnosis: Pt remains at high nutrition risk secondary to malnutrition (severe) related to inability to meet sufficient protein-energy needs in setting of persistent N/V, intermittent constipation s/p gastric sleeve as evidenced by meeting <50% EER >1 month, 83lb (22%) wt loss x 6 months. Pt s/p gastric motility study- per GI No anatomical/functional cause for persistent nausea and intolerance, suspect some psych component per MD.   Pt seen with many questions regarding diet tolerance, frequent reorientation needed 2/2 complaints of pain with catheter, refusing medications, etc. Of note, Pt refusing anti-emetics as well. Extensive education provided regarding post-gastric surgery nutrition recommendations, encouraged small/frequent meals, drinking separate from meals, low sugar/fat options. Pt appears resistant to trial many foods, requesting foods that are more related to comfort (bagel with cream cheese, chocolate milk, ice cream), discouraged at this time. Pt reluctant but agreeable to trial [employer] Glucose supplement, limited acceptance of RD suggestions. Written literature provided.     Recommendations:   1) RD to provide [employer] CompStak Glucose supplement to assess tolerance  2) Rx MVI daily   3) Small/frequent meals   4) Monitor weights daily for trend/accuracy     Monitoring and Evaluation:   [ x] PO intake [x ] Tolerance to diet prescription [X] Weights  [X] Follow up per protocol [X] Labs:

## 2024-04-04 NOTE — PROGRESS NOTE ADULT - SUBJECTIVE AND OBJECTIVE BOX
SURGERY        INTERVAL EVENTS/SUBJECTIVE:   No acute events overnight.  No new current complaints.    ______________________________________________  OBJECTIVE:   T(C): 37.1 (04-04-24 @ 04:30), Max: 37.1 (04-04-24 @ 04:30)  HR: 66 (04-04-24 @ 04:30) (66 - 72)  BP: 107/63 (04-04-24 @ 04:30) (107/63 - 123/82)  RR: 18 (04-04-24 @ 04:30) (18 - 19)  SpO2: 96% (04-04-24 @ 04:30) (96% - 97%)  Wt(kg): --  CAPILLARY BLOOD GLUCOSE      POCT Blood Glucose.: 71 mg/dL (03 Apr 2024 11:47)    I&O's Detail    03 Apr 2024 07:01  -  04 Apr 2024 07:00  --------------------------------------------------------  IN:  Total IN: 0 mL    OUT:    Intermittent Catheterization - Urethral (mL): 1010 mL  Total OUT: 1010 mL    Total NET: -1010 mL          Physical exam:  Gen: resting in bed in NAD  Chest: no increased WOB, regular inspiratory effort   Abdomen: Soft, nontender, nondistended   NEURO: awake, alert  ______________________________________________  LABS:  CBC Full  -  ( 03 Apr 2024 06:26 )  WBC Count : 4.83 K/uL  RBC Count : 5.56 M/uL  Hemoglobin : 14.8 g/dL  Hematocrit : 44.3 %  Platelet Count - Automated : 246 K/uL  Mean Cell Volume : 79.7 fl  Mean Cell Hemoglobin : 26.6 pg  Mean Cell Hemoglobin Concentration : 33.4 gm/dL  Auto Neutrophil # : 2.48 K/uL  Auto Lymphocyte # : 1.52 K/uL  Auto Monocyte # : 0.66 K/uL  Auto Eosinophil # : 0.09 K/uL  Auto Basophil # : 0.03 K/uL  Auto Neutrophil % : 51.3 %  Auto Lymphocyte % : 31.5 %  Auto Monocyte % : 13.7 %  Auto Eosinophil % : 1.9 %  Auto Basophil % : 0.6 %    04-03    135  |  95<L>  |  8.7  ----------------------------<  82  3.4<L>   |  24.0  |  0.43<L>    Ca    9.0      03 Apr 2024 06:26  Phos  4.6     04-03  Mg     2.0     04-03    TPro  7.0  /  Alb  4.0  /  TBili  3.3<H>  /  DBili  1.9<H>  /  AST  234<H>  /  ALT  372<H>  /  AlkPhos  86  04-03

## 2024-04-04 NOTE — PHYSICAL THERAPY INITIAL EVALUATION ADULT - NEUROVASCULAR ASSESSMENT RLE
saddle numbness, and tingling to lateral thigh/numbness present/tingling present/cold/no discoloration

## 2024-04-04 NOTE — PROGRESS NOTE ADULT - ATTENDING COMMENTS
Follow-up gastric emptying study  Surgery team to follow
No surgical intervention indicated  No anatomical or functional cause of nausea or difficulty with liquid/food identified   Recommend encourage PO intake as tolerated   Will follow
Followup results of gastric emptying study  Will follow
Follow-up results of gastric emptying study  Will follow
Followup gastric emptying study  Will follow
Followup gastric emptying study  Will follow
No surgical intervention indicated  Recommend increasing PO intake as tolerated   Will follow
Patient seen on morning rounds on 4/1/24  Followup gastric emptying study  Will follow
Followup gastric emptying study   Will follow

## 2024-04-04 NOTE — PHYSICAL THERAPY INITIAL EVALUATION ADULT - CRITERIA FOR SKILLED THERAPEUTIC INTERVENTIONS
acute rehab/impairments found/functional limitations in following categories/anticipated discharge recommendation

## 2024-04-04 NOTE — PHYSICAL THERAPY INITIAL EVALUATION ADULT - GENERAL OBSERVATIONS, REHAB EVAL
Pt received seated in bedside chair, CBWR, pts mother present at bedside. pt agreeable to PT evaluation.

## 2024-04-04 NOTE — PROGRESS NOTE ADULT - REASON FOR ADMISSION
intractable n/v

## 2024-04-04 NOTE — PHYSICAL THERAPY INITIAL EVALUATION ADULT - DIAGNOSIS, PT EVAL
Pt demonstrates functional limitations in transfers and ambulation due to decreased strength and balance.

## 2024-04-04 NOTE — DISCHARGE NOTE NURSING/CASE MANAGEMENT/SOCIAL WORK - PATIENT PORTAL LINK FT
You can access the FollowMyHealth Patient Portal offered by [facility] by registering at the following website: [email or web address]. By joining [facility]’s FollowMyHealth portal, you will also be able to view your health information using other applications (apps) compatible with our system.

## 2024-04-04 NOTE — PHYSICAL THERAPY INITIAL EVALUATION ADULT - BALANCE DISTURBANCE, IDENTIFIED IMPAIRMENT CONTRIBUTE, REHAB EVAL
pt reports cramping to bilateral thighs and calves, as well as lightheadedness in standing./decreased strength

## 2024-04-04 NOTE — PROGRESS NOTE ADULT - SUBJECTIVE AND OBJECTIVE BOX
The patient is a 24 year old male with a past medical history of obesity, anxiety and depression, sleep apnes status post recent Gastric sleeve surgery who presented to the Er with complaints of abdominal pain, nausea vomiting and inability to tolerate PO. Noted to have transaminitis in the ER. CT of the abdomen and pelvis with no acute findings. RUQ was negative. Seen by GI, status post EGD normal.   [facility] was consulted for unexplained leukopenia    3/31/2024  Pt seen this a at bedside.  Pt resting comfortably.  No acute events overnight.  Afebrile.  Pt planned to have gastric emptying study challange today 4/2/2024    MEDICATIONS  (STANDING):  bisacodyl Suppository 10 milliGRAM(s) Rectal daily  pantoprazole  Injectable 40 milliGRAM(s) IV Push every 12 hours  polyethylene glycol 3350 17 Gram(s) Oral daily  senna 2 Tablet(s) Oral at bedtime  tamsulosin 0.4 milliGRAM(s) Oral at bedtime    MEDICATIONS  (PRN):  OLANZapine Injectable 2.5 milliGRAM(s) IntraMuscular every 8 hours PRN anxiety  ondansetron Injectable 4 milliGRAM(s) IV Push every 4 hours PRN Nausea and/or Vomiting  ondansetron Injectable 4 milliGRAM(s) IV Push once PRN prior to gastrin emptying      Vital Signs Last 24 Hrs  T(C): 37.1 (04-04-24 @ 04:30), Max: 37.1 (04-04-24 @ 04:30)  T(F): 98.8 (04-04-24 @ 04:30), Max: 98.8 (04-04-24 @ 04:30)  HR: 66 (04-04-24 @ 04:30) (66 - 72)  BP: 107/63 (04-04-24 @ 04:30) (107/63 - 123/82)  BP(mean): --  RR: 18 (04-04-24 @ 04:30) (18 - 19)  SpO2: 96% (04-04-24 @ 04:30) (96% - 97%)          Labs  CBC                                 14.8   4.83  )-----------( 246      ( 03 Apr 2024 06:26 )             44.3                         14.0   3.17  )-----------( 134      ( 31 Mar 2024 09:53 )             42.5                        13.4   2.89  )-----------( 225      ( 29 Mar 2024 10:08 )             40.7     Chem      03-30    137  |  98  |  <3.0<L>  ----------------------------<  112<H>  3.4<L>   |  27.0  |  0.51    Ca    8.4      30 Mar 2024 10:40  Phos  3.4     03-30  Mg     1.8     03-30    TPro  6.3<L>  /  Alb  3.6  /  TBili  2.2<H>  /  DBili  x   /  AST  147<H>  /  ALT  243<H>  /  AlkPhos  79  03-30 03-29    136  |  97  |  3.0<L>  ----------------------------<  110<H>  3.5   |  26.0  |  0.59    Ca    8.5      29 Mar 2024 10:08  Mg     1.5     03-29    TPro  6.2<L>  /  Alb  3.6  /  TBili  2.0  /  DBili  x   /  AST  139<H>  /  ALT  219<H>  /  AlkPhos  77  03-29    PHYSICAL EXAM:  GENERAL: NAD, AOX3, Obese gastric sleeve surgery Feb 2024  HEAD:  Atraumatic, Normocephalic  EYES:conjunctiva and sclera clear  ENMT: Moist mucous membranes  NECK: Supple  CHEST/LUNG: Clear to auscultation bilaterally; No rales, rhonchi, wheezing, or rubs  HEART: Regular rate and rhythm; No murmurs, rubs, or gallops  ABDOMEN: Soft, Nontender, Nondistended; Bowel sounds present   + Rodriguez for retention  EXTREMITIES:  2+ Peripheral Pulses, No clubbing, cyanosis, or edema   The patient is a 24 year old male with a past medical history of obesity, anxiety and depression, sleep apnes status post recent Gastric sleeve surgery who presented to the Er with complaints of abdominal pain, nausea vomiting and inability to tolerate PO. Noted to have transaminitis in the ER. CT of the abdomen and pelvis with no acute findings. RUQ was negative. Seen by GI, status post EGD normal.   [facility] was consulted for unexplained leukopenia    4/4/2024  Pt seen this a at bedside.  Pt resting comfortably.  No acute events overnight.  Afebrile.  normal gastric emptying study     MEDICATIONS  (STANDING):  bisacodyl Suppository 10 milliGRAM(s) Rectal daily  pantoprazole  Injectable 40 milliGRAM(s) IV Push every 12 hours  polyethylene glycol 3350 17 Gram(s) Oral daily  senna 2 Tablet(s) Oral at bedtime  tamsulosin 0.4 milliGRAM(s) Oral at bedtime    MEDICATIONS  (PRN):  OLANZapine Injectable 2.5 milliGRAM(s) IntraMuscular every 8 hours PRN anxiety  ondansetron Injectable 4 milliGRAM(s) IV Push every 4 hours PRN Nausea and/or Vomiting  ondansetron Injectable 4 milliGRAM(s) IV Push once PRN prior to gastrin emptying      Vital Signs Last 24 Hrs  T(C): 37.1 (04-04-24 @ 04:30), Max: 37.1 (04-04-24 @ 04:30)  T(F): 98.8 (04-04-24 @ 04:30), Max: 98.8 (04-04-24 @ 04:30)  HR: 66 (04-04-24 @ 04:30) (66 - 72)  BP: 107/63 (04-04-24 @ 04:30) (107/63 - 123/82)  BP(mean): --  RR: 18 (04-04-24 @ 04:30) (18 - 19)  SpO2: 96% (04-04-24 @ 04:30) (96% - 97%)          Labs  CBC                                 14.8   4.83  )-----------( 246      ( 03 Apr 2024 06:26 )             44.3                         14.0   3.17  )-----------( 134      ( 31 Mar 2024 09:53 )             42.5                        13.4   2.89  )-----------( 225      ( 29 Mar 2024 10:08 )             40.7     Chem      03-30    137  |  98  |  <3.0<L>  ----------------------------<  112<H>  3.4<L>   |  27.0  |  0.51    Ca    8.4      30 Mar 2024 10:40  Phos  3.4     03-30  Mg     1.8     03-30    TPro  6.3<L>  /  Alb  3.6  /  TBili  2.2<H>  /  DBili  x   /  AST  147<H>  /  ALT  243<H>  /  AlkPhos  79  03-30 03-29    136  |  97  |  3.0<L>  ----------------------------<  110<H>  3.5   |  26.0  |  0.59    Ca    8.5      29 Mar 2024 10:08  Mg     1.5     03-29    TPro  6.2<L>  /  Alb  3.6  /  TBili  2.0  /  DBili  x   /  AST  139<H>  /  ALT  219<H>  /  AlkPhos  77  03-29    PHYSICAL EXAM:  GENERAL: NAD, AOX3, Obese gastric sleeve surgery Feb 2024  HEAD:  Atraumatic, Normocephalic  EYES:conjunctiva and sclera clear  ENMT: Moist mucous membranes  NECK: Supple  CHEST/LUNG: Clear to auscultation bilaterally; No rales, rhonchi, wheezing, or rubs  HEART: Regular rate and rhythm; No murmurs, rubs, or gallops  ABDOMEN: Soft, Nontender, Nondistended; Bowel sounds present   + Rodriguez for retention  EXTREMITIES:  2+ Peripheral Pulses, No clubbing, cyanosis, or edema

## 2024-04-04 NOTE — DISCHARGE NOTE NURSING/CASE MANAGEMENT/SOCIAL WORK - NSDCPEFALRISK_GEN_ALL_CORE
For information on Fall & Injury Prevention, visit: [email or web address] OR  [email or web address] OR  https://www.cdc.gov/steadi/patient.html

## 2024-04-04 NOTE — PROGRESS NOTE ADULT - PROVIDER SPECIALTY LIST ADULT
Bariatric Surgery
Bariatric Surgery
Gastroenterology
Heme/Onc
Hospitalist
Surgery
Surgery
Bariatric Surgery
Bariatric Surgery
Surgery
Bariatric Surgery
Heme/Onc
Heme/Onc
Hospitalist
Bariatric Surgery
Gastroenterology
Heme/Onc
Hospitalist
Hospitalist
Gastroenterology

## 2024-04-04 NOTE — PROGRESS NOTE ADULT - NUTRITIONAL ASSESSMENT
This patient has been assessed with a concern for Malnutrition and has been determined to have a diagnosis/diagnoses of Severe protein-calorie malnutrition and Morbid obesity (BMI > 40).    This patient is being managed with:   Diet Consistent Carbohydrate Clear Liquid-  Entered: Apr 1 2024 12:07PM    Diet NPO after Midnight-     NPO Start Date: 01-Apr-2024   NPO Start Time: 23:59  Except Medications  Entered: Apr 1 2024  9:38AM  
This patient has been assessed with a concern for Malnutrition and has been determined to have a diagnosis/diagnoses of Severe protein-calorie malnutrition and Morbid obesity (BMI > 40).    This patient is being managed with:   Diet Consistent Carbohydrate Clear Liquid-  Entered: Apr 1 2024 12:07PM  
This patient has been assessed with a concern for Malnutrition and has been determined to have a diagnosis/diagnoses of Severe protein-calorie malnutrition and Morbid obesity (BMI > 40).    This patient is being managed with:   Diet Consistent Carbohydrate Clear Liquid-  Entered: Apr 1 2024 12:07PM    Diet NPO after Midnight-     NPO Start Date: 01-Apr-2024   NPO Start Time: 23:59  Except Medications  Entered: Apr 1 2024  9:38AM  
This patient has been assessed with a concern for Malnutrition and has been determined to have a diagnosis/diagnoses of Severe protein-calorie malnutrition and Morbid obesity (BMI > 40).    This patient is being managed with:   Diet Consistent Carbohydrate Clear Liquid-  Entered: Apr 1 2024 12:07PM  
This patient has been assessed with a concern for Malnutrition and has been determined to have a diagnosis/diagnoses of Severe protein-calorie malnutrition and Morbid obesity (BMI > 40).    This patient is being managed with:   Diet Consistent Carbohydrate Clear Liquid-  Entered: Apr 1 2024 12:07PM    Diet NPO after Midnight-     NPO Start Date: 01-Apr-2024   NPO Start Time: 23:59  Except Medications  Entered: Apr 1 2024  9:38AM  
This patient has been assessed with a concern for Malnutrition and has been determined to have a diagnosis/diagnoses of Severe protein-calorie malnutrition and Morbid obesity (BMI > 40).    This patient is being managed with:   Diet Consistent Carbohydrate/No Snacks-  Entered: Apr  3 2024  3:18PM  
This patient has been assessed with a concern for Malnutrition and has been determined to have a diagnosis/diagnoses of Severe protein-calorie malnutrition and Morbid obesity (BMI > 40).    This patient is being managed with:   Diet Consistent Carbohydrate/No Snacks-  Entered: Apr  3 2024  3:18PM  
This patient has been assessed with a concern for Malnutrition and has been determined to have a diagnosis/diagnoses of Severe protein-calorie malnutrition and Morbid obesity (BMI > 40).    This patient is being managed with:   Diet Consistent Carbohydrate Clear Liquid-  Entered: Apr 1 2024 12:07PM  
This patient has been assessed with a concern for Malnutrition and has been determined to have a diagnosis/diagnoses of Severe protein-calorie malnutrition and Morbid obesity (BMI > 40).    This patient is being managed with:   Diet Consistent Carbohydrate/No Snacks-  Entered: Apr  3 2024  3:18PM

## 2024-04-04 NOTE — PHYSICAL THERAPY INITIAL EVALUATION ADULT - GAIT DEVIATIONS NOTED, PT EVAL
decreased foot clearance from floor during swing phase bilaterally/decreased latesha/decreased weight-shifting ability

## 2024-04-04 NOTE — PHYSICAL THERAPY INITIAL EVALUATION ADULT - PERTINENT HX OF CURRENT PROBLEM, REHAB EVAL
Dx: intractable nausea and vomiting post recent gastric sleeve surgery, noted in ER to have transaminitis, leukopenia and UTI  PMH: obesity, anxiety, depression, sleep apnea, s/p recent gastric sleeve surgery

## 2024-04-07 ENCOUNTER — INPATIENT (INPATIENT)
Facility: HOSPITAL | Age: 24
LOS: 21 days | Discharge: HOME | DRG: 123 | End: 2024-04-29
Attending: INTERNAL MEDICINE | Admitting: HOSPITALIST
Payer: MEDICARE

## 2024-04-07 VITALS
HEART RATE: 76 BPM | TEMPERATURE: 98 F | HEIGHT: 68 IN | SYSTOLIC BLOOD PRESSURE: 115 MMHG | RESPIRATION RATE: 18 BRPM | WEIGHT: 295.42 LBS | DIASTOLIC BLOOD PRESSURE: 79 MMHG | OXYGEN SATURATION: 100 %

## 2024-04-07 DIAGNOSIS — Z90.3 ACQUIRED ABSENCE OF STOMACH [PART OF]: Chronic | ICD-10-CM

## 2024-04-07 PROCEDURE — 99285 EMERGENCY DEPT VISIT HI MDM: CPT

## 2024-04-07 RX ORDER — LIDOCAINE HCL 20 MG/ML
5 VIAL (ML) INJECTION ONCE
Refills: 0 | Status: COMPLETED | OUTPATIENT
Start: 2024-04-07 | End: 2024-04-07

## 2024-04-07 RX ORDER — MECLIZINE HCL 12.5 MG
25 TABLET ORAL ONCE
Refills: 0 | Status: COMPLETED | OUTPATIENT
Start: 2024-04-07 | End: 2024-04-07

## 2024-04-07 RX ORDER — METOCLOPRAMIDE HCL 10 MG
10 TABLET ORAL ONCE
Refills: 0 | Status: COMPLETED | OUTPATIENT
Start: 2024-04-07 | End: 2024-04-07

## 2024-04-07 RX ORDER — ACETAMINOPHEN 500 MG
1000 TABLET ORAL ONCE
Refills: 0 | Status: COMPLETED | OUTPATIENT
Start: 2024-04-07 | End: 2024-04-07

## 2024-04-07 RX ORDER — SODIUM CHLORIDE 9 MG/ML
1000 INJECTION INTRAMUSCULAR; INTRAVENOUS; SUBCUTANEOUS ONCE
Refills: 0 | Status: COMPLETED | OUTPATIENT
Start: 2024-04-07 | End: 2024-04-07

## 2024-04-07 RX ADMIN — Medication 25 MILLIGRAM(S): at 23:56

## 2024-04-07 NOTE — ED PROVIDER NOTE - PHYSICAL EXAMINATION
VITAL SIGNS: I have reviewed nursing notes and confirm.  CONSTITUTIONAL:  in no acute distress.  SKIN: Skin exam is warm and dry, no acute rash.  HEAD: Normocephalic; atraumatic.  EYES: PERRL,  conjunctiva and sclera clear. (+) unable abduct right eye when looking right, unable to abduct left eye when looking left  ENT: No nasal discharge; airway clear. Throat clear.  NECK: Supple; non tender.    CARD: Regular rate and rhythm.  RESP: No wheezes,  no rales or rhonchi.   ABD:  soft; non-distended; non-tender;   EXT: Normal ROM. No clubbing, cyanosis or edema.  NEURO: Alert, oriented x 3, . (+) decreased sensation to b/l medial thigh, no arm drift, no leg drift.

## 2024-04-07 NOTE — ED PROVIDER NOTE - CLINICAL SUMMARY MEDICAL DECISION MAKING FREE TEXT BOX
24 year old male with a past medical history of obesity, anxiety and depression status post recent Gastric sleeve surgery at [facility] Discharged from the hospital 2 days ago presents with double vision, dizziness, room spinning sensation, weakness in the leg, and decreased p.o. intake.  Patient had report he had complained about dizziness while he was discharged but  it was not addressed by the medical team.  Patient report [id number] double vision.  He had chronic numbness of his inner thighs, and he had urinary retention which has been chronic since his admission.   During his admission recently he had EGD that was unremarkable, nuclear emptying studies was unremarkable.  Patient report that he still has trouble eating.   he also has trouble walking and  was told that he will need to see a physical therapist.    on exam,  patient unable abduct right eye when looking right, unable to abduct left eye when looking left,   Consistent with cranial nerve palsy.  Will get blood work, CT head, meclizine for dizziness, Reglan for nausea.    Patient report history of urinary retention, will place Rodriguez. Patient will likely need admission for MRI and decreased p.o. intake.

## 2024-04-07 NOTE — ED PROVIDER NOTE - NS ED ROS FT
CONSTITUTIONAL - no  fever, no diaphoresis, no weight change  SKIN - no rash  HEMATOLOGIC - no bleeding, no bruising  EYES - no eye pain, no blurred vision  ENT - no change in hearing, no pain  RESPIRATORY - no shortness of breath, no cough  CARDIAC - no chest pain, no palpitations  GI - no abd pain, no nausea, no vomiting, no diarrhea, no constipation, no bleeding  GENITO-URINARY - no discharge, no dysuria; no hematuria,   ENDO - no polydipsia, no polyuria, no heat/no cold intolerance  MUSCULOSKELETAL - no joint pain, no swelling, no redness  NEUROLOGIC - no weakness, no headache, no anesthesia, no paresthesias (+) dizziness, room spinning sensation, leg numbness   PSYCH - no anxiety, non suicidal, non homicidal, no hallucination, no depression

## 2024-04-07 NOTE — ED ADULT TRIAGE NOTE - CHIEF COMPLAINT QUOTE
Per mom, pt w/ decreased PO intake, lightheadedness, blurry vision, decreased urinating/defecating, unable to ambulate for 2-3weeks. DC 2 days ago.

## 2024-04-07 NOTE — ED PROVIDER NOTE - OBJECTIVE STATEMENT
24 year old male with a past medical history of obesity, anxiety and depression status post recent Gastric sleeve surgery at [facility] Discharged from the hospital 2 days ago presents with double vision, dizziness, room spinning sensation, weakness in the leg, and decreased p.o. intake.  Patient had report he had complained about dizziness while he was discharged but  it was not addressed by the medical team.  Patient report [id number] double vision.  He had chronic numbness of his inner thighs, and he had urinary retention which has been chronic since his admission.   During his admission recently he had EGD that was unremarkable, nuclear emptying studies was unremarkable.  Patient report that he still has trouble eating.   he also has trouble walking and  was told that he will need to see a physical therapist.

## 2024-04-08 DIAGNOSIS — H53.2 DIPLOPIA: ICD-10-CM

## 2024-04-08 PROBLEM — F41.9 ANXIETY DISORDER, UNSPECIFIED: Chronic | Status: ACTIVE | Noted: 2024-03-29

## 2024-04-08 PROBLEM — E66.9 OBESITY, UNSPECIFIED: Chronic | Status: ACTIVE | Noted: 2024-03-29

## 2024-04-08 PROBLEM — G47.33 OBSTRUCTIVE SLEEP APNEA (ADULT) (PEDIATRIC): Chronic | Status: ACTIVE | Noted: 2024-03-29

## 2024-04-08 LAB
A1C WITH ESTIMATED AVERAGE GLUCOSE RESULT: 5.8 % — HIGH (ref 4–5.6)
ALBUMIN SERPL ELPH-MCNC: 3.9 G/DL — SIGNIFICANT CHANGE UP (ref 3.3–5.2)
ALBUMIN SERPL ELPH-MCNC: 4.3 G/DL — SIGNIFICANT CHANGE UP (ref 3.3–5.2)
ALP SERPL-CCNC: 108 U/L — SIGNIFICANT CHANGE UP (ref 40–120)
ALP SERPL-CCNC: 94 U/L — SIGNIFICANT CHANGE UP (ref 40–120)
ALT FLD-CCNC: 310 U/L — HIGH
ALT FLD-CCNC: 356 U/L — HIGH
AMORPH URATE CRY # URNS: PRESENT
ANION GAP SERPL CALC-SCNC: 18 MMOL/L — HIGH (ref 5–17)
ANION GAP SERPL CALC-SCNC: 18 MMOL/L — HIGH (ref 5–17)
APPEARANCE UR: ABNORMAL
AST SERPL-CCNC: 153 U/L — HIGH
AST SERPL-CCNC: 170 U/L — HIGH
BACTERIA # UR AUTO: ABNORMAL /HPF
BASE EXCESS BLDV CALC-SCNC: 4.7 MMOL/L — HIGH (ref -2–3)
BASOPHILS # BLD AUTO: 0.04 K/UL — SIGNIFICANT CHANGE UP (ref 0–0.2)
BASOPHILS NFR BLD AUTO: 0.6 % — SIGNIFICANT CHANGE UP (ref 0–2)
BILIRUB DIRECT SERPL-MCNC: 1.5 MG/DL — HIGH (ref 0–0.3)
BILIRUB INDIRECT FLD-MCNC: 1.3 MG/DL — HIGH (ref 0.2–1)
BILIRUB SERPL-MCNC: 2.8 MG/DL — HIGH (ref 0.4–2)
BILIRUB SERPL-MCNC: 2.9 MG/DL — HIGH (ref 0.4–2)
BILIRUB UR-MCNC: ABNORMAL
BUN SERPL-MCNC: 10.2 MG/DL — SIGNIFICANT CHANGE UP (ref 8–20)
BUN SERPL-MCNC: 10.6 MG/DL — SIGNIFICANT CHANGE UP (ref 8–20)
CA-I SERPL-SCNC: 1.15 MMOL/L — SIGNIFICANT CHANGE UP (ref 1.15–1.33)
CALCIUM SERPL-MCNC: 9.3 MG/DL — SIGNIFICANT CHANGE UP (ref 8.4–10.5)
CALCIUM SERPL-MCNC: 9.8 MG/DL — SIGNIFICANT CHANGE UP (ref 8.4–10.5)
CAST: 8 /LPF — HIGH (ref 0–4)
CHLORIDE BLDV-SCNC: 99 MMOL/L — SIGNIFICANT CHANGE UP (ref 96–108)
CHLORIDE SERPL-SCNC: 100 MMOL/L — SIGNIFICANT CHANGE UP (ref 96–108)
CHLORIDE SERPL-SCNC: 96 MMOL/L — SIGNIFICANT CHANGE UP (ref 96–108)
CHOLEST SERPL-MCNC: 168 MG/DL — SIGNIFICANT CHANGE UP
CO2 SERPL-SCNC: 24 MMOL/L — SIGNIFICANT CHANGE UP (ref 22–29)
CO2 SERPL-SCNC: 25 MMOL/L — SIGNIFICANT CHANGE UP (ref 22–29)
COLOR SPEC: ABNORMAL
CREAT SERPL-MCNC: 0.57 MG/DL — SIGNIFICANT CHANGE UP (ref 0.5–1.3)
CREAT SERPL-MCNC: 0.57 MG/DL — SIGNIFICANT CHANGE UP (ref 0.5–1.3)
DIFF PNL FLD: ABNORMAL
EGFR: 139 ML/MIN/1.73M2 — SIGNIFICANT CHANGE UP
EGFR: 139 ML/MIN/1.73M2 — SIGNIFICANT CHANGE UP
EOSINOPHIL # BLD AUTO: 0.03 K/UL — SIGNIFICANT CHANGE UP (ref 0–0.5)
EOSINOPHIL NFR BLD AUTO: 0.4 % — SIGNIFICANT CHANGE UP (ref 0–6)
ESTIMATED AVERAGE GLUCOSE: 120 MG/DL — HIGH (ref 68–114)
FOLATE SERPL-MCNC: 7.9 NG/ML — SIGNIFICANT CHANGE UP
GAS PNL BLDV: 136 MMOL/L — SIGNIFICANT CHANGE UP (ref 136–145)
GAS PNL BLDV: SIGNIFICANT CHANGE UP
GLUCOSE BLDV-MCNC: 106 MG/DL — HIGH (ref 70–99)
GLUCOSE SERPL-MCNC: 94 MG/DL — SIGNIFICANT CHANGE UP (ref 70–99)
GLUCOSE SERPL-MCNC: 98 MG/DL — SIGNIFICANT CHANGE UP (ref 70–99)
GLUCOSE UR QL: NEGATIVE MG/DL — SIGNIFICANT CHANGE UP
HCO3 BLDV-SCNC: 29 MMOL/L — SIGNIFICANT CHANGE UP (ref 22–29)
HCT VFR BLD CALC: 40.8 % — SIGNIFICANT CHANGE UP (ref 39–50)
HCT VFR BLD CALC: 45.5 % — SIGNIFICANT CHANGE UP (ref 39–50)
HCT VFR BLDA CALC: 47 % — SIGNIFICANT CHANGE UP
HDLC SERPL-MCNC: 30 MG/DL — LOW
HGB BLD CALC-MCNC: 15.8 G/DL — SIGNIFICANT CHANGE UP (ref 12.6–17.4)
HGB BLD-MCNC: 13.6 G/DL — SIGNIFICANT CHANGE UP (ref 13–17)
HGB BLD-MCNC: 15.5 G/DL — SIGNIFICANT CHANGE UP (ref 13–17)
IMM GRANULOCYTES NFR BLD AUTO: 1.2 % — HIGH (ref 0–0.9)
KETONES UR-MCNC: ABNORMAL MG/DL
LACTATE BLDV-MCNC: 2.4 MMOL/L — HIGH (ref 0.5–2)
LEUKOCYTE ESTERASE UR-ACNC: ABNORMAL
LIDOCAIN IGE QN: 25 U/L — SIGNIFICANT CHANGE UP (ref 22–51)
LIPID PNL WITH DIRECT LDL SERPL: 116 MG/DL — HIGH
LYMPHOCYTES # BLD AUTO: 2.15 K/UL — SIGNIFICANT CHANGE UP (ref 1–3.3)
LYMPHOCYTES # BLD AUTO: 31.3 % — SIGNIFICANT CHANGE UP (ref 13–44)
MAGNESIUM SERPL-MCNC: 2.1 MG/DL — SIGNIFICANT CHANGE UP (ref 1.6–2.6)
MCHC RBC-ENTMCNC: 26.7 PG — LOW (ref 27–34)
MCHC RBC-ENTMCNC: 26.8 PG — LOW (ref 27–34)
MCHC RBC-ENTMCNC: 33.3 GM/DL — SIGNIFICANT CHANGE UP (ref 32–36)
MCHC RBC-ENTMCNC: 34.1 GM/DL — SIGNIFICANT CHANGE UP (ref 32–36)
MCV RBC AUTO: 78.6 FL — LOW (ref 80–100)
MCV RBC AUTO: 80.2 FL — SIGNIFICANT CHANGE UP (ref 80–100)
MONOCYTES # BLD AUTO: 0.8 K/UL — SIGNIFICANT CHANGE UP (ref 0–0.9)
MONOCYTES NFR BLD AUTO: 11.6 % — SIGNIFICANT CHANGE UP (ref 2–14)
NEUTROPHILS # BLD AUTO: 3.77 K/UL — SIGNIFICANT CHANGE UP (ref 1.8–7.4)
NEUTROPHILS NFR BLD AUTO: 54.9 % — SIGNIFICANT CHANGE UP (ref 43–77)
NITRITE UR-MCNC: POSITIVE
NON HDL CHOLESTEROL: 138 MG/DL — HIGH
PCO2 BLDV: 45 MMHG — SIGNIFICANT CHANGE UP (ref 42–55)
PH BLDV: 7.42 — SIGNIFICANT CHANGE UP (ref 7.32–7.43)
PH UR: 6 — SIGNIFICANT CHANGE UP (ref 5–8)
PLATELET # BLD AUTO: 298 K/UL — SIGNIFICANT CHANGE UP (ref 150–400)
PLATELET # BLD AUTO: 335 K/UL — SIGNIFICANT CHANGE UP (ref 150–400)
PO2 BLDV: 59 MMHG — HIGH (ref 25–45)
POTASSIUM BLDV-SCNC: 4.4 MMOL/L — SIGNIFICANT CHANGE UP (ref 3.5–5.1)
POTASSIUM SERPL-MCNC: 3.2 MMOL/L — LOW (ref 3.5–5.3)
POTASSIUM SERPL-MCNC: 4 MMOL/L — SIGNIFICANT CHANGE UP (ref 3.5–5.3)
POTASSIUM SERPL-SCNC: 3.2 MMOL/L — LOW (ref 3.5–5.3)
POTASSIUM SERPL-SCNC: 4 MMOL/L — SIGNIFICANT CHANGE UP (ref 3.5–5.3)
PROT SERPL-MCNC: 6.9 G/DL — SIGNIFICANT CHANGE UP (ref 6.6–8.7)
PROT SERPL-MCNC: 8 G/DL — SIGNIFICANT CHANGE UP (ref 6.6–8.7)
PROT UR-MCNC: 30 MG/DL
RBC # BLD: 5.09 M/UL — SIGNIFICANT CHANGE UP (ref 4.2–5.8)
RBC # BLD: 5.79 M/UL — SIGNIFICANT CHANGE UP (ref 4.2–5.8)
RBC # FLD: 15 % — HIGH (ref 10.3–14.5)
RBC # FLD: 15.5 % — HIGH (ref 10.3–14.5)
RBC CASTS # UR COMP ASSIST: 23 /HPF — HIGH (ref 0–4)
SAO2 % BLDV: 90.4 % — SIGNIFICANT CHANGE UP
SODIUM SERPL-SCNC: 139 MMOL/L — SIGNIFICANT CHANGE UP (ref 135–145)
SODIUM SERPL-SCNC: 142 MMOL/L — SIGNIFICANT CHANGE UP (ref 135–145)
SP GR SPEC: 1.02 — SIGNIFICANT CHANGE UP (ref 1–1.03)
SQUAMOUS # UR AUTO: 6 /HPF — HIGH (ref 0–5)
TRIGL SERPL-MCNC: 110 MG/DL — SIGNIFICANT CHANGE UP
TSH SERPL-MCNC: 5.51 UIU/ML — HIGH (ref 0.27–4.2)
UROBILINOGEN FLD QL: 1 MG/DL — SIGNIFICANT CHANGE UP (ref 0.2–1)
VIT B12 SERPL-MCNC: 1173 PG/ML — SIGNIFICANT CHANGE UP (ref 232–1245)
WBC # BLD: 6.09 K/UL — SIGNIFICANT CHANGE UP (ref 3.8–10.5)
WBC # BLD: 6.87 K/UL — SIGNIFICANT CHANGE UP (ref 3.8–10.5)
WBC # FLD AUTO: 6.09 K/UL — SIGNIFICANT CHANGE UP (ref 3.8–10.5)
WBC # FLD AUTO: 6.87 K/UL — SIGNIFICANT CHANGE UP (ref 3.8–10.5)
WBC UR QL: 2 /HPF — SIGNIFICANT CHANGE UP (ref 0–5)

## 2024-04-08 PROCEDURE — 99223 1ST HOSP IP/OBS HIGH 75: CPT

## 2024-04-08 PROCEDURE — 70551 MRI BRAIN STEM W/O DYE: CPT | Mod: 26

## 2024-04-08 PROCEDURE — 72146 MRI CHEST SPINE W/O DYE: CPT | Mod: 26

## 2024-04-08 PROCEDURE — 70450 CT HEAD/BRAIN W/O DYE: CPT | Mod: 26,MC

## 2024-04-08 PROCEDURE — 99221 1ST HOSP IP/OBS SF/LOW 40: CPT

## 2024-04-08 PROCEDURE — 70544 MR ANGIOGRAPHY HEAD W/O DYE: CPT | Mod: 26,59

## 2024-04-08 PROCEDURE — 72148 MRI LUMBAR SPINE W/O DYE: CPT | Mod: 26

## 2024-04-08 RX ORDER — ACETAMINOPHEN 500 MG
650 TABLET ORAL EVERY 6 HOURS
Refills: 0 | Status: DISCONTINUED | OUTPATIENT
Start: 2024-04-08 | End: 2024-04-29

## 2024-04-08 RX ORDER — POTASSIUM CHLORIDE 20 MEQ
40 PACKET (EA) ORAL EVERY 4 HOURS
Refills: 0 | Status: COMPLETED | OUTPATIENT
Start: 2024-04-08 | End: 2024-04-08

## 2024-04-08 RX ORDER — FAMOTIDINE 10 MG/ML
20 INJECTION INTRAVENOUS
Refills: 0 | Status: DISCONTINUED | OUTPATIENT
Start: 2024-04-08 | End: 2024-04-09

## 2024-04-08 RX ORDER — HYDROXYZINE HCL 10 MG
1 TABLET ORAL
Refills: 0 | DISCHARGE

## 2024-04-08 RX ORDER — PREGABALIN 225 MG/1
15 CAPSULE ORAL
Refills: 0 | DISCHARGE

## 2024-04-08 RX ORDER — SIMETHICONE 80 MG/1
1 TABLET, CHEWABLE ORAL
Refills: 0 | DISCHARGE

## 2024-04-08 RX ORDER — ONDANSETRON 8 MG/1
4 TABLET, FILM COATED ORAL EVERY 8 HOURS
Refills: 0 | Status: DISCONTINUED | OUTPATIENT
Start: 2024-04-08 | End: 2024-04-29

## 2024-04-08 RX ORDER — ACETAMINOPHEN 500 MG
1000 TABLET ORAL ONCE
Refills: 0 | Status: COMPLETED | OUTPATIENT
Start: 2024-04-08 | End: 2024-04-08

## 2024-04-08 RX ORDER — ONDANSETRON 8 MG/1
5 TABLET, FILM COATED ORAL
Refills: 0 | DISCHARGE

## 2024-04-08 RX ORDER — ASPIRIN/CALCIUM CARB/MAGNESIUM 324 MG
81 TABLET ORAL DAILY
Refills: 0 | Status: DISCONTINUED | OUTPATIENT
Start: 2024-04-08 | End: 2024-04-09

## 2024-04-08 RX ORDER — POTASSIUM CHLORIDE 20 MEQ
20 PACKET (EA) ORAL ONCE
Refills: 0 | Status: DISCONTINUED | OUTPATIENT
Start: 2024-04-08 | End: 2024-04-08

## 2024-04-08 RX ORDER — MECLIZINE HCL 12.5 MG
12.5 TABLET ORAL THREE TIMES A DAY
Refills: 0 | Status: DISCONTINUED | OUTPATIENT
Start: 2024-04-08 | End: 2024-04-29

## 2024-04-08 RX ORDER — ENOXAPARIN SODIUM 100 MG/ML
40 INJECTION SUBCUTANEOUS EVERY 12 HOURS
Refills: 0 | Status: DISCONTINUED | OUTPATIENT
Start: 2024-04-08 | End: 2024-04-09

## 2024-04-08 RX ORDER — ACETAMINOPHEN 500 MG
650 TABLET ORAL ONCE
Refills: 0 | Status: COMPLETED | OUTPATIENT
Start: 2024-04-08 | End: 2024-04-08

## 2024-04-08 RX ORDER — LANOLIN ALCOHOL/MO/W.PET/CERES
3 CREAM (GRAM) TOPICAL AT BEDTIME
Refills: 0 | Status: DISCONTINUED | OUTPATIENT
Start: 2024-04-08 | End: 2024-04-29

## 2024-04-08 RX ORDER — FAMOTIDINE 10 MG/ML
1 INJECTION INTRAVENOUS
Refills: 0 | DISCHARGE

## 2024-04-08 RX ORDER — ATORVASTATIN CALCIUM 80 MG/1
40 TABLET, FILM COATED ORAL AT BEDTIME
Refills: 0 | Status: DISCONTINUED | OUTPATIENT
Start: 2024-04-08 | End: 2024-04-29

## 2024-04-08 RX ORDER — PREGABALIN 225 MG/1
1000 CAPSULE ORAL DAILY
Refills: 0 | Status: DISCONTINUED | OUTPATIENT
Start: 2024-04-08 | End: 2024-04-29

## 2024-04-08 RX ORDER — FOLIC ACID 0.8 MG
1 TABLET ORAL DAILY
Refills: 0 | Status: DISCONTINUED | OUTPATIENT
Start: 2024-04-08 | End: 2024-04-29

## 2024-04-08 RX ORDER — TAMSULOSIN HYDROCHLORIDE 0.4 MG/1
0.4 CAPSULE ORAL AT BEDTIME
Refills: 0 | Status: DISCONTINUED | OUTPATIENT
Start: 2024-04-08 | End: 2024-04-29

## 2024-04-08 RX ORDER — SODIUM CHLORIDE 9 MG/ML
1000 INJECTION INTRAMUSCULAR; INTRAVENOUS; SUBCUTANEOUS
Refills: 0 | Status: DISCONTINUED | OUTPATIENT
Start: 2024-04-08 | End: 2024-04-09

## 2024-04-08 RX ADMIN — SODIUM CHLORIDE 1000 MILLILITER(S): 9 INJECTION INTRAMUSCULAR; INTRAVENOUS; SUBCUTANEOUS at 00:31

## 2024-04-08 RX ADMIN — Medication 81 MILLIGRAM(S): at 14:10

## 2024-04-08 RX ADMIN — Medication 400 MILLIGRAM(S): at 00:31

## 2024-04-08 RX ADMIN — Medication 5 MILLILITER(S): at 00:35

## 2024-04-08 RX ADMIN — Medication 400 MILLIGRAM(S): at 23:27

## 2024-04-08 RX ADMIN — Medication 3 MILLIGRAM(S): at 22:13

## 2024-04-08 RX ADMIN — Medication 1 MILLIGRAM(S): at 14:10

## 2024-04-08 RX ADMIN — Medication 10 MILLIGRAM(S): at 00:31

## 2024-04-08 RX ADMIN — Medication 40 MILLIEQUIVALENT(S): at 14:11

## 2024-04-08 RX ADMIN — Medication 1000 MILLIGRAM(S): at 01:30

## 2024-04-08 RX ADMIN — ONDANSETRON 4 MILLIGRAM(S): 8 TABLET, FILM COATED ORAL at 21:22

## 2024-04-08 RX ADMIN — PREGABALIN 1000 MICROGRAM(S): 225 CAPSULE ORAL at 14:10

## 2024-04-08 RX ADMIN — Medication 650 MILLIGRAM(S): at 04:47

## 2024-04-08 RX ADMIN — Medication 650 MILLIGRAM(S): at 22:13

## 2024-04-08 RX ADMIN — Medication 650 MILLIGRAM(S): at 23:07

## 2024-04-08 RX ADMIN — SODIUM CHLORIDE 85 MILLILITER(S): 9 INJECTION INTRAMUSCULAR; INTRAVENOUS; SUBCUTANEOUS at 09:46

## 2024-04-08 NOTE — SPEECH LANGUAGE PATHOLOGY EVALUATION - SLP PERTINENT HISTORY OF CURRENT PROBLEM
24 year old male with a past medical history of obesity, anxiety and depression status post recent Gastric sleeve surgery at [facility] Discharged from the hospital 2 days ago presents with double vision, dizziness, room spinning sensation, weakness in the leg, and decreased PO intake. Patient has had the symptoms of weakness and poor PO intake since prior admission. His workup was negative then including EGD, emptying study. He was seen by PT and recommended outpatient CT. He has also been having recurrent urinary retention for which he has required recurrent catheterizations. Today, he comes back to the ED with complaint of blurry vision/double vision. Patient is living with mother and aunt who are caring for him.

## 2024-04-08 NOTE — SPEECH LANGUAGE PATHOLOGY EVALUATION - SLP GENERAL OBSERVATIONS
Pt recd A&A in bed, 0x3, eyes intermittently closed with c/o double vision and dizziness, c/o lower extremity numbness, requesting water (RN informed of all), pain 0/10 pre/post eval

## 2024-04-08 NOTE — SPEECH LANGUAGE PATHOLOGY EVALUATION - SLP CONVERSATIONAL SPEECH
unremarkable syntax, semantic content. Intermittent slowed formulation/processing, likely 2* report of fatigue/dizziness throughout

## 2024-04-08 NOTE — SPEECH LANGUAGE PATHOLOGY EVALUATION - COMMENTS
Cognitive-linguistic skills appear grossly WFL based on informal assessment. CT Head No Cont (04.08.24 @ 04:04) >    IMPRESSION:    Ill-defined hypodensities in the right temporal and right occipital   lobes. It is unclear if these are artifactual in nature.In the presence   of acute also focalized neurological deficits, ischemia may be   entertained. Consider follow-up by MRI imaging, if clinically indicated. Cognitive-linguistic skills appear grossly WFL based on informal assessment. Pt reports he is at his cognitive baseline, however notes that he feels "out of it" at times due to dizziness and fatigue.

## 2024-04-08 NOTE — H&P ADULT - NSHPLABSRESULTS_GEN_ALL_CORE
15.5   6.87  )-----------( 335      ( 2024 23:57 )             45.5     2024 23:57    139    |  96     |  10.2   ----------------------------<  94     4.0     |  25.0   |  0.57     Ca    9.8        2024 23:57    TPro  8.0    /  Alb  4.3    /  TBili  2.9    /  DBili  x      /  AST  170    /  ALT  356    /  AlkPhos  108    2024 23:57      CAPILLARY BLOOD GLUCOSE        LIVER FUNCTIONS - ( 2024 23:57 )  Alb: 4.3 g/dL / Pro: 8.0 g/dL / ALK PHOS: 108 U/L / ALT: 356 U/L / AST: 170 U/L / GGT: x           Urinalysis Basic - ( 2024 00:48 )    Color: Orange / Appearance: Cloudy / S.022 / pH: x  Gluc: x / Ketone: Trace mg/dL  / Bili: Moderate / Urobili: 1.0 mg/dL   Blood: x / Protein: 30 mg/dL / Nitrite: Positive   Leuk Esterase: Small / RBC: 23 /HPF / WBC 2 /HPF   Sq Epi: x / Non Sq Epi: 6 /HPF / Bacteria: Few /HPF    < from: CT Head No Cont (24 @ 04:04) >    IMPRESSION:    Ill-defined hypodensities in the right temporal and right occipital   lobes. It is unclear if these are artifactual in nature.In the presence   of acute also focalized neurological deficits, ischemia may be   entertained. Consider follow-up by MRI imaging, if clinically indicated.    < end of copied text >

## 2024-04-08 NOTE — ED ADULT NURSE NOTE - OBJECTIVE STATEMENT
Pt presents to ED c/o double vision, dizziness, leg weakness, decreased PO intake and urinary retention. PM hx gastric sleeve, obesity, depression. Pt connected to cardiac monitor-NSR HR 70 on room air. AOx3, alert, anxious. Airway patent respirations even unlabored. Pt awaiting CT.
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
## 2024-04-08 NOTE — H&P ADULT - HISTORY OF PRESENT ILLNESS
24 year old male with a past medical history of obesity, anxiety and depression status post recent Gastric sleeve surgery at [facility] Discharged from the hospital 2 days ago presents with double vision, dizziness, room spinning sensation, weakness in the leg, and decreased PO intake. Patient has had the symptoms of weakness and poor PO intake since prior admission. His workup was negative then including EGD, emptying study. He was seen by PT and recommended outpatient CT.  24 year old male with a past medical history of obesity, anxiety and depression status post recent Gastric sleeve surgery at [facility] Discharged from the hospital 2 days ago presents with double vision, dizziness, room spinning sensation, weakness in the leg, and decreased PO intake. Patient has had the symptoms of weakness and poor PO intake since prior admission. His workup was negative then including EGD, emptying study. He was seen by PT and recommended outpatient CT. He has also been having recurrent urinary retention for which he has required recurrent catheterizations. Today, he comes back to the ED with complaint of blurry vision/double vision. Patient is living with mother and aunt who are caring for him.

## 2024-04-08 NOTE — CONSULT NOTE ADULT - ASSESSMENT
The patient is a 24y Male who is followed by neurology because of diplopia, dizziness, b/l LE weakness/numbness and urine retention.  CT head showed possible hypodensities in right par/temp lobes.  Possible nutritional deficiency given his poor PO intake following gastric bypass and unable to take vitamins.  Also concerned for etiology of hypogenicities seen on head CT.  I am also concerned about lumbar spine given his BLE complaints and urine retention    Suggest to check:   - MRI brain, eval for hypodensities seen on CT head   - MRA head, eval for aneurysm given diplopia   - MRI LS spine, eval for cauda equina syndrome give urine retention and BLE numb/weak   - add thiamine, niacin, riboflavin levels, eval nutritional deficiency give poor po intake past 2 months    Further suggestions to follow results of above tests    will follow with you    David Dinh MD PhD   [id number]

## 2024-04-08 NOTE — ED ADULT NURSE REASSESSMENT NOTE - NS ED NURSE REASSESS COMMENT FT1
Pt remains connected to cardiac monitor-NSR HR 70 on room air. Rodriguez remains in place. Pt awaiting CT.

## 2024-04-08 NOTE — SPEECH LANGUAGE PATHOLOGY EVALUATION - SLP DIAGNOSIS
Receptive/expressive language and motor speech skills are WFL for basic information. Pt able to respond to questions, follow mult-step commands, identify objects/pictures. No deficits in naming demonstrated with adequate syntax and semantic content during conversational exchange. Intermittent delayed processing/formulation, likely 2* reported fatigue and dizziness.

## 2024-04-08 NOTE — CONSULT NOTE ADULT - SUBJECTIVE AND OBJECTIVE BOX
[facility]                                     [facility] [place]                                 [facility]                                  [address]                                        [place], [place], [address]                                             [phone]    CC: diplopia dizziness, leg weakness/numbness/pain and urine retention  HPI:  The patient is a 24y Male who presented with complaints of dizziness for several days, leg weakness/numbness and pain for several weeks and urine retention for 30 hours, needing schaffer catheter.  He had gastric sleeve done in February and has had poor po intake since the surgery.  He is not taking hois vitamins post gastric sleev surgery, but has had two injections of B12. He went to [facility] for symptoms related to his gastric sleeve and was discharged two days ago with c/o dizziness-mostly lightheaded  He also c/o b/l leg weakness/numbness worse on left than right.  He has been retaining urine.  He c/o diplopia, not resolved with lateral gaze on either side, improved with single eye closure.  Neurology is asked to evaluate.    PAST MEDICAL & SURGICAL HISTORY:  S/P gastric sleeve procedure      Obesity      Obstructive sleep apnea      Anxiety and depression      H/O gastric sleeve    MEDICATIONS  (STANDING):  aspirin enteric coated 81 milliGRAM(s) Oral daily  atorvastatin 40 milliGRAM(s) Oral at bedtime  cyanocobalamin 1000 MICROGram(s) Oral daily  enoxaparin Injectable 40 milliGRAM(s) SubCutaneous every 12 hours  famotidine    Tablet 20 milliGRAM(s) Oral two times a day  folic acid 1 milliGRAM(s) Oral daily  potassium chloride   Powder 40 milliEquivalent(s) Oral every 4 hours  sodium chloride 0.9%. 1000 milliLiter(s) (85 mL/Hr) IV Continuous <Continuous>  tamsulosin 0.4 milliGRAM(s) Oral at bedtime    MEDICATIONS  (PRN):  acetaminophen     Tablet .. 650 milliGRAM(s) Oral every 6 hours PRN Temp greater or equal to 38C (100.4F), Mild Pain (1 - 3)  aluminum hydroxide/magnesium hydroxide/simethicone Suspension 30 milliLiter(s) Oral every 4 hours PRN Dyspepsia  meclizine 12.5 milliGRAM(s) Oral three times a day PRN Dizziness  melatonin 3 milliGRAM(s) Oral at bedtime PRN Insomnia  ondansetron Injectable 4 milliGRAM(s) IV Push every 8 hours PRN Nausea and/or Vomiting      Allergies    No Known Allergies    Intolerances    SOCIAL HISTORY:  no tob,   no alcohol   no drugs    FAMILY HISTORY:  No pertinent family history in first degree relatives    ROS: 14 point ROS negative other than what is present in HPI or below    Vital Signs Last 24 Hrs  T(C): 36.4 (08 Apr 2024 07:29), Max: 36.7 (08 Apr 2024 04:39)  T(F): 97.6 (08 Apr 2024 07:29), Max: 98 (08 Apr 2024 04:39)  HR: 63 (08 Apr 2024 07:29) (63 - 76)  BP: 136/81 (08 Apr 2024 07:29) (115/79 - 136/81)  BP(mean): --  RR: 18 (08 Apr 2024 07:29) (18 - 19)  SpO2: 100% (08 Apr 2024 07:29) (100% - 100%)    Parameters below as of 08 Apr 2024 07:29  Patient On (Oxygen Delivery Method): room air    General: NAD    Detailed Neurologic Exam:    Mental status: The patient is awake and alert and has normal attention span.  The patient is fully oriented in 3 spheres. The patient is oriented to current events. The patient is able to name objects, follow commands, repeat sentences.    Cranial nerves: Pupils equal and react symmetrically to light. Unable to assess visual field deficit to confrontation as he tries to forcefully close eyes when opened, unable to keep open long enough to assess visual fields. Extraocular motion is full with b/l nystagmus. Facial sensation is intact. Facial musculature is symmetric.     Motor: There is normal bulk and tone.  There is no tremor.  Strength is 5/5 in the right arm and 4/5 leg.   Strength is 5/5 in the left arm and 4/5 leg.  pain limits full testing of BLE    Sensation: diminished to light touch and pin in b/l lower extremities    Reflexes: 2+ biceps/BR/patellar DTR and plantar responses are flexor.    Cerebellar: There is no dysmetria on finger to nose testing.    Gait : deferred    LABS:                         13.6   6.09  )-----------( 298      ( 08 Apr 2024 06:45 )             40.8       04-08    142  |  100  |  10.6  ----------------------------<  98  3.2<L>   |  24.0  |  0.57    Ca    9.3      08 Apr 2024 06:45  Mg     2.1     04-08    TPro  8.0  /  Alb  4.3  /  TBili  2.9<H>  /  DBili  x   /  AST  170<H>  /  ALT  356<H>  /  AlkPhos  108  04-07      RADIOLOGY & ADDITIONAL STUDIES (independently reviewed unless otherwise noted):  CT Head No Cont (04.08.24 @ 04:04)   IMPRESSION:  Ill-defined hypodensities in the right temporal and right occipital   lobes. It is unclear if these are artifactual in nature.In the presence   of acute also focalized neurological deficits, ischemia may be   entertained. Consider follow-up by MRI imaging, if clinically indicated.
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
## 2024-04-08 NOTE — ED ADULT NURSE NOTE - NS ED NURSE REPORT GIVEN DT
08-Apr-2024 06:52

## 2024-04-08 NOTE — ED ADULT NURSE REASSESSMENT NOTE - NS ED NURSE REASSESS COMMENT FT1
Pt bladder scan 740 ml. Rodriguez inserted with 2nd RN TIMOTHY. Pt tolerated well. Draining dark yellow urine.

## 2024-04-08 NOTE — H&P ADULT - NSHPPHYSICALEXAM_GEN_ALL_CORE
Vital Signs Last 24 Hrs  T(C): 36.7 (08 Apr 2024 04:39), Max: 36.7 (08 Apr 2024 04:39)  T(F): 98 (08 Apr 2024 04:39), Max: 98 (08 Apr 2024 04:39)  HR: 68 (08 Apr 2024 04:39) (68 - 76)  BP: 117/78 (08 Apr 2024 04:39) (115/79 - 117/78)  BP(mean): --  RR: 19 (08 Apr 2024 04:39) (18 - 19)  SpO2: 100% (08 Apr 2024 04:39) (100% - 100%)    Parameters below as of 08 Apr 2024 04:39  Patient On (Oxygen Delivery Method): room air    General: Age-appearing, in no acute distress; obese  Head: Normocephalic, atraumatic  ENMT: neck supple; + nystagmus with lateral gaze  Cardiovascular: +S1, S2; Regular rate and rhythm, no murmurs, rubs, gallops  Respiratory: CTA BL, no wheezes, rales, rhonchi  Gastrointestinal: Abdomen soft, non-tender, +BS in all 4 quadrants  : Dark brown urine in schaffer bag  Extremities: No clubbing, cyanosis, or edema  Vascular: 2+ pulses, cap refill < 2 seconds  Neuro: Non-focal, AAOx4, sensation intact BL  Musculoskeletal: Normal tone, no deformities  Skin: Warm, dry; no acute rash seen  Psych: Appropriate, cooperative

## 2024-04-08 NOTE — ED ADULT NURSE NOTE - NSFALLRISKINTERV_ED_ALL_ED

## 2024-04-09 DIAGNOSIS — R00.0 TACHYCARDIA, UNSPECIFIED: ICD-10-CM

## 2024-04-09 DIAGNOSIS — I26.99 OTHER PULMONARY EMBOLISM WITHOUT ACUTE COR PULMONALE: ICD-10-CM

## 2024-04-09 LAB
24R-OH-CALCIDIOL SERPL-MCNC: 19.8 NG/ML — LOW (ref 30–80)
ALBUMIN SERPL ELPH-MCNC: 3.8 G/DL — SIGNIFICANT CHANGE UP (ref 3.3–5.2)
ALP SERPL-CCNC: 90 U/L — SIGNIFICANT CHANGE UP (ref 40–120)
ALT FLD-CCNC: 286 U/L — HIGH
ANION GAP SERPL CALC-SCNC: 14 MMOL/L — SIGNIFICANT CHANGE UP (ref 5–17)
APTT BLD: 26.9 SEC — SIGNIFICANT CHANGE UP (ref 24.5–35.6)
APTT BLD: >200 SEC — CRITICAL HIGH (ref 24.5–35.6)
AST SERPL-CCNC: 128 U/L — HIGH
BILIRUB DIRECT SERPL-MCNC: 1.3 MG/DL — HIGH (ref 0–0.3)
BILIRUB INDIRECT FLD-MCNC: 1.4 MG/DL — HIGH (ref 0.2–1)
BILIRUB SERPL-MCNC: 2.7 MG/DL — HIGH (ref 0.4–2)
BUN SERPL-MCNC: 10.1 MG/DL — SIGNIFICANT CHANGE UP (ref 8–20)
CALCIUM SERPL-MCNC: 9.1 MG/DL — SIGNIFICANT CHANGE UP (ref 8.4–10.5)
CHLORIDE SERPL-SCNC: 101 MMOL/L — SIGNIFICANT CHANGE UP (ref 96–108)
CO2 SERPL-SCNC: 23 MMOL/L — SIGNIFICANT CHANGE UP (ref 22–29)
CREAT SERPL-MCNC: 0.47 MG/DL — LOW (ref 0.5–1.3)
CULTURE RESULTS: NO GROWTH — SIGNIFICANT CHANGE UP
EGFR: 147 ML/MIN/1.73M2 — SIGNIFICANT CHANGE UP
GLUCOSE BLDC GLUCOMTR-MCNC: 93 MG/DL — SIGNIFICANT CHANGE UP (ref 70–99)
GLUCOSE SERPL-MCNC: 95 MG/DL — SIGNIFICANT CHANGE UP (ref 70–99)
HCT VFR BLD CALC: 40.5 % — SIGNIFICANT CHANGE UP (ref 39–50)
HCT VFR BLD CALC: 45.7 % — SIGNIFICANT CHANGE UP (ref 39–50)
HGB BLD-MCNC: 13.6 G/DL — SIGNIFICANT CHANGE UP (ref 13–17)
HGB BLD-MCNC: 15.3 G/DL — SIGNIFICANT CHANGE UP (ref 13–17)
LIDOCAIN IGE QN: 29 U/L — SIGNIFICANT CHANGE UP (ref 22–51)
MAGNESIUM SERPL-MCNC: 2 MG/DL — SIGNIFICANT CHANGE UP (ref 1.6–2.6)
MCHC RBC-ENTMCNC: 26.7 PG — LOW (ref 27–34)
MCHC RBC-ENTMCNC: 26.7 PG — LOW (ref 27–34)
MCHC RBC-ENTMCNC: 33.5 GM/DL — SIGNIFICANT CHANGE UP (ref 32–36)
MCHC RBC-ENTMCNC: 33.6 GM/DL — SIGNIFICANT CHANGE UP (ref 32–36)
MCV RBC AUTO: 79.4 FL — LOW (ref 80–100)
MCV RBC AUTO: 79.9 FL — LOW (ref 80–100)
PHOSPHATE SERPL-MCNC: 4.7 MG/DL — SIGNIFICANT CHANGE UP (ref 2.4–4.7)
PLATELET # BLD AUTO: 291 K/UL — SIGNIFICANT CHANGE UP (ref 150–400)
PLATELET # BLD AUTO: 295 K/UL — SIGNIFICANT CHANGE UP (ref 150–400)
POTASSIUM SERPL-MCNC: 3.5 MMOL/L — SIGNIFICANT CHANGE UP (ref 3.5–5.3)
POTASSIUM SERPL-SCNC: 3.5 MMOL/L — SIGNIFICANT CHANGE UP (ref 3.5–5.3)
PROCALCITONIN SERPL-MCNC: 0.06 NG/ML — SIGNIFICANT CHANGE UP (ref 0.02–0.1)
PROT SERPL-MCNC: 6.8 G/DL — SIGNIFICANT CHANGE UP (ref 6.6–8.7)
RBC # BLD: 5.1 M/UL — SIGNIFICANT CHANGE UP (ref 4.2–5.8)
RBC # BLD: 5.72 M/UL — SIGNIFICANT CHANGE UP (ref 4.2–5.8)
RBC # FLD: 14.9 % — HIGH (ref 10.3–14.5)
RBC # FLD: 15.3 % — HIGH (ref 10.3–14.5)
SODIUM SERPL-SCNC: 138 MMOL/L — SIGNIFICANT CHANGE UP (ref 135–145)
SPECIMEN SOURCE: SIGNIFICANT CHANGE UP
T4 FREE SERPL-MCNC: 1.5 NG/DL — SIGNIFICANT CHANGE UP (ref 0.9–1.8)
WBC # BLD: 5.64 K/UL — SIGNIFICANT CHANGE UP (ref 3.8–10.5)
WBC # BLD: 5.92 K/UL — SIGNIFICANT CHANGE UP (ref 3.8–10.5)
WBC # FLD AUTO: 5.64 K/UL — SIGNIFICANT CHANGE UP (ref 3.8–10.5)
WBC # FLD AUTO: 5.92 K/UL — SIGNIFICANT CHANGE UP (ref 3.8–10.5)

## 2024-04-09 PROCEDURE — 99233 SBSQ HOSP IP/OBS HIGH 50: CPT | Mod: FS

## 2024-04-09 PROCEDURE — 99223 1ST HOSP IP/OBS HIGH 75: CPT | Mod: FS

## 2024-04-09 PROCEDURE — 99233 SBSQ HOSP IP/OBS HIGH 50: CPT

## 2024-04-09 PROCEDURE — 93010 ELECTROCARDIOGRAM REPORT: CPT

## 2024-04-09 PROCEDURE — 93970 EXTREMITY STUDY: CPT | Mod: 26

## 2024-04-09 PROCEDURE — 71275 CT ANGIOGRAPHY CHEST: CPT | Mod: 26

## 2024-04-09 PROCEDURE — 74177 CT ABD & PELVIS W/CONTRAST: CPT | Mod: 26

## 2024-04-09 PROCEDURE — 99232 SBSQ HOSP IP/OBS MODERATE 35: CPT

## 2024-04-09 RX ORDER — KETOROLAC TROMETHAMINE 30 MG/ML
15 SYRINGE (ML) INJECTION ONCE
Refills: 0 | Status: DISCONTINUED | OUTPATIENT
Start: 2024-04-09 | End: 2024-04-09

## 2024-04-09 RX ORDER — ONDANSETRON 8 MG/1
4 TABLET, FILM COATED ORAL ONCE
Refills: 0 | Status: COMPLETED | OUTPATIENT
Start: 2024-04-09 | End: 2024-04-09

## 2024-04-09 RX ORDER — HEPARIN SODIUM 5000 [USP'U]/ML
5000 INJECTION INTRAVENOUS; SUBCUTANEOUS EVERY 6 HOURS
Refills: 0 | Status: DISCONTINUED | OUTPATIENT
Start: 2024-04-09 | End: 2024-04-12

## 2024-04-09 RX ORDER — SODIUM CHLORIDE 9 MG/ML
1000 INJECTION, SOLUTION INTRAVENOUS
Refills: 0 | Status: DISCONTINUED | OUTPATIENT
Start: 2024-04-09 | End: 2024-04-17

## 2024-04-09 RX ORDER — HEPARIN SODIUM 5000 [USP'U]/ML
10000 INJECTION INTRAVENOUS; SUBCUTANEOUS EVERY 6 HOURS
Refills: 0 | Status: DISCONTINUED | OUTPATIENT
Start: 2024-04-09 | End: 2024-04-12

## 2024-04-09 RX ORDER — METOCLOPRAMIDE HCL 10 MG
10 TABLET ORAL ONCE
Refills: 0 | Status: COMPLETED | OUTPATIENT
Start: 2024-04-09 | End: 2024-04-09

## 2024-04-09 RX ORDER — ERGOCALCIFEROL 1.25 MG/1
50000 CAPSULE ORAL
Refills: 0 | Status: DISCONTINUED | OUTPATIENT
Start: 2024-04-09 | End: 2024-04-29

## 2024-04-09 RX ORDER — OLANZAPINE 15 MG/1
2.5 TABLET, FILM COATED ORAL AT BEDTIME
Refills: 0 | Status: DISCONTINUED | OUTPATIENT
Start: 2024-04-09 | End: 2024-04-12

## 2024-04-09 RX ORDER — ACETAMINOPHEN 500 MG
1000 TABLET ORAL ONCE
Refills: 0 | Status: COMPLETED | OUTPATIENT
Start: 2024-04-09 | End: 2024-04-09

## 2024-04-09 RX ORDER — PANTOPRAZOLE SODIUM 20 MG/1
40 TABLET, DELAYED RELEASE ORAL
Refills: 0 | Status: DISCONTINUED | OUTPATIENT
Start: 2024-04-09 | End: 2024-04-29

## 2024-04-09 RX ORDER — HEPARIN SODIUM 5000 [USP'U]/ML
INJECTION INTRAVENOUS; SUBCUTANEOUS
Qty: 25000 | Refills: 0 | Status: DISCONTINUED | OUTPATIENT
Start: 2024-04-09 | End: 2024-04-12

## 2024-04-09 RX ORDER — HEPARIN SODIUM 5000 [USP'U]/ML
10000 INJECTION INTRAVENOUS; SUBCUTANEOUS ONCE
Refills: 0 | Status: COMPLETED | OUTPATIENT
Start: 2024-04-09 | End: 2024-04-09

## 2024-04-09 RX ORDER — METOPROLOL TARTRATE 50 MG
5 TABLET ORAL EVERY 8 HOURS
Refills: 0 | Status: DISCONTINUED | OUTPATIENT
Start: 2024-04-09 | End: 2024-04-29

## 2024-04-09 RX ADMIN — HEPARIN SODIUM 2400 UNIT(S)/HR: 5000 INJECTION INTRAVENOUS; SUBCUTANEOUS at 19:46

## 2024-04-09 RX ADMIN — HEPARIN SODIUM 10000 UNIT(S): 5000 INJECTION INTRAVENOUS; SUBCUTANEOUS at 16:34

## 2024-04-09 RX ADMIN — ATORVASTATIN CALCIUM 40 MILLIGRAM(S): 80 TABLET, FILM COATED ORAL at 22:41

## 2024-04-09 RX ADMIN — Medication 1000 MILLIGRAM(S): at 04:04

## 2024-04-09 RX ADMIN — ONDANSETRON 4 MILLIGRAM(S): 8 TABLET, FILM COATED ORAL at 11:25

## 2024-04-09 RX ADMIN — Medication 81 MILLIGRAM(S): at 11:56

## 2024-04-09 RX ADMIN — OLANZAPINE 2.5 MILLIGRAM(S): 15 TABLET, FILM COATED ORAL at 22:42

## 2024-04-09 RX ADMIN — Medication 10 MILLIGRAM(S): at 10:02

## 2024-04-09 RX ADMIN — HEPARIN SODIUM 2400 UNIT(S)/HR: 5000 INJECTION INTRAVENOUS; SUBCUTANEOUS at 20:52

## 2024-04-09 RX ADMIN — PREGABALIN 1000 MICROGRAM(S): 225 CAPSULE ORAL at 12:00

## 2024-04-09 RX ADMIN — PANTOPRAZOLE SODIUM 40 MILLIGRAM(S): 20 TABLET, DELAYED RELEASE ORAL at 19:20

## 2024-04-09 RX ADMIN — HEPARIN SODIUM 2400 UNIT(S)/HR: 5000 INJECTION INTRAVENOUS; SUBCUTANEOUS at 15:49

## 2024-04-09 RX ADMIN — Medication 1 MILLIGRAM(S): at 11:55

## 2024-04-09 RX ADMIN — Medication 15 MILLIGRAM(S): at 19:23

## 2024-04-09 RX ADMIN — Medication 5 MILLIGRAM(S): at 10:25

## 2024-04-09 RX ADMIN — TAMSULOSIN HYDROCHLORIDE 0.4 MILLIGRAM(S): 0.4 CAPSULE ORAL at 22:41

## 2024-04-09 RX ADMIN — Medication 400 MILLIGRAM(S): at 10:31

## 2024-04-09 RX ADMIN — HEPARIN SODIUM 0 UNIT(S)/HR: 5000 INJECTION INTRAVENOUS; SUBCUTANEOUS at 23:56

## 2024-04-09 RX ADMIN — SODIUM CHLORIDE 100 MILLILITER(S): 9 INJECTION, SOLUTION INTRAVENOUS at 11:53

## 2024-04-09 NOTE — CONSULT NOTE ADULT - SUBJECTIVE AND OBJECTIVE BOX
HPI: HPI: 25 yo M s/p sleeve gastrectomy 2/2024 who presents with complaints of tingling in LE, dizziness , nausea and small volume bilious emesis.  Denies any fever/chills at this time. Patient most recently discharged with similar complaints.  UGI study performed at this time , wnl. Now with slightly elevated LFTs Tbili/Alk phos/AST/ALT of 2.1/98/135/233 respectively. CT  A/P wnl, no acute intrabdominal pathology.      ROS: 10-system review is otherwise negative except HPI above.      PAST MEDICAL & SURGICAL HISTORY:  S/P gastric sleeve procedure      Obesity      Obstructive sleep apnea      Anxiety and depression      H/O gastric sleeve        FAMILY HISTORY:  No pertinent family history in first degree relatives      Family history not pertinent as reviewed with the patient.    SOCIAL HISTORY:  Denies any toxic habits    ALLERGIES: NKA No Known Allergies      HOME MEDICATIONS: ***  Home Medications:  Atarax 25 mg oral tablet: 1 tab(s) orally 3 times a day as needed for  anxiety (08 Apr 2024 07:05)  cyanocobalamin 1000 mcg/15 mL oral liquid: 15 milliliter(s) orally once a day (08 Apr 2024 07:05)  famotidine 20 mg oral tablet: 1 tab(s) orally 2 times a day (08 Apr 2024 07:05)  Flomax 0.4 mg oral capsule: 1 cap(s) orally once a day (08 Apr 2024 07:05)  simethicone 80 mg oral tablet, chewable: 1 tab(s) chewed 2 times a day as needed for spasms (08 Apr 2024 07:05)  Zofran 4 mg/5 mL oral solution: 5 milliliter(s) orally every 8 hours as needed for  nausea/ vomiting (08 Apr 2024 07:05)      --------------------------------------------------------------------------------------------    PHYSICAL EXAM:   General: NAD, Lying in bed comfortably  Neuro: A+Ox3  HEENT: EOMI, PERHUGO, MMM  Cardio: RRR  Resp: Non labored breathing on RA  GI/Abd: Soft, NT/ND, no rebound/guarding, no masses palpated  Vascular: All 4 extremities warm and well perfused.     --------------------------------------------------------------------------------------------    LABS       138    |  101    |  10.1   ----------------------------<  95         ( 09 Apr 2024 09:26 )  3.5     |  23.0   |  0.47     Ca    9.1        ( 09 Apr 2024 09:26 )  Phos  4.7       ( 09 Apr 2024 09:26 )  Mg     2.0       ( 09 Apr 2024 09:26 )    TPro  6.8    /  Alb  3.8    /  TBili  2.7    /  DBili  1.3    /  AST  128    /  ALT  286    /  AlkPhos  90     ( 09 Apr 2024 09:26 )    LIVER FUNCTIONS - ( 09 Apr 2024 09:26 )  Alb: 3.8 g/dL / Pro: 6.8 g/dL / ALK PHOS: 90 U/L / ALT: 286 U/L / AST: 128 U/L / GGT: x               CAPILLARY BLOOD GLUCOSE        Urinalysis Basic - ( 09 Apr 2024 09:26 )    Color: x / Appearance: x / SG: x / pH: x  Gluc: 95 mg/dL / Ketone: x  / Bili: x / Urobili: x   Blood: x / Protein: x / Nitrite: x   Leuk Esterase: x / RBC: x / WBC x   Sq Epi: x / Non Sq Epi: x / Bacteria: x          --------------------------------------------------------------------------------------------  IMAGING  ***

## 2024-04-09 NOTE — CONSULT NOTE ADULT - SUBJECTIVE AND OBJECTIVE BOX
HPI:  24 year old male with a past medical history of obesity, anxiety and depression status post recent Gastric sleeve surgery at [facility] Discharged from the hospital 2 days ago presents with double vision, dizziness, room spinning sensation, weakness in the leg, and decreased PO intake. Patient has had the symptoms of weakness and poor PO intake since prior admission. His workup was negative then including EGD, emptying study. He was seen by PT and recommended outpatient CT. He has also been having recurrent urinary retention for which he has required recurrent catheterizations. Today, he comes back to the ED with complaint of blurry vision/double vision. Patient is living with mother and aunt who are caring for him.  (08 Apr 2024 06:28)  Pt seen states he is unable to open eyes due to the room spinning which promotes  nausea and vomiting.    PAST MEDICAL & SURGICAL HISTORY:  S/P gastric sleeve procedure  Obesity  Obstructive sleep apnea  Anxiety and depression  H/O gastric sleeve    REVIEW OF SYSTEMS:    CONSTITUTIONAL: No fever, weight loss, or fatigue  EYES: No eye pain, visual disturbances, or discharge  ENMT:  No difficulty hearing, tinnitus, vertigo; No sinus or throat pain  NECK: No pain or stiffness  BREASTS: No pain, masses, or nipple discharge  RESPIRATORY: No cough, wheezing, chills or hemoptysis; No shortness of breath  CARDIOVASCULAR: No chest pain, palpitations, dizziness, or leg swelling  GASTROINTESTINAL: No abdominal or epigastric pain. No nausea, vomiting, or hematemesis; No diarrhea or constipation. No melena or hematochezia.  GENITOURINARY: No dysuria, frequency, hematuria, or incontinence  NEUROLOGICAL: No headaches, memory loss, loss of strength, numbness, or tremors  SKIN: No itching, burning, rashes, or lesions   LYMPH NODES: No enlarged glands  ENDOCRINE: No heat or cold intolerance; No hair loss  MUSCULOSKELETAL: No joint pain or swelling; No muscle, back, or extremity pain  PSYCHIATRIC: No depression, anxiety, mood swings, or difficulty sleeping  HEME/LYMPH: No easy bruising, or bleeding gums  ALLERY AND IMMUNOLOGIC: No hives or eczema    Allergies  No Known Allergies  Intolerances      MEDICATIONS  (STANDING):  aspirin enteric coated 81 milliGRAM(s) Oral daily  atorvastatin 40 milliGRAM(s) Oral at bedtime  cyanocobalamin 1000 MICROGram(s) Oral daily  enoxaparin Injectable 40 milliGRAM(s) SubCutaneous every 12 hours  folic acid 1 milliGRAM(s) Oral daily  lactated ringers. 1000 milliLiter(s) (100 mL/Hr) IV Continuous <Continuous>  ondansetron Injectable 4 milliGRAM(s) IV Push once  pantoprazole  Injectable 40 milliGRAM(s) IV Push two times a day  sodium chloride 0.9%. 1000 milliLiter(s) (85 mL/Hr) IV Continuous <Continuous>  tamsulosin 0.4 milliGRAM(s) Oral at bedtime    MEDICATIONS  (PRN):  acetaminophen     Tablet .. 650 milliGRAM(s) Oral every 6 hours PRN Temp greater or equal to 38C (100.4F), Mild Pain (1 - 3)  aluminum hydroxide/magnesium hydroxide/simethicone Suspension 30 milliLiter(s) Oral every 4 hours PRN Dyspepsia  meclizine 12.5 milliGRAM(s) Oral three times a day PRN Dizziness  melatonin 3 milliGRAM(s) Oral at bedtime PRN Insomnia  metoprolol tartrate Injectable 5 milliGRAM(s) IV Push every 8 hours PRN hr over 120  ondansetron Injectable 4 milliGRAM(s) IV Push every 8 hours PRN Nausea and/or Vomiting    SOCIAL HISTORY:  FAMILY HISTORY:  No pertinent family history in first degree relatives      Vital Signs Last 24 Hrs  T(C): 36.3 (09 Apr 2024 07:54), Max: 36.6 (08 Apr 2024 23:03)  T(F): 97.4 (09 Apr 2024 07:54), Max: 97.9 (09 Apr 2024 04:18)  HR: 56 (09 Apr 2024 07:54) (56 - 82)  BP: 151/93 (09 Apr 2024 07:54) (122/78 - 151/93)  BP(mean): --  RR: 18 (09 Apr 2024 07:54) (18 - 18)  SpO2: 100% (09 Apr 2024 07:54) (96% - 100%)    Parameters below as of 09 Apr 2024 07:54  Patient On (Oxygen Delivery Method): room air    PHYSICAL EXAM:  GCS15  GENERAL: NAD,   HEAD:  Atraumatic, Normocephalic  EYES: maintaining eyes closed   ENMT: No tonsillar erythema, exudates, or enlargement; Moist mucous membranes,  NECK: Supple,   NERVOUS SYSTEM:  Alert & Oriented X3; Motor Strength right -4/5 hand grasp , left 5/5, triceps biceps 3/4 left 4/5 biceps/ tricpes, B/L upper and lower extremities right 4/5 knee,  left straight left ; DTRs 2+ intact and symmetric  sensory numbness upper thigh bilat, numbness to groin region schaffer in place.   CHEST/LUNG: Clear bilaterally;   HEART: Regular rate and rhythm; No murmurs, rubs, or gallops  ABDOMEN: Soft, Nontender, Nondistended; Bowel sounds present  EXTREMITIES:  2+ Peripheral Pulses, No edema      LABS:                        13.6   6.09  )-----------( 298      ( 08 Apr 2024 06:45 )             40.8     04-09    138  |  101  |  10.1  ----------------------------<  95  3.5   |  23.0  |  0.47<L>    Ca    9.1      09 Apr 2024 09:26  Mg     2.1     04-08    TPro  6.9  /  Alb  3.9  /  TBili  2.8<H>  /  DBili  1.5<H>  /  AST  153<H>  /  ALT  310<H>  /  AlkPhos  94  04-08      Urinalysis Basic - ( 09 Apr 2024 09:26 )    Color: x / Appearance: x / SG: x / pH: x  Gluc: 95 mg/dL / Ketone: x  / Bili: x / Urobili: x   Blood: x / Protein: x / Nitrite: x   Leuk Esterase: x / RBC: x / WBC x   Sq Epi: x / Non Sq Epi: x / Bacteria: x  Urinalysis Basic - ( 09 Apr 2024 09:26 )      RADIOLOGY & ADDITIONAL STUDIES:  ~~~~~~~~~~~~~~~~~~~~~~~~~~~~~~   CT Head No Cont (04.08.24 @ 04:04) >  IMPRESSION:  Ill-defined hypodensities in the right temporal and right occipital   lobes. It is unclear if these are artifactual in nature.In the presence   of acute also focalized neurological deficits, ischemia may be   entertained. Consider follow-up by MRI imaging, if clinically indicated.  --- End of Report ---  MR Angio Head No Cont (04.08.24 @ 19:44) >  PROCEDURE DATE:  04/08/2024    IMPRESSION:  1.  BRAIN:   Unremarkable MR of the brain.  2.  ANTERIOR CIRCULATION:    Intact.  3. POSTERIOR CIRCULATION:  Intact.  --- End of Report ---    MR Thoracic Spine No Cont (04.08.24 @ 19:37) >  ACC: [id number] EXAM:  MR SPINE LUMBAR   ORDERED BY: NORMA JUSTIN   ACC: [id number] EXAM:  MR SPINE THORACIC   ORDERED BY: YANIRA TRAN   PROCEDURE DATE:  04/08/2024    IMPRESSION:  1. THORACIC SPINE:   Multiple thoracic disc protrusions (disc herniation)    cause ventral cord deformity and at T3-T4 causes cord edema/gliosis  2. LUMBAR SPINE:   Unremarkable MR of the lumbar spine.    --- End of Report ---   HPI:  24 year old male with a past medical history of obesity, anxiety and depression status post recent Gastric sleeve surgery at [facility] Discharged from the hospital 2 days ago presents with double vision, dizziness, room spinning sensation, weakness in the leg, and decreased PO intake. Patient has had the symptoms of weakness and poor PO intake since prior admission. His workup was negative then including EGD, emptying study. He was seen by PT and recommended outpatient CT. He has also been having recurrent urinary retention for which he has required recurrent catheterizations. Today, he comes back to the ED with complaint of blurry vision/double vision. Patient is living with mother and aunt who are caring for him.  (08 Apr 2024 06:28)  Pt seen states he is unable to open eyes due to the room spinning which promotes nausea and vomiting.  Pt is experiencing projectile vomiting  PAST MEDICAL & SURGICAL HISTORY:  S/P gastric sleeve procedure  Obesity  Obstructive sleep apnea  Anxiety and depression  H/O gastric sleeve    REVIEW OF SYSTEMS:    CONSTITUTIONAL: No fever, weight loss, or fatigue  EYES: No eye pain, visual disturbances, or discharge  ENMT:  No difficulty hearing, tinnitus, vertigo; No sinus or throat pain  NECK: No pain or stiffness  BREASTS: No pain, masses, or nipple discharge  RESPIRATORY: No cough, wheezing, chills or hemoptysis; No shortness of breath  CARDIOVASCULAR: No chest pain, palpitations, dizziness, or leg swelling  GASTROINTESTINAL: No abdominal or epigastric pain. No nausea, vomiting, or hematemesis; No diarrhea or constipation. No melena or hematochezia.  GENITOURINARY: No dysuria, frequency, hematuria, or incontinence  NEUROLOGICAL: No headaches, memory loss, loss of strength, pos numbness, or tremors  SKIN: No itching, burning, rashes, or lesions   LYMPH NODES: No enlarged glands  ENDOCRINE: No heat or cold intolerance; No hair loss  MUSCULOSKELETAL: No joint pain or swelling; pos muscle, back, or extremity pain  PSYCHIATRIC: No depression, anxiety, mood swings, or difficulty sleeping  HEME/LYMPH: No easy bruising, or bleeding gums  ALLERY AND IMMUNOLOGIC: No hives or eczema    Allergies  No Known Allergies  Intolerances      MEDICATIONS  (STANDING):  aspirin enteric coated 81 milliGRAM(s) Oral daily  atorvastatin 40 milliGRAM(s) Oral at bedtime  cyanocobalamin 1000 MICROGram(s) Oral daily  enoxaparin Injectable 40 milliGRAM(s) SubCutaneous every 12 hours  folic acid 1 milliGRAM(s) Oral daily  lactated ringers. 1000 milliLiter(s) (100 mL/Hr) IV Continuous <Continuous>  ondansetron Injectable 4 milliGRAM(s) IV Push once  pantoprazole  Injectable 40 milliGRAM(s) IV Push two times a day  sodium chloride 0.9%. 1000 milliLiter(s) (85 mL/Hr) IV Continuous <Continuous>  tamsulosin 0.4 milliGRAM(s) Oral at bedtime    MEDICATIONS  (PRN):  acetaminophen     Tablet .. 650 milliGRAM(s) Oral every 6 hours PRN Temp greater or equal to 38C (100.4F), Mild Pain (1 - 3)  aluminum hydroxide/magnesium hydroxide/simethicone Suspension 30 milliLiter(s) Oral every 4 hours PRN Dyspepsia  meclizine 12.5 milliGRAM(s) Oral three times a day PRN Dizziness  melatonin 3 milliGRAM(s) Oral at bedtime PRN Insomnia  metoprolol tartrate Injectable 5 milliGRAM(s) IV Push every 8 hours PRN hr over 120  ondansetron Injectable 4 milliGRAM(s) IV Push every 8 hours PRN Nausea and/or Vomiting    SOCIAL HISTORY:  FAMILY HISTORY:  No pertinent family history in first degree relatives      Vital Signs Last 24 Hrs  T(C): 36.3 (09 Apr 2024 07:54), Max: 36.6 (08 Apr 2024 23:03)  T(F): 97.4 (09 Apr 2024 07:54), Max: 97.9 (09 Apr 2024 04:18)  HR: 56 (09 Apr 2024 07:54) (56 - 82)  BP: 151/93 (09 Apr 2024 07:54) (122/78 - 151/93)  BP(mean): --  RR: 18 (09 Apr 2024 07:54) (18 - 18)  SpO2: 100% (09 Apr 2024 07:54) (96% - 100%)    Parameters below as of 09 Apr 2024 07:54  Patient On (Oxygen Delivery Method): room air    PHYSICAL EXAM:  GCS15  GENERAL: NAD,   HEAD:  Atraumatic, Normocephalic  EYES: maintaining eyes closed   ENMT: No tonsillar erythema, exudates, or enlargement; Moist mucous membranes,  NECK: Supple,   NERVOUS SYSTEM:  Alert & Oriented X3; Motor Strength right -4/5 hand grasp , left 5/5, triceps biceps 3/4 left 4/5 biceps/ tricpes, B/L upper and lower extremities right 4/5 knee,  left straight left ; DTRs 2+ intact and symmetric  sensory numbness upper thigh bilat, numbness to groin region schaffer in place. urine dark kerri color  CHEST/LUNG: Clear bilaterally;   HEART: Regular rate and rhythm; No murmurs, rubs, or gallops  ABDOMEN: Soft, Nontender, Nondistended; Bowel sounds present  EXTREMITIES:  2+ Peripheral Pulses, No edema      LABS:                        13.6   6.09  )-----------( 298      ( 08 Apr 2024 06:45 )             40.8     04-09    138  |  101  |  10.1  ----------------------------<  95  3.5   |  23.0  |  0.47<L>    Ca    9.1      09 Apr 2024 09:26  Mg     2.1     04-08    TPro  6.9  /  Alb  3.9  /  TBili  2.8<H>  /  DBili  1.5<H>  /  AST  153<H>  /  ALT  310<H>  /  AlkPhos  94  04-08      Urinalysis Basic - ( 09 Apr 2024 09:26 )    Color: x / Appearance: x / SG: x / pH: x  Gluc: 95 mg/dL / Ketone: x  / Bili: x / Urobili: x   Blood: x / Protein: x / Nitrite: x   Leuk Esterase: x / RBC: x / WBC x   Sq Epi: x / Non Sq Epi: x / Bacteria: x  Urinalysis Basic - ( 09 Apr 2024 09:26 )      RADIOLOGY & ADDITIONAL STUDIES:  ~~~~~~~~~~~~~~~~~~~~~~~~~~~~~~   CT Head No Cont (04.08.24 @ 04:04) >  IMPRESSION:  Ill-defined hypodensities in the right temporal and right occipital   lobes. It is unclear if these are artifactual in nature.In the presence   of acute also focalized neurological deficits, ischemia may be   entertained. Consider follow-up by MRI imaging, if clinically indicated.  --- End of Report ---  MR Angio Head No Cont (04.08.24 @ 19:44) >  PROCEDURE DATE:  04/08/2024    IMPRESSION:  1.  BRAIN:   Unremarkable MR of the brain.  2.  ANTERIOR CIRCULATION:    Intact.  3. POSTERIOR CIRCULATION:  Intact.  --- End of Report ---    MR Thoracic Spine No Cont (04.08.24 @ 19:37) >  ACC: [id number] EXAM:  MR SPINE LUMBAR   ORDERED BY: NORMA JUSTIN   ACC: [id number] EXAM:  MR SPINE THORACIC   ORDERED BY: YANIRA TRAN   PROCEDURE DATE:  04/08/2024    IMPRESSION:  1. THORACIC SPINE:   Multiple thoracic disc protrusions (disc herniation)    cause ventral cord deformity and at T3-T4 causes cord edema/gliosis  2. LUMBAR SPINE:   Unremarkable MR of the lumbar spine.    --- End of Report ---   HPI:  24 year old male with a past medical history of obesity, anxiety and depression status post recent Gastric sleeve surgery at [facility] Discharged from the hospital 2 days ago presents with double vision, dizziness, room spinning sensation, weakness in the leg, and decreased PO intake. Patient has had the symptoms of weakness and poor PO intake since prior admission. His workup was negative then including EGD, emptying study. He was seen by PT and recommended outpatient CT. He has also been having recurrent urinary retention for which he has required recurrent catheterizations. Today, he comes back to the ED with complaint of blurry vision/double vision. Patient is living with mother and aunt who are caring for him.  (08 Apr 2024 06:28)  Pt seen states he is unable to open eyes due to the room spinning which promotes nausea and vomiting.  Pt is experiencing projectile vomiting  PAST MEDICAL & SURGICAL HISTORY:  S/P gastric sleeve procedure  Obesity  Obstructive sleep apnea  Anxiety and depression  H/O gastric sleeve    REVIEW OF SYSTEMS:    CONSTITUTIONAL: No fever, weight loss, or fatigue  EYES: No eye pain, visual disturbances, or discharge  ENMT:  No difficulty hearing, tinnitus, vertigo; No sinus or throat pain  NECK: No pain or stiffness  BREASTS: No pain, masses, or nipple discharge  RESPIRATORY: No cough, wheezing, chills or hemoptysis; No shortness of breath  CARDIOVASCULAR: No chest pain, palpitations, dizziness, or leg swelling  GASTROINTESTINAL: No abdominal or epigastric pain. No nausea, vomiting, or hematemesis; No diarrhea or constipation. No melena or hematochezia.  GENITOURINARY: No dysuria, frequency, hematuria, or incontinence  NEUROLOGICAL: No headaches, memory loss, loss of strength, pos numbness, or tremors  SKIN: No itching, burning, rashes, or lesions   LYMPH NODES: No enlarged glands  ENDOCRINE: No heat or cold intolerance; No hair loss  MUSCULOSKELETAL: No joint pain or swelling; pos muscle, back, or extremity pain  PSYCHIATRIC: No depression, anxiety, mood swings, or difficulty sleeping  HEME/LYMPH: No easy bruising, or bleeding gums  ALLERY AND IMMUNOLOGIC: No hives or eczema    Allergies  No Known Allergies  Intolerances      MEDICATIONS  (STANDING):  aspirin enteric coated 81 milliGRAM(s) Oral daily  atorvastatin 40 milliGRAM(s) Oral at bedtime  cyanocobalamin 1000 MICROGram(s) Oral daily  enoxaparin Injectable 40 milliGRAM(s) SubCutaneous every 12 hours  folic acid 1 milliGRAM(s) Oral daily  lactated ringers. 1000 milliLiter(s) (100 mL/Hr) IV Continuous <Continuous>  ondansetron Injectable 4 milliGRAM(s) IV Push once  pantoprazole  Injectable 40 milliGRAM(s) IV Push two times a day  sodium chloride 0.9%. 1000 milliLiter(s) (85 mL/Hr) IV Continuous <Continuous>  tamsulosin 0.4 milliGRAM(s) Oral at bedtime    MEDICATIONS  (PRN):  acetaminophen     Tablet .. 650 milliGRAM(s) Oral every 6 hours PRN Temp greater or equal to 38C (100.4F), Mild Pain (1 - 3)  aluminum hydroxide/magnesium hydroxide/simethicone Suspension 30 milliLiter(s) Oral every 4 hours PRN Dyspepsia  meclizine 12.5 milliGRAM(s) Oral three times a day PRN Dizziness  melatonin 3 milliGRAM(s) Oral at bedtime PRN Insomnia  metoprolol tartrate Injectable 5 milliGRAM(s) IV Push every 8 hours PRN hr over 120  ondansetron Injectable 4 milliGRAM(s) IV Push every 8 hours PRN Nausea and/or Vomiting    SOCIAL HISTORY:  FAMILY HISTORY:  No pertinent family history in first degree relatives      Vital Signs Last 24 Hrs  T(C): 36.3 (09 Apr 2024 07:54), Max: 36.6 (08 Apr 2024 23:03)  T(F): 97.4 (09 Apr 2024 07:54), Max: 97.9 (09 Apr 2024 04:18)  HR: 56 (09 Apr 2024 07:54) (56 - 82)  BP: 151/93 (09 Apr 2024 07:54) (122/78 - 151/93)  BP(mean): --  RR: 18 (09 Apr 2024 07:54) (18 - 18)  SpO2: 100% (09 Apr 2024 07:54) (96% - 100%)    Parameters below as of 09 Apr 2024 07:54  Patient On (Oxygen Delivery Method): room air    PHYSICAL EXAM:  GCS15  GENERAL: NAD,   HEAD:  Atraumatic, Normocephalic  EYES: maintaining eyes closed   ENMT: No tonsillar erythema, exudates, or enlargement; Moist mucous membranes,  NECK: Supple,   NERVOUS SYSTEM:  Alert & Oriented X3; Motor Strength right 4/5 hand grasp , left 5/5, triceps biceps 5/5 left 4/5 biceps/ tricpes, B/L upper and lower extremities right 4/5 knee,  left straight left ; DTRs 2+ intact and symmetric  sensory numbness upper thigh bilat, numbness to groin region schaffer in place. urine dark kerri color  CHEST/LUNG: Clear bilaterally;   HEART: Regular rate and rhythm; No murmurs, rubs, or gallops  ABDOMEN: Soft, Nontender, Nondistended; Bowel sounds present  EXTREMITIES:  2+ Peripheral Pulses, No edema      LABS:                        13.6   6.09  )-----------( 298      ( 08 Apr 2024 06:45 )             40.8     04-09    138  |  101  |  10.1  ----------------------------<  95  3.5   |  23.0  |  0.47<L>    Ca    9.1      09 Apr 2024 09:26  Mg     2.1     04-08    TPro  6.9  /  Alb  3.9  /  TBili  2.8<H>  /  DBili  1.5<H>  /  AST  153<H>  /  ALT  310<H>  /  AlkPhos  94  04-08      Urinalysis Basic - ( 09 Apr 2024 09:26 )    Color: x / Appearance: x / SG: x / pH: x  Gluc: 95 mg/dL / Ketone: x  / Bili: x / Urobili: x   Blood: x / Protein: x / Nitrite: x   Leuk Esterase: x / RBC: x / WBC x   Sq Epi: x / Non Sq Epi: x / Bacteria: x  Urinalysis Basic - ( 09 Apr 2024 09:26 )      RADIOLOGY & ADDITIONAL STUDIES:  ~~~~~~~~~~~~~~~~~~~~~~~~~~~~~~   CT Head No Cont (04.08.24 @ 04:04) >  IMPRESSION:  Ill-defined hypodensities in the right temporal and right occipital   lobes. It is unclear if these are artifactual in nature.In the presence   of acute also focalized neurological deficits, ischemia may be   entertained. Consider follow-up by MRI imaging, if clinically indicated.  --- End of Report ---  MR Angio Head No Cont (04.08.24 @ 19:44) >  PROCEDURE DATE:  04/08/2024    IMPRESSION:  1.  BRAIN:   Unremarkable MR of the brain.  2.  ANTERIOR CIRCULATION:    Intact.  3. POSTERIOR CIRCULATION:  Intact.  --- End of Report ---    MR Thoracic Spine No Cont (04.08.24 @ 19:37) >  ACC: [id number] EXAM:  MR SPINE LUMBAR   ORDERED BY: NORMA JUSTIN   ACC: [id number] EXAM:  MR SPINE THORACIC   ORDERED BY: YANIRA TRAN   PROCEDURE DATE:  04/08/2024    IMPRESSION:  1. THORACIC SPINE:   Multiple thoracic disc protrusions (disc herniation)    cause ventral cord deformity and at T3-T4 causes cord edema/gliosis  2. LUMBAR SPINE:   Unremarkable MR of the lumbar spine.    --- End of Report ---   HPI:  24 year old male with a past medical history of obesity, anxiety and depression status post recent Gastric sleeve surgery at [facility] Discharged from the hospital 2 days ago presents with double vision, dizziness, room spinning sensation, weakness in the leg, and decreased PO intake. Patient has had the symptoms of weakness and poor PO intake since prior admission. His workup was negative then including EGD, emptying study. He was seen by PT and recommended outpatient CT. He has also been having recurrent urinary retention for which he has required recurrent catheterizations. Today, he comes back to the ED with complaint of blurry vision/double vision. Patient is living with mother and aunt who are caring for him.  (08 Apr 2024 06:28)  Pt seen states he is unable to open eyes due to the room spinning which promotes nausea and vomiting.  Pt is experiencing projectile vomiting  PAST MEDICAL & SURGICAL HISTORY:  S/P gastric sleeve procedure  Obesity  Obstructive sleep apnea  Anxiety and depression  H/O gastric sleeve    REVIEW OF SYSTEMS:    CONSTITUTIONAL: No fever, weight loss, or fatigue  EYES: No eye pain, visual disturbances, or discharge  ENMT:  No difficulty hearing, tinnitus, vertigo; No sinus or throat pain  NECK: No pain or stiffness  BREASTS: No pain, masses, or nipple discharge  RESPIRATORY: No cough, wheezing, chills or hemoptysis; No shortness of breath  CARDIOVASCULAR: No chest pain, palpitations, dizziness, or leg swelling  GASTROINTESTINAL: No abdominal or epigastric pain. No nausea, vomiting, or hematemesis; No diarrhea or constipation. No melena or hematochezia.  GENITOURINARY: No dysuria, frequency, hematuria, or incontinence  NEUROLOGICAL: No headaches, memory loss, loss of strength, pos numbness, or tremors  SKIN: No itching, burning, rashes, or lesions   LYMPH NODES: No enlarged glands  ENDOCRINE: No heat or cold intolerance; No hair loss  MUSCULOSKELETAL: No joint pain or swelling; pos muscle, back, or extremity pain  PSYCHIATRIC: No depression, anxiety, mood swings, or difficulty sleeping  HEME/LYMPH: No easy bruising, or bleeding gums  ALLERY AND IMMUNOLOGIC: No hives or eczema    Allergies  No Known Allergies  Intolerances      MEDICATIONS  (STANDING):  aspirin enteric coated 81 milliGRAM(s) Oral daily  atorvastatin 40 milliGRAM(s) Oral at bedtime  cyanocobalamin 1000 MICROGram(s) Oral daily  enoxaparin Injectable 40 milliGRAM(s) SubCutaneous every 12 hours  folic acid 1 milliGRAM(s) Oral daily  lactated ringers. 1000 milliLiter(s) (100 mL/Hr) IV Continuous <Continuous>  ondansetron Injectable 4 milliGRAM(s) IV Push once  pantoprazole  Injectable 40 milliGRAM(s) IV Push two times a day  sodium chloride 0.9%. 1000 milliLiter(s) (85 mL/Hr) IV Continuous <Continuous>  tamsulosin 0.4 milliGRAM(s) Oral at bedtime    MEDICATIONS  (PRN):  acetaminophen     Tablet .. 650 milliGRAM(s) Oral every 6 hours PRN Temp greater or equal to 38C (100.4F), Mild Pain (1 - 3)  aluminum hydroxide/magnesium hydroxide/simethicone Suspension 30 milliLiter(s) Oral every 4 hours PRN Dyspepsia  meclizine 12.5 milliGRAM(s) Oral three times a day PRN Dizziness  melatonin 3 milliGRAM(s) Oral at bedtime PRN Insomnia  metoprolol tartrate Injectable 5 milliGRAM(s) IV Push every 8 hours PRN hr over 120  ondansetron Injectable 4 milliGRAM(s) IV Push every 8 hours PRN Nausea and/or Vomiting    SOCIAL HISTORY:  FAMILY HISTORY:  No pertinent family history in first degree relatives      Vital Signs Last 24 Hrs  T(C): 36.3 (09 Apr 2024 07:54), Max: 36.6 (08 Apr 2024 23:03)  T(F): 97.4 (09 Apr 2024 07:54), Max: 97.9 (09 Apr 2024 04:18)  HR: 56 (09 Apr 2024 07:54) (56 - 82)  BP: 151/93 (09 Apr 2024 07:54) (122/78 - 151/93)  BP(mean): --  RR: 18 (09 Apr 2024 07:54) (18 - 18)  SpO2: 100% (09 Apr 2024 07:54) (96% - 100%)    Parameters below as of 09 Apr 2024 07:54  Patient On (Oxygen Delivery Method): room air    PHYSICAL EXAM:  GCS15  GENERAL: NAD,   HEAD:  Atraumatic, Normocephalic  EYES: maintaining eyes closed   ENMT: No tonsillar erythema, exudates, or enlargement; Moist mucous membranes,  NECK: Supple,   NERVOUS SYSTEM:  Alert & Oriented X3; Motor Strength right 4/5 hand grasp , left 5/5, triceps biceps 5/5 left 4/5 biceps/ tricpes, B/L upper and lower extremities right 4/5 knee,  left straight left ; DTRs 2+ intact and symmetric  sensory numbness upper thigh bilat, numbness to groin region schaffer in place. urine dark kerri color  CHEST/LUNG: Clear bilaterally;   HEART: Regular rate and rhythm; No murmurs, rubs, or gallops  ABDOMEN: Soft, Nontender, Nondistended; Bowel sounds present  EXTREMITIES:  2+ Peripheral Pulses, No edema  Muscular skeletal neg deform neg pin point tenderness      LABS:                        13.6   6.09  )-----------( 298      ( 08 Apr 2024 06:45 )             40.8     04-09    138  |  101  |  10.1  ----------------------------<  95  3.5   |  23.0  |  0.47<L>    Ca    9.1      09 Apr 2024 09:26  Mg     2.1     04-08    TPro  6.9  /  Alb  3.9  /  TBili  2.8<H>  /  DBili  1.5<H>  /  AST  153<H>  /  ALT  310<H>  /  AlkPhos  94  04-08      Urinalysis Basic - ( 09 Apr 2024 09:26 )    Color: x / Appearance: x / SG: x / pH: x  Gluc: 95 mg/dL / Ketone: x  / Bili: x / Urobili: x   Blood: x / Protein: x / Nitrite: x   Leuk Esterase: x / RBC: x / WBC x   Sq Epi: x / Non Sq Epi: x / Bacteria: x  Urinalysis Basic - ( 09 Apr 2024 09:26 )      RADIOLOGY & ADDITIONAL STUDIES:  ~~~~~~~~~~~~~~~~~~~~~~~~~~~~~~   CT Head No Cont (04.08.24 @ 04:04) >  IMPRESSION:  Ill-defined hypodensities in the right temporal and right occipital   lobes. It is unclear if these are artifactual in nature.In the presence   of acute also focalized neurological deficits, ischemia may be   entertained. Consider follow-up by MRI imaging, if clinically indicated.  --- End of Report ---  MR Angio Head No Cont (04.08.24 @ 19:44) >  PROCEDURE DATE:  04/08/2024    IMPRESSION:  1.  BRAIN:   Unremarkable MR of the brain.  2.  ANTERIOR CIRCULATION:    Intact.  3. POSTERIOR CIRCULATION:  Intact.  --- End of Report ---    MR Thoracic Spine No Cont (04.08.24 @ 19:37) >  ACC: [id number] EXAM:  MR SPINE LUMBAR   ORDERED BY: NORMA JUSTIN   ACC: [id number] EXAM:  MR SPINE THORACIC   ORDERED BY: YANIRA TRAN   PROCEDURE DATE:  04/08/2024    IMPRESSION:  1. THORACIC SPINE:   Multiple thoracic disc protrusions (disc herniation)    cause ventral cord deformity and at T3-T4 causes cord edema/gliosis  2. LUMBAR SPINE:   Unremarkable MR of the lumbar spine.    --- End of Report ---   HPI:  24 year old male with a past medical history of obesity, anxiety and depression status post recent Gastric sleeve surgery at [facility] Discharged from the hospital 2 days ago presents with double vision, dizziness, room spinning sensation, weakness in the leg, and decreased PO intake. Patient has had the symptoms of weakness and poor PO intake since prior admission. His workup was negative then including EGD, emptying study. He was seen by PT and recommended outpatient CT. He has also been having recurrent urinary retention for which he has required recurrent catheterizations. Today, he comes back to the ED with complaint of blurry vision/double vision. Patient is living with mother and aunt who are caring for him.  (08 Apr 2024 06:28)  Pt seen states he is unable to open eyes due to the room spinning which promotes nausea and vomiting.  Pt is experiencing projectile vomiting    PAST MEDICAL & SURGICAL HISTORY:  S/P gastric sleeve procedure  Obesity  Obstructive sleep apnea  Anxiety and depression  H/O gastric sleeve    REVIEW OF SYSTEMS:    CONSTITUTIONAL: No fever, weight loss, or fatigue  EYES: No eye pain, visual disturbances, or discharge  ENMT:  No difficulty hearing, tinnitus, vertigo; No sinus or throat pain  NECK: No pain or stiffness  BREASTS: No pain, masses, or nipple discharge  RESPIRATORY: No cough, wheezing, chills or hemoptysis; No shortness of breath  CARDIOVASCULAR: No chest pain, palpitations, dizziness, or leg swelling  GASTROINTESTINAL: No abdominal or epigastric pain. No nausea, vomiting, or hematemesis; No diarrhea or constipation. No melena or hematochezia.  GENITOURINARY: No dysuria, frequency, hematuria, or incontinence  NEUROLOGICAL: No headaches, memory loss, loss of strength, pos numbness, or tremors  SKIN: No itching, burning, rashes, or lesions   LYMPH NODES: No enlarged glands  ENDOCRINE: No heat or cold intolerance; No hair loss  MUSCULOSKELETAL: No joint pain or swelling; pos muscle, back, or extremity pain  PSYCHIATRIC: No depression, anxiety, mood swings, or difficulty sleeping  HEME/LYMPH: No easy bruising, or bleeding gums  ALLERY AND IMMUNOLOGIC: No hives or eczema    Allergies  No Known Allergies  Intolerances      MEDICATIONS  (STANDING):  aspirin enteric coated 81 milliGRAM(s) Oral daily  atorvastatin 40 milliGRAM(s) Oral at bedtime  cyanocobalamin 1000 MICROGram(s) Oral daily  enoxaparin Injectable 40 milliGRAM(s) SubCutaneous every 12 hours  folic acid 1 milliGRAM(s) Oral daily  lactated ringers. 1000 milliLiter(s) (100 mL/Hr) IV Continuous <Continuous>  ondansetron Injectable 4 milliGRAM(s) IV Push once  pantoprazole  Injectable 40 milliGRAM(s) IV Push two times a day  sodium chloride 0.9%. 1000 milliLiter(s) (85 mL/Hr) IV Continuous <Continuous>  tamsulosin 0.4 milliGRAM(s) Oral at bedtime    MEDICATIONS  (PRN):  acetaminophen     Tablet .. 650 milliGRAM(s) Oral every 6 hours PRN Temp greater or equal to 38C (100.4F), Mild Pain (1 - 3)  aluminum hydroxide/magnesium hydroxide/simethicone Suspension 30 milliLiter(s) Oral every 4 hours PRN Dyspepsia  meclizine 12.5 milliGRAM(s) Oral three times a day PRN Dizziness  melatonin 3 milliGRAM(s) Oral at bedtime PRN Insomnia  metoprolol tartrate Injectable 5 milliGRAM(s) IV Push every 8 hours PRN hr over 120  ondansetron Injectable 4 milliGRAM(s) IV Push every 8 hours PRN Nausea and/or Vomiting    SOCIAL HISTORY:  FAMILY HISTORY:  No pertinent family history in first degree relatives      Vital Signs Last 24 Hrs  T(C): 36.3 (09 Apr 2024 07:54), Max: 36.6 (08 Apr 2024 23:03)  T(F): 97.4 (09 Apr 2024 07:54), Max: 97.9 (09 Apr 2024 04:18)  HR: 56 (09 Apr 2024 07:54) (56 - 82)  BP: 151/93 (09 Apr 2024 07:54) (122/78 - 151/93)  BP(mean): --  RR: 18 (09 Apr 2024 07:54) (18 - 18)  SpO2: 100% (09 Apr 2024 07:54) (96% - 100%)    Parameters below as of 09 Apr 2024 07:54  Patient On (Oxygen Delivery Method): room air    PHYSICAL EXAM:  GCS15  GENERAL: NAD,   HEAD:  Atraumatic, Normocephalic  EYES: maintaining eyes closed   ENMT: No tonsillar erythema, exudates, or enlargement; Moist mucous membranes,  NECK: Supple,   NERVOUS SYSTEM:  Alert & Oriented X3; Motor Strength right 4/5 hand grasp , left 5/5, triceps biceps 5/5 left 4/5 biceps/ tricpes, B/L upper and lower extremities right 4/5 knee,  left straight left ; DTRs 2+ intact and symmetric  sensory numbness upper thigh bilat, numbness to groin region schaffer in place. urine dark kerri color  CHEST/LUNG: Clear bilaterally;   HEART: Regular rate and rhythm; No murmurs, rubs, or gallops  ABDOMEN: Soft, Nontender, Nondistended; Bowel sounds present  EXTREMITIES:  2+ Peripheral Pulses, No edema  Muscular skeletal neg deform neg pin point tenderness      LABS:                        13.6   6.09  )-----------( 298      ( 08 Apr 2024 06:45 )             40.8     04-09    138  |  101  |  10.1  ----------------------------<  95  3.5   |  23.0  |  0.47<L>    Ca    9.1      09 Apr 2024 09:26  Mg     2.1     04-08    TPro  6.9  /  Alb  3.9  /  TBili  2.8<H>  /  DBili  1.5<H>  /  AST  153<H>  /  ALT  310<H>  /  AlkPhos  94  04-08      Urinalysis Basic - ( 09 Apr 2024 09:26 )    Color: x / Appearance: x / SG: x / pH: x  Gluc: 95 mg/dL / Ketone: x  / Bili: x / Urobili: x   Blood: x / Protein: x / Nitrite: x   Leuk Esterase: x / RBC: x / WBC x   Sq Epi: x / Non Sq Epi: x / Bacteria: x  Urinalysis Basic - ( 09 Apr 2024 09:26 )      RADIOLOGY & ADDITIONAL STUDIES:  ~~~~~~~~~~~~~~~~~~~~~~~~~~~~~~   CT Head No Cont (04.08.24 @ 04:04) >  IMPRESSION:  Ill-defined hypodensities in the right temporal and right occipital   lobes. It is unclear if these are artifactual in nature.In the presence   of acute also focalized neurological deficits, ischemia may be   entertained. Consider follow-up by MRI imaging, if clinically indicated.  --- End of Report ---  MR Angio Head No Cont (04.08.24 @ 19:44) >  PROCEDURE DATE:  04/08/2024    IMPRESSION:  1.  BRAIN:   Unremarkable MR of the brain.  2.  ANTERIOR CIRCULATION:    Intact.  3. POSTERIOR CIRCULATION:  Intact.  --- End of Report ---    MR Thoracic Spine No Cont (04.08.24 @ 19:37) >  ACC: [id number] EXAM:  MR SPINE LUMBAR   ORDERED BY: NORMA JUSTIN   ACC: [id number] EXAM:  MR SPINE THORACIC   ORDERED BY: YANIRA TRAN   PROCEDURE DATE:  04/08/2024    IMPRESSION:  1. THORACIC SPINE:   Multiple thoracic disc protrusions (disc herniation)    cause ventral cord deformity and at T3-T4 causes cord edema/gliosis  2. LUMBAR SPINE:   Unremarkable MR of the lumbar spine.    --- End of Report ---

## 2024-04-09 NOTE — CONSULT NOTE ADULT - SUBJECTIVE AND OBJECTIVE BOX
[facility]                                              CARDIOLOGY AT [place] [address]                                             Telephone: [phone]. Fax:[phone]                                                       CARDIOLOGY CONSULTATION NOTE                                                                                             History obtained by: Patient and medical record   Community Cardiologist: None    obtained: Yes [  ] No [ x ]  Reason for Consultation: Tachycardia, r/o Afib   Available out pt records reviewed: Yes [  ] No [ x ]    Chief complaint:    Patient is a 24y old  Male who presents with a chief complaint of Visual changes with abnormal MRI (09 Apr 2024 10:39)    HPI:  24 year old male with a past medical history of obesity, anxiety and depression status post recent Gastric sleeve surgery at [facility] Discharged from the hospital 2 days ago presents with double vision, dizziness, room spinning sensation, weakness in the leg, and decreased PO intake. Patient has had the symptoms of weakness and poor PO intake since prior admission. His workup was negative then including EGD, emptying study. He was seen by PT and recommended outpatient CT. He has also been having recurrent urinary retention for which he has required recurrent catheterizations. Today, he comes back to the ED with complaint of blurry vision/double vision. Patient is living with mother and aunt who are caring for him.  (08 Apr 2024 06:28)    PAST MEDICAL HISTORY  S/P gastric sleeve procedure    Obesity    Obstructive sleep apnea    Anxiety and depression    PAST SURGICAL HISTORY  H/O gastric sleeve    Obstructive sleep apnea    SUBSTANCE USE HISTORY  Denies current and previous substance use [  ]   CIGARETTES -   ALCOHOL -   DRUGS -     FAMILY HISTORY:  No pertinent family history in first degree relatives    CARDIAC SPECIFIC FAMILY HX   No KNOWN family history of Cardiovascular disease, CAD, or sudden death in first degree relatives unless specified below  Family History of Cardiovascular Disease:  [  ]   Coronary Artery Disease in first degree relative:  [  ]   Sudden Cardiac Death in First degree relative: [  ]    HOME MEDICATIONS:  Atarax 25 mg oral tablet: 1 tab(s) orally 3 times a day as needed for  anxiety (08 Apr 2024 07:05)  cyanocobalamin 1000 mcg/15 mL oral liquid: 15 milliliter(s) orally once a day (08 Apr 2024 07:05)  famotidine 20 mg oral tablet: 1 tab(s) orally 2 times a day (08 Apr 2024 07:05)  Flomax 0.4 mg oral capsule: 1 cap(s) orally once a day (08 Apr 2024 07:05)  simethicone 80 mg oral tablet, chewable: 1 tab(s) chewed 2 times a day as needed for spasms (08 Apr 2024 07:05)  Zofran 4 mg/5 mL oral solution: 5 milliliter(s) orally every 8 hours as needed for  nausea/ vomiting (08 Apr 2024 07:05)    CURRENT CARDIAC MEDICATIONS:  metoprolol tartrate Injectable 5 milliGRAM(s) IV Push every 8 hours PRN hr over 120    CURRENT OTHER MEDICATIONS:  acetaminophen     Tablet .. 650 milliGRAM(s) Oral every 6 hours PRN Temp greater or equal to 38C (100.4F), Mild Pain (1 - 3)  meclizine 12.5 milliGRAM(s) Oral three times a day PRN Dizziness  melatonin 3 milliGRAM(s) Oral at bedtime PRN Insomnia  ondansetron Injectable 4 milliGRAM(s) IV Push every 8 hours PRN Nausea and/or Vomiting  aluminum hydroxide/magnesium hydroxide/simethicone Suspension 30 milliLiter(s) Oral every 4 hours PRN Dyspepsia  pantoprazole  Injectable 40 milliGRAM(s) IV Push two times a day  atorvastatin 40 milliGRAM(s) Oral at bedtime  cyanocobalamin 1000 MICROGram(s) Oral daily  ergocalciferol 40525 Unit(s) Oral every week  folic acid 1 milliGRAM(s) Oral daily  heparin   Injectable 44495 Unit(s) IV Push every 6 hours PRN For aPTT less than 40  heparin   Injectable 37561 Unit(s) IV Push once, Stop order after: 1 Doses  heparin   Injectable 5000 Unit(s) IV Push every 6 hours PRN For aPTT between 40 - 57  heparin  Infusion.  Unit(s)/Hr (24 mL/Hr) IV Continuous <Continuous>  lactated ringers. 1000 milliLiter(s) (100 mL/Hr) IV Continuous <Continuous>  tamsulosin 0.4 milliGRAM(s) Oral at bedtime    ALLERGIES:   No Known Allergies    VITAL SIGNS:  T(C): 36.6 (04-09-24 @ 14:18), Max: 36.6 (04-08-24 @ 23:03)  T(F): 97.8 (04-09-24 @ 14:18), Max: 97.9 (04-09-24 @ 04:18)  HR: 71 (04-09-24 @ 14:18) (56 - 82)  BP: 135/85 (04-09-24 @ 14:18) (125/84 - 151/93)  RR: 18 (04-09-24 @ 14:18) (18 - 18)  SpO2: 100% (04-09-24 @ 14:18) (96% - 100%)    INTAKE AND OUTPUT:     LABS:                   13.6   6.09  )-----------( 298      ( 08 Apr 2024 06:45 )             40.8     04-09    138  |  101  |  10.1  ----------------------------<  95  3.5   |  23.0  |  0.47<L>    Ca    9.1      09 Apr 2024 09:26  Phos  4.7     04-09  Mg     2.0     04-09    TPro  6.8  /  Alb  3.8  /  TBili  2.7<H>  /  DBili  1.3<H>  /  AST  128<H>  /  ALT  286<H>  /  AlkPhos  90  04-09    Urinalysis Basic - ( 09 Apr 2024 09:26 )    Color: x / Appearance: x / SG: x / pH: x  Gluc: 95 mg/dL / Ketone: x  / Bili: x / Urobili: x   Blood: x / Protein: x / Nitrite: x   Leuk Esterase: x / RBC: x / WBC x   Sq Epi: x / Non Sq Epi: x / Bacteria: x    RADIOLOGY IMAGING:   CT Angio Chest PE Protocol w/ IV Cont: Urgent   Indication: R/O PE  Transport: Stretcher-Crib  Exam Completed  Provider's Contact #: [phone] (04-09-24 @ 10:19) [Results Available]

## 2024-04-09 NOTE — CONSULT NOTE ADULT - PROBLEM SELECTOR RECOMMENDATION 2
- as above  - no indication for mechanical thrombectomy vs catheter thrombolysis, PE is very distal  - monitor on telemetry

## 2024-04-09 NOTE — PROGRESS NOTE ADULT - ASSESSMENT
The patient is a 24y Male who is followed by neurology because of diplopia, dizziness, b/l LE weakness/numbness and urine retention.  CT head showed possible hypodensities in right par/temp lobes.  Possible nutritional deficiency given his poor PO intake following gastric bypass and unable to take vitamins.  Also concerned for etiology of hypogenicities seen on head CT.  I am also concerned about lumbar spine given his BLE complaints and urine retention    Suggest to check:   - MRI brain, eval for hypodensities seen on CT head      ** no acute stroke mass or blood, likely artifact on CT head   - MRA head, eval for aneurysm given diplopia     ** no evidence for aneurysm or LVO or critical stenosis   - MRI LS spine, eval for cauda equina syndrome give urine retention and BLE numb/weak     ** no cauda equina  - MRI Thoracic spine    ** multiple disk protrusions with contact and deformity at T3/4 with underlying cord signal changes    ** request spine surgery evaluation to determine if intervention is indicated   - add thiamine, niacin, riboflavin levels, eval nutritional deficiency give poor po intake past 2 months    ** concern for nutritional deficiency given his poor PO intake and lack of consistent intake of vitamin supplements post bariatric surgery    Further suggestions to follow results of above tests    Discussed with Dr Fonseca    will follow with you    David Dinh MD PhD   [id number]

## 2024-04-09 NOTE — PROGRESS NOTE ADULT - SUBJECTIVE AND OBJECTIVE BOX
Patient is a 24y old  Male who presents with a chief complaint of Visual changes      Patient seen and examined at bedside earlier today   c/o nausea and R eye vision problems , multiple complains unable to stand walk due to dizziness , weakness   later called by nurse intractable vomiting around  10 and  , HR is high 160-170's   pt is feeling weak , ECG obtained , rapid atrial fib   iv metoprolol 5 given , iv tylenol ordered     10;40 pt's HR in 80's back NSR     beti Dinh this am, MR result reviewed  NS team consulted re T spine abnormalites seen on MR         ALLERGIES:  No Known Allergies    MEDICATIONS  (STANDING):  aspirin enteric coated 81 milliGRAM(s) Oral daily  atorvastatin 40 milliGRAM(s) Oral at bedtime  cyanocobalamin 1000 MICROGram(s) Oral daily  enoxaparin Injectable 40 milliGRAM(s) SubCutaneous every 12 hours  folic acid 1 milliGRAM(s) Oral daily  lactated ringers. 1000 milliLiter(s) (100 mL/Hr) IV Continuous <Continuous>  ondansetron Injectable 4 milliGRAM(s) IV Push once  pantoprazole  Injectable 40 milliGRAM(s) IV Push two times a day  sodium chloride 0.9%. 1000 milliLiter(s) (85 mL/Hr) IV Continuous <Continuous>  tamsulosin 0.4 milliGRAM(s) Oral at bedtime    MEDICATIONS  (PRN):  acetaminophen     Tablet .. 650 milliGRAM(s) Oral every 6 hours PRN Temp greater or equal to 38C (100.4F), Mild Pain (1 - 3)  aluminum hydroxide/magnesium hydroxide/simethicone Suspension 30 milliLiter(s) Oral every 4 hours PRN Dyspepsia  meclizine 12.5 milliGRAM(s) Oral three times a day PRN Dizziness  melatonin 3 milliGRAM(s) Oral at bedtime PRN Insomnia  metoprolol tartrate Injectable 5 milliGRAM(s) IV Push every 8 hours PRN hr over 120  ondansetron Injectable 4 milliGRAM(s) IV Push every 8 hours PRN Nausea and/or Vomiting    Vital Signs Last 24 Hrs  T(F): 97.4 (09 Apr 2024 07:54), Max: 97.9 (09 Apr 2024 04:18)  HR: 56 (09 Apr 2024 07:54) (56 - 82)  BP: 151/93 (09 Apr 2024 07:54) (122/78 - 151/93)  RR: 18 (09 Apr 2024 07:54) (18 - 18)  SpO2: 100% (09 Apr 2024 07:54) (96% - 100%)  I&O's Summary      PHYSICAL EXAM:  General: awake in mild distress due to nausea    Neck: supple , no JVD   Lungs: diminished BS   Cardio: RRR, S1/S2, No murmur  Abdomen: Soft, Nontender, Nondistended; Bowel sounds present  Extremities: No calf tenderness, No pitting edema  Skin ; normal color     LABS:                        13.6   6.09  )-----------( 298      ( 08 Apr 2024 06:45 )             40.8     04-09    138  |  101  |  10.1  ----------------------------<  95  3.5   |  23.0  |  0.47    Ca    9.1      09 Apr 2024 09:26  Mg     2.1     04-08    TPro  6.9  /  Alb  3.9  /  TBili  2.8  /  DBili  1.5  /  AST  153  /  ALT  310  /  AlkPhos  94  04-08      Lipase: 25 U/L (04-07-24 @ 23:57)                04-08 Chol 168 mg/dL LDL -- HDL 30 mg/dL Trig 110 mg/dL  TSH 5.51   TSH with FT4 reflex --  Total T3 --    23:57 - VBG - pH: 7.420 | pCO2: 45    | pO2: 59    | Lactate: 2.40             POCT Blood Glucose.: 93 mg/dL (09 Apr 2024 10:11)      Urinalysis Basic - ( 09 Apr 2024 09:26 )    Color: x / Appearance: x / SG: x / pH: x  Gluc: 95 mg/dL / Ketone: x  / Bili: x / Urobili: x   Blood: x / Protein: x / Nitrite: x   Leuk Esterase: x / RBC: x / WBC x   Sq Epi: x / Non Sq Epi: x / Bacteria: x        Culture - Urine (collected 08 Apr 2024 00:48)  Source: Clean Catch Clean Catch (Midstream)  Final Report (09 Apr 2024 06:58):    No growth        mmr< from: MR Thoracic Spine No Cont (04.08.24 @ 19:37) >  1. THORACIC SPINE:   Multiple thoracic disc protrusions (disc herniation)    cause ventral cord deformity and at T3-T4 causes cord edema/gliosis    2. LUMBAR SPINE:   Unremarkable MR of the lumbar spine.    --- End of Report ---    < end of copied text >

## 2024-04-09 NOTE — CONSULT NOTE ADULT - NS ATTEND AMEND GEN_ALL_CORE FT
I agree with the above. I personally examined and saw the patient. BLE 5/5 strength, decreased sensation from chest and legs. I had a long discussion with patient's mother, sister, and additional family members reviewing the patient's history and symptomatology. I discussed with them that at this time, the quality of the MRI thoracic wo contrast is poor and it is difficult for me to visualize the disc herniation that is being read by radiology. Thus I would like a repeat MRI before determining if there is a need for surgery. Chaka also now has PEs and is on a heparin drip. I will also reach out to his original surgeon, Dr. Terry. All questions answered.
24 year old male with a past medical history of obesity, anxiety and depression status post recent Gastric sleeve surgery at [facility] Discharged from the hospital 2 days ago presents with double vision, dizziness, room spinning sensation, weakness in the leg, and decreased PO intake/urinary retention. Cardiology consulted to evaluate tachycardia     Pulmonary embolism  SVT- atrial tachycardia  Dizziness  acute dehydration  Recent gastric sleeve  Thoracic disc herniation  Morbid obesity  autism spectrum disorder  anxiety      Patient presents with tachycardia, dizziness, weakness, dehydration.  Poor PO intake  post gastric sleeve for morbid obesity treatment  has concerning findings on MRI which Neurosurgery is evaluating      He has pulmonary embolism, distal in this setting.  recommend initiation of heparin infusion if ok received by surgery teams for PE treatment.  his atrial tachycardia has resolved, he is in sinus rhythm    encourage PO intake  IVF hydration recommended given poor PO    check a 2D TTE    risk stratification for neurosurgery if there is emergent need for surgery would be non-modifiable and elevated. If deemed emergent due to chordal issues, this is deemed life saving and should proceed.    Discussed with aunt over phone at patient, mother request along with patient and mother in person.    We will follow.

## 2024-04-09 NOTE — PROGRESS NOTE ADULT - ASSESSMENT
24 year old male with a past medical history of obesity, anxiety and depression status post recent Gastric sleeve surgery at [facility] Discharged from the hospital 2 days ago presents with double vision, dizziness, room spinning sensation, weakness in the leg, and decreased PO intake/urinary retention.    1- Nausea vomiting   pt had Gi evaluation work up done on last admission  records reviewed   will cont PPI protonix iv bid   reglna vs zofran prn   Ct of abdomen  may need to follow up with the surgeon who operated on him in [facility]     2- Tachycardia during severe vomiting   atrial fib now back in NSR   s/p lopressor iv 5 mg   cardiology consulted   will get CTA of chest r/o PE     3-Dizziness / weakness   neurology consult appreciated   MR of brain no acute infract or pathology   d/w dR Dinh     4- Back pain thoracic lumbar spine   Mr of spine reviewed , T3-T4 edema , disc portrusison   NS consulted   pain meds as needed     5- Urinary retention   recurrent   schaffer inserted   cont Flomax     6- h/o anxiety , post travmatic stress Dx ?   d/w Dr canas   seen by him     d/w aunt per pt's request over the phone

## 2024-04-09 NOTE — CONSULT NOTE ADULT - ASSESSMENT
This is a 24ym presented with dizziness, vomiting and nausea with lower back pain accompanied  by sensory changes    Plan  -headaches and dizziness pt will need to be evaluated by the neurology service.  -MRI of the thoracic and lumbar spine appreciated which demonstrates a t3-t4 gliosis/edema. discuss with Neurology consulted  -dietary deficiency being r/o  -will review exam and films with attending. This is a 24ym presented with dizziness, vomiting and nausea with lower back pain accompanied  by sensory changes    MRI thoracic spine multi disc protrusion ventral cord deform T 3-4 cord edema gliosis  Plan  -headaches and dizziness pt will need to be evaluated by the neurology service.  -MRI of the thoracic and lumbar spine appreciated which demonstrates a t3-t4 gliosis/edema. discuss with Neurology consulted  -dietary deficiency being r/o  -will review exam and films with attending Dr Anderson This is a 24ym presented with dizziness, vomiting and nausea with lower back pain accompanied  by sensory changes    MRI thoracic spine multi disc protrusion ventral cord deform T 3-4 cord edema gliosis  Plan  -headaches and dizziness pt will need to be evaluated by the neurology service.  -MRI of the thoracic and lumbar spine appreciated which demonstrates a t3-t4 gliosis/edema. discuss with Neurology consulted  -dietary deficiency being r/o  -will review exam and films with attending Dr Anderson  Pt seen with Dr Anderson and the mri of the thoracic and lumbar spine reviewed. Will repeat the mri of the thoracic spine This is a 24ym presented with dizziness, vomiting and nausea with lower back pain accompanied by sensory changes    MRI thoracic spine read concerning for T3-T4 disc herniation     Plan  -headaches and dizziness pt will need to be evaluated by the neurology service.  -MRI of the thoracic spine is of poor quality--recommend repeat MRI thoracic wo contrast   -heparin drip for PE  -will review exam and films with attending Dr Anderson    Pt seen with Dr Anderson

## 2024-04-09 NOTE — CONSULT NOTE ADULT - ASSESSMENT
ASSESSMENT: 24M with history of gastric sleeve. Admitted to medicine service with LE weakness and dizziness, reports persistent vomiting       PLAN:    - no acute surgical intervention required at this time   -will continue to follow   - Plan discussed with Attending, Dr. Santamaria

## 2024-04-09 NOTE — CONSULT NOTE ADULT - PROBLEM SELECTOR RECOMMENDATION 9
- pt w/ nausea and vomiting w/ double vision and dizziness. decreased PO intake   - pt is dry on physical exam, is volume depleted  - trigger for tachycardia is compensatory mechanism for dehydration coupled w/ acute pulmonary embolism   - there is no evidence of afib/flutter, it is simple atrial tachycardia   - treat underlying PE w/ full anticoagulation   - give IVF to treat dehydration   - PE is secondary to immobility pt admits to post op sedentary / not moving for over a week   - Needs neuro surgery consult. pt has spinal cord edema and also cauda equina symptoms complains of saddle paresthesias   - check echo   - we will follow

## 2024-04-09 NOTE — CONSULT NOTE ADULT - ASSESSMENT
24 year old male with a past medical history of obesity, anxiety and depression status post recent Gastric sleeve surgery at [facility] Discharged from the hospital 2 days ago presents with double vision, dizziness, room spinning sensation, weakness in the leg, and decreased PO intake/urinary retention. Cardiology consulted to evaluate tachycardia

## 2024-04-10 ENCOUNTER — RESULT REVIEW (OUTPATIENT)
Age: 24
End: 2024-04-10

## 2024-04-10 DIAGNOSIS — I47.19 OTHER SUPRAVENTRICULAR TACHYCARDIA: ICD-10-CM

## 2024-04-10 LAB
APTT BLD: > 200 (ref 24.5–35.6)
APTT BLD: >200 SEC — CRITICAL HIGH (ref 24.5–35.6)
CERULOPLASMIN SERPL-MCNC: 23 MG/DL — SIGNIFICANT CHANGE UP (ref 15–30)
HCT VFR BLD CALC: 37.6 % — LOW (ref 39–50)
HGB BLD-MCNC: 12.9 G/DL — LOW (ref 13–17)
MCHC RBC-ENTMCNC: 26.8 PG — LOW (ref 27–34)
MCHC RBC-ENTMCNC: 34.3 GM/DL — SIGNIFICANT CHANGE UP (ref 32–36)
MCV RBC AUTO: 78 FL — LOW (ref 80–100)
PLATELET # BLD AUTO: 268 K/UL — SIGNIFICANT CHANGE UP (ref 150–400)
RBC # BLD: 4.82 M/UL — SIGNIFICANT CHANGE UP (ref 4.2–5.8)
RBC # FLD: 14.7 % — HIGH (ref 10.3–14.5)
WBC # BLD: 5.22 K/UL — SIGNIFICANT CHANGE UP (ref 3.8–10.5)
WBC # FLD AUTO: 5.22 K/UL — SIGNIFICANT CHANGE UP (ref 3.8–10.5)

## 2024-04-10 PROCEDURE — 93306 TTE W/DOPPLER COMPLETE: CPT | Mod: 26

## 2024-04-10 PROCEDURE — 99233 SBSQ HOSP IP/OBS HIGH 50: CPT | Mod: FS

## 2024-04-10 PROCEDURE — 99233 SBSQ HOSP IP/OBS HIGH 50: CPT

## 2024-04-10 PROCEDURE — 99232 SBSQ HOSP IP/OBS MODERATE 35: CPT

## 2024-04-10 PROCEDURE — 99232 SBSQ HOSP IP/OBS MODERATE 35: CPT | Mod: FS

## 2024-04-10 RX ORDER — SENNA PLUS 8.6 MG/1
2 TABLET ORAL AT BEDTIME
Refills: 0 | Status: DISCONTINUED | OUTPATIENT
Start: 2024-04-10 | End: 2024-04-29

## 2024-04-10 RX ORDER — POLYETHYLENE GLYCOL 3350 17 G/17G
17 POWDER, FOR SOLUTION ORAL DAILY
Refills: 0 | Status: DISCONTINUED | OUTPATIENT
Start: 2024-04-10 | End: 2024-04-29

## 2024-04-10 RX ADMIN — PANTOPRAZOLE SODIUM 40 MILLIGRAM(S): 20 TABLET, DELAYED RELEASE ORAL at 16:51

## 2024-04-10 RX ADMIN — OLANZAPINE 2.5 MILLIGRAM(S): 15 TABLET, FILM COATED ORAL at 21:54

## 2024-04-10 RX ADMIN — HEPARIN SODIUM 1600 UNIT(S)/HR: 5000 INJECTION INTRAVENOUS; SUBCUTANEOUS at 22:53

## 2024-04-10 RX ADMIN — Medication 650 MILLIGRAM(S): at 16:51

## 2024-04-10 RX ADMIN — Medication 3 MILLIGRAM(S): at 21:52

## 2024-04-10 RX ADMIN — HEPARIN SODIUM 1600 UNIT(S)/HR: 5000 INJECTION INTRAVENOUS; SUBCUTANEOUS at 21:57

## 2024-04-10 RX ADMIN — HEPARIN SODIUM 2000 UNIT(S)/HR: 5000 INJECTION INTRAVENOUS; SUBCUTANEOUS at 01:00

## 2024-04-10 RX ADMIN — ONDANSETRON 4 MILLIGRAM(S): 8 TABLET, FILM COATED ORAL at 00:39

## 2024-04-10 RX ADMIN — Medication 1 MILLIGRAM(S): at 16:51

## 2024-04-10 RX ADMIN — ONDANSETRON 4 MILLIGRAM(S): 8 TABLET, FILM COATED ORAL at 17:57

## 2024-04-10 RX ADMIN — HEPARIN SODIUM 0 UNIT(S)/HR: 5000 INJECTION INTRAVENOUS; SUBCUTANEOUS at 19:34

## 2024-04-10 RX ADMIN — SODIUM CHLORIDE 100 MILLILITER(S): 9 INJECTION, SOLUTION INTRAVENOUS at 02:06

## 2024-04-10 RX ADMIN — PANTOPRAZOLE SODIUM 40 MILLIGRAM(S): 20 TABLET, DELAYED RELEASE ORAL at 06:26

## 2024-04-10 RX ADMIN — HEPARIN SODIUM 0 UNIT(S)/HR: 5000 INJECTION INTRAVENOUS; SUBCUTANEOUS at 10:11

## 2024-04-10 RX ADMIN — HEPARIN SODIUM 2000 UNIT(S)/HR: 5000 INJECTION INTRAVENOUS; SUBCUTANEOUS at 07:53

## 2024-04-10 RX ADMIN — SODIUM CHLORIDE 100 MILLILITER(S): 9 INJECTION, SOLUTION INTRAVENOUS at 21:57

## 2024-04-10 RX ADMIN — HEPARIN SODIUM 2000 UNIT(S)/HR: 5000 INJECTION INTRAVENOUS; SUBCUTANEOUS at 04:05

## 2024-04-10 RX ADMIN — PREGABALIN 1000 MICROGRAM(S): 225 CAPSULE ORAL at 16:54

## 2024-04-10 RX ADMIN — ATORVASTATIN CALCIUM 40 MILLIGRAM(S): 80 TABLET, FILM COATED ORAL at 21:52

## 2024-04-10 NOTE — PROGRESS NOTE ADULT - ASSESSMENT
24 year old male with a past medical history of obesity, anxiety and depression status post recent Gastric sleeve surgery at [facility] Discharged from the hospital 2 days ago presents with double vision, dizziness, room spinning sensation, weakness in the leg, and decreased PO intake/urinary retention. Cardiology consulted to evaluate tachycardia.

## 2024-04-10 NOTE — PROGRESS NOTE ADULT - ASSESSMENT
Patient is a 23yo M presented with dizziness, vomiting and nausea with lower back pain accompanied by sensory changes. Neurosurgery consulted for MRI thoracic spine read concerning for T3-T4 disc herniation.     Plan:  - Q4 neuro checks   - MRI Thoracic Spine 4/9 reviewed - poor quality imaging - T3-4 disc herniation with cord edema/gliosis   - Repeat MRI Thoracic Spine wo contrast - ordered   - Pain control prn, avoid oversedation  - Continue heparin drip for PEs  - Neurology following   - Further medical management and supportive care per primary team  - Case discussed with Dr. Anderson

## 2024-04-10 NOTE — PROGRESS NOTE ADULT - SUBJECTIVE AND OBJECTIVE BOX
Hospitalist Daily Progress Note    Chief Complaint:  Patient is a 24y old  Male who presents with a chief complaint of Visual changes with abnormal MRI (10 Apr 2024 09:57)      SUBJECTIVE / OVERNIGHT EVENTS:  Patient was seen and examined at bedside.   Complains of numbness in LE  Complains of blurriness in Right eye  States to have some ringing in his ears  Still has nausea    All remainder ROS negative.     MEDICATIONS  (STANDING):  atorvastatin 40 milliGRAM(s) Oral at bedtime  cyanocobalamin 1000 MICROGram(s) Oral daily  ergocalciferol 60778 Unit(s) Oral every week  folic acid 1 milliGRAM(s) Oral daily  heparin  Infusion.  Unit(s)/Hr (24 mL/Hr) IV Continuous <Continuous>  lactated ringers. 1000 milliLiter(s) (100 mL/Hr) IV Continuous <Continuous>  multivitamin 1 Tablet(s) Oral daily  OLANZapine Injectable 2.5 milliGRAM(s) IntraMuscular at bedtime  pantoprazole  Injectable 40 milliGRAM(s) IV Push two times a day  tamsulosin 0.4 milliGRAM(s) Oral at bedtime  vitamin B complex with vitamin C 1 Tablet(s) Oral daily    MEDICATIONS  (PRN):  acetaminophen     Tablet .. 650 milliGRAM(s) Oral every 6 hours PRN Temp greater or equal to 38C (100.4F), Mild Pain (1 - 3)  aluminum hydroxide/magnesium hydroxide/simethicone Suspension 30 milliLiter(s) Oral every 4 hours PRN Dyspepsia  heparin   Injectable 94854 Unit(s) IV Push every 6 hours PRN For aPTT less than 40  heparin   Injectable 5000 Unit(s) IV Push every 6 hours PRN For aPTT between 40 - 57  meclizine 12.5 milliGRAM(s) Oral three times a day PRN Dizziness  melatonin 3 milliGRAM(s) Oral at bedtime PRN Insomnia  metoprolol tartrate Injectable 5 milliGRAM(s) IV Push every 8 hours PRN hr over 120  ondansetron Injectable 4 milliGRAM(s) IV Push every 8 hours PRN Nausea and/or Vomiting        I&O's Summary    09 Apr 2024 07:01  -  10 Apr 2024 07:00  --------------------------------------------------------  IN: 250 mL / OUT: 650 mL / NET: -400 mL        PHYSICAL EXAM:  Vital Signs Last 24 Hrs  T(C): 36.5 (10 Apr 2024 07:57), Max: 36.6 (10 Apr 2024 00:30)  T(F): 97.7 (10 Apr 2024 07:57), Max: 97.9 (10 Apr 2024 00:30)  HR: 70 (10 Apr 2024 07:57) (62 - 74)  BP: 137/97 (10 Apr 2024 07:57) (124/85 - 158/85)  BP(mean): --  RR: 18 (10 Apr 2024 07:57) (18 - 18)  SpO2: 98% (10 Apr 2024 07:57) (98% - 100%)    Parameters below as of 10 Apr 2024 07:57  Patient On (Oxygen Delivery Method): nasal cannula      Constitutional: NAD, Resting, NC  ENT: Supple, No JVD  Lungs: CTA B/L, Non-labored breathing  Cardio: RRR, S1/S2, No murmur  Abdomen: Soft, Nontender, Nondistended; Bowel sounds present  Extremities: No calf tenderness, No pitting edema  Musculoskeletal:   No joint swelling  Psych: Calm, cooperative affect appropriate  Neuro: Awake and alert, oriented x4, 4/5 b/l le and 5/5 in b/l ue  Skin: No rashes; no palpable lesions    LABS:                        12.9   5.22  )-----------( 268      ( 10 Apr 2024 04:51 )             37.6     04-09    138  |  101  |  10.1  ----------------------------<  95  3.5   |  23.0  |  0.47<L>    Ca    9.1      09 Apr 2024 09:26  Phos  4.7     04-09  Mg     2.0     04-09    TPro  6.8  /  Alb  3.8  /  TBili  2.7<H>  /  DBili  1.3<H>  /  AST  128<H>  /  ALT  286<H>  /  AlkPhos  90  04-09    PTT - ( 10 Apr 2024 08:25 )  PTT:> 200      Urinalysis Basic - ( 09 Apr 2024 09:26 )    Color: x / Appearance: x / SG: x / pH: x  Gluc: 95 mg/dL / Ketone: x  / Bili: x / Urobili: x   Blood: x / Protein: x / Nitrite: x   Leuk Esterase: x / RBC: x / WBC x   Sq Epi: x / Non Sq Epi: x / Bacteria: x        Culture - Urine (collected 08 Apr 2024 00:48)  Source: Clean Catch Clean Catch (Midstream)  Final Report (09 Apr 2024 06:58):    No growth      CAPILLARY BLOOD GLUCOSE            RADIOLOGY REVIEWED

## 2024-04-10 NOTE — PROGRESS NOTE ADULT - ASSESSMENT
24 year old male with a past medical history of obesity, anxiety and depression status post recent Gastric sleeve surgery at [facility] Discharged from the hospital 2 days ago presents with double vision, dizziness, room spinning sensation, weakness in the leg, and decreased PO intake/urinary retention.    #Nausea and vomiting   GI evaluation was completed on prior admission  records reviewed   will cont PPI protonix iv bid   reglan vs zofran prn   Ct of abdomen  Spoke with Patients surgeon Dr. Terry [phone] - Recommends to continue hydration and repeat barium scan    #PE   CTA notes b/l PE  Tele had shown tachycardia - per cards - SVT  Cardio recs appreciated  US LE duplex neg for DVT  Heparin drip    #Blurriness/weakness  Neuro recs appreciated  MR of brain no acute infract or pathology   Vitamin levels pending  Concern for vitamin deficiency    #Back pain thoracic lumbar spine   Mr of spine reviewed , T3-T4 edema , disc protrusion  Repeat MRI T spine per NS  pain meds as needed   NS recs appreciated    #Urinary retention   recurrent   schaffer inserted   cont Flomax     #h/o anxiety , post travmatic stress Dx ?   Seen by  on last admission  Conservative management      Spoke with patients mom bedside and Aunt over the phone  I spoke to patients Bariatric surgeon Dr. Terry - Recommends IV hydration, defers transfer to [facility] until Monday if patient and family want it then as he is away for the time.

## 2024-04-10 NOTE — PROGRESS NOTE ADULT - SUBJECTIVE AND OBJECTIVE BOX
Subjective: Patient was seen and examined at bedside. No overnight events or acute complaints. Patient was resting comfortably in bed, not interactive. Patient denies nausea, vomiting, pain or any discomfort.       STATUS POST: s/p sleeve gastrectomy 2/2024       MEDICATIONS  (STANDING):  atorvastatin 40 milliGRAM(s) Oral at bedtime  cyanocobalamin 1000 MICROGram(s) Oral daily  ergocalciferol 07624 Unit(s) Oral every week  folic acid 1 milliGRAM(s) Oral daily  heparin  Infusion.  Unit(s)/Hr (24 mL/Hr) IV Continuous <Continuous>  lactated ringers. 1000 milliLiter(s) (100 mL/Hr) IV Continuous <Continuous>  OLANZapine Injectable 2.5 milliGRAM(s) IntraMuscular at bedtime  pantoprazole  Injectable 40 milliGRAM(s) IV Push two times a day  tamsulosin 0.4 milliGRAM(s) Oral at bedtime    MEDICATIONS  (PRN):  acetaminophen     Tablet .. 650 milliGRAM(s) Oral every 6 hours PRN Temp greater or equal to 38C (100.4F), Mild Pain (1 - 3)  aluminum hydroxide/magnesium hydroxide/simethicone Suspension 30 milliLiter(s) Oral every 4 hours PRN Dyspepsia  heparin   Injectable 97075 Unit(s) IV Push every 6 hours PRN For aPTT less than 40  heparin   Injectable 5000 Unit(s) IV Push every 6 hours PRN For aPTT between 40 - 57  meclizine 12.5 milliGRAM(s) Oral three times a day PRN Dizziness  melatonin 3 milliGRAM(s) Oral at bedtime PRN Insomnia  metoprolol tartrate Injectable 5 milliGRAM(s) IV Push every 8 hours PRN hr over 120  ondansetron Injectable 4 milliGRAM(s) IV Push every 8 hours PRN Nausea and/or Vomiting      Vital Signs Last 24 Hrs  T(C): 36.6 (10 Apr 2024 00:30), Max: 36.6 (09 Apr 2024 14:18)  T(F): 97.9 (10 Apr 2024 00:30), Max: 97.9 (10 Apr 2024 00:30)  HR: 63 (10 Apr 2024 00:30) (62 - 74)  BP: 124/85 (10 Apr 2024 00:30) (124/85 - 158/85)  BP(mean): --  RR: 18 (10 Apr 2024 00:30) (18 - 18)  SpO2: 99% (10 Apr 2024 00:30) (98% - 100%)    Parameters below as of 10 Apr 2024 00:30  Patient On (Oxygen Delivery Method): nasal cannula  O2 Flow (L/min): 2      Physical Exam:    Constitutional: resting comfortably in bed, NAD  Respiratory: Respirations non-labored, no accessory muscle use  Gastrointestinal: Soft, non-tender, non-distended  Extremities: No peripheral edema  Neurological: A&O x 3  Musculoskeletal: no limitation of movement      LABS:                        12.9   5.22  )-----------( 268      ( 10 Apr 2024 04:51 )             37.6     04-09    138  |  101  |  10.1  ----------------------------<  95  3.5   |  23.0  |  0.47<L>    Ca    9.1      09 Apr 2024 09:26  Phos  4.7     04-09  Mg     2.0     04-09    TPro  6.8  /  Alb  3.8  /  TBili  2.7<H>  /  DBili  1.3<H>  /  AST  128<H>  /  ALT  286<H>  /  AlkPhos  90  04-09    PTT - ( 09 Apr 2024 22:44 )  PTT:>200.0 sec  Urinalysis Basic - ( 09 Apr 2024 09:26 )    Color: x / Appearance: x / SG: x / pH: x  Gluc: 95 mg/dL / Ketone: x  / Bili: x / Urobili: x   Blood: x / Protein: x / Nitrite: x   Leuk Esterase: x / RBC: x / WBC x   Sq Epi: x / Non Sq Epi: x / Bacteria: x        A: 24M with history of gastric sleeve. Admitted to medicine service with LE weakness and dizziness, reports persistent vomiting       P:  - monitor nausea/vomiting  - Pain control   - Encourage ambulation / OOB   - no acute surgical intervention required at this time   - will continue to follow   - rest of care per primary team

## 2024-04-10 NOTE — PROGRESS NOTE ADULT - PROBLEM SELECTOR PLAN 2
- no episodes of atrial tachycardia in the past 24 hours on telemetry  - likely triggered due to compensatory mechanism for dehydration with acute PE.   - Treat underlying conclusion  - monitor on telemetry

## 2024-04-10 NOTE — PROGRESS NOTE ADULT - PROBLEM SELECTOR PLAN 1
- PE secondary to immobility. Pt admitted to being sedentary post-op/not moving for over a week   - Appreciate neurosurgery consult. MRI T spine concerning for T3-T4 disc herniation.   - pending TTE for evaluation of cardiac function and any valvular abnormalities  - B/L LE dopplers negative for DVTs  - c/w heparin drip for anticoagulation.

## 2024-04-10 NOTE — PROGRESS NOTE ADULT - SUBJECTIVE AND OBJECTIVE BOX
[facility]                                                         CARDIOLOGY AT [facility]                                                                  [address], [place] [address]                                                         Telephone: [phone]. Fax:[phone]                                                                             PROGRESS NOTE    Reason for follow up: Tachycardia, r/o Afib   Update: currently NSR/sinus bradycardia on telemetry. pt is in no acute distress.       Review of symptoms:   Cardiac:  No chest pain. No dyspnea. No palpitations.  Respiratory: no cough. No dyspnea  Gastrointestinal: No diarrhea. No abdominal pain. No bleeding.   Neuro: No focal neuro complaints.    Vitals:  T(C): 36.6 (04-10-24 @ 00:30), Max: 36.6 (04-09-24 @ 14:18)  HR: 63 (04-10-24 @ 00:30) (62 - 74)  BP: 124/85 (04-10-24 @ 00:30) (124/85 - 158/85)  RR: 18 (04-10-24 @ 00:30) (18 - 18)  SpO2: 99% (04-10-24 @ 00:30) (98% - 100%)  Wt(kg): --  I&O's Summary    09 Apr 2024 07:01  -  10 Apr 2024 07:00  --------------------------------------------------------  IN: 250 mL / OUT: 650 mL / NET: -400 mL      Weight (kg): 134 (04-07 @ 21:49)    PHYSICAL EXAM:  Appearance: Comfortable. No acute distress  HEENT:  Atraumatic. Normocephalic.  Normal oral mucosa  Neurologic: A & O x 3, no gross focal deficits.  Cardiovascular: RRR S1 S2, No murmur, no rubs/gallops. No JVD  Respiratory: Lungs clear to auscultation, unlabored   Gastrointestinal:  Soft, Non-tender, + BS  Lower Extremities: 2+ Peripheral Pulses, No clubbing, cyanosis, or edema  Psychiatry: Patient is calm. No agitation.   Skin: warm and dry.    CURRENT CARDIAC MEDICATIONS:  metoprolol tartrate Injectable 5 milliGRAM(s) IV Push every 8 hours PRN      CURRENT OTHER MEDICATIONS:  acetaminophen     Tablet .. 650 milliGRAM(s) Oral every 6 hours PRN Temp greater or equal to 38C (100.4F), Mild Pain (1 - 3)  meclizine 12.5 milliGRAM(s) Oral three times a day PRN Dizziness  melatonin 3 milliGRAM(s) Oral at bedtime PRN Insomnia  OLANZapine Injectable 2.5 milliGRAM(s) IntraMuscular at bedtime  ondansetron Injectable 4 milliGRAM(s) IV Push every 8 hours PRN Nausea and/or Vomiting  aluminum hydroxide/magnesium hydroxide/simethicone Suspension 30 milliLiter(s) Oral every 4 hours PRN Dyspepsia  pantoprazole  Injectable 40 milliGRAM(s) IV Push two times a day  atorvastatin 40 milliGRAM(s) Oral at bedtime  cyanocobalamin 1000 MICROGram(s) Oral daily  ergocalciferol 57271 Unit(s) Oral every week  folic acid 1 milliGRAM(s) Oral daily  heparin   Injectable 49984 Unit(s) IV Push every 6 hours PRN For aPTT less than 40  heparin   Injectable 5000 Unit(s) IV Push every 6 hours PRN For aPTT between 40 - 57  heparin  Infusion.  Unit(s)/Hr (24 mL/Hr) IV Continuous <Continuous>  lactated ringers. 1000 milliLiter(s) (100 mL/Hr) IV Continuous <Continuous>  tamsulosin 0.4 milliGRAM(s) Oral at bedtime      LABS:	 	                            12.9   5.22  )-----------( 268      ( 10 Apr 2024 04:51 )             37.6     04-09    138  |  101  |  10.1  ----------------------------<  95  3.5   |  23.0  |  0.47<L>    Ca    9.1      09 Apr 2024 09:26  Phos  4.7     04-09  Mg     2.0     04-09    TPro  6.8  /  Alb  3.8  /  TBili  2.7<H>  /  DBili  1.3<H>  /  AST  128<H>  /  ALT  286<H>  /  AlkPhos  90  04-09    PT/INR/PTT ( 09 Apr 2024 22:44 )                       :                       :      X            :       >200.0                 .        .                   .              .           .       X           .                                       Lipid Profile: Date: 04-08 @ 06:45  Total cholesterol 168; Direct LDL: --; HDL: 30; Triglycerides:110    HgA1c:   TSH: Thyroid Stimulating Hormone, Serum: 5.51 uIU/mL      TELEMETRY:   ECG:    DIAGNOSTIC TESTING:  [ ] Echocardiogram:   [ ]  Catheterization:  [ ] Stress Test:    OTHER: 	                                                                [facility]                                                         CARDIOLOGY AT [facility]                                                                  [address], [place] [address]                                                         Telephone: [phone]. Fax:[phone]                                                                             PROGRESS NOTE    Reason for follow up: Tachycardia, r/o Afib   Update: currently NSR/sinus bradycardia on telemetry. pt is in no acute distress.       Review of symptoms:   Cardiac:  No chest pain. No dyspnea. No palpitations.  Respiratory: no cough. No dyspnea  Gastrointestinal: No diarrhea. No abdominal pain. No bleeding.   Neuro: No focal neuro complaints.    Vitals:  T(C): 36.6 (04-10-24 @ 00:30), Max: 36.6 (04-09-24 @ 14:18)  HR: 63 (04-10-24 @ 00:30) (62 - 74)  BP: 124/85 (04-10-24 @ 00:30) (124/85 - 158/85)  RR: 18 (04-10-24 @ 00:30) (18 - 18)  SpO2: 99% (04-10-24 @ 00:30) (98% - 100%)  Wt(kg): --  I&O's Summary    09 Apr 2024 07:01  -  10 Apr 2024 07:00  --------------------------------------------------------  IN: 250 mL / OUT: 650 mL / NET: -400 mL      Weight (kg): 134 (04-07 @ 21:49)    PHYSICAL EXAM:  Appearance: Comfortable. No acute distress  HEENT:  Atraumatic. Normocephalic.  Normal oral mucosa  Neurologic: A & O x 3, no gross focal deficits.  Cardiovascular: RRR S1 S2, No murmur, no rubs/gallops. No JVD  Respiratory: Lungs clear to auscultation, unlabored   Gastrointestinal:  Soft, Non-tender, + BS  Lower Extremities: 2+ Peripheral Pulses, No clubbing, cyanosis, or edema  Psychiatry: Patient is calm. No agitation.   Skin: warm and dry.    CURRENT CARDIAC MEDICATIONS:  metoprolol tartrate Injectable 5 milliGRAM(s) IV Push every 8 hours PRN      CURRENT OTHER MEDICATIONS:  acetaminophen     Tablet .. 650 milliGRAM(s) Oral every 6 hours PRN Temp greater or equal to 38C (100.4F), Mild Pain (1 - 3)  meclizine 12.5 milliGRAM(s) Oral three times a day PRN Dizziness  melatonin 3 milliGRAM(s) Oral at bedtime PRN Insomnia  OLANZapine Injectable 2.5 milliGRAM(s) IntraMuscular at bedtime  ondansetron Injectable 4 milliGRAM(s) IV Push every 8 hours PRN Nausea and/or Vomiting  aluminum hydroxide/magnesium hydroxide/simethicone Suspension 30 milliLiter(s) Oral every 4 hours PRN Dyspepsia  pantoprazole  Injectable 40 milliGRAM(s) IV Push two times a day  atorvastatin 40 milliGRAM(s) Oral at bedtime  cyanocobalamin 1000 MICROGram(s) Oral daily  ergocalciferol 04027 Unit(s) Oral every week  folic acid 1 milliGRAM(s) Oral daily  heparin   Injectable 80793 Unit(s) IV Push every 6 hours PRN For aPTT less than 40  heparin   Injectable 5000 Unit(s) IV Push every 6 hours PRN For aPTT between 40 - 57  heparin  Infusion.  Unit(s)/Hr (24 mL/Hr) IV Continuous <Continuous>  lactated ringers. 1000 milliLiter(s) (100 mL/Hr) IV Continuous <Continuous>  tamsulosin 0.4 milliGRAM(s) Oral at bedtime      LABS:	 	                            12.9   5.22  )-----------( 268      ( 10 Apr 2024 04:51 )             37.6     04-09    138  |  101  |  10.1  ----------------------------<  95  3.5   |  23.0  |  0.47<L>    Ca    9.1      09 Apr 2024 09:26  Phos  4.7     04-09  Mg     2.0     04-09    TPro  6.8  /  Alb  3.8  /  TBili  2.7<H>  /  DBili  1.3<H>  /  AST  128<H>  /  ALT  286<H>  /  AlkPhos  90  04-09    PT/INR/PTT ( 09 Apr 2024 22:44 )                       :                       :      X            :       >200.0                 .        .                   .              .           .       X           .                                       Lipid Profile: Date: 04-08 @ 06:45  Total cholesterol 168; Direct LDL: --; HDL: 30; Triglycerides:110    HgA1c:   TSH: Thyroid Stimulating Hormone, Serum: 5.51 uIU/mL      TELEMETRY: Sinus bradycardia 50s   ECG: NSR     DIAGNOSTIC TESTING:  [x ] Echocardiogram: < from: Echocardiogram, Pediatric (Echocardiogram, Pediatric ..) (02.12.15 @ 10:25) >   1. Very limited study due to patient body habitus.   2. Normal right ventricular morphology.   3. Qualitatively normal right ventricular systolic function.   4. Normal left ventricular morphology.   5. Qualitatively normal left ventricular systolic function.   6. No pericardial effusion.   7. The origin of the right coronary artery is not well visualized, but on limited views it appears to originate normally.    < end of copied text >    [ ]  Catheterization:  [ ] Stress Test:    OTHER:

## 2024-04-10 NOTE — PROGRESS NOTE ADULT - SUBJECTIVE AND OBJECTIVE BOX
HPI:  24 year old male with a past medical history of obesity, anxiety and depression status post recent Gastric sleeve surgery at [facility] Discharged from the hospital 2 days ago presents with double vision, dizziness, room spinning sensation, weakness in the leg, and decreased PO intake. Patient has had the symptoms of weakness and poor PO intake since prior admission. His workup was negative then including EGD, emptying study. He was seen by PT and recommended outpatient CT. He has also been having recurrent urinary retention for which he has required recurrent catheterizations. Today, he comes back to the ED with complaint of blurry vision/double vision. Patient is living with mother and aunt who are caring for him. (08 Apr 2024 06:28)    INTERVAL HPI/OVERNIGHT EVENTS:  24y Male seen lying comfortably in bed. Pt c/o of numbness from his nipple line down to his feet x 2 weeks. Pt is c/o dizziness and nausea when sitting up or standing. Pt denies any episodes of emesis this morning. Schaffer in with dark yellow urine. Denies headache, fevers, chills, chest pain, SOB.     Vital Signs Last 24 Hrs  T(C): 36.5 (10 Apr 2024 07:57), Max: 36.6 (09 Apr 2024 14:18)  T(F): 97.7 (10 Apr 2024 07:57), Max: 97.9 (10 Apr 2024 00:30)  HR: 70 (10 Apr 2024 07:57) (62 - 74)  BP: 137/97 (10 Apr 2024 07:57) (124/85 - 158/85)  BP(mean): --  RR: 18 (10 Apr 2024 07:57) (18 - 18)  SpO2: 98% (10 Apr 2024 07:57) (98% - 100%)    Parameters below as of 10 Apr 2024 07:57  Patient On (Oxygen Delivery Method): nasal cannula    PHYSICAL EXAM:  GENERAL: NAD, GCS15  HEAD: Atraumatic, Normocephalic  NEURO: AAO X3; eye opening to voice but does not sustain; KERNS; motor strength B/L UE 5/5; B/L LE 4/5 knee extension, remainder LE 5/5; DTRs 2+ intact and symmetric; sensory numbness upper thigh B/L, numbness to groin region, remainder intact; schaffer in place  CHEST/LUNG: Nonlabored breathing  ABDOMEN: Soft, Nontender, Nondistended  EXTREMITIES:  2+ Peripheral Pulses, No edema    LABS:                        12.9   5.22  )-----------( 268      ( 10 Apr 2024 04:51 )             37.6     04-09    138  |  101  |  10.1  ----------------------------<  95  3.5   |  23.0  |  0.47<L>    Ca    9.1      09 Apr 2024 09:26  Phos  4.7     04-09  Mg     2.0     04-09    TPro  6.8  /  Alb  3.8  /  TBili  2.7<H>  /  DBili  1.3<H>  /  AST  128<H>  /  ALT  286<H>  /  AlkPhos  90  04-09    PTT - ( 09 Apr 2024 22:44 )  PTT:>200.0 sec  Urinalysis Basic - ( 09 Apr 2024 09:26 )    Color: x / Appearance: x / SG: x / pH: x  Gluc: 95 mg/dL / Ketone: x  / Bili: x / Urobili: x   Blood: x / Protein: x / Nitrite: x   Leuk Esterase: x / RBC: x / WBC x   Sq Epi: x / Non Sq Epi: x / Bacteria: x    04-09 @ 07:01  -  04-10 @ 07:00  --------------------------------------------------------  IN: 250 mL / OUT: 650 mL / NET: -400 mL    RADIOLOGY & ADDITIONAL TESTS:  CT Head No Cont (04.08.24 @ 04:04) >  IMPRESSION:  Ill-defined hypodensities in the right temporal and right occipital   lobes. It is unclear if these are artifactual in nature.In the presence   of acute also focalized neurological deficits, ischemia may be   entertained. Consider follow-up by MRI imaging, if clinically indicated.  --- End of Report ---    MR Angio Head No Cont (04.08.24 @ 19:44) >  PROCEDURE DATE:  04/08/2024    IMPRESSION:  1.  BRAIN:   Unremarkable MR of the brain.  2.  ANTERIOR CIRCULATION:    Intact.  3. POSTERIOR CIRCULATION:  Intact.  --- End of Report ---    MR Thoracic Spine No Cont (04.08.24 @ 19:37) >  ACC: [id number] EXAM:  MR SPINE LUMBAR   ORDERED BY: NORMA JUSTIN   ACC: [id number] EXAM:  MR SPINE THORACIC   ORDERED BY: YANIRA TRAN   PROCEDURE DATE:  04/08/2024    IMPRESSION:  1. THORACIC SPINE:   Multiple thoracic disc protrusions (disc herniation)    cause ventral cord deformity and at T3-T4 causes cord edema/gliosis  2. LUMBAR SPINE:   Unremarkable MR of the lumbar spine.    --- End of Report ---

## 2024-04-11 LAB
ANION GAP SERPL CALC-SCNC: 16 MMOL/L — SIGNIFICANT CHANGE UP (ref 5–17)
ANISOCYTOSIS BLD QL: SLIGHT — SIGNIFICANT CHANGE UP
APTT BLD: 113.4 SEC — HIGH (ref 24.5–35.6)
APTT BLD: 61.2 SEC — HIGH (ref 24.5–35.6)
APTT BLD: 63.2 SEC — HIGH (ref 24.5–35.6)
BASOPHILS # BLD AUTO: 0 K/UL — SIGNIFICANT CHANGE UP (ref 0–0.2)
BASOPHILS NFR BLD AUTO: 0 % — SIGNIFICANT CHANGE UP (ref 0–2)
BUN SERPL-MCNC: 4.8 MG/DL — LOW (ref 8–20)
BURR CELLS BLD QL SMEAR: PRESENT — SIGNIFICANT CHANGE UP
CALCIUM SERPL-MCNC: 9.2 MG/DL — SIGNIFICANT CHANGE UP (ref 8.4–10.5)
CHLORIDE SERPL-SCNC: 96 MMOL/L — SIGNIFICANT CHANGE UP (ref 96–108)
CO2 SERPL-SCNC: 23 MMOL/L — SIGNIFICANT CHANGE UP (ref 22–29)
CREAT SERPL-MCNC: 0.45 MG/DL — LOW (ref 0.5–1.3)
EGFR: 149 ML/MIN/1.73M2 — SIGNIFICANT CHANGE UP
ELLIPTOCYTES BLD QL SMEAR: SLIGHT — SIGNIFICANT CHANGE UP
EOSINOPHIL # BLD AUTO: 0 K/UL — SIGNIFICANT CHANGE UP (ref 0–0.5)
EOSINOPHIL NFR BLD AUTO: 0 % — SIGNIFICANT CHANGE UP (ref 0–6)
GLUCOSE SERPL-MCNC: 111 MG/DL — HIGH (ref 70–99)
HCT VFR BLD CALC: 40.9 % — SIGNIFICANT CHANGE UP (ref 39–50)
HGB BLD-MCNC: 14.1 G/DL — SIGNIFICANT CHANGE UP (ref 13–17)
LYMPHOCYTES # BLD AUTO: 0.14 K/UL — LOW (ref 1–3.3)
LYMPHOCYTES # BLD AUTO: 0.9 % — LOW (ref 13–44)
MANUAL SMEAR VERIFICATION: SIGNIFICANT CHANGE UP
MCHC RBC-ENTMCNC: 26.9 PG — LOW (ref 27–34)
MCHC RBC-ENTMCNC: 34.5 GM/DL — SIGNIFICANT CHANGE UP (ref 32–36)
MCV RBC AUTO: 78.1 FL — LOW (ref 80–100)
MICROCYTES BLD QL: SLIGHT — SIGNIFICANT CHANGE UP
MONOCYTES # BLD AUTO: 0.56 K/UL — SIGNIFICANT CHANGE UP (ref 0–0.9)
MONOCYTES NFR BLD AUTO: 3.5 % — SIGNIFICANT CHANGE UP (ref 2–14)
NEUTROPHILS # BLD AUTO: 15.3 K/UL — HIGH (ref 1.8–7.4)
NEUTROPHILS NFR BLD AUTO: 95.6 % — HIGH (ref 43–77)
OVALOCYTES BLD QL SMEAR: SIGNIFICANT CHANGE UP
PLAT MORPH BLD: NORMAL — SIGNIFICANT CHANGE UP
PLATELET # BLD AUTO: 302 K/UL — SIGNIFICANT CHANGE UP (ref 150–400)
POIKILOCYTOSIS BLD QL AUTO: SIGNIFICANT CHANGE UP
POTASSIUM SERPL-MCNC: 3.8 MMOL/L — SIGNIFICANT CHANGE UP (ref 3.5–5.3)
POTASSIUM SERPL-SCNC: 3.8 MMOL/L — SIGNIFICANT CHANGE UP (ref 3.5–5.3)
RBC # BLD: 5.24 M/UL — SIGNIFICANT CHANGE UP (ref 4.2–5.8)
RBC # FLD: 15.2 % — HIGH (ref 10.3–14.5)
RBC BLD AUTO: ABNORMAL
SCHISTOCYTES BLD QL AUTO: SLIGHT — SIGNIFICANT CHANGE UP
SODIUM SERPL-SCNC: 135 MMOL/L — SIGNIFICANT CHANGE UP (ref 135–145)
WBC # BLD: 16 K/UL — HIGH (ref 3.8–10.5)
WBC # FLD AUTO: 16 K/UL — HIGH (ref 3.8–10.5)

## 2024-04-11 PROCEDURE — 99232 SBSQ HOSP IP/OBS MODERATE 35: CPT

## 2024-04-11 PROCEDURE — 72146 MRI CHEST SPINE W/O DYE: CPT | Mod: 26

## 2024-04-11 PROCEDURE — 99233 SBSQ HOSP IP/OBS HIGH 50: CPT

## 2024-04-11 PROCEDURE — 74220 X-RAY XM ESOPHAGUS 1CNTRST: CPT | Mod: 26

## 2024-04-11 PROCEDURE — 99233 SBSQ HOSP IP/OBS HIGH 50: CPT | Mod: FS

## 2024-04-11 RX ORDER — ACETAMINOPHEN 500 MG
1000 TABLET ORAL ONCE
Refills: 0 | Status: COMPLETED | OUTPATIENT
Start: 2024-04-11 | End: 2024-04-11

## 2024-04-11 RX ADMIN — Medication 400 MILLIGRAM(S): at 22:50

## 2024-04-11 RX ADMIN — PANTOPRAZOLE SODIUM 40 MILLIGRAM(S): 20 TABLET, DELAYED RELEASE ORAL at 17:49

## 2024-04-11 RX ADMIN — HEPARIN SODIUM 1300 UNIT(S)/HR: 5000 INJECTION INTRAVENOUS; SUBCUTANEOUS at 20:47

## 2024-04-11 RX ADMIN — Medication 1000 MILLIGRAM(S): at 23:50

## 2024-04-11 RX ADMIN — HEPARIN SODIUM 1600 UNIT(S)/HR: 5000 INJECTION INTRAVENOUS; SUBCUTANEOUS at 11:17

## 2024-04-11 RX ADMIN — HEPARIN SODIUM 1300 UNIT(S)/HR: 5000 INJECTION INTRAVENOUS; SUBCUTANEOUS at 14:00

## 2024-04-11 RX ADMIN — PANTOPRAZOLE SODIUM 40 MILLIGRAM(S): 20 TABLET, DELAYED RELEASE ORAL at 05:35

## 2024-04-11 RX ADMIN — HEPARIN SODIUM 1300 UNIT(S)/HR: 5000 INJECTION INTRAVENOUS; SUBCUTANEOUS at 19:20

## 2024-04-11 RX ADMIN — Medication 1000 MILLIGRAM(S): at 05:26

## 2024-04-11 RX ADMIN — HEPARIN SODIUM 1600 UNIT(S)/HR: 5000 INJECTION INTRAVENOUS; SUBCUTANEOUS at 07:44

## 2024-04-11 RX ADMIN — OLANZAPINE 2.5 MILLIGRAM(S): 15 TABLET, FILM COATED ORAL at 22:24

## 2024-04-11 RX ADMIN — HEPARIN SODIUM 1600 UNIT(S)/HR: 5000 INJECTION INTRAVENOUS; SUBCUTANEOUS at 04:33

## 2024-04-11 RX ADMIN — SODIUM CHLORIDE 100 MILLILITER(S): 9 INJECTION, SOLUTION INTRAVENOUS at 04:26

## 2024-04-11 RX ADMIN — Medication 400 MILLIGRAM(S): at 04:26

## 2024-04-11 NOTE — PROGRESS NOTE ADULT - ASSESSMENT
Assessment: Patient is a 25yo M presented with dizziness, vomiting and nausea with lower back pain accompanied by sensory changes. Neurosurgery consulted for inital MRI thoracic spine read concerning for T3-T4 disc herniation.  Repeat MRI completed today remarkable for small disc herniation T6-7; repeat imaging reviewed by neurosurgeon and neuro-radiologist without any significant changes appreciated.     Plan:  - Q4 neuro checks   - MRI Thoracic Spine 4/8 reviewed - poor quality imaging - T3-4 disc herniation with cord edema/gliosis   - MRI T/S 4/11 reviewed -small T6-7 disc herniation without any significant changes  - Pain control prn, avoid oversedation  - Continue heparin drip for PEs  - Neurology following   - Further medical management and supportive care per primary team    Case discussed with Dr. Anderson. Assessment: Patient is a 25yo M presented with dizziness, vomiting and nausea with lower back pain accompanied by sensory changes. Neurosurgery consulted for initial MRI thoracic spine read concerning for T3-T4 disc herniation.  Repeat MRI completed today remarkable for small disc herniation T6-7; repeat imaging reviewed by neurosurgeon and neuro-radiologist without any significant changes appreciated.     Plan:  - MRI Thoracic Spine 4/8 reviewed - poor quality imaging - T3-4 disc herniation with cord edema/gliosis   - Repeat MRI T/S 4/11 reviewed -small T6-7 disc herniation without any significant changes  - Pain control prn, avoid oversedation  - Continue heparin drip for PEs  - Neurology following   - Further medical management and supportive care per primary team, neuro checks at the discretion of the primary team.    Neurosurgery signing off. Case discussed with Dr. Anderson, patient and patient's mom bedside.  Assessment: Patient is a 23yo M presented with dizziness, vomiting and nausea with lower back pain accompanied by sensory changes. Neurosurgery consulted for initial MRI thoracic spine read concerning for T3-T4 disc herniation.  Repeat MRI completed today remarkable for small disc herniation T6-7; repeat imaging reviewed by neurosurgeon and neuro-radiologist without any significant changes appreciated.     Plan:  - MRI Thoracic Spine 4/8 reviewed - poor quality imaging  - Repeat MRI T/S 4/11 reviewed -small T6-7 disc herniation without any significant changes or issues with the spinal cord; prior T3-4 disc herniation not seen  - Pain control prn, avoid oversedation  - Continue heparin drip for PEs  - Neurology following  - Further medical management and supportive care per primary team, neuro checks at the discretion of the primary team.    Neurosurgery signing off. Case discussed with Dr. Anderson, patient and patient's mom bedside.

## 2024-04-11 NOTE — PROGRESS NOTE ADULT - ASSESSMENT
24 year old male with a past medical history of obesity, anxiety and depression status post recent Gastric sleeve surgery at [facility] Discharged from the hospital 2 days ago presents with double vision, dizziness, room spinning sensation, weakness in the leg, and decreased PO intake/urinary retention.    #Nausea and vomiting   GI evaluation was completed on prior admission  records reviewed   will cont PPI protonix iv bid   reglan vs zofran prn   Ct of abdomen  Spoke with Patients surgeon Dr. Terry [phone] - Recommends to continue hydration and repeat barium scan    #PE   CTA notes b/l PE  Tele had shown tachycardia - per cards - SVT  Cardio recs appreciated  US LE duplex neg for DVT  Heparin drip    #Blurriness/weakness  Neuro recs appreciated  MR of brain no acute infract or pathology   Vitamin levels pending  Concern for vitamin deficiency    #Back pain thoracic lumbar spine   Mr of spine reviewed , T3-T4 edema , disc protrusion  Repeat MRI T spine without T3-T4 edema and slight disc protrusion noted in t6/7  pain meds as needed   NS recs appreciated - No further intervention    #Urinary retention   recurrent   schaffer inserted   cont Flomax     #h/o anxiety , post traumatic stress Dx ?   Seen by  on last admission  Conservative management    Spoke with Aunt over the phone  I spoke to patients Bariatric surgeon Dr. Terry - Recommends IV hydration, defers transfer to [facility] until Monday if patient and family want it then as he is away for the time.     Dispo: Pending course

## 2024-04-11 NOTE — PROGRESS NOTE ADULT - SUBJECTIVE AND OBJECTIVE BOX
Hospitalist Daily Progress Note    Chief Complaint:  Patient is a 24y old  Male who presents with a chief complaint of Visual changes with abnormal MRI (11 Apr 2024 10:31)      SUBJECTIVE / OVERNIGHT EVENTS:  Patient was seen and examined at bedside. States blurriness is little improved. States to still have numbness/weakness of legs and abdomen.     Patient denies chest pain, SOB, fever, chills, dysuria or any other complaints. All remainder ROS negative.     MEDICATIONS  (STANDING):  atorvastatin 40 milliGRAM(s) Oral at bedtime  cyanocobalamin 1000 MICROGram(s) Oral daily  ergocalciferol 24634 Unit(s) Oral every week  folic acid 1 milliGRAM(s) Oral daily  heparin  Infusion.  Unit(s)/Hr (24 mL/Hr) IV Continuous <Continuous>  lactated ringers. 1000 milliLiter(s) (100 mL/Hr) IV Continuous <Continuous>  multivitamin 1 Tablet(s) Oral daily  OLANZapine Injectable 2.5 milliGRAM(s) IntraMuscular at bedtime  pantoprazole  Injectable 40 milliGRAM(s) IV Push two times a day  tamsulosin 0.4 milliGRAM(s) Oral at bedtime  vitamin B complex with vitamin C 1 Tablet(s) Oral daily    MEDICATIONS  (PRN):  acetaminophen     Tablet .. 650 milliGRAM(s) Oral every 6 hours PRN Temp greater or equal to 38C (100.4F), Mild Pain (1 - 3)  aluminum hydroxide/magnesium hydroxide/simethicone Suspension 30 milliLiter(s) Oral every 4 hours PRN Dyspepsia  heparin   Injectable 5000 Unit(s) IV Push every 6 hours PRN For aPTT between 40 - 57  heparin   Injectable 63075 Unit(s) IV Push every 6 hours PRN For aPTT less than 40  meclizine 12.5 milliGRAM(s) Oral three times a day PRN Dizziness  melatonin 3 milliGRAM(s) Oral at bedtime PRN Insomnia  metoprolol tartrate Injectable 5 milliGRAM(s) IV Push every 8 hours PRN hr over 120  ondansetron Injectable 4 milliGRAM(s) IV Push every 8 hours PRN Nausea and/or Vomiting  polyethylene glycol 3350 17 Gram(s) Oral daily PRN Constipation  senna 2 Tablet(s) Oral at bedtime PRN Constipation        I&O's Summary    10 Apr 2024 07:01  -  11 Apr 2024 07:00  --------------------------------------------------------  IN: 1516 mL / OUT: 3000 mL / NET: -1484 mL        PHYSICAL EXAM:  Vital Signs Last 24 Hrs  T(C): 36.9 (11 Apr 2024 07:40), Max: 37.4 (11 Apr 2024 04:00)  T(F): 98.5 (11 Apr 2024 07:40), Max: 99.4 (11 Apr 2024 04:00)  HR: 81 (11 Apr 2024 07:40) (60 - 98)  BP: 103/70 (11 Apr 2024 07:40) (98/62 - 144/97)  BP(mean): 101 (10 Apr 2024 16:52) (101 - 101)  RR: 18 (11 Apr 2024 07:40) (17 - 19)  SpO2: 95% (11 Apr 2024 07:40) (95% - 100%)    Parameters below as of 11 Apr 2024 07:40  Patient On (Oxygen Delivery Method): room air          Constitutional: NAD, Resting  ENT: Supple, No JVD  Lungs: CTA B/L, Non-labored breathing  Cardio: RRR, S1/S2, No murmur  Abdomen: Soft, Nontender, Nondistended; Bowel sounds present  Extremities: No calf tenderness, No pitting edema  Musculoskeletal:   No joint swelling  Psych: Calm, cooperative affect appropriate  Neuro: Awake and alert  Skin: No rashes; no palpable lesions    LABS:                        14.1   16.00 )-----------( 302      ( 11 Apr 2024 03:56 )             40.9     04-11    135  |  96  |  4.8<L>  ----------------------------<  111<H>  3.8   |  23.0  |  0.45<L>    Ca    9.2      11 Apr 2024 03:56      PTT - ( 11 Apr 2024 11:35 )  PTT:113.4 sec      Urinalysis Basic - ( 11 Apr 2024 03:56 )    Color: x / Appearance: x / SG: x / pH: x  Gluc: 111 mg/dL / Ketone: x  / Bili: x / Urobili: x   Blood: x / Protein: x / Nitrite: x   Leuk Esterase: x / RBC: x / WBC x   Sq Epi: x / Non Sq Epi: x / Bacteria: x        CAPILLARY BLOOD GLUCOSE            RADIOLOGY REVIEWED

## 2024-04-11 NOTE — PROGRESS NOTE ADULT - SUBJECTIVE AND OBJECTIVE BOX
SURGERY        INTERVAL EVENTS/SUBJECTIVE:   No acute events overnight.  Has an open can of coconut water at bedside which he reports being able to drink yesterday.  Continues to have normal bowel function.  Denies fevers and chills, denies pain.    ______________________________________________  OBJECTIVE:   T(C): 37.2 (04-11-24 @ 06:57), Max: 37.4 (04-11-24 @ 04:00)  HR: 98 (04-11-24 @ 04:31) (60 - 98)  BP: 98/62 (04-11-24 @ 04:31) (98/62 - 144/97)  RR: 17 (04-11-24 @ 04:31) (17 - 19)  SpO2: 97% (04-11-24 @ 04:31) (97% - 100%)  Wt(kg): --  CAPILLARY BLOOD GLUCOSE        I&O's Detail    10 Apr 2024 07:01  -  11 Apr 2024 07:00  --------------------------------------------------------  IN:    Heparin Infusion: 176 mL    Lactated Ringers: 1100 mL    Oral Fluid: 240 mL  Total IN: 1516 mL    OUT:    Ureteral Catheter (mL): 1800 mL    Voided (mL): 1200 mL  Total OUT: 3000 mL    Total NET: -1484 mL          Physical exam:  Gen: resting in bed comfortably in NAD  Chest: no increased WOB, regular inspiratory effort   Abdomen: Soft, nontender, nondistended with no rebound tenderness or guarding  Vascular: WWP, KERNS x4  NEURO: awake, alert  ______________________________________________  LABS:  CBC Full  -  ( 11 Apr 2024 03:56 )  WBC Count : 16.00 K/uL  RBC Count : 5.24 M/uL  Hemoglobin : 14.1 g/dL  Hematocrit : 40.9 %  Platelet Count - Automated : 302 K/uL  Mean Cell Volume : 78.1 fl  Mean Cell Hemoglobin : 26.9 pg  Mean Cell Hemoglobin Concentration : 34.5 gm/dL  Auto Neutrophil # : 15.30 K/uL  Auto Lymphocyte # : 0.14 K/uL  Auto Monocyte # : 0.56 K/uL  Auto Eosinophil # : 0.00 K/uL  Auto Basophil # : 0.00 K/uL  Auto Neutrophil % : 95.6 %  Auto Lymphocyte % : 0.9 %  Auto Monocyte % : 3.5 %  Auto Eosinophil % : 0.0 %  Auto Basophil % : 0.0 %    04-11    135  |  96  |  4.8<L>  ----------------------------<  111<H>  3.8   |  23.0  |  0.45<L>    Ca    9.2      11 Apr 2024 03:56  Phos  4.7     04-09  Mg     2.0     04-09    TPro  6.8  /  Alb  3.8  /  TBili  2.7<H>  /  DBili  1.3<H>  /  AST  128<H>  /  ALT  286<H>  /  AlkPhos  90  04-09

## 2024-04-11 NOTE — PROGRESS NOTE ADULT - ASSESSMENT
24M with recent sleeve gastrectomy at [facility], admitted to medicine service with LE weakness, dizziness and persistent vomiting.  Nausea and vomiting are improved and he is tolerating PO intake yesterday and continued normal bowel function.      Plan:  - Diet as tolerated  - No acute surgical intervention indicated  - Surgery team will sign off, please reconsult us with any questions or concerns.

## 2024-04-11 NOTE — PROGRESS NOTE ADULT - SUBJECTIVE AND OBJECTIVE BOX
HPI: 24 year old male with a past medical history of obesity, anxiety and depression status post recent Gastric sleeve surgery at [facility] recently discharged from the hospital presents with double vision, dizziness, room spinning sensation, weakness in the leg, and decreased PO intake. Patient has had the symptoms of weakness and poor PO intake since prior admission. His workup was negative then including EGD, emptying study. He was seen by PT and recommended outpatient CT. He has also been having recurrent urinary retention for which he has required recurrent catheterizations. On 4-8-24 patient comes back to the ED with complaint of blurry vision/double vision. Patient is living with mother and aunt who are caring for him.    INTERVAL HPI/OVERNIGHT EVENTS:  24y Male seen lying comfortably in bed. Pt c/o of numbness from his nipple line down to his feet x 2 weeks that is unchanged. Patient denies any other complaints at this time.    Vital Signs Last 24 Hrs  T(C): 36.9 (11 Apr 2024 07:40), Max: 37.4 (11 Apr 2024 04:00)  T(F): 98.5 (11 Apr 2024 07:40), Max: 99.4 (11 Apr 2024 04:00)  HR: 81 (11 Apr 2024 07:40) (60 - 98)  BP: 103/70 (11 Apr 2024 07:40) (98/62 - 144/97)  BP(mean): 101 (10 Apr 2024 16:52) (101 - 101)  RR: 18 (11 Apr 2024 07:40) (17 - 19)  SpO2: 95% (11 Apr 2024 07:40) (95% - 100%)    Parameters below as of 11 Apr 2024 07:40  Patient On (Oxygen Delivery Method): room air    Physical Exam:  General: NAD, NCAT, resting comfortably in bed  Lungs: respirations unlabored on RA  Neuro: AOX3, KERNS AG, LLE knee extension 4/5 otherwise 5/5 throughout, PERRL, opens eyes to voice but does not sustain, b/l upper thigh numbness to groin region, sensation otherwise intact, follows simple commands     IMAGING:  < from: MR Thoracic Spine No Cont (04.11.24 @ 01:32) >ACC: [id number] EXAM:  MR SPINE THORACIC   ORDERED BY: SILKE SCHMITZ   PROCEDURE DATE:  04/11/2024      INTERPRETATION:  CLINICAL INDICATION: Back pain and lower extremity   numbness    < end of copied text >  < from: MR Thoracic Spine No Cont (04.11.24 @ 01:32) >  IMPRESSION:  Multilevel Schmorl's nodes. Small left-sided disc herniation T6-7 with   mild flattening the ventral aspect of the cord without abnormal cord   signal. No change since 4-8 2024.      Dr. Carolina discussed these findings with Dr. Anderson on 4/11/2024 8:11 AM with   read back.    --- End of Report ---      ISABELLA CAROLINA MD; Attending Radiologist  This document has been electronically signed. Apr 11 2024  8:17AM    < end of copied text >    < from: MR Angio Head No Cont (04.08.24 @ 19:44) >ACC: [id number] EXAM:  MR ANGIO BRAIN   ORDERED BY: NORMA JUSTIN; ACC: [id number] EXAM:  MR BRAIN   ORDERED BY: JENNA DE PAZ     PROCEDURE DATE:  04/08/2024      < from: MR Angio Head No Cont (04.08.24 @ 19:44) >  IMPRESSION:    1.  BRAIN:   Unremarkable MR of the brain.    2.  ANTERIOR CIRCULATION:    Intact.    3. POSTERIOR CIRCULATION:  Intact.    --- End of Report ---      ANA PIERRE MD; Attending Radiologist  This document has been electronically signed. Apr 9 2024  7:30AM    < end of copied text >    < from: MR Thoracic Spine No Cont (04.08.24 @ 19:37) >ACC: [id number] EXAM:  MR SPINE LUMBAR   ORDERED BY: NORMA JUSTIN; ACC: [id number] EXAM:  MR SPINE THORACIC   ORDERED BY: YANIRA TRAN     PROCEDURE DATE:  04/08/2024      < from: MR Thoracic Spine No Cont (04.08.24 @ 19:37) >  IMPRESSION:    1. THORACIC SPINE:   Multiple thoracic disc protrusions (disc herniation)    cause ventral cord deformity and at T3-T4 causes cord edema/gliosis    2. LUMBAR SPINE:   Unremarkable MR of the lumbar spine.    --- End of Report ---      ANA PIERRE MD; Attending Radiologist  This document has been electronically signed. Apr 9 2024  7:55AM    < end of copied text >    LABs:                        14.1   16.00 )-----------( 302      ( 11 Apr 2024 03:56 )             40.9       04-11    135  |  96  |  4.8<L>  ----------------------------<  111<H>  3.8   |  23.0  |  0.45<L>    Ca    9.2      11 Apr 2024 03:56          Urinalysis Basic - ( 11 Apr 2024 03:56 )  Color: x / Appearance: x / SG: x / pH: x  Gluc: 111 mg/dL / Ketone: x  / Bili: x / Urobili: x   Blood: x / Protein: x / Nitrite: x   Leuk Esterase: x / RBC: x / WBC x   Sq Epi: x / Non Sq Epi: x / Bacteria: x      PTT - ( 11 Apr 2024 03:56 )  PTT:61.2 sec                       HPI: 24 year old male with a past medical history of obesity, anxiety and depression status post recent Gastric sleeve surgery at [facility] recently discharged from the hospital presents with double vision, dizziness, room spinning sensation, weakness in the leg, and decreased PO intake. Patient has had the symptoms of weakness and poor PO intake since prior admission. His workup was negative then including EGD, emptying study. He was seen by PT and recommended outpatient CT. He has also been having recurrent urinary retention for which he has required recurrent catheterizations. On 4-8-24 patient comes back to the ED with complaint of blurry vision/double vision. Patient is living with mother and aunt who are caring for him.    INTERVAL HPI/OVERNIGHT EVENTS:  24y Male seen lying comfortably in bed. Neurosurgery consulted for patient with c/o of numbness from his nipple line down to his feet x 2 weeks that is unchanged. Patient denies complaints at the time of exam.     Vital Signs Last 24 Hrs  T(C): 36.9 (11 Apr 2024 07:40), Max: 37.4 (11 Apr 2024 04:00)  T(F): 98.5 (11 Apr 2024 07:40), Max: 99.4 (11 Apr 2024 04:00)  HR: 81 (11 Apr 2024 07:40) (60 - 98)  BP: 103/70 (11 Apr 2024 07:40) (98/62 - 144/97)  BP(mean): 101 (10 Apr 2024 16:52) (101 - 101)  RR: 18 (11 Apr 2024 07:40) (17 - 19)  SpO2: 95% (11 Apr 2024 07:40) (95% - 100%)    Parameters below as of 11 Apr 2024 07:40  Patient On (Oxygen Delivery Method): room air    Physical Exam:  General: NAD, NCAT, resting comfortably in bed  Lungs: respirations unlabored on RA  Neuro: AOX3, KERNS AG, LLE knee extension 4/5 otherwise 5/5 throughout, PERRL, opens eyes to voice but does not sustain, b/l upper thigh numbness to groin region, sensation otherwise intact, follows simple commands     IMAGING:  < from: MR Thoracic Spine No Cont (04.11.24 @ 01:32) >ACC: [id number] EXAM:  MR SPINE THORACIC   ORDERED BY: SILKE SCHMITZ   PROCEDURE DATE:  04/11/2024      INTERPRETATION:  CLINICAL INDICATION: Back pain and lower extremity   numbness    < end of copied text >  < from: MR Thoracic Spine No Cont (04.11.24 @ 01:32) >  IMPRESSION:  Multilevel Schmorl's nodes. Small left-sided disc herniation T6-7 with   mild flattening the ventral aspect of the cord without abnormal cord   signal. No change since 4-8 2024.      Dr. Carolina discussed these findings with Dr. Anderson on 4/11/2024 8:11 AM with   read back.    --- End of Report ---      ISABELLA CAROLINA MD; Attending Radiologist  This document has been electronically signed. Apr 11 2024  8:17AM    < end of copied text >    < from: MR Angio Head No Cont (04.08.24 @ 19:44) >ACC: [id number] EXAM:  MR ANGIO BRAIN   ORDERED BY: NORMA JUSTIN; ACC: [id number] EXAM:  MR BRAIN   ORDERED BY: JENNA DE PAZ     PROCEDURE DATE:  04/08/2024      < from: MR Angio Head No Cont (04.08.24 @ 19:44) >  IMPRESSION:    1.  BRAIN:   Unremarkable MR of the brain.    2.  ANTERIOR CIRCULATION:    Intact.    3. POSTERIOR CIRCULATION:  Intact.    --- End of Report ---      ANA PIERRE MD; Attending Radiologist  This document has been electronically signed. Apr 9 2024  7:30AM    < end of copied text >    < from: MR Thoracic Spine No Cont (04.08.24 @ 19:37) >ACC: [id number] EXAM:  MR SPINE LUMBAR   ORDERED BY: NORMA JUSTIN; ACC: [id number] EXAM:  MR SPINE THORACIC   ORDERED BY: YANIRA TRAN     PROCEDURE DATE:  04/08/2024      < from: MR Thoracic Spine No Cont (04.08.24 @ 19:37) >  IMPRESSION:    1. THORACIC SPINE:   Multiple thoracic disc protrusions (disc herniation)    cause ventral cord deformity and at T3-T4 causes cord edema/gliosis    2. LUMBAR SPINE:   Unremarkable MR of the lumbar spine.    --- End of Report ---      ANA PIERRE MD; Attending Radiologist  This document has been electronically signed. Apr 9 2024  7:55AM    < end of copied text >    LABs:                        14.1   16.00 )-----------( 302      ( 11 Apr 2024 03:56 )             40.9       04-11    135  |  96  |  4.8<L>  ----------------------------<  111<H>  3.8   |  23.0  |  0.45<L>    Ca    9.2      11 Apr 2024 03:56          Urinalysis Basic - ( 11 Apr 2024 03:56 )  Color: x / Appearance: x / SG: x / pH: x  Gluc: 111 mg/dL / Ketone: x  / Bili: x / Urobili: x   Blood: x / Protein: x / Nitrite: x   Leuk Esterase: x / RBC: x / WBC x   Sq Epi: x / Non Sq Epi: x / Bacteria: x      PTT - ( 11 Apr 2024 03:56 )  PTT:61.2 sec

## 2024-04-11 NOTE — PROGRESS NOTE ADULT - NS ATTEND AMEND GEN_ALL_CORE FT
I agree with the above. I personally examined and saw the patient this afternoon. Full strength in legs. I discussed his progress with original surgeon Dr. Terry at request of family and spoke with Dr. Santamaria and Dr. Linda.
I agree with the above. I personally examined and saw the patient. Moving legs. Reviewed MRI, no cord compression; updated mom in person and aunt via phone.
24 year old male with a past medical history of obesity, anxiety and depression status post recent Gastric sleeve surgery at [facility] Discharged from the hospital 2 days ago presents with double vision, dizziness, room spinning sensation, weakness in the leg, and decreased PO intake/urinary retention. Cardiology consulted to evaluate tachycardia     Pulmonary embolism  SVT- atrial tachycardia  Dizziness  acute dehydration  Recent gastric sleeve  Thoracic disc herniation  Morbid obesity  autism spectrum disorder  anxiety      Patient presents with tachycardia, dizziness, weakness, dehydration.  Poor PO intake  post gastric sleeve for morbid obesity treatment  has concerning findings on MRI which Neurosurgery is evaluating      PE  remains in sinus rhythm  non cardiac symptoms this morning.  telemetry appropriate, sinus vladimir    needs RADHA testing    encourage PO intake  IVF hydration recommended given poor PO    2D TTE pending    risk stratification for neurosurgery if there is emergent need for surgery would be non-modifiable and elevated. If deemed emergent due to chordal issues, this is deemed life saving and should proceed.    planned for MRI thoracic by neurosurgery    heparin infusion if ok with neuro teams for PE    we will follow.

## 2024-04-11 NOTE — PROGRESS NOTE ADULT - ATTENDING COMMENTS
No surgical intervention indicated  Surgery team will sign off at this time. Please re-consult as needed. Recommend patient followup with his surgeon for routine post-bariatric surgery care post discharge

## 2024-04-12 LAB
ANION GAP SERPL CALC-SCNC: 17 MMOL/L — SIGNIFICANT CHANGE UP (ref 5–17)
APTT BLD: 45.6 SEC — HIGH (ref 24.5–35.6)
APTT BLD: 53.9 SEC — HIGH (ref 24.5–35.6)
APTT BLD: 83.1 SEC — HIGH (ref 24.5–35.6)
BASOPHILS # BLD AUTO: 0.04 K/UL — SIGNIFICANT CHANGE UP (ref 0–0.2)
BASOPHILS NFR BLD AUTO: 0.7 % — SIGNIFICANT CHANGE UP (ref 0–2)
BUN SERPL-MCNC: 4.8 MG/DL — LOW (ref 8–20)
CALCIUM SERPL-MCNC: 9.1 MG/DL — SIGNIFICANT CHANGE UP (ref 8.4–10.5)
CHLORIDE SERPL-SCNC: 99 MMOL/L — SIGNIFICANT CHANGE UP (ref 96–108)
CO2 SERPL-SCNC: 22 MMOL/L — SIGNIFICANT CHANGE UP (ref 22–29)
CREAT SERPL-MCNC: 0.53 MG/DL — SIGNIFICANT CHANGE UP (ref 0.5–1.3)
EGFR: 142 ML/MIN/1.73M2 — SIGNIFICANT CHANGE UP
EOSINOPHIL # BLD AUTO: 0.14 K/UL — SIGNIFICANT CHANGE UP (ref 0–0.5)
EOSINOPHIL NFR BLD AUTO: 2.4 % — SIGNIFICANT CHANGE UP (ref 0–6)
GLUCOSE SERPL-MCNC: 100 MG/DL — HIGH (ref 70–99)
HCT VFR BLD CALC: 40 % — SIGNIFICANT CHANGE UP (ref 39–50)
HGB BLD-MCNC: 13.2 G/DL — SIGNIFICANT CHANGE UP (ref 13–17)
IMM GRANULOCYTES NFR BLD AUTO: 0.8 % — SIGNIFICANT CHANGE UP (ref 0–0.9)
LYMPHOCYTES # BLD AUTO: 1.54 K/UL — SIGNIFICANT CHANGE UP (ref 1–3.3)
LYMPHOCYTES # BLD AUTO: 26.1 % — SIGNIFICANT CHANGE UP (ref 13–44)
MCHC RBC-ENTMCNC: 26.5 PG — LOW (ref 27–34)
MCHC RBC-ENTMCNC: 33 GM/DL — SIGNIFICANT CHANGE UP (ref 32–36)
MCV RBC AUTO: 80.3 FL — SIGNIFICANT CHANGE UP (ref 80–100)
MONOCYTES # BLD AUTO: 0.71 K/UL — SIGNIFICANT CHANGE UP (ref 0–0.9)
MONOCYTES NFR BLD AUTO: 12 % — SIGNIFICANT CHANGE UP (ref 2–14)
NEUTROPHILS # BLD AUTO: 3.43 K/UL — SIGNIFICANT CHANGE UP (ref 1.8–7.4)
NEUTROPHILS NFR BLD AUTO: 58 % — SIGNIFICANT CHANGE UP (ref 43–77)
PLATELET # BLD AUTO: 296 K/UL — SIGNIFICANT CHANGE UP (ref 150–400)
POTASSIUM SERPL-MCNC: 3.7 MMOL/L — SIGNIFICANT CHANGE UP (ref 3.5–5.3)
POTASSIUM SERPL-SCNC: 3.7 MMOL/L — SIGNIFICANT CHANGE UP (ref 3.5–5.3)
RBC # BLD: 4.98 M/UL — SIGNIFICANT CHANGE UP (ref 4.2–5.8)
RBC # FLD: 15.5 % — HIGH (ref 10.3–14.5)
SODIUM SERPL-SCNC: 138 MMOL/L — SIGNIFICANT CHANGE UP (ref 135–145)
T3 SERPL-MCNC: 85 NG/DL — SIGNIFICANT CHANGE UP
WBC # BLD: 5.91 K/UL — SIGNIFICANT CHANGE UP (ref 3.8–10.5)
WBC # FLD AUTO: 5.91 K/UL — SIGNIFICANT CHANGE UP (ref 3.8–10.5)

## 2024-04-12 PROCEDURE — 99233 SBSQ HOSP IP/OBS HIGH 50: CPT

## 2024-04-12 RX ORDER — HEPARIN SODIUM 5000 [USP'U]/ML
10000 INJECTION INTRAVENOUS; SUBCUTANEOUS EVERY 6 HOURS
Refills: 0 | Status: DISCONTINUED | OUTPATIENT
Start: 2024-04-12 | End: 2024-04-12

## 2024-04-12 RX ORDER — HEPARIN SODIUM 5000 [USP'U]/ML
10000 INJECTION INTRAVENOUS; SUBCUTANEOUS EVERY 6 HOURS
Refills: 0 | Status: DISCONTINUED | OUTPATIENT
Start: 2024-04-12 | End: 2024-04-13

## 2024-04-12 RX ORDER — HEPARIN SODIUM 5000 [USP'U]/ML
1600 INJECTION INTRAVENOUS; SUBCUTANEOUS
Qty: 25000 | Refills: 0 | Status: DISCONTINUED | OUTPATIENT
Start: 2024-04-12 | End: 2024-04-13

## 2024-04-12 RX ORDER — HEPARIN SODIUM 5000 [USP'U]/ML
1100 INJECTION INTRAVENOUS; SUBCUTANEOUS
Qty: 25000 | Refills: 0 | Status: DISCONTINUED | OUTPATIENT
Start: 2024-04-12 | End: 2024-04-12

## 2024-04-12 RX ORDER — MULTIVIT-MIN/FERROUS GLUCONATE 9 MG/15 ML
15 LIQUID (ML) ORAL DAILY
Refills: 0 | Status: DISCONTINUED | OUTPATIENT
Start: 2024-04-12 | End: 2024-04-29

## 2024-04-12 RX ORDER — HEPARIN SODIUM 5000 [USP'U]/ML
5000 INJECTION INTRAVENOUS; SUBCUTANEOUS EVERY 6 HOURS
Refills: 0 | Status: DISCONTINUED | OUTPATIENT
Start: 2024-04-12 | End: 2024-04-12

## 2024-04-12 RX ORDER — HEPARIN SODIUM 5000 [USP'U]/ML
5000 INJECTION INTRAVENOUS; SUBCUTANEOUS EVERY 6 HOURS
Refills: 0 | Status: DISCONTINUED | OUTPATIENT
Start: 2024-04-12 | End: 2024-04-13

## 2024-04-12 RX ORDER — OLANZAPINE 15 MG/1
2.5 TABLET, FILM COATED ORAL DAILY
Refills: 0 | Status: DISCONTINUED | OUTPATIENT
Start: 2024-04-12 | End: 2024-04-26

## 2024-04-12 RX ADMIN — PANTOPRAZOLE SODIUM 40 MILLIGRAM(S): 20 TABLET, DELAYED RELEASE ORAL at 18:37

## 2024-04-12 RX ADMIN — HEPARIN SODIUM 1600 UNIT(S)/HR: 5000 INJECTION INTRAVENOUS; SUBCUTANEOUS at 20:41

## 2024-04-12 RX ADMIN — SODIUM CHLORIDE 100 MILLILITER(S): 9 INJECTION, SOLUTION INTRAVENOUS at 05:53

## 2024-04-12 RX ADMIN — HEPARIN SODIUM 1600 UNIT(S)/HR: 5000 INJECTION INTRAVENOUS; SUBCUTANEOUS at 09:15

## 2024-04-12 RX ADMIN — Medication 1 TABLET(S): at 16:06

## 2024-04-12 RX ADMIN — HEPARIN SODIUM 1600 UNIT(S)/HR: 5000 INJECTION INTRAVENOUS; SUBCUTANEOUS at 03:12

## 2024-04-12 RX ADMIN — HEPARIN SODIUM 5000 UNIT(S): 5000 INJECTION INTRAVENOUS; SUBCUTANEOUS at 03:15

## 2024-04-12 RX ADMIN — Medication 1 MILLIGRAM(S): at 16:06

## 2024-04-12 RX ADMIN — PANTOPRAZOLE SODIUM 40 MILLIGRAM(S): 20 TABLET, DELAYED RELEASE ORAL at 05:53

## 2024-04-12 RX ADMIN — HEPARIN SODIUM 1600 UNIT(S)/HR: 5000 INJECTION INTRAVENOUS; SUBCUTANEOUS at 17:21

## 2024-04-12 RX ADMIN — Medication 1 ENEMA: at 14:20

## 2024-04-12 RX ADMIN — SODIUM CHLORIDE 100 MILLILITER(S): 9 INJECTION, SOLUTION INTRAVENOUS at 18:41

## 2024-04-12 RX ADMIN — Medication 15 MILLILITER(S): at 16:06

## 2024-04-12 RX ADMIN — ATORVASTATIN CALCIUM 40 MILLIGRAM(S): 80 TABLET, FILM COATED ORAL at 21:19

## 2024-04-12 RX ADMIN — TAMSULOSIN HYDROCHLORIDE 0.4 MILLIGRAM(S): 0.4 CAPSULE ORAL at 21:19

## 2024-04-12 RX ADMIN — HEPARIN SODIUM 1600 UNIT(S)/HR: 5000 INJECTION INTRAVENOUS; SUBCUTANEOUS at 08:34

## 2024-04-12 RX ADMIN — HEPARIN SODIUM 1600 UNIT(S)/HR: 5000 INJECTION INTRAVENOUS; SUBCUTANEOUS at 07:47

## 2024-04-12 NOTE — OCCUPATIONAL THERAPY INITIAL EVALUATION ADULT - ADDITIONAL COMMENTS
as per chart: pt lives in a 1 level home with mother and aunt, 3 [address]. Pt will benefit from follow up by  manager.

## 2024-04-12 NOTE — PHYSICAL THERAPY INITIAL EVALUATION ADULT - GENERAL OBSERVATIONS, REHAB EVAL
Pt received in bed +IV +monitor +schaffer on room air, pleasant and cooperative with encouragement. Pt seen with OT

## 2024-04-12 NOTE — CHART NOTE - NSCHARTNOTEFT_GEN_A_CORE
Multiple attempts made to see patient for psychiatric evaluation. Patient had been given PRN enema this afternoon, and uncomfortable due to not having a bowel movement as of yet Psychiatry consulted due to anxiety. Multiple attempts made to see patient for psychiatric evaluation. Patient had been given PRN enema this afternoon, and uncomfortable due to not having a bowel movement as of yet. Patient deferred psychiatric evaluation at this time.

## 2024-04-12 NOTE — PROGRESS NOTE ADULT - ASSESSMENT
24 year old male with a past medical history of obesity, anxiety and depression status post recent Gastric sleeve surgery at [facility] Discharged from the hospital 2 days ago presents with double vision, dizziness, room spinning sensation, weakness in the leg, and decreased PO intake/urinary retention.    #Nausea and vomiting   GI evaluation was completed on prior admission  records reviewed   will cont PPI protonix iv bid   reglan vs zofran prn   Ct of abdomen  Spoke with Patients surgeon Dr. Terry [phone] - Recommends to continue hydration and repeat barium scan  Barium scan performed and was normal    #PE   CTA notes b/l PE  Tele had shown tachycardia - per cards - SVT  Cardio recs appreciated  US LE duplex neg for DVT  Heparin drip    #Blurriness/weakness  Neuro recs appreciated  MR of brain no acute infract or pathology   Vitamin levels pending  Concern for vitamin deficiency    #Back pain thoracic lumbar spine   Mr of spine reviewed , T3-T4 edema , disc protrusion  Repeat MRI T spine without T3-T4 edema and slight disc protrusion noted in t6/7  pain meds as needed   NS recs appreciated - No further intervention    #Urinary retention   recurrent   schaffer inserted   cont Flomax     #h/o anxiety , post traumatic stress Dx ?   Seen by  on last admission  Conservative management    Spoke with Aunt over the phone  I spoke to patients Bariatric surgeon Dr. Terry - Recommends IV hydration, defers transfer to [facility] until Monday if patient and family want it then as he is away for the time.     Dispo: Pending course

## 2024-04-12 NOTE — PHYSICAL THERAPY INITIAL EVALUATION ADULT - PERTINENT HX OF CURRENT PROBLEM, REHAB EVAL
23yo M presented with dizziness, vomiting and nausea with lower back pain accompanied by sensory changes. Neurosurgery consulted for initial MRI thoracic spine read concerning for T3-T4 disc herniation.  Repeat MRI completed today remarkable for small disc herniation T6-7; repeat imaging reviewed by neurosurgeon and neuro-radiologist without any significant changes appreciated.

## 2024-04-12 NOTE — PHYSICAL THERAPY INITIAL EVALUATION ADULT - RANGE OF MOTION EXAMINATION, REHAB EVAL
assessed during movement/bilateral upper extremity ROM was WFL (within functional limits)/bilateral lower extremity ROM was WFL (within functional limits)

## 2024-04-12 NOTE — PROGRESS NOTE ADULT - SUBJECTIVE AND OBJECTIVE BOX
Hospitalist Daily Progress Note    Chief Complaint:  Patient is a 24y old  Male who presents with a chief complaint of Visual changes with abnormal MRI (11 Apr 2024 13:09)      SUBJECTIVE / OVERNIGHT EVENTS:  Patient was seen and examined at bedside.   Complains of blurriness  Complains of weakness in legs with numbess  Tolerating cereal, some bread   All remainder ROS negative.     MEDICATIONS  (STANDING):  atorvastatin 40 milliGRAM(s) Oral at bedtime  cyanocobalamin 1000 MICROGram(s) Oral daily  ergocalciferol 45680 Unit(s) Oral every week  folic acid 1 milliGRAM(s) Oral daily  heparin  Infusion.  Unit(s)/Hr (24 mL/Hr) IV Continuous <Continuous>  lactated ringers. 1000 milliLiter(s) (100 mL/Hr) IV Continuous <Continuous>  multivitamin/minerals/iron Oral Solution (CENTRUM) 15 milliLiter(s) Oral daily  OLANZapine Injectable 2.5 milliGRAM(s) IntraMuscular at bedtime  pantoprazole  Injectable 40 milliGRAM(s) IV Push two times a day  saline laxative (FLEET) Rectal Enema 1 Enema Rectal once  tamsulosin 0.4 milliGRAM(s) Oral at bedtime  vitamin B complex with vitamin C 1 Tablet(s) Oral daily    MEDICATIONS  (PRN):  acetaminophen     Tablet .. 650 milliGRAM(s) Oral every 6 hours PRN Temp greater or equal to 38C (100.4F), Mild Pain (1 - 3)  aluminum hydroxide/magnesium hydroxide/simethicone Suspension 30 milliLiter(s) Oral every 4 hours PRN Dyspepsia  heparin   Injectable 33301 Unit(s) IV Push every 6 hours PRN For aPTT less than 40  heparin   Injectable 5000 Unit(s) IV Push every 6 hours PRN For aPTT between 40 - 57  meclizine 12.5 milliGRAM(s) Oral three times a day PRN Dizziness  melatonin 3 milliGRAM(s) Oral at bedtime PRN Insomnia  metoprolol tartrate Injectable 5 milliGRAM(s) IV Push every 8 hours PRN hr over 120  ondansetron Injectable 4 milliGRAM(s) IV Push every 8 hours PRN Nausea and/or Vomiting  polyethylene glycol 3350 17 Gram(s) Oral daily PRN Constipation  senna 2 Tablet(s) Oral at bedtime PRN Constipation        I&O's Summary    11 Apr 2024 07:01  -  12 Apr 2024 07:00  --------------------------------------------------------  IN: 1440 mL / OUT: 3650 mL / NET: -2210 mL        PHYSICAL EXAM:  Vital Signs Last 24 Hrs  T(C): 36.9 (12 Apr 2024 07:53), Max: 37.1 (11 Apr 2024 20:00)  T(F): 98.4 (12 Apr 2024 07:53), Max: 98.7 (11 Apr 2024 20:00)  HR: 74 (12 Apr 2024 07:53) (60 - 75)  BP: 107/73 (12 Apr 2024 07:53) (107/73 - 139/87)  BP(mean): --  RR: 18 (12 Apr 2024 07:53) (18 - 18)  SpO2: 96% (12 Apr 2024 07:53) (96% - 100%)    Parameters below as of 12 Apr 2024 07:53  Patient On (Oxygen Delivery Method): room air          Constitutional: NAD, Resting  ENT: Supple, No JVD  Lungs: CTA B/L, Non-labored breathing  Cardio: RRR, S1/S2, No murmur  Abdomen: Soft, Nontender, Nondistended; Bowel sounds present  Extremities: No calf tenderness, No pitting edema  Musculoskeletal:   No joint swelling  Psych: Calm, cooperative affect appropriate  Neuro: Awake and alert, oriented x 4, moves 4/5 in LE and 5/5 in UE  Skin: No rashes; no palpable lesions    LABS:                        13.2   5.91  )-----------( 296      ( 12 Apr 2024 08:11 )             40.0     04-12    138  |  99  |  4.8<L>  ----------------------------<  100<H>  3.7   |  22.0  |  0.53    Ca    9.1      12 Apr 2024 08:11      PTT - ( 12 Apr 2024 08:11 )  PTT:45.6 sec      Urinalysis Basic - ( 12 Apr 2024 08:11 )    Color: x / Appearance: x / SG: x / pH: x  Gluc: 100 mg/dL / Ketone: x  / Bili: x / Urobili: x   Blood: x / Protein: x / Nitrite: x   Leuk Esterase: x / RBC: x / WBC x   Sq Epi: x / Non Sq Epi: x / Bacteria: x        CAPILLARY BLOOD GLUCOSE            RADIOLOGY REVIEWED

## 2024-04-12 NOTE — PHYSICAL THERAPY INITIAL EVALUATION ADULT - PHYSICAL ASSIST/NONPHYSICAL ASSIST: SIT/STAND, REHAB EVAL
2 person assist Chonodrocutaneous Helical Advancement Flap Text: The defect edges were debeveled with a #15 scalpel blade.  Given the location of the defect and the proximity to free margins a chondrocutaneous helical advancement flap was deemed most appropriate.  Using a sterile surgical marker, the appropriate advancement flap was drawn incorporating the defect and placing the expected incisions within the relaxed skin tension lines where possible.    The area thus outlined was incised deep to adipose tissue with a #15 scalpel blade.  The skin margins were undermined to an appropriate distance in all directions utilizing iris scissors.

## 2024-04-12 NOTE — PHYSICAL THERAPY INITIAL EVALUATION ADULT - ADDITIONAL COMMENTS
Pt confirmed social history from prior admission: Pt lives with mother and aunt in a house with CRSITINA (3 steps with 2 rails), and has assist from mother as needed. (as per krista: pt resides on 1st floor)

## 2024-04-12 NOTE — OCCUPATIONAL THERAPY INITIAL EVALUATION ADULT - PERTINENT HX OF CURRENT PROBLEM, REHAB EVAL
As per MD note: 24 year old male with a past medical history of obesity, anxiety and depression status post recent Gastric sleeve surgery at [facility] Discharged from the hospital 2 days ago presents with double vision, dizziness, room spinning sensation, weakness in the leg, and decreased PO intake/urinary retention.

## 2024-04-12 NOTE — CHART NOTE - NSCHARTNOTEFT_GEN_A_CORE
Nutrition note:     Pt receiving bedside nursing care during visit. Spoke with pt's mother. Aware pt has been unable to tolerate PO since surgery in February. Spoke with pt's Doctor; had diet changed to Bariatric full liquids for better tolerance. Provided bariatric full liquid diet literature to pt's mother and left contact information. RD to continue to monitor pt and will remain available PRN.

## 2024-04-13 LAB
ALBUMIN SERPL ELPH-MCNC: 3.7 G/DL — SIGNIFICANT CHANGE UP (ref 3.3–5.2)
ALP SERPL-CCNC: 100 U/L — SIGNIFICANT CHANGE UP (ref 40–120)
ALT FLD-CCNC: 201 U/L — HIGH
AMMONIA BLD-MCNC: 41 UMOL/L — SIGNIFICANT CHANGE UP (ref 11–55)
ANION GAP SERPL CALC-SCNC: 14 MMOL/L — SIGNIFICANT CHANGE UP (ref 5–17)
ANION GAP SERPL CALC-SCNC: 15 MMOL/L — SIGNIFICANT CHANGE UP (ref 5–17)
APTT BLD: 80.9 SEC — HIGH (ref 24.5–35.6)
AST SERPL-CCNC: 93 U/L — HIGH
BASE EXCESS BLDV CALC-SCNC: 5.7 MMOL/L — HIGH (ref -2–3)
BASOPHILS # BLD AUTO: 0.04 K/UL — SIGNIFICANT CHANGE UP (ref 0–0.2)
BASOPHILS NFR BLD AUTO: 0.7 % — SIGNIFICANT CHANGE UP (ref 0–2)
BILIRUB SERPL-MCNC: 2.1 MG/DL — HIGH (ref 0.4–2)
BUN SERPL-MCNC: 3.9 MG/DL — LOW (ref 8–20)
BUN SERPL-MCNC: 4.9 MG/DL — LOW (ref 8–20)
CA-I SERPL-SCNC: 1.15 MMOL/L — SIGNIFICANT CHANGE UP (ref 1.15–1.33)
CALCIUM SERPL-MCNC: 8.7 MG/DL — SIGNIFICANT CHANGE UP (ref 8.4–10.5)
CALCIUM SERPL-MCNC: 9.3 MG/DL — SIGNIFICANT CHANGE UP (ref 8.4–10.5)
CHLORIDE BLDV-SCNC: 101 MMOL/L — SIGNIFICANT CHANGE UP (ref 96–108)
CHLORIDE SERPL-SCNC: 100 MMOL/L — SIGNIFICANT CHANGE UP (ref 96–108)
CHLORIDE SERPL-SCNC: 102 MMOL/L — SIGNIFICANT CHANGE UP (ref 96–108)
CO2 SERPL-SCNC: 23 MMOL/L — SIGNIFICANT CHANGE UP (ref 22–29)
CO2 SERPL-SCNC: 25 MMOL/L — SIGNIFICANT CHANGE UP (ref 22–29)
CREAT SERPL-MCNC: 0.44 MG/DL — LOW (ref 0.5–1.3)
CREAT SERPL-MCNC: 0.49 MG/DL — LOW (ref 0.5–1.3)
EGFR: 145 ML/MIN/1.73M2 — SIGNIFICANT CHANGE UP
EGFR: 150 ML/MIN/1.73M2 — SIGNIFICANT CHANGE UP
EOSINOPHIL # BLD AUTO: 0.11 K/UL — SIGNIFICANT CHANGE UP (ref 0–0.5)
EOSINOPHIL NFR BLD AUTO: 1.8 % — SIGNIFICANT CHANGE UP (ref 0–6)
GAS PNL BLDV: 137 MMOL/L — SIGNIFICANT CHANGE UP (ref 136–145)
GAS PNL BLDV: SIGNIFICANT CHANGE UP
GLUCOSE BLDC GLUCOMTR-MCNC: 91 MG/DL — SIGNIFICANT CHANGE UP (ref 70–99)
GLUCOSE BLDC GLUCOMTR-MCNC: 91 MG/DL — SIGNIFICANT CHANGE UP (ref 70–99)
GLUCOSE BLDV-MCNC: 82 MG/DL — SIGNIFICANT CHANGE UP (ref 70–99)
GLUCOSE SERPL-MCNC: 88 MG/DL — SIGNIFICANT CHANGE UP (ref 70–99)
GLUCOSE SERPL-MCNC: 90 MG/DL — SIGNIFICANT CHANGE UP (ref 70–99)
HCO3 BLDV-SCNC: 31 MMOL/L — HIGH (ref 22–29)
HCT VFR BLD CALC: 38.3 % — LOW (ref 39–50)
HCT VFR BLD CALC: 38.7 % — LOW (ref 39–50)
HCT VFR BLD CALC: 40.5 % — SIGNIFICANT CHANGE UP (ref 39–50)
HCT VFR BLDA CALC: 40 % — SIGNIFICANT CHANGE UP
HGB BLD CALC-MCNC: 13.4 G/DL — SIGNIFICANT CHANGE UP (ref 12.6–17.4)
HGB BLD-MCNC: 12.8 G/DL — LOW (ref 13–17)
HGB BLD-MCNC: 13.3 G/DL — SIGNIFICANT CHANGE UP (ref 13–17)
HGB BLD-MCNC: 13.8 G/DL — SIGNIFICANT CHANGE UP (ref 13–17)
IMM GRANULOCYTES NFR BLD AUTO: 1 % — HIGH (ref 0–0.9)
LACTATE BLDV-MCNC: 1.3 MMOL/L — SIGNIFICANT CHANGE UP (ref 0.5–2)
LYMPHOCYTES # BLD AUTO: 2.13 K/UL — SIGNIFICANT CHANGE UP (ref 1–3.3)
LYMPHOCYTES # BLD AUTO: 34.7 % — SIGNIFICANT CHANGE UP (ref 13–44)
MAGNESIUM SERPL-MCNC: 1.8 MG/DL — SIGNIFICANT CHANGE UP (ref 1.6–2.6)
MCHC RBC-ENTMCNC: 26.4 PG — LOW (ref 27–34)
MCHC RBC-ENTMCNC: 27 PG — SIGNIFICANT CHANGE UP (ref 27–34)
MCHC RBC-ENTMCNC: 27 PG — SIGNIFICANT CHANGE UP (ref 27–34)
MCHC RBC-ENTMCNC: 33.4 GM/DL — SIGNIFICANT CHANGE UP (ref 32–36)
MCHC RBC-ENTMCNC: 34.1 GM/DL — SIGNIFICANT CHANGE UP (ref 32–36)
MCHC RBC-ENTMCNC: 34.4 GM/DL — SIGNIFICANT CHANGE UP (ref 32–36)
MCV RBC AUTO: 78.7 FL — LOW (ref 80–100)
MCV RBC AUTO: 79 FL — LOW (ref 80–100)
MCV RBC AUTO: 79.1 FL — LOW (ref 80–100)
MONOCYTES # BLD AUTO: 0.66 K/UL — SIGNIFICANT CHANGE UP (ref 0–0.9)
MONOCYTES NFR BLD AUTO: 10.7 % — SIGNIFICANT CHANGE UP (ref 2–14)
NEUTROPHILS # BLD AUTO: 3.14 K/UL — SIGNIFICANT CHANGE UP (ref 1.8–7.4)
NEUTROPHILS NFR BLD AUTO: 51.1 % — SIGNIFICANT CHANGE UP (ref 43–77)
PCO2 BLDV: 55 MMHG — SIGNIFICANT CHANGE UP (ref 42–55)
PH BLDV: 7.36 — SIGNIFICANT CHANGE UP (ref 7.32–7.43)
PHOSPHATE SERPL-MCNC: 2.1 MG/DL — LOW (ref 2.4–4.7)
PLATELET # BLD AUTO: 313 K/UL — SIGNIFICANT CHANGE UP (ref 150–400)
PLATELET # BLD AUTO: 319 K/UL — SIGNIFICANT CHANGE UP (ref 150–400)
PLATELET # BLD AUTO: 352 K/UL — SIGNIFICANT CHANGE UP (ref 150–400)
PO2 BLDV: 55 MMHG — HIGH (ref 25–45)
POTASSIUM BLDV-SCNC: 4 MMOL/L — SIGNIFICANT CHANGE UP (ref 3.5–5.1)
POTASSIUM SERPL-MCNC: 3.7 MMOL/L — SIGNIFICANT CHANGE UP (ref 3.5–5.3)
POTASSIUM SERPL-MCNC: 4.1 MMOL/L — SIGNIFICANT CHANGE UP (ref 3.5–5.3)
POTASSIUM SERPL-SCNC: 3.7 MMOL/L — SIGNIFICANT CHANGE UP (ref 3.5–5.3)
POTASSIUM SERPL-SCNC: 4.1 MMOL/L — SIGNIFICANT CHANGE UP (ref 3.5–5.3)
PROT SERPL-MCNC: 7 G/DL — SIGNIFICANT CHANGE UP (ref 6.6–8.7)
RBC # BLD: 4.85 M/UL — SIGNIFICANT CHANGE UP (ref 4.2–5.8)
RBC # BLD: 4.92 M/UL — SIGNIFICANT CHANGE UP (ref 4.2–5.8)
RBC # BLD: 5.12 M/UL — SIGNIFICANT CHANGE UP (ref 4.2–5.8)
RBC # FLD: 15.2 % — HIGH (ref 10.3–14.5)
RBC # FLD: 15.3 % — HIGH (ref 10.3–14.5)
RBC # FLD: 15.5 % — HIGH (ref 10.3–14.5)
SAO2 % BLDV: 87.6 % — SIGNIFICANT CHANGE UP
SODIUM SERPL-SCNC: 138 MMOL/L — SIGNIFICANT CHANGE UP (ref 135–145)
SODIUM SERPL-SCNC: 139 MMOL/L — SIGNIFICANT CHANGE UP (ref 135–145)
TROPONIN T, HIGH SENSITIVITY RESULT: 6 NG/L — SIGNIFICANT CHANGE UP (ref 0–51)
WBC # BLD: 5.97 K/UL — SIGNIFICANT CHANGE UP (ref 3.8–10.5)
WBC # BLD: 6.14 K/UL — SIGNIFICANT CHANGE UP (ref 3.8–10.5)
WBC # BLD: 6.91 K/UL — SIGNIFICANT CHANGE UP (ref 3.8–10.5)
WBC # FLD AUTO: 5.97 K/UL — SIGNIFICANT CHANGE UP (ref 3.8–10.5)
WBC # FLD AUTO: 6.14 K/UL — SIGNIFICANT CHANGE UP (ref 3.8–10.5)
WBC # FLD AUTO: 6.91 K/UL — SIGNIFICANT CHANGE UP (ref 3.8–10.5)

## 2024-04-13 PROCEDURE — 99233 SBSQ HOSP IP/OBS HIGH 50: CPT

## 2024-04-13 PROCEDURE — 95819 EEG AWAKE AND ASLEEP: CPT | Mod: 26

## 2024-04-13 PROCEDURE — 93010 ELECTROCARDIOGRAM REPORT: CPT

## 2024-04-13 PROCEDURE — 70450 CT HEAD/BRAIN W/O DYE: CPT | Mod: 26

## 2024-04-13 RX ORDER — MORPHINE SULFATE 50 MG/1
2 CAPSULE, EXTENDED RELEASE ORAL ONCE
Refills: 0 | Status: DISCONTINUED | OUTPATIENT
Start: 2024-04-13 | End: 2024-04-13

## 2024-04-13 RX ORDER — APIXABAN 2.5 MG/1
10 TABLET, FILM COATED ORAL EVERY 12 HOURS
Refills: 0 | Status: COMPLETED | OUTPATIENT
Start: 2024-04-13 | End: 2024-04-20

## 2024-04-13 RX ADMIN — PREGABALIN 1000 MICROGRAM(S): 225 CAPSULE ORAL at 13:06

## 2024-04-13 RX ADMIN — MORPHINE SULFATE 2 MILLIGRAM(S): 50 CAPSULE, EXTENDED RELEASE ORAL at 22:56

## 2024-04-13 RX ADMIN — SODIUM CHLORIDE 100 MILLILITER(S): 9 INJECTION, SOLUTION INTRAVENOUS at 11:34

## 2024-04-13 RX ADMIN — Medication 650 MILLIGRAM(S): at 05:26

## 2024-04-13 RX ADMIN — HEPARIN SODIUM 1600 UNIT(S)/HR: 5000 INJECTION INTRAVENOUS; SUBCUTANEOUS at 00:47

## 2024-04-13 RX ADMIN — ATORVASTATIN CALCIUM 40 MILLIGRAM(S): 80 TABLET, FILM COATED ORAL at 20:49

## 2024-04-13 RX ADMIN — Medication 1 MILLIGRAM(S): at 13:06

## 2024-04-13 RX ADMIN — SODIUM CHLORIDE 100 MILLILITER(S): 9 INJECTION, SOLUTION INTRAVENOUS at 22:59

## 2024-04-13 RX ADMIN — PANTOPRAZOLE SODIUM 40 MILLIGRAM(S): 20 TABLET, DELAYED RELEASE ORAL at 17:57

## 2024-04-13 RX ADMIN — OLANZAPINE 2.5 MILLIGRAM(S): 15 TABLET, FILM COATED ORAL at 22:56

## 2024-04-13 RX ADMIN — TAMSULOSIN HYDROCHLORIDE 0.4 MILLIGRAM(S): 0.4 CAPSULE ORAL at 20:49

## 2024-04-13 RX ADMIN — HEPARIN SODIUM 1600 UNIT(S)/HR: 5000 INJECTION INTRAVENOUS; SUBCUTANEOUS at 07:32

## 2024-04-13 RX ADMIN — HEPARIN SODIUM 1600 UNIT(S)/HR: 5000 INJECTION INTRAVENOUS; SUBCUTANEOUS at 01:35

## 2024-04-13 RX ADMIN — Medication 15 MILLILITER(S): at 13:07

## 2024-04-13 RX ADMIN — Medication 1 TABLET(S): at 13:06

## 2024-04-13 RX ADMIN — Medication 650 MILLIGRAM(S): at 05:49

## 2024-04-13 RX ADMIN — PANTOPRAZOLE SODIUM 40 MILLIGRAM(S): 20 TABLET, DELAYED RELEASE ORAL at 05:25

## 2024-04-13 RX ADMIN — APIXABAN 10 MILLIGRAM(S): 2.5 TABLET, FILM COATED ORAL at 17:57

## 2024-04-13 RX ADMIN — MORPHINE SULFATE 2 MILLIGRAM(S): 50 CAPSULE, EXTENDED RELEASE ORAL at 23:04

## 2024-04-13 RX ADMIN — SODIUM CHLORIDE 100 MILLILITER(S): 9 INJECTION, SOLUTION INTRAVENOUS at 00:48

## 2024-04-13 NOTE — PROGRESS NOTE ADULT - ASSESSMENT
Assessment: 24M with PMHx obesity, anxiety, depression, s/p gastric sleeve surgery presenting for diplopia, leg weakness, decreased PO intake, and urinary retention. Imaging negative. Awaiting psychiatry evaluation.     Plan:    AMS, Leg Weakness, Diplopia, Urinary Retention  - CT head: hypodensities in R parietal and temporal lobes  - MRI brain/MRA head/MRI lumbosacral spine performed  - No stroke, mass, hemorrhage, aneurysm, LVO, critical stenosis, or cauda equina  - Repeat MRI t-spine: no T3-T4 edema; slight disc protrusion in T6-7  - No intervention per neurosurgery  - VBG w/ lytes: unremarkable  - Repeat CT head (4/13) ---> Pending  - Ammonia ---> Pending  - EEG results ----> Pending  - Psychiatry consulted ----> Pending    Possible Vitamin Deficiency  - Had poor PO intake for past 2 months: possible vitamin deficiency after bariatric surgery  - Vitamin D weekly, folic acid and vitamin B supplements  - Vitamin B12 wnl  - Thiamine, niacin, riboflavin levels ----> Pending  - May be contributing to bilateral leg weakness/lethargy    Urinary Retention  - Rodriguez in place  - Tamsulosin 0.4 mg PO qhs  - TOV ---> Pending    PE  - CT chest (4/9): pulmonary embolism, no R heart strain  - US LE: no DVT  - Eliquis 10 mg BID x7 days, then 5 mg PO BID  - On room air  - Telemetry monitoring    Anxiety/Depression  - Psychiatry consult placed ----> Pending  - Metoprolol 5 mg IV q8h PRN for HR>120  - Zyprexa 2.5 mg IM PRN    HLD  - Atorvastatin 40 mg PO qhs    Vertigo  - Meclizine 12.5 mg PO TID PRN    Nausea/vomiting  - Resolved  - CT abdomen: no acute abdominal finding or visceral injury  - IV LR 1000 mL @ 100 cc/h  - Barium scan: wnl  - Simethicone suspension q4h PRN  - Zofran 4 mg IV q8h pRN  - Protonix 40 mg IV BID  - Miralax, senna  - Bariatric surgeon recommending to continue IV fluids,     Dispo: Acute inpatient rehab    Discussed with patient, mother at bedside, and aunt on phone

## 2024-04-13 NOTE — RAPID RESPONSE TEAM SUMMARY - NSADDTLFINDINGSRRT_GEN_ALL_CORE
T(C): 36.4 (13 Apr 2024 20:00), Max: 37.1 (13 Apr 2024 07:30)  T(F): 97.5 (13 Apr 2024 20:00), Max: 98.8 (13 Apr 2024 07:30)  HR: 83 (13 Apr 2024 20:00) (65 - 103)  BP: 136/83 (13 Apr 2024 20:00) (112/68 - 145/82)  RR: 18 (13 Apr 2024 20:00) (18 - 18)  SpO2: 97% (13 Apr 2024 20:00) (96% - 98%)    General: WD Obese Male lying in Bed NAD    Cardiac: S1S2 + RRR  Chest: No Rashes Deformities + Pain on Palp to B/L Breast areas  + Reproducible B/l   Lungs: CTA No R/R/W or Crackles B/L A-B  Abd: Obese NDNT No Guarding, Rigidity or Rebound + BS x 4 Q   Integument: No Pallor Warm/Dry   Ext: No C/C/E x 4

## 2024-04-13 NOTE — PROGRESS NOTE ADULT - SUBJECTIVE AND OBJECTIVE BOX
Patient is a 24y old  Male who presents with a chief complaint of Visual changes with abnormal MRI (12 Apr 2024 12:41)      INTERVAL HPI/OVERNIGHT EVENTS: No acute events overnight. This AM, mother at bedside reporting that patient has been more confused since 4/12 at around 7 PM.     MEDICATIONS  (STANDING):  apixaban 10 milliGRAM(s) Oral every 12 hours  atorvastatin 40 milliGRAM(s) Oral at bedtime  cyanocobalamin 1000 MICROGram(s) Oral daily  ergocalciferol 56229 Unit(s) Oral every week  folic acid 1 milliGRAM(s) Oral daily  lactated ringers. 1000 milliLiter(s) (100 mL/Hr) IV Continuous <Continuous>  multivitamin/minerals/iron Oral Solution (CENTRUM) 15 milliLiter(s) Oral daily  pantoprazole  Injectable 40 milliGRAM(s) IV Push two times a day  tamsulosin 0.4 milliGRAM(s) Oral at bedtime  vitamin B complex with vitamin C 1 Tablet(s) Oral daily    MEDICATIONS  (PRN):  acetaminophen     Tablet .. 650 milliGRAM(s) Oral every 6 hours PRN Temp greater or equal to 38C (100.4F), Mild Pain (1 - 3)  aluminum hydroxide/magnesium hydroxide/simethicone Suspension 30 milliLiter(s) Oral every 4 hours PRN Dyspepsia  meclizine 12.5 milliGRAM(s) Oral three times a day PRN Dizziness  melatonin 3 milliGRAM(s) Oral at bedtime PRN Insomnia  metoprolol tartrate Injectable 5 milliGRAM(s) IV Push every 8 hours PRN hr over 120  OLANZapine Injectable 2.5 milliGRAM(s) IntraMuscular daily PRN Acxiety  ondansetron Injectable 4 milliGRAM(s) IV Push every 8 hours PRN Nausea and/or Vomiting  polyethylene glycol 3350 17 Gram(s) Oral daily PRN Constipation  senna 2 Tablet(s) Oral at bedtime PRN Constipation      Allergies    No Known Allergies    Intolerances        REVIEW OF SYSTEMS: limited due to lethargy  CONSTITUTIONAL: + fatigue  RESPIRATORY: No cough; No shortness of breath  CARDIOVASCULAR: No chest pain  GASTROINTESTINAL: No abdominal pain.  NEUROLOGICAL: No headaches, + numbness around schaffer catheter insertion site      Vital Signs Last 24 Hrs  T(C): 36.4 (13 Apr 2024 13:03), Max: 37.1 (12 Apr 2024 21:07)  T(F): 97.5 (13 Apr 2024 13:03), Max: 98.8 (13 Apr 2024 07:30)  HR: 65 (13 Apr 2024 13:03) (65 - 98)  BP: 135/91 (13 Apr 2024 13:03) (112/68 - 145/82)  BP(mean): --  RR: 18 (13 Apr 2024 13:03) (18 - 18)  SpO2: 98% (13 Apr 2024 13:03) (96% - 98%)    Parameters below as of 13 Apr 2024 13:03  Patient On (Oxygen Delivery Method): room air        PHYSICAL EXAM:  GENERAL: NAD, lying in bed comfortably  HEAD:  Atraumatic, Normocephalic  EYES: EOMI, PERRLA, conjunctiva and sclera clear  NERVOUS SYSTEM:  Awakens to touch, lethargic, follows some commands and answers simple questions  CHEST/LUNG: Clear to auscultation bilaterally; No wheezes  HEART: Regular rate and rhythm; No murmurs  ABDOMEN: Soft, Nontender, Nondistended; Bowel sounds present  GENITOURINARY: schaffer insertion site clean, dry, intact  EXTREMITIES:  No edema    LABS:                        12.8   6.14  )-----------( 319      ( 13 Apr 2024 07:59 )             38.3     04-13    138  |  100  |  3.9<L>  ----------------------------<  90  3.7   |  25.0  |  0.44<L>    Ca    8.7      13 Apr 2024 07:59      PTT - ( 13 Apr 2024 00:39 )  PTT:80.9 sec  Urinalysis Basic - ( 13 Apr 2024 07:59 )    Color: x / Appearance: x / SG: x / pH: x  Gluc: 90 mg/dL / Ketone: x  / Bili: x / Urobili: x   Blood: x / Protein: x / Nitrite: x   Leuk Esterase: x / RBC: x / WBC x   Sq Epi: x / Non Sq Epi: x / Bacteria: x      CAPILLARY BLOOD GLUCOSE          RADIOLOGY & ADDITIONAL TESTS:

## 2024-04-13 NOTE — PROGRESS NOTE ADULT - ATTENDING COMMENTS
24M with PMHx obesity, anxiety, depression, s/p gastric sleeve surgery presenting for diplopia, leg weakness, decreased PO intake, and urinary retention. Imaging negative. Awaiting psychiatry evaluation.    Acute PE  Altered Mental status  Leg Weakness? Diplopia? Urinary Retention    Per aunt and mother more lethargic starting yesterday evening   Check CTH, EEG, Blood Gas Ammonia   Neuro consulted  There are ongoing concerns for underlying psychiatric involvement.    F/u vitamin panel

## 2024-04-13 NOTE — EEG REPORT - NS EEG TEXT BOX
SHANICE MARIA [id number]     Study Date: 	04-13-24  Duration : 25 min   --------------------------------------------------------------------------------------------------  History:  CC/ HPI Patient is a 24y old  Male who presents with a chief complaint of Visual changes with abnormal MRI (13 Apr 2024 13:23)    MEDICATIONS  (STANDING):  apixaban 10 milliGRAM(s) Oral every 12 hours  atorvastatin 40 milliGRAM(s) Oral at bedtime  cyanocobalamin 1000 MICROGram(s) Oral daily  ergocalciferol 28589 Unit(s) Oral every week  folic acid 1 milliGRAM(s) Oral daily  lactated ringers. 1000 milliLiter(s) (100 mL/Hr) IV Continuous <Continuous>  multivitamin/minerals/iron Oral Solution (CENTRUM) 15 milliLiter(s) Oral daily  pantoprazole  Injectable 40 milliGRAM(s) IV Push two times a day  tamsulosin 0.4 milliGRAM(s) Oral at bedtime  vitamin B complex with vitamin C 1 Tablet(s) Oral daily    --------------------------------------------------------------------------------------------------  Study Interpretation:    [[[Abbreviation Key:  PDR=alpha rhythm/posterior dominant rhythm. A-P=anterior posterior.  Amplitude: ‘very low’:<20; ‘low’:20-49; ‘medium’:; ‘high’:>150uV.  Persistence for periodic/rhythmic patterns (% of epoch) ‘rare’:<1%; ‘occasional’:1-10%; ‘frequent’:10-50%; ‘abundant’:50-90%; ‘continuous’:>90%.  Persistence for sporadic discharges: ‘rare’:<1/hr; ‘occasional’:1/min-1/hr; ‘frequent’:>1/min; ‘abundant’:>1/10 sec.  RPP=rhythmic and periodic patterns; GRDA=generalized rhythmic delta activity; FIRDA=frontal intermittent GRDA; LRDA=lateralized rhythmic delta activity; TIRDA=temporal intermittent rhythmic delta activity;  LPD=PLED=lateralized periodic discharges; GPD=generalized periodic discharges; BIPDs =bilateral independent periodic discharges; Mf=multifocal; SIRPDs=stimulus induced rhythmic, periodic, or ictal appearing discharges; BIRDs=brief potentially ictal rhythmic discharges >4 Hz, lasting .5-10s; PFA (paroxysmal bursts >13 Hz or =8 Hz <10s).  Modifiers: +F=with fast component; +S=with spike component; +R=with rhythmic component.  S-B=burst suppression pattern.  Max=maximal. N1-drowsy; N2-stage II sleep; N3-slow wave sleep. SSS/BETS=small sharp spikes/benign epileptiform transients of sleep. HV=hyperventilation; PS=photic stimulation]]]    Daily EEG Visual Analysis    FINDINGS:      Background:  Continuity: continuous  Symmetry: symmetric  PDR: 9 Hz activity, with amplitude to 40 uV, that attenuated to eye opening.  Low amplitude frontal beta noted in wakefulness.  Reactivity: present  Voltage: normal, mostly 20-150uV  Anterior Posterior Gradient: present  Other background findings: none  Breach: absent    Background Slowing:  Generalized slowing: none was present.  Focal slowing: none was present.    State Changes:   -Drowsiness noted with increased slowing, attenuation of fast activity, vertex transients.  -Present with N2 sleep transients with symmetric spindles and K-complexes.    Sporadic Epileptiform Discharges:    None    Rhythmic and Periodic Patterns (RPPs):  None     Electrographic and Electroclinical seizures:  None    Other Clinical Events:  None    Activation Procedures:   -Hyperventilation was not performed.    -Photic stimulation was performed and did not elicit any abnormalities.      Artifacts:  Intermittent myogenic and movement artifacts were noted.    ECG:  The heart rate on single channel ECG was predominantly between 60-70 BPM.    EEG Classification / Summary:  Normal  EEG in the awake / drowsy / asleep state(s).    Clinical Impression:  There were no epileptiform abnormalities recorded.      This is a preliminary fellow impression only pending attending review    Michael Arceo MD - Epilepsy Fellow SHANICE MARIA [id number]     Study Date: 	04-13-24  Duration : 25 min   --------------------------------------------------------------------------------------------------  History:  CC/ HPI Patient is a 24y old  Male who presents with a chief complaint of Visual changes with abnormal MRI (13 Apr 2024 13:23)    MEDICATIONS  (STANDING):  apixaban 10 milliGRAM(s) Oral every 12 hours  atorvastatin 40 milliGRAM(s) Oral at bedtime  cyanocobalamin 1000 MICROGram(s) Oral daily  ergocalciferol 57762 Unit(s) Oral every week  folic acid 1 milliGRAM(s) Oral daily  lactated ringers. 1000 milliLiter(s) (100 mL/Hr) IV Continuous <Continuous>  multivitamin/minerals/iron Oral Solution (CENTRUM) 15 milliLiter(s) Oral daily  pantoprazole  Injectable 40 milliGRAM(s) IV Push two times a day  tamsulosin 0.4 milliGRAM(s) Oral at bedtime  vitamin B complex with vitamin C 1 Tablet(s) Oral daily    --------------------------------------------------------------------------------------------------  Study Interpretation:    [[[Abbreviation Key:  PDR=alpha rhythm/posterior dominant rhythm. A-P=anterior posterior.  Amplitude: ‘very low’:<20; ‘low’:20-49; ‘medium’:; ‘high’:>150uV.  Persistence for periodic/rhythmic patterns (% of epoch) ‘rare’:<1%; ‘occasional’:1-10%; ‘frequent’:10-50%; ‘abundant’:50-90%; ‘continuous’:>90%.  Persistence for sporadic discharges: ‘rare’:<1/hr; ‘occasional’:1/min-1/hr; ‘frequent’:>1/min; ‘abundant’:>1/10 sec.  RPP=rhythmic and periodic patterns; GRDA=generalized rhythmic delta activity; FIRDA=frontal intermittent GRDA; LRDA=lateralized rhythmic delta activity; TIRDA=temporal intermittent rhythmic delta activity;  LPD=PLED=lateralized periodic discharges; GPD=generalized periodic discharges; BIPDs =bilateral independent periodic discharges; Mf=multifocal; SIRPDs=stimulus induced rhythmic, periodic, or ictal appearing discharges; BIRDs=brief potentially ictal rhythmic discharges >4 Hz, lasting .5-10s; PFA (paroxysmal bursts >13 Hz or =8 Hz <10s).  Modifiers: +F=with fast component; +S=with spike component; +R=with rhythmic component.  S-B=burst suppression pattern.  Max=maximal. N1-drowsy; N2-stage II sleep; N3-slow wave sleep. SSS/BETS=small sharp spikes/benign epileptiform transients of sleep. HV=hyperventilation; PS=photic stimulation]]]    Daily EEG Visual Analysis    FINDINGS:      Background:  Continuity: continuous  Symmetry: symmetric  PDR: 8.5-9 Hz activity, with amplitude to 40 uV, that attenuated to eye opening.  Low amplitude frontal beta noted in wakefulness.  Reactivity: present  Voltage: normal, mostly 20-150uV  Anterior Posterior Gradient: present  Other background findings: none  Breach: absent    Background Slowing:  Generalized slowing: none was present.  Focal slowing: none was present.    State Changes:   -Drowsiness noted with increased slowing, attenuation of fast activity, vertex transients.  -Present with N2 sleep transients with symmetric spindles and K-complexes.    Sporadic Epileptiform Discharges:    None    Rhythmic and Periodic Patterns (RPPs):  None     Electrographic and Electroclinical seizures:  None    Other Clinical Events:  None    Activation Procedures:   -Hyperventilation was performed and did not elicit any abnormalities.    -Photic stimulation was performed and did not elicit any abnormalities.      Artifacts:  Intermittent myogenic and movement artifacts were noted.    ECG:  The heart rate on single channel ECG was predominantly between 60-70 BPM.    EEG Classification / Summary:  Normal  EEG in the awake / drowsy / asleep state(s).    Clinical Impression:  There were no epileptiform abnormalities recorded.          Michael Arceo MD - Epilepsy Fellow

## 2024-04-13 NOTE — RAPID RESPONSE TEAM SUMMARY - NSOTHERINTERVENTIONSRRT_GEN_ALL_CORE
EKG- Sinus WNL No Acute Findings   CXR:    LABS:    TROP: 6                         13.8   5.97  )-----------( 352      ( 13 Apr 2024 22:40 )             40.5     04-13    139  |  102  |  4.9<L>  ----------------------------<  88  4.1   |  23.0  |  0.49<L>    Ca    9.3      13 Apr 2024 22:40  Phos  2.1     04-13  Mg     1.8     04-13        LIVER FUNCTIONS - ( 13 Apr 2024 22:40 )  Alb: 3.7 g/dL / Pro: 7.0 g/dL / ALK PHOS: 100 U/L / ALT: 201 U/L / AST: 93 U/L / GGT: x           Urinalysis Basic - ( 13 Apr 2024 22:40 )    Color: x / Appearance: x / SG: x / pH: x  Gluc: 88 mg/dL / Ketone: x  / Bili: x / Urobili: x   Blood: x / Protein: x / Nitrite: x   Leuk Esterase: x / RBC: x / WBC x   Sq Epi: x / Non Sq Epi: x / Bacteria: x    CXR: Pending          EKG- Sinus WNL No Acute Findings    LABS:    TROP: 6                         13.8   5.97  )-----------( 352      ( 13 Apr 2024 22:40 )             40.5     04-13    139  |  102  |  4.9<L>  ----------------------------<  88  4.1   |  23.0  |  0.49<L>    Ca    9.3      13 Apr 2024 22:40  Phos  2.1     04-13  Mg     1.8     04-13        LIVER FUNCTIONS - ( 13 Apr 2024 22:40 )  Alb: 3.7 g/dL / Pro: 7.0 g/dL / ALK PHOS: 100 U/L / ALT: 201 U/L / AST: 93 U/L / GGT: x           Urinalysis Basic - ( 13 Apr 2024 22:40 )    Color: x / Appearance: x / SG: x / pH: x  Gluc: 88 mg/dL / Ketone: x  / Bili: x / Urobili: x   Blood: x / Protein: x / Nitrite: x   Leuk Esterase: x / RBC: x / WBC x   Sq Epi: x / Non Sq Epi: x / Bacteria: x    CXR: Pending          EKG- Sinus WNL No Acute Findings    LABS:    TROP: 6   Will Reeval in 2 Hours                         13.8   5.97  )-----------( 352      ( 13 Apr 2024 22:40 )             40.5     04-13    139  |  102  |  4.9<L>  ----------------------------<  88  4.1   |  23.0  |  0.49<L>    Ca    9.3      13 Apr 2024 22:40  Phos  2.1     04-13  Mg     1.8     04-13        LIVER FUNCTIONS - ( 13 Apr 2024 22:40 )  Alb: 3.7 g/dL / Pro: 7.0 g/dL / ALK PHOS: 100 U/L / ALT: 201 U/L / AST: 93 U/L / GGT: x           Urinalysis Basic - ( 13 Apr 2024 22:40 )    Color: x / Appearance: x / SG: x / pH: x  Gluc: 88 mg/dL / Ketone: x  / Bili: x / Urobili: x   Blood: x / Protein: x / Nitrite: x   Leuk Esterase: x / RBC: x / WBC x   Sq Epi: x / Non Sq Epi: x / Bacteria: x    CXR: Pending

## 2024-04-13 NOTE — RAPID RESPONSE TEAM SUMMARY - NSSITUATIONBACKGROUNDRRT_GEN_ALL_CORE
24M with PMHx obesity, anxiety, depression, s/p gastric sleeve surgery presenting for diplopia, leg weakness, decreased PO intake, and urinary retention. Imaging negative. Awaiting psychiatry evaluation.   States + CP B/L Upper  7/10 Dull  Non Radiating. Denies: SOB Dizziness N/V

## 2024-04-14 LAB
ALBUMIN SERPL ELPH-MCNC: 3.5 G/DL — SIGNIFICANT CHANGE UP (ref 3.3–5.2)
ALP SERPL-CCNC: 93 U/L — SIGNIFICANT CHANGE UP (ref 40–120)
ALT FLD-CCNC: 189 U/L — HIGH
ANION GAP SERPL CALC-SCNC: 14 MMOL/L — SIGNIFICANT CHANGE UP (ref 5–17)
APTT BLD: 35.5 SEC — SIGNIFICANT CHANGE UP (ref 24.5–35.6)
AST SERPL-CCNC: 88 U/L — HIGH
BASOPHILS # BLD AUTO: 0.04 K/UL — SIGNIFICANT CHANGE UP (ref 0–0.2)
BASOPHILS NFR BLD AUTO: 0.7 % — SIGNIFICANT CHANGE UP (ref 0–2)
BILIRUB SERPL-MCNC: 2.1 MG/DL — HIGH (ref 0.4–2)
BUN SERPL-MCNC: 3.3 MG/DL — LOW (ref 8–20)
CALCIUM SERPL-MCNC: 9.4 MG/DL — SIGNIFICANT CHANGE UP (ref 8.4–10.5)
CHLORIDE SERPL-SCNC: 101 MMOL/L — SIGNIFICANT CHANGE UP (ref 96–108)
CO2 SERPL-SCNC: 23 MMOL/L — SIGNIFICANT CHANGE UP (ref 22–29)
CREAT SERPL-MCNC: 0.42 MG/DL — LOW (ref 0.5–1.3)
EGFR: 152 ML/MIN/1.73M2 — SIGNIFICANT CHANGE UP
EOSINOPHIL # BLD AUTO: 0.13 K/UL — SIGNIFICANT CHANGE UP (ref 0–0.5)
EOSINOPHIL NFR BLD AUTO: 2.2 % — SIGNIFICANT CHANGE UP (ref 0–6)
GLUCOSE SERPL-MCNC: 86 MG/DL — SIGNIFICANT CHANGE UP (ref 70–99)
HCT VFR BLD CALC: 40.2 % — SIGNIFICANT CHANGE UP (ref 39–50)
HGB BLD-MCNC: 13.1 G/DL — SIGNIFICANT CHANGE UP (ref 13–17)
IMM GRANULOCYTES NFR BLD AUTO: 1 % — HIGH (ref 0–0.9)
LYMPHOCYTES # BLD AUTO: 2.05 K/UL — SIGNIFICANT CHANGE UP (ref 1–3.3)
LYMPHOCYTES # BLD AUTO: 34.5 % — SIGNIFICANT CHANGE UP (ref 13–44)
MAGNESIUM SERPL-MCNC: 1.8 MG/DL — SIGNIFICANT CHANGE UP (ref 1.6–2.6)
MCHC RBC-ENTMCNC: 26.6 PG — LOW (ref 27–34)
MCHC RBC-ENTMCNC: 32.6 GM/DL — SIGNIFICANT CHANGE UP (ref 32–36)
MCV RBC AUTO: 81.5 FL — SIGNIFICANT CHANGE UP (ref 80–100)
MONOCYTES # BLD AUTO: 0.69 K/UL — SIGNIFICANT CHANGE UP (ref 0–0.9)
MONOCYTES NFR BLD AUTO: 11.6 % — SIGNIFICANT CHANGE UP (ref 2–14)
NEUTROPHILS # BLD AUTO: 2.98 K/UL — SIGNIFICANT CHANGE UP (ref 1.8–7.4)
NEUTROPHILS NFR BLD AUTO: 50 % — SIGNIFICANT CHANGE UP (ref 43–77)
PHOSPHATE SERPL-MCNC: 3.4 MG/DL — SIGNIFICANT CHANGE UP (ref 2.4–4.7)
PLATELET # BLD AUTO: 305 K/UL — SIGNIFICANT CHANGE UP (ref 150–400)
POTASSIUM SERPL-MCNC: 4 MMOL/L — SIGNIFICANT CHANGE UP (ref 3.5–5.3)
POTASSIUM SERPL-SCNC: 4 MMOL/L — SIGNIFICANT CHANGE UP (ref 3.5–5.3)
PROT SERPL-MCNC: 6.7 G/DL — SIGNIFICANT CHANGE UP (ref 6.6–8.7)
PYRIDOXAL PHOS SERPL-MCNC: 8.6 UG/L — SIGNIFICANT CHANGE UP (ref 3.4–65.2)
RBC # BLD: 4.93 M/UL — SIGNIFICANT CHANGE UP (ref 4.2–5.8)
RBC # FLD: 15.6 % — HIGH (ref 10.3–14.5)
SODIUM SERPL-SCNC: 138 MMOL/L — SIGNIFICANT CHANGE UP (ref 135–145)
WBC # BLD: 5.95 K/UL — SIGNIFICANT CHANGE UP (ref 3.8–10.5)
WBC # FLD AUTO: 5.95 K/UL — SIGNIFICANT CHANGE UP (ref 3.8–10.5)

## 2024-04-14 PROCEDURE — 93010 ELECTROCARDIOGRAM REPORT: CPT

## 2024-04-14 PROCEDURE — 99232 SBSQ HOSP IP/OBS MODERATE 35: CPT

## 2024-04-14 RX ORDER — SODIUM CHLORIDE 9 MG/ML
1000 INJECTION INTRAMUSCULAR; INTRAVENOUS; SUBCUTANEOUS ONCE
Refills: 0 | Status: COMPLETED | OUTPATIENT
Start: 2024-04-14 | End: 2024-04-14

## 2024-04-14 RX ADMIN — Medication 0.5 MILLIGRAM(S): at 16:49

## 2024-04-14 RX ADMIN — ATORVASTATIN CALCIUM 40 MILLIGRAM(S): 80 TABLET, FILM COATED ORAL at 22:15

## 2024-04-14 RX ADMIN — SODIUM CHLORIDE 1000 MILLILITER(S): 9 INJECTION INTRAMUSCULAR; INTRAVENOUS; SUBCUTANEOUS at 16:49

## 2024-04-14 RX ADMIN — PREGABALIN 1000 MICROGRAM(S): 225 CAPSULE ORAL at 13:24

## 2024-04-14 RX ADMIN — SODIUM CHLORIDE 100 MILLILITER(S): 9 INJECTION, SOLUTION INTRAVENOUS at 05:02

## 2024-04-14 RX ADMIN — APIXABAN 10 MILLIGRAM(S): 2.5 TABLET, FILM COATED ORAL at 17:29

## 2024-04-14 RX ADMIN — SODIUM CHLORIDE 100 MILLILITER(S): 9 INJECTION, SOLUTION INTRAVENOUS at 15:03

## 2024-04-14 RX ADMIN — PANTOPRAZOLE SODIUM 40 MILLIGRAM(S): 20 TABLET, DELAYED RELEASE ORAL at 05:02

## 2024-04-14 RX ADMIN — Medication 15 MILLILITER(S): at 13:23

## 2024-04-14 RX ADMIN — APIXABAN 10 MILLIGRAM(S): 2.5 TABLET, FILM COATED ORAL at 05:02

## 2024-04-14 RX ADMIN — Medication 1 MILLIGRAM(S): at 13:23

## 2024-04-14 RX ADMIN — Medication 1 TABLET(S): at 13:24

## 2024-04-14 RX ADMIN — TAMSULOSIN HYDROCHLORIDE 0.4 MILLIGRAM(S): 0.4 CAPSULE ORAL at 22:16

## 2024-04-14 RX ADMIN — PANTOPRAZOLE SODIUM 40 MILLIGRAM(S): 20 TABLET, DELAYED RELEASE ORAL at 17:30

## 2024-04-14 NOTE — PROGRESS NOTE ADULT - ASSESSMENT
24M PMHx obesity, anxiety, depression, s/p gastric sleeve surgery presenting for diplopia, leg weakness, decreased PO intake, and urinary retention. Imaging negative. Awaiting psychiatry evaluation.     AMS, Leg Weakness, Diplopia, Urinary Retention  - CT head: hypodensities in R parietal and temporal lobes  - MRI brain/MRA head/MRI lumbosacral spine performed  - No stroke, mass, hemorrhage, aneurysm, LVO, critical stenosis, or cauda equina  - Repeat MRI t-spine: no T3-T4 edema; slight disc protrusion in T6-7  - No intervention per neurosurgery  - VBG w/ lytes: unremarkable  - Repeat CTH, EEG non contributory  - Psychiatry consulted ----> Pending    Possible Vitamin Deficiency  - Had poor PO intake for past 2 months: possible vitamin deficiency after bariatric surgery  - Vitamin D weekly, folic acid and vitamin B supplements  - Vitamin B12 wnl  - Thiamine, niacin, riboflavin levels ----> Pending  - May be contributing to bilateral leg weakness/lethargy    Urinary Retention  - Tamsulosin 0.4 mg PO qhs  Cont bladder scans q6h    PE  - CT chest (4/9): pulmonary embolism, no R heart strain  - US LE: no DVT  - Eliquis 10 mg BID x7 days, then 5 mg PO BID  - On room air  - Telemetry monitoring    Anxiety/Depression  - Psychiatry consult placed ----> Pending  - Metoprolol 5 mg IV q8h PRN for HR>120  - Zyprexa 2.5 mg IM PRN    HLD  - Atorvastatin 40 mg PO qhs    Vertigo  - Meclizine 12.5 mg PO TID PRN    Nausea/vomiting  - Resolved  - CT abdomen: no acute abdominal finding or visceral injury  - IV LR 1000 mL @ 100 cc/h  - Barium scan: wnl  - Simethicone suspension q4h PRN  - Zofran 4 mg IV q8h pRN  - Protonix 40 mg IV BID  - Miralax, senna  - Bariatric surgeon recommending to continue IV fluids,     Dispo: Acute inpatient rehab    Acute

## 2024-04-14 NOTE — PROGRESS NOTE ADULT - ASSESSMENT
The patient is a 24y Male who is followed by neurology because of diplopia, dizziness, b/l LE weakness/numbness and urine retention.  CT head showed possible hypodensities in right par/temp lobes.  Possible nutritional deficiency given his poor PO intake following gastric bypass and unable to take vitamins.  Also concerned for etiology of hypogenicities seen on head CT.  I am also concerned about lumbar spine given his BLE complaints and urine retention    Exam limited today due to his level of cooperation with exam.  Suggest to check:   - MRI brain, eval for hypodensities seen on CT head      ** no acute stroke mass or blood, likely artifact on CT head   - MRA head, eval for aneurysm given diplopia     ** no evidence for aneurysm or LVO or critical stenosis   - MRI LS spine, eval for cauda equina syndrome give urine retention and BLE numb/weak     ** no cauda equina  - MRI Thoracic spine    ** multiple disk protrusions with contact and deformity at T3/4 with underlying cord signal changes    ** request spine surgery evaluation to determine if intervention is indicated   - add thiamine, niacin, riboflavin levels, eval nutritional deficiency give poor po intake past 2 months    ** concern for nutritional deficiency given his poor PO intake and lack of consistent intake of vitamin supplements post bariatric surgery    ** B6 is normal, await further studies    AMS  Ammonia and EEG unrevealing for cause of AMS    Further suggestions to follow results of vitamin labs    Discussed with Dr Smith    will follow with you    David Dinh MD PhD   [id number]

## 2024-04-14 NOTE — PROGRESS NOTE ADULT - SUBJECTIVE AND OBJECTIVE BOX
[facility]                                     [facility] [place]                                 [facility]leg[facility] Sweet, [facility]                                  [address]                                        [place], [place], [address]                                             [phone]    CC: diplopia dizziness, leg weakness/numbness/pain and urine retention  HPI:  The patient is a 24y Male who presented with complaints of dizziness for several days, leg weakness/numbness and pain for several weeks and urine retention for 30 hours, needing schaffer catheter.  He had gastric sleeve done in February and has had poor po intake since the surgery.  He is not taking hois vitamins post gastric sleeve surgery, but has had two injections of B12. He went to [facility] for symptoms related to his gastric sleeve and was discharged two days ago with c/o dizziness-mostly lightheaded  He also c/o b/l leg weakness/numbness worse on left than right.  He has been retaining urine.  He c/o diplopia, not resolved with lateral gaze on either side, improved with single eye closure.  Neurology is asked to evaluate.    Interval history: sleepy, will not allow me to examine him, mother at bedside says he cn be stubborn at times    Review of systems (neurology): unable to assess    MEDICATIONS  (STANDING):  apixaban 10 milliGRAM(s) Oral every 12 hours  atorvastatin 40 milliGRAM(s) Oral at bedtime  cyanocobalamin 1000 MICROGram(s) Oral daily  ergocalciferol 56739 Unit(s) Oral every week  folic acid 1 milliGRAM(s) Oral daily  lactated ringers. 1000 milliLiter(s) (100 mL/Hr) IV Continuous <Continuous>  multivitamin/minerals/iron Oral Solution (CENTRUM) 15 milliLiter(s) Oral daily  pantoprazole  Injectable 40 milliGRAM(s) IV Push two times a day  tamsulosin 0.4 milliGRAM(s) Oral at bedtime  vitamin B complex with vitamin C 1 Tablet(s) Oral daily    MEDICATIONS  (PRN):  acetaminophen     Tablet .. 650 milliGRAM(s) Oral every 6 hours PRN Temp greater or equal to 38C (100.4F), Mild Pain (1 - 3)  aluminum hydroxide/magnesium hydroxide/simethicone Suspension 30 milliLiter(s) Oral every 4 hours PRN Dyspepsia  meclizine 12.5 milliGRAM(s) Oral three times a day PRN Dizziness  melatonin 3 milliGRAM(s) Oral at bedtime PRN Insomnia  metoprolol tartrate Injectable 5 milliGRAM(s) IV Push every 8 hours PRN hr over 120  OLANZapine Injectable 2.5 milliGRAM(s) IntraMuscular daily PRN Acxiety  ondansetron Injectable 4 milliGRAM(s) IV Push every 8 hours PRN Nausea and/or Vomiting  polyethylene glycol 3350 17 Gram(s) Oral daily PRN Constipation  senna 2 Tablet(s) Oral at bedtime PRN Constipation      Vital Signs Last 24 Hrs  T(C): 36.3 (14 Apr 2024 12:00), Max: 37.2 (14 Apr 2024 04:53)  T(F): 97.4 (14 Apr 2024 12:00), Max: 98.9 (14 Apr 2024 04:53)  HR: 83 (14 Apr 2024 12:00) (70 - 103)  BP: 128/64 (14 Apr 2024 12:00) (117/84 - 143/92)  BP(mean): --  RR: 18 (14 Apr 2024 12:00) (18 - 18)  SpO2: 97% (14 Apr 2024 12:00) (96% - 100%)    Parameters below as of 14 Apr 2024 12:00  Patient On (Oxygen Delivery Method): room air    General: NAD    Detailed Neurologic Exam:    Mental status: The patient is sleepy will not participate fully with exam    Cranial nerves: Pupils equal and react symmetrically to light. Unable to assess visual field deficit to confrontation as he will not participate, unable to keep open long enough to assess visual fields. Extraocular motion can not be assessed. Facial sensation can not be assessed. Facial musculature is symmetric.     Motor: There is normal bulk and tone.  There is no tremor.  Moves extremities to stimuli, does not allow formal testing    Sensation: diminished to pinch in b/l lower extremities    Cerebellar: Unable to assess dysmetria on finger to nose testing.    Gait : deferred    LABS:                                     13.1   5.95  )-----------( 305      ( 14 Apr 2024 07:12 )             40.2     04-14    138  |  101  |  3.3<L>  ----------------------------<  86  4.0   |  23.0  |  0.42<L>    Ca    9.4      14 Apr 2024 07:12  Phos  3.4     04-14  Mg     1.8     04-14    TPro  6.7  /  Alb  3.5  /  TBili  2.1<H>  /  DBili  x   /  AST  88<H>  /  ALT  189<H>  /  AlkPhos  93  04-14    LIVER FUNCTIONS - ( 14 Apr 2024 07:12 )  Alb: 3.5 g/dL / Pro: 6.7 g/dL / ALK PHOS: 93 U/L / ALT: 189 U/L / AST: 88 U/L / GGT: x           PTT - ( 14 Apr 2024 07:12 )  PTT:35.5 sec     Vitamin B6 (04.09.24 @ 09:26)    Vitamin B6: 8.6:   Vitamin B12, Serum (04.08.24 @ 06:45)   Vitamin B12, Serum: 1173 pg/mL  Folate, Serum (04.08.24 @ 06:45)   Folate, Serum: 7.9 ng/mL  Ammonia, Serum (04.13.24 @ 13:03)    Ammonia, Serum: 41: Ammonia levels will be falsely elevated if specimen is NOT collected,  processed and maintained at 4-8 C umol/L    EEG Classification / Summary 4/13/24  Normal  EEG in the awake / drowsy / asleep state(s).    Clinical Impression:  There were no epileptiform abnormalities recorded.    RADIOLOGY & ADDITIONAL STUDIES (independently reviewed unless otherwise noted):    MRI/MRA Head No Cont (04.08.24 @ 19:34)   IMPRESSION:  1.  BRAIN:   Unremarkable MR of the brain.  2.  ANTERIOR CIRCULATION:    Intact.  3. POSTERIOR CIRCULATION:  Intact.    MR Thoracic and Lumbar Spine No Cont (04.08.24 @ 19:37)   IMPRESSION:  1. THORACIC SPINE:   Multiple thoracic disc protrusions (disc herniation)    cause ventral cord deformity and at T3-T4 causes cord edema/gliosis  2. LUMBAR SPINE:   Unremarkable MR of the lumbar spine.      CT Head No Cont (04.08.24 @ 04:04)   IMPRESSION:  Ill-defined hypodensities in the right temporal and right occipital   lobes. It is unclear if these are artifactual in nature.In the presence   of acute also focalized neurological deficits, ischemia may be   entertained. Consider follow-up by MRI imaging, if clinically indicated.

## 2024-04-14 NOTE — PROGRESS NOTE ADULT - SUBJECTIVE AND OBJECTIVE BOX
[facility] Division of Hospital Medicine    SUBJECTIVE / OVERNIGHT EVENTS:  No events    Patient denies chest pain, SOB, abd pain, N/V, fever, chills, dysuria or any other complaints. All remainder ROS negative.     MEDICATIONS  (STANDING):  apixaban 10 milliGRAM(s) Oral every 12 hours  atorvastatin 40 milliGRAM(s) Oral at bedtime  cyanocobalamin 1000 MICROGram(s) Oral daily  ergocalciferol 13987 Unit(s) Oral every week  folic acid 1 milliGRAM(s) Oral daily  lactated ringers. 1000 milliLiter(s) (100 mL/Hr) IV Continuous <Continuous>  multivitamin/minerals/iron Oral Solution (CENTRUM) 15 milliLiter(s) Oral daily  pantoprazole  Injectable 40 milliGRAM(s) IV Push two times a day  tamsulosin 0.4 milliGRAM(s) Oral at bedtime  vitamin B complex with vitamin C 1 Tablet(s) Oral daily    MEDICATIONS  (PRN):  acetaminophen     Tablet .. 650 milliGRAM(s) Oral every 6 hours PRN Temp greater or equal to 38C (100.4F), Mild Pain (1 - 3)  aluminum hydroxide/magnesium hydroxide/simethicone Suspension 30 milliLiter(s) Oral every 4 hours PRN Dyspepsia  meclizine 12.5 milliGRAM(s) Oral three times a day PRN Dizziness  melatonin 3 milliGRAM(s) Oral at bedtime PRN Insomnia  metoprolol tartrate Injectable 5 milliGRAM(s) IV Push every 8 hours PRN hr over 120  OLANZapine Injectable 2.5 milliGRAM(s) IntraMuscular daily PRN Acxiety  ondansetron Injectable 4 milliGRAM(s) IV Push every 8 hours PRN Nausea and/or Vomiting  polyethylene glycol 3350 17 Gram(s) Oral daily PRN Constipation  senna 2 Tablet(s) Oral at bedtime PRN Constipation        I&O's Summary    13 Apr 2024 07:01  -  14 Apr 2024 07:00  --------------------------------------------------------  IN: 0 mL / OUT: 3500 mL / NET: -3500 mL        PHYSICAL EXAM:  Vital Signs Last 24 Hrs  T(C): 36.2 (14 Apr 2024 08:00), Max: 37.2 (14 Apr 2024 04:53)  T(F): 97.2 (14 Apr 2024 08:00), Max: 98.9 (14 Apr 2024 04:53)  HR: 87 (14 Apr 2024 08:00) (65 - 103)  BP: 117/84 (14 Apr 2024 08:00) (117/84 - 143/92)  BP(mean): --  RR: 18 (14 Apr 2024 08:00) (18 - 18)  SpO2: 96% (14 Apr 2024 08:00) (96% - 100%)    Parameters below as of 14 Apr 2024 08:00  Patient On (Oxygen Delivery Method): room air            CONSTITUTIONAL: NAD, appears stated age  ENMT: Moist oral mucosa, no pharyngeal injection or exudates; normal dentition  RESPIRATORY: Normal respiratory effort; clear to auscultation bilaterally  CARDIOVASCULAR: Regular rate and rhythm, normal S1 and S2, no murmur/rub/gallop; Peripheral pulses are 2+ bilaterally  ABDOMEN: Nontender to palpation, normoactive bowel sounds, no rebound/guarding;   MUSCLOSKELETAL:  No clubbing or cyanosis of digits; no joint swelling or tenderness to palpation  PSYCH: A+O to person, place, and time; affect appropriate  NEUROLOGY: CN 2-12 are intact and symmetric; no gross sensory deficits;   SKIN: No rashes; no palpable lesions    LABS:                        13.1   5.95  )-----------( 305      ( 14 Apr 2024 07:12 )             40.2     04-14    138  |  101  |  3.3<L>  ----------------------------<  86  4.0   |  23.0  |  0.42<L>    Ca    9.4      14 Apr 2024 07:12  Phos  3.4     04-14  Mg     1.8     04-14    TPro  6.7  /  Alb  3.5  /  TBili  2.1<H>  /  DBili  x   /  AST  88<H>  /  ALT  189<H>  /  AlkPhos  93  04-14    PTT - ( 14 Apr 2024 07:12 )  PTT:35.5 sec      Urinalysis Basic - ( 14 Apr 2024 07:12 )    Color: x / Appearance: x / SG: x / pH: x  Gluc: 86 mg/dL / Ketone: x  / Bili: x / Urobili: x   Blood: x / Protein: x / Nitrite: x   Leuk Esterase: x / RBC: x / WBC x   Sq Epi: x / Non Sq Epi: x / Bacteria: x        CAPILLARY BLOOD GLUCOSE      POCT Blood Glucose.: 91 mg/dL (13 Apr 2024 22:21)  POCT Blood Glucose.: 91 mg/dL (13 Apr 2024 18:03)        RADIOLOGY & ADDITIONAL TESTS:  Results Reviewed:   Imaging Personally Reviewed:  Electrocardiogram Personally Reviewed:

## 2024-04-14 NOTE — CHART NOTE - NSCHARTNOTEFT_GEN_A_CORE
Rapid Response 2 Hr Follow up Rapid Response 2 Hr Follow up    Pt Resting comfortably NAD  States B/L upper CP has improved but now feels "sore".  Denies: Cardiac CP Sob N/V dizziness    T(C): 36.8 (14 Apr 2024 00:06), Max: 37.1 (13 Apr 2024 07:30)  T(F): 98.2 (14 Apr 2024 00:06), Max: 98.8 (13 Apr 2024 07:30)  HR: 74 (14 Apr 2024 00:06) (65 - 103)  BP: 122/82 (14 Apr 2024 00:06) (119/84 - 145/82)  RR: 18 (14 Apr 2024 00:06) (18 - 18)  SpO2: 100% (14 Apr 2024 00:06) (96% - 100%) ra     Cardiac; S1S2 + RRR  Lungs: CTA B/L  Chest: + Sl Pain to palp B/l Upper Chest but decreased Pain   Integument" No Pallor Warm/Dry  Abd: Obese NDNT     A/P 2 Hr Follow up RR  Pt feeling Better w/ + Sl residual B/L Upper Chest "soreness'   Continue to Monitor Pt  Recall PA for any changes in Pt status   Will sign out to AM Team

## 2024-04-15 ENCOUNTER — RESULT REVIEW (OUTPATIENT)
Age: 24
End: 2024-04-15

## 2024-04-15 LAB
HCT VFR BLD CALC: 41.7 % — SIGNIFICANT CHANGE UP (ref 39–50)
HGB BLD-MCNC: 13.8 G/DL — SIGNIFICANT CHANGE UP (ref 13–17)
MCHC RBC-ENTMCNC: 26.8 PG — LOW (ref 27–34)
MCHC RBC-ENTMCNC: 33.1 GM/DL — SIGNIFICANT CHANGE UP (ref 32–36)
MCV RBC AUTO: 81 FL — SIGNIFICANT CHANGE UP (ref 80–100)
PLATELET # BLD AUTO: 367 K/UL — SIGNIFICANT CHANGE UP (ref 150–400)
RBC # BLD: 5.15 M/UL — SIGNIFICANT CHANGE UP (ref 4.2–5.8)
RBC # FLD: 15.5 % — HIGH (ref 10.3–14.5)
WBC # BLD: 7.98 K/UL — SIGNIFICANT CHANGE UP (ref 3.8–10.5)
WBC # FLD AUTO: 7.98 K/UL — SIGNIFICANT CHANGE UP (ref 3.8–10.5)

## 2024-04-15 PROCEDURE — 93321 DOPPLER ECHO F-UP/LMTD STD: CPT | Mod: 26

## 2024-04-15 PROCEDURE — 99232 SBSQ HOSP IP/OBS MODERATE 35: CPT

## 2024-04-15 PROCEDURE — 93970 EXTREMITY STUDY: CPT | Mod: 26

## 2024-04-15 PROCEDURE — 93308 TTE F-UP OR LMTD: CPT | Mod: 26

## 2024-04-15 RX ORDER — ACETAMINOPHEN 500 MG
650 TABLET ORAL ONCE
Refills: 0 | Status: DISCONTINUED | OUTPATIENT
Start: 2024-04-15 | End: 2024-04-15

## 2024-04-15 RX ORDER — ACETAMINOPHEN 500 MG
650 TABLET ORAL ONCE
Refills: 0 | Status: COMPLETED | OUTPATIENT
Start: 2024-04-15 | End: 2024-04-16

## 2024-04-15 RX ORDER — GABAPENTIN 400 MG/1
100 CAPSULE ORAL
Refills: 0 | Status: DISCONTINUED | OUTPATIENT
Start: 2024-04-15 | End: 2024-04-29

## 2024-04-15 RX ADMIN — Medication 15 MILLILITER(S): at 14:27

## 2024-04-15 RX ADMIN — Medication 650 MILLIGRAM(S): at 14:18

## 2024-04-15 RX ADMIN — SODIUM CHLORIDE 100 MILLILITER(S): 9 INJECTION, SOLUTION INTRAVENOUS at 01:15

## 2024-04-15 RX ADMIN — GABAPENTIN 100 MILLIGRAM(S): 400 CAPSULE ORAL at 18:59

## 2024-04-15 RX ADMIN — TAMSULOSIN HYDROCHLORIDE 0.4 MILLIGRAM(S): 0.4 CAPSULE ORAL at 21:34

## 2024-04-15 RX ADMIN — PANTOPRAZOLE SODIUM 40 MILLIGRAM(S): 20 TABLET, DELAYED RELEASE ORAL at 05:35

## 2024-04-15 RX ADMIN — Medication 1 TABLET(S): at 14:27

## 2024-04-15 RX ADMIN — PANTOPRAZOLE SODIUM 40 MILLIGRAM(S): 20 TABLET, DELAYED RELEASE ORAL at 18:58

## 2024-04-15 RX ADMIN — ATORVASTATIN CALCIUM 40 MILLIGRAM(S): 80 TABLET, FILM COATED ORAL at 21:34

## 2024-04-15 RX ADMIN — APIXABAN 10 MILLIGRAM(S): 2.5 TABLET, FILM COATED ORAL at 05:37

## 2024-04-15 RX ADMIN — APIXABAN 10 MILLIGRAM(S): 2.5 TABLET, FILM COATED ORAL at 18:58

## 2024-04-15 RX ADMIN — SODIUM CHLORIDE 100 MILLILITER(S): 9 INJECTION, SOLUTION INTRAVENOUS at 13:38

## 2024-04-15 RX ADMIN — Medication 1 MILLIGRAM(S): at 13:37

## 2024-04-15 RX ADMIN — PREGABALIN 1000 MICROGRAM(S): 225 CAPSULE ORAL at 13:37

## 2024-04-15 RX ADMIN — SODIUM CHLORIDE 100 MILLILITER(S): 9 INJECTION, SOLUTION INTRAVENOUS at 21:35

## 2024-04-15 NOTE — DIETITIAN INITIAL EVALUATION ADULT - PERTINENT LABORATORY DATA
04-14    138  |  101  |  3.3<L>  ----------------------------<  86  4.0   |  23.0  |  0.42<L>    Ca    9.4      14 Apr 2024 07:12  Phos  3.4     04-14  Mg     1.8     04-14    TPro  6.7  /  Alb  3.5  /  TBili  2.1<H>  /  DBili  x   /  AST  88<H>  /  ALT  189<H>  /  AlkPhos  93  04-14  A1C with Estimated Average Glucose Result: 5.8 % (04-08-24 @ 06:45)

## 2024-04-15 NOTE — DIETITIAN INITIAL EVALUATION ADULT - ORAL INTAKE PTA/DIET HISTORY
Pt taking full liquid bariatric diet fair and now requesting for diet to be upgraded. S/P gastric sleeve x 2 mo noted. Pt was having nausea and vomiting upon admission. Pt with about 50# weight loss since surgery. Provided diet education on bariatric phase 3 regular. Spoke with residents to address this request. bariatric team signed off and recommended advance as tolerated.

## 2024-04-15 NOTE — DIETITIAN INITIAL EVALUATION ADULT - PERTINENT MEDS FT
MEDICATIONS  (STANDING):  apixaban 10 milliGRAM(s) Oral every 12 hours  atorvastatin 40 milliGRAM(s) Oral at bedtime  cyanocobalamin 1000 MICROGram(s) Oral daily  ergocalciferol 44037 Unit(s) Oral every week  folic acid 1 milliGRAM(s) Oral daily  lactated ringers. 1000 milliLiter(s) (100 mL/Hr) IV Continuous <Continuous>  multivitamin/minerals/iron Oral Solution (CENTRUM) 15 milliLiter(s) Oral daily  pantoprazole  Injectable 40 milliGRAM(s) IV Push two times a day  tamsulosin 0.4 milliGRAM(s) Oral at bedtime  vitamin B complex with vitamin C 1 Tablet(s) Oral daily    MEDICATIONS  (PRN):  acetaminophen     Tablet .. 650 milliGRAM(s) Oral every 6 hours PRN Temp greater or equal to 38C (100.4F), Mild Pain (1 - 3)  aluminum hydroxide/magnesium hydroxide/simethicone Suspension 30 milliLiter(s) Oral every 4 hours PRN Dyspepsia  LORazepam     Tablet 0.5 milliGRAM(s) Oral three times a day PRN Anxiety  meclizine 12.5 milliGRAM(s) Oral three times a day PRN Dizziness  melatonin 3 milliGRAM(s) Oral at bedtime PRN Insomnia  metoprolol tartrate Injectable 5 milliGRAM(s) IV Push every 8 hours PRN hr over 120  OLANZapine Injectable 2.5 milliGRAM(s) IntraMuscular daily PRN Acxiety  ondansetron Injectable 4 milliGRAM(s) IV Push every 8 hours PRN Nausea and/or Vomiting  polyethylene glycol 3350 17 Gram(s) Oral daily PRN Constipation  senna 2 Tablet(s) Oral at bedtime PRN Constipation

## 2024-04-15 NOTE — DIETITIAN INITIAL EVALUATION ADULT - ADD RECOMMEND
Ensure Max BID to provide 150kcal, 30gr pro per bottle  Advance diet to soft bariatric phase 2  Continue folic acid, MVI, Vit D, B12

## 2024-04-15 NOTE — PROGRESS NOTE ADULT - ASSESSMENT
Assessment: 24M PMHx obesity, anxiety, depression, s/p gastric sleeve surgery presenting for diplopia, leg weakness, decreased PO intake, and urinary retention. Imaging negative. Etiology possibly vitamin deficiency after gastric sleeve surgery; awaiting vitamin panel results.     Plan:    AMS, Leg Weakness, Diplopia, Urinary Retention  - CT head: hypodensities in R parietal and temporal lobes  - MRI brain/MRA head/MRI lumbosacral spine performed  - No stroke, mass, hemorrhage, aneurysm, LVO, critical stenosis, or cauda equina  - Repeat MRI t-spine: no T3-T4 edema; slight disc protrusion in T6-7  - No intervention per neurosurgery  - VBG w/ lytes: unremarkable  - Repeat CTH, EEG non contributory  - Ammonia level wnl  - Follow up with psychiatrist outpatient for possible CBT  - US b/l LE to r/o DVT -----> Pending  - Started Gabapentin 100 mg BID   - PM&R consult placed for acute inpatient rehab placement ---> Pending    Possible Vitamin Deficiency  - Had poor PO intake for past 2 months: possible vitamin deficiency after bariatric surgery  - Vitamin D weekly, folic acid and vitamin B supplements  - Vitamin B12 wnl  - Repeat Vitamin B2 level due to lab request (was not kept away from light) ---> Pending  - Thiamine, niacin, riboflavin levels ----> Pending  - May be contributing to bilateral leg weakness/lethargy    Urinary Retention  - Tamsulosin 0.4 mg PO qhs  - Bladder scan q6h    PE  - CT chest (4/9): pulmonary embolism, no R heart strain  - US LE: no DVT  - Eliquis 10 mg BID x7 days, then 5 mg PO BID  - On room air  - Telemetry monitoring  - TTE to r/o R heart strain ---> Pending    Anxiety/Depression  - Metoprolol 5 mg IV q8h PRN for HR>120  - Zyprexa 2.5 mg IM PRN  - Follow up with psychiatrist outpatient    HLD  - Atorvastatin 40 mg PO qhs    Vertigo  - Meclizine 12.5 mg PO TID PRN    Nausea/vomiting  - Resolved  - CT abdomen: no acute abdominal finding or visceral injury  - IV LR 1000 mL @ 100 cc/h  - Barium scan: wnl  - Simethicone suspension q4h PRN  - Zofran 4 mg IV q8h pRN  - Protonix 40 mg IV BID  - Mirlee gibbsna  - Bariatric surgeon recommending to continue IV fluids  - Started Soft Bariatric phase 2 diet    Dispo: Acute inpatient rehab    Acute

## 2024-04-15 NOTE — PROGRESS NOTE ADULT - SUBJECTIVE AND OBJECTIVE BOX
Patient is a 24y old  Male who presents with a chief complaint of Diplopia     (15 Apr 2024 10:05)      INTERVAL HPI/OVERNIGHT EVENTS: No acute events overnight. No new complaints.    MEDICATIONS  (STANDING):  apixaban 10 milliGRAM(s) Oral every 12 hours  atorvastatin 40 milliGRAM(s) Oral at bedtime  cyanocobalamin 1000 MICROGram(s) Oral daily  ergocalciferol 73178 Unit(s) Oral every week  folic acid 1 milliGRAM(s) Oral daily  gabapentin 100 milliGRAM(s) Oral two times a day  lactated ringers. 1000 milliLiter(s) (100 mL/Hr) IV Continuous <Continuous>  multivitamin/minerals/iron Oral Solution (CENTRUM) 15 milliLiter(s) Oral daily  pantoprazole  Injectable 40 milliGRAM(s) IV Push two times a day  tamsulosin 0.4 milliGRAM(s) Oral at bedtime  vitamin B complex with vitamin C 1 Tablet(s) Oral daily    MEDICATIONS  (PRN):  acetaminophen     Tablet .. 650 milliGRAM(s) Oral every 6 hours PRN Temp greater or equal to 38C (100.4F), Mild Pain (1 - 3)  aluminum hydroxide/magnesium hydroxide/simethicone Suspension 30 milliLiter(s) Oral every 4 hours PRN Dyspepsia  LORazepam     Tablet 0.5 milliGRAM(s) Oral three times a day PRN Anxiety  meclizine 12.5 milliGRAM(s) Oral three times a day PRN Dizziness  melatonin 3 milliGRAM(s) Oral at bedtime PRN Insomnia  metoprolol tartrate Injectable 5 milliGRAM(s) IV Push every 8 hours PRN hr over 120  OLANZapine Injectable 2.5 milliGRAM(s) IntraMuscular daily PRN Acxiety  ondansetron Injectable 4 milliGRAM(s) IV Push every 8 hours PRN Nausea and/or Vomiting  polyethylene glycol 3350 17 Gram(s) Oral daily PRN Constipation  senna 2 Tablet(s) Oral at bedtime PRN Constipation      Allergies    No Known Allergies    Intolerances        REVIEW OF SYSTEMS:  CONSTITUTIONAL: No fever or fatigue; no diplopia  RESPIRATORY: No cough; No shortness of breath  CARDIOVASCULAR: No chest pain, palpitations, dizziness, or leg swelling  GASTROINTESTINAL: No abdominal pain. No nausea, vomiting, or hematemesis; + constipation.   NEUROLOGICAL: No headaches, + numbness from lower abdomen to thighs, + sharp pain in lower legs  MUSCULOSKELETAL: No joint pain; No muscle, back, or extremity pain      Vital Signs Last 24 Hrs  T(C): 37.1 (15 Apr 2024 09:00), Max: 37.1 (15 Apr 2024 09:00)  T(F): 98.7 (15 Apr 2024 09:00), Max: 98.7 (15 Apr 2024 09:00)  HR: 89 (15 Apr 2024 09:00) (84 - 132)  BP: 120/83 (15 Apr 2024 09:00) (116/66 - 127/86)  BP(mean): --  RR: 18 (15 Apr 2024 09:00) (18 - 18)  SpO2: 97% (15 Apr 2024 09:00) (97% - 98%)    Parameters below as of 15 Apr 2024 09:00  Patient On (Oxygen Delivery Method): room air      PHYSICAL EXAM:  GENERAL: NAD, lying in bed comfortably  HEAD:  Atraumatic, Normocephalic  EYES: EOMI, PERRLA, conjunctiva and sclera clear  NERVOUS SYSTEM:  Awakens, alert, oriented x3, no focal deficits  CHEST/LUNG: Clear to auscultation bilaterally; No wheezes  HEART: Regular rate and rhythm; No murmurs  ABDOMEN: Soft, Nontender, Nondistended; Bowel sounds present  GENITOURINARY: schaffer insertion site clean, dry, intact  EXTREMITIES:  No edema    LABS:                        13.1   5.95  )-----------( 305      ( 14 Apr 2024 07:12 )             40.2     04-14    138  |  101  |  3.3<L>  ----------------------------<  86  4.0   |  23.0  |  0.42<L>    Ca    9.4      14 Apr 2024 07:12  Phos  3.4     04-14  Mg     1.8     04-14    TPro  6.7  /  Alb  3.5  /  TBili  2.1<H>  /  DBili  x   /  AST  88<H>  /  ALT  189<H>  /  AlkPhos  93  04-14    PTT - ( 14 Apr 2024 07:12 )  PTT:35.5 sec  Urinalysis Basic - ( 14 Apr 2024 07:12 )    Color: x / Appearance: x / SG: x / pH: x  Gluc: 86 mg/dL / Ketone: x  / Bili: x / Urobili: x   Blood: x / Protein: x / Nitrite: x   Leuk Esterase: x / RBC: x / WBC x   Sq Epi: x / Non Sq Epi: x / Bacteria: x      CAPILLARY BLOOD GLUCOSE          RADIOLOGY & ADDITIONAL TESTS:    Imaging Personally Reviewed:  [ ] YES  [ ] NO    Consultant(s) Notes Reviewed:  [ ] YES  [ ] NO    Care Discussed with Consultants/Other Providers [ ] YES  [ ] NO    Plan of Care discussed with Housestaff [ ]YES [ ] NO Patient is a 24y old  Male who presents with a chief complaint of Diplopia     (15 Apr 2024 10:05)      INTERVAL HPI/OVERNIGHT EVENTS: No acute events overnight. No new complaints.    MEDICATIONS  (STANDING):  apixaban 10 milliGRAM(s) Oral every 12 hours  atorvastatin 40 milliGRAM(s) Oral at bedtime  cyanocobalamin 1000 MICROGram(s) Oral daily  ergocalciferol 55789 Unit(s) Oral every week  folic acid 1 milliGRAM(s) Oral daily  gabapentin 100 milliGRAM(s) Oral two times a day  lactated ringers. 1000 milliLiter(s) (100 mL/Hr) IV Continuous <Continuous>  multivitamin/minerals/iron Oral Solution (CENTRUM) 15 milliLiter(s) Oral daily  pantoprazole  Injectable 40 milliGRAM(s) IV Push two times a day  tamsulosin 0.4 milliGRAM(s) Oral at bedtime  vitamin B complex with vitamin C 1 Tablet(s) Oral daily    MEDICATIONS  (PRN):  acetaminophen     Tablet .. 650 milliGRAM(s) Oral every 6 hours PRN Temp greater or equal to 38C (100.4F), Mild Pain (1 - 3)  aluminum hydroxide/magnesium hydroxide/simethicone Suspension 30 milliLiter(s) Oral every 4 hours PRN Dyspepsia  LORazepam     Tablet 0.5 milliGRAM(s) Oral three times a day PRN Anxiety  meclizine 12.5 milliGRAM(s) Oral three times a day PRN Dizziness  melatonin 3 milliGRAM(s) Oral at bedtime PRN Insomnia  metoprolol tartrate Injectable 5 milliGRAM(s) IV Push every 8 hours PRN hr over 120  OLANZapine Injectable 2.5 milliGRAM(s) IntraMuscular daily PRN Acxiety  ondansetron Injectable 4 milliGRAM(s) IV Push every 8 hours PRN Nausea and/or Vomiting  polyethylene glycol 3350 17 Gram(s) Oral daily PRN Constipation  senna 2 Tablet(s) Oral at bedtime PRN Constipation      Allergies    No Known Allergies    Intolerances        REVIEW OF SYSTEMS:  CONSTITUTIONAL: No fever or fatigue; no diplopia  RESPIRATORY: No cough; No shortness of breath  CARDIOVASCULAR: No chest pain, palpitations, dizziness, or leg swelling  GASTROINTESTINAL: No abdominal pain. No nausea, vomiting, or hematemesis; + constipation.   NEUROLOGICAL: No headaches, + numbness from lower abdomen to thighs, + sharp pain in lower legs  MUSCULOSKELETAL: No joint pain; No muscle, back, or extremity pain      Vital Signs Last 24 Hrs  T(C): 37.1 (15 Apr 2024 09:00), Max: 37.1 (15 Apr 2024 09:00)  T(F): 98.7 (15 Apr 2024 09:00), Max: 98.7 (15 Apr 2024 09:00)  HR: 89 (15 Apr 2024 09:00) (84 - 132)  BP: 120/83 (15 Apr 2024 09:00) (116/66 - 127/86)  BP(mean): --  RR: 18 (15 Apr 2024 09:00) (18 - 18)  SpO2: 97% (15 Apr 2024 09:00) (97% - 98%)    Parameters below as of 15 Apr 2024 09:00  Patient On (Oxygen Delivery Method): room air      PHYSICAL EXAM:  GENERAL: NAD, lying in bed comfortably  HEAD:  Atraumatic, Normocephalic  EYES: EOMI, PERRLA, conjunctiva and sclera clear  NERVOUS SYSTEM:  Awakens, alert, oriented x3, no focal deficits  CHEST/LUNG: Clear to auscultation bilaterally; No wheezes  HEART: Regular rate and rhythm; No murmurs  ABDOMEN: Soft, Nontender, Nondistended; Bowel sounds present  GENITOURINARY: schaffer insertion site clean, dry, intact  EXTREMITIES:  No edema    LABS:                        13.1   5.95  )-----------( 305      ( 14 Apr 2024 07:12 )             40.2     04-14    138  |  101  |  3.3<L>  ----------------------------<  86  4.0   |  23.0  |  0.42<L>    Ca    9.4      14 Apr 2024 07:12  Phos  3.4     04-14  Mg     1.8     04-14    TPro  6.7  /  Alb  3.5  /  TBili  2.1<H>  /  DBili  x   /  AST  88<H>  /  ALT  189<H>  /  AlkPhos  93  04-14    PTT - ( 14 Apr 2024 07:12 )  PTT:35.5 sec  Urinalysis Basic - ( 14 Apr 2024 07:12 )    Color: x / Appearance: x / SG: x / pH: x  Gluc: 86 mg/dL / Ketone: x  / Bili: x / Urobili: x   Blood: x / Protein: x / Nitrite: x   Leuk Esterase: x / RBC: x / WBC x   Sq Epi: x / Non Sq Epi: x / Bacteria: x      CAPILLARY BLOOD GLUCOSE          RADIOLOGY & ADDITIONAL TESTS:

## 2024-04-15 NOTE — PROGRESS NOTE ADULT - ATTENDING COMMENTS
24M with PMHx obesity, anxiety, depression, s/p gastric sleeve surgery presenting for diplopia, leg weakness, decreased PO intake, and urinary retention. Imaging negative. Awaiting psychiatry evaluation.    Acute PE  Altered Mental status  Leg Weakness? Diplopia?    Neuro consulted  F/u vitamin panel   F/u repeat TTE given intermittent tachycardia     PT rec Acute however unclear if performance sec to behavioral component

## 2024-04-15 NOTE — DIETITIAN INITIAL EVALUATION ADULT - OTHER INFO
24M PMHx obesity, anxiety, depression, s/p gastric sleeve surgery presenting for diplopia, leg weakness, decreased PO intake, and urinary retention. Imaging negative. Awaiting psychiatry evaluation. s/p gastric sleeve in Feb 2024.

## 2024-04-16 LAB
ALBUMIN SERPL ELPH-MCNC: 3.6 G/DL — SIGNIFICANT CHANGE UP (ref 3.3–5.2)
ALP SERPL-CCNC: 96 U/L — SIGNIFICANT CHANGE UP (ref 40–120)
ALT FLD-CCNC: 167 U/L — HIGH
ANION GAP SERPL CALC-SCNC: 12 MMOL/L — SIGNIFICANT CHANGE UP (ref 5–17)
AST SERPL-CCNC: 75 U/L — HIGH
BASOPHILS # BLD AUTO: 0.05 K/UL — SIGNIFICANT CHANGE UP (ref 0–0.2)
BASOPHILS NFR BLD AUTO: 0.6 % — SIGNIFICANT CHANGE UP (ref 0–2)
BILIRUB SERPL-MCNC: 1.6 MG/DL — SIGNIFICANT CHANGE UP (ref 0.4–2)
BUN SERPL-MCNC: 5.3 MG/DL — LOW (ref 8–20)
CALCIUM SERPL-MCNC: 9.3 MG/DL — SIGNIFICANT CHANGE UP (ref 8.4–10.5)
CHLORIDE SERPL-SCNC: 104 MMOL/L — SIGNIFICANT CHANGE UP (ref 96–108)
CO2 SERPL-SCNC: 23 MMOL/L — SIGNIFICANT CHANGE UP (ref 22–29)
CREAT SERPL-MCNC: 0.44 MG/DL — LOW (ref 0.5–1.3)
EGFR: 150 ML/MIN/1.73M2 — SIGNIFICANT CHANGE UP
EOSINOPHIL # BLD AUTO: 0.15 K/UL — SIGNIFICANT CHANGE UP (ref 0–0.5)
EOSINOPHIL NFR BLD AUTO: 1.8 % — SIGNIFICANT CHANGE UP (ref 0–6)
GLUCOSE SERPL-MCNC: 99 MG/DL — SIGNIFICANT CHANGE UP (ref 70–99)
HCT VFR BLD CALC: 38.9 % — LOW (ref 39–50)
HGB BLD-MCNC: 13 G/DL — SIGNIFICANT CHANGE UP (ref 13–17)
IMM GRANULOCYTES NFR BLD AUTO: 1 % — HIGH (ref 0–0.9)
LYMPHOCYTES # BLD AUTO: 2.3 K/UL — SIGNIFICANT CHANGE UP (ref 1–3.3)
LYMPHOCYTES # BLD AUTO: 27.9 % — SIGNIFICANT CHANGE UP (ref 13–44)
MAGNESIUM SERPL-MCNC: 1.8 MG/DL — SIGNIFICANT CHANGE UP (ref 1.6–2.6)
MCHC RBC-ENTMCNC: 27 PG — SIGNIFICANT CHANGE UP (ref 27–34)
MCHC RBC-ENTMCNC: 33.4 GM/DL — SIGNIFICANT CHANGE UP (ref 32–36)
MCV RBC AUTO: 80.9 FL — SIGNIFICANT CHANGE UP (ref 80–100)
MONOCYTES # BLD AUTO: 0.87 K/UL — SIGNIFICANT CHANGE UP (ref 0–0.9)
MONOCYTES NFR BLD AUTO: 10.5 % — SIGNIFICANT CHANGE UP (ref 2–14)
NEUTROPHILS # BLD AUTO: 4.8 K/UL — SIGNIFICANT CHANGE UP (ref 1.8–7.4)
NEUTROPHILS NFR BLD AUTO: 58.2 % — SIGNIFICANT CHANGE UP (ref 43–77)
PANTOTHENATE SERPLBLD-MCNC: 25.8 NG/ML — SIGNIFICANT CHANGE UP (ref 12.9–253.1)
PHOSPHATE SERPL-MCNC: 4.6 MG/DL — SIGNIFICANT CHANGE UP (ref 2.4–4.7)
PLATELET # BLD AUTO: 362 K/UL — SIGNIFICANT CHANGE UP (ref 150–400)
POTASSIUM SERPL-MCNC: 3.7 MMOL/L — SIGNIFICANT CHANGE UP (ref 3.5–5.3)
POTASSIUM SERPL-SCNC: 3.7 MMOL/L — SIGNIFICANT CHANGE UP (ref 3.5–5.3)
PROT SERPL-MCNC: 6.6 G/DL — SIGNIFICANT CHANGE UP (ref 6.6–8.7)
RBC # BLD: 4.81 M/UL — SIGNIFICANT CHANGE UP (ref 4.2–5.8)
RBC # FLD: 15.7 % — HIGH (ref 10.3–14.5)
SODIUM SERPL-SCNC: 139 MMOL/L — SIGNIFICANT CHANGE UP (ref 135–145)
WBC # BLD: 8.25 K/UL — SIGNIFICANT CHANGE UP (ref 3.8–10.5)
WBC # FLD AUTO: 8.25 K/UL — SIGNIFICANT CHANGE UP (ref 3.8–10.5)

## 2024-04-16 PROCEDURE — 99223 1ST HOSP IP/OBS HIGH 75: CPT

## 2024-04-16 PROCEDURE — 99232 SBSQ HOSP IP/OBS MODERATE 35: CPT

## 2024-04-16 RX ORDER — LIDOCAINE HCL 20 MG/ML
10 VIAL (ML) INJECTION ONCE
Refills: 0 | Status: COMPLETED | OUTPATIENT
Start: 2024-04-16 | End: 2024-04-17

## 2024-04-16 RX ORDER — FLUOXETINE HCL 10 MG
10 CAPSULE ORAL DAILY
Refills: 0 | Status: DISCONTINUED | OUTPATIENT
Start: 2024-04-16 | End: 2024-04-29

## 2024-04-16 RX ADMIN — APIXABAN 10 MILLIGRAM(S): 2.5 TABLET, FILM COATED ORAL at 05:18

## 2024-04-16 RX ADMIN — ATORVASTATIN CALCIUM 40 MILLIGRAM(S): 80 TABLET, FILM COATED ORAL at 21:44

## 2024-04-16 RX ADMIN — Medication 15 MILLILITER(S): at 13:17

## 2024-04-16 RX ADMIN — PREGABALIN 1000 MICROGRAM(S): 225 CAPSULE ORAL at 13:16

## 2024-04-16 RX ADMIN — Medication 1 MILLIGRAM(S): at 13:16

## 2024-04-16 RX ADMIN — ONDANSETRON 4 MILLIGRAM(S): 8 TABLET, FILM COATED ORAL at 13:12

## 2024-04-16 RX ADMIN — Medication 650 MILLIGRAM(S): at 10:03

## 2024-04-16 RX ADMIN — GABAPENTIN 100 MILLIGRAM(S): 400 CAPSULE ORAL at 05:18

## 2024-04-16 RX ADMIN — TAMSULOSIN HYDROCHLORIDE 0.4 MILLIGRAM(S): 0.4 CAPSULE ORAL at 21:44

## 2024-04-16 RX ADMIN — PANTOPRAZOLE SODIUM 40 MILLIGRAM(S): 20 TABLET, DELAYED RELEASE ORAL at 18:00

## 2024-04-16 RX ADMIN — APIXABAN 10 MILLIGRAM(S): 2.5 TABLET, FILM COATED ORAL at 18:00

## 2024-04-16 RX ADMIN — Medication 0.1 MILLIGRAM(S): at 15:42

## 2024-04-16 RX ADMIN — Medication 650 MILLIGRAM(S): at 05:26

## 2024-04-16 RX ADMIN — Medication 0.1 MILLIGRAM(S): at 21:45

## 2024-04-16 RX ADMIN — SODIUM CHLORIDE 100 MILLILITER(S): 9 INJECTION, SOLUTION INTRAVENOUS at 21:49

## 2024-04-16 RX ADMIN — SODIUM CHLORIDE 100 MILLILITER(S): 9 INJECTION, SOLUTION INTRAVENOUS at 05:19

## 2024-04-16 RX ADMIN — Medication 650 MILLIGRAM(S): at 05:18

## 2024-04-16 RX ADMIN — Medication 10 MILLIGRAM(S): at 15:41

## 2024-04-16 RX ADMIN — Medication 1 TABLET(S): at 13:17

## 2024-04-16 RX ADMIN — PANTOPRAZOLE SODIUM 40 MILLIGRAM(S): 20 TABLET, DELAYED RELEASE ORAL at 05:17

## 2024-04-16 RX ADMIN — GABAPENTIN 100 MILLIGRAM(S): 400 CAPSULE ORAL at 18:00

## 2024-04-16 RX ADMIN — POLYETHYLENE GLYCOL 3350 17 GRAM(S): 17 POWDER, FOR SOLUTION ORAL at 15:48

## 2024-04-16 RX ADMIN — Medication 650 MILLIGRAM(S): at 11:03

## 2024-04-16 RX ADMIN — Medication 0.5 MILLIGRAM(S): at 21:45

## 2024-04-16 NOTE — PROGRESS NOTE ADULT - ASSESSMENT
Assessment: 24M PMHx obesity, anxiety, depression, s/p gastric sleeve surgery presenting for diplopia, leg weakness, decreased PO intake, and urinary retention. Imaging negative. Etiology possibly vitamin deficiency after gastric sleeve surgery; awaiting vitamin panel results.     Plan:    AMS, Leg Weakness, Diplopia, Urinary Retention  - CT head: hypodensities in R parietal and temporal lobes  - MRI brain/MRA head/MRI lumbosacral spine performed  - No stroke, mass, hemorrhage, aneurysm, LVO, critical stenosis, or cauda equina  - Repeat MRI t-spine: no T3-T4 edema; slight disc protrusion in T6-7  - No intervention per neurosurgery  - VBG w/ lytes: unremarkable  - Repeat CTH, EEG non contributory  - Ammonia level wnl  - Follow up with psychiatrist outpatient for possible CBT  - US b/l LE to r/o DVT: negative  - Started Gabapentin 100 mg BID   - PM&R consulted, recs noted    Anxiety  - Prozac 10 mg qd  - Clonidine 0.1 mg q8h  -  recs noted    Possible Vitamin Deficiency  - Had poor PO intake for past 2 months: possible vitamin deficiency after bariatric surgery  - Vitamin D weekly, folic acid and vitamin B supplements  - Vitamin B12 wnl  - Repeat Vitamin B2 level due to lab request (was not kept away from light) ---> Pending  - Thiamine, niacin, riboflavin levels ----> Pending  - May be contributing to bilateral leg weakness/lethargy    Urinary Retention  - Tamsulosin 0.4 mg PO qhs  - Bladder scan q6h    PE  - CT chest (4/9): pulmonary embolism, no R heart strain  - US LE: no DVT  - Eliquis 10 mg BID x7 days, then 5 mg PO BID  - On room air  - Telemetry monitoring  - TTE to r/o R heart strain: wnl    Anxiety/Depression  - Metoprolol 5 mg IV q8h PRN for HR>120  - Zyprexa 2.5 mg IM PRN  - Follow up with psychiatrist outpatient    HLD  - Atorvastatin 40 mg PO qhs    Vertigo  - Meclizine 12.5 mg PO TID PRN    Nausea/vomiting  - Resolved  - CT abdomen: no acute abdominal finding or visceral injury  - IV LR 1000 mL @ 100 cc/h  - Barium scan: wnl  - Simethicone suspension q4h PRN  - Zofran 4 mg IV q8h pRN  - Protonix 40 mg IV BID  - Mirsai senna  - Bariatric surgeon recommending to continue IV fluids  - Started Soft Bariatric phase 2 diet    Dispo: Acute inpatient rehab    Acute   Assessment: 24M PMHx obesity, anxiety, depression, s/p gastric sleeve surgery presenting for diplopia, leg weakness, decreased PO intake, and urinary retention. Imaging negative. Etiology possibly vitamin deficiency after gastric sleeve surgery; awaiting vitamin panel results.     Plan:    AMS, Leg Weakness, Diplopia, Urinary Retention  - CT head: hypodensities in R parietal and temporal lobes  - MRI brain/MRA head/MRI lumbosacral spine performed  - No stroke, mass, hemorrhage, aneurysm, LVO, critical stenosis, or cauda equina  - Repeat MRI t-spine: no T3-T4 edema; slight disc protrusion in T6-7  - No intervention per neurosurgery  - VBG w/ lytes: unremarkable  - Repeat CTH, EEG non contributory  - Ammonia level wnl  - Follow up with psychiatrist outpatient for possible CBT  - US b/l LE to r/o DVT: negative  - Gabapentin 100 mg BID   - PM&R consulted, recs noted    Anxiety  - Prozac 10 mg qd  - Clonidine 0.1 mg q8h  -  recs noted    Possible Vitamin Deficiency  - Had poor PO intake for past 2 months: possible vitamin deficiency after bariatric surgery  - Vitamin D weekly, folic acid and vitamin B supplements  - Vitamin B12 wnl  - Repeat Vitamin B2 level due to lab request (was not kept away from light) ---> Pending  - Thiamine, niacin, riboflavin levels ----> Pending  - May be contributing to bilateral leg weakness/lethargy    Urinary Retention  - Tamsulosin 0.4 mg PO qhs  - Bladder scan q6h    PE  - CT chest (4/9): pulmonary embolism, no R heart strain  - US LE: no DVT  - Eliquis 10 mg BID x7 days, then 5 mg PO BID  - On room air  - Telemetry monitoring  - TTE to r/o R heart strain: wnl    HLD  - Atorvastatin 40 mg PO qhs    Vertigo  - Meclizine 12.5 mg PO TID PRN    Nausea/vomiting  - Resolved  - CT abdomen: no acute abdominal finding or visceral injury  - IV LR 1000 mL @ 100 cc/h  - Barium scan: wnl  - Simethicone suspension q4h PRN  - Zofran 4 mg IV q8h pRN  - Protonix 40 mg IV BID  - Miralax, senna  - Bariatric surgeon recommending to continue IV fluids  - Started Soft Bariatric phase 2 diet    Dispo: Discharge home once stable    Acute

## 2024-04-16 NOTE — BH CONSULTATION LIAISON ASSESSMENT NOTE - SUMMARY
Patient is a 24 year old male, originally from [place] and now living with aunt and mother on , unemployed, past medical history of obesity, RADHA, gastric sleeve (2/1/24), past psychiatric history of anxiety, depression, PTSD, and autism spectrum d/o, no prior psychiatric hospitalizations, OPP include therapist and psychiatrist based in [place], was previously prescribed Topamax, Wellbutrin, and Klonopin most recently but non-compliant due to reported side effects, no history of SA/NSSIB, substance history significant for past use of ?hemp, no legal history, remote family history of anxiety and depression, admitted to [facility] for diplopia, leg weakness, decreased PO intake, and urinary retention   Psychiatry consulted due to anxiety.     Assessment:  Patient presenting with symptoms of anxiety that has gradually worsened since gastric sleeve operation in February 2024 that he states manifests as nausea/vomiting. He states he was initially able to tolerate liquids but this has gradually become difficult for him as well due to the nausea. He reports at baseline he has had anxiety "for years" but symptoms have escalated significantly since the procedure.     Recommendations:  - start Prozac 10mg po daily with plan to titrate   - start Clonidine 0.1mg Q8 hours   - will follow as needed

## 2024-04-16 NOTE — BH CONSULTATION LIAISON ASSESSMENT NOTE - CURRENT MEDICATION
MEDICATIONS  (STANDING):  apixaban 10 milliGRAM(s) Oral every 12 hours  atorvastatin 40 milliGRAM(s) Oral at bedtime  cloNIDine 0.1 milliGRAM(s) Oral every 8 hours  cyanocobalamin 1000 MICROGram(s) Oral daily  ergocalciferol 41992 Unit(s) Oral every week  FLUoxetine 10 milliGRAM(s) Oral daily  folic acid 1 milliGRAM(s) Oral daily  gabapentin 100 milliGRAM(s) Oral two times a day  lactated ringers. 1000 milliLiter(s) (100 mL/Hr) IV Continuous <Continuous>  multivitamin/minerals/iron Oral Solution (CENTRUM) 15 milliLiter(s) Oral daily  pantoprazole  Injectable 40 milliGRAM(s) IV Push two times a day  tamsulosin 0.4 milliGRAM(s) Oral at bedtime  vitamin B complex with vitamin C 1 Tablet(s) Oral daily    MEDICATIONS  (PRN):  acetaminophen     Tablet .. 650 milliGRAM(s) Oral every 6 hours PRN Temp greater or equal to 38C (100.4F), Mild Pain (1 - 3)  aluminum hydroxide/magnesium hydroxide/simethicone Suspension 30 milliLiter(s) Oral every 4 hours PRN Dyspepsia  LORazepam     Tablet 0.5 milliGRAM(s) Oral three times a day PRN Anxiety  meclizine 12.5 milliGRAM(s) Oral three times a day PRN Dizziness  melatonin 3 milliGRAM(s) Oral at bedtime PRN Insomnia  metoprolol tartrate Injectable 5 milliGRAM(s) IV Push every 8 hours PRN hr over 120  OLANZapine Injectable 2.5 milliGRAM(s) IntraMuscular daily PRN Acxiety  ondansetron Injectable 4 milliGRAM(s) IV Push every 8 hours PRN Nausea and/or Vomiting  polyethylene glycol 3350 17 Gram(s) Oral daily PRN Constipation  senna 2 Tablet(s) Oral at bedtime PRN Constipation

## 2024-04-16 NOTE — PROGRESS NOTE ADULT - SUBJECTIVE AND OBJECTIVE BOX
Patient is a 24y old  Male who presents with a chief complaint of Visual changes with abnormal MRI (16 Apr 2024 07:01)      INTERVAL HPI/OVERNIGHT EVENTS: No acute events overnight. No new complaints.     MEDICATIONS  (STANDING):  apixaban 10 milliGRAM(s) Oral every 12 hours  atorvastatin 40 milliGRAM(s) Oral at bedtime  cloNIDine 0.1 milliGRAM(s) Oral every 8 hours  cyanocobalamin 1000 MICROGram(s) Oral daily  ergocalciferol 97687 Unit(s) Oral every week  FLUoxetine 10 milliGRAM(s) Oral daily  folic acid 1 milliGRAM(s) Oral daily  gabapentin 100 milliGRAM(s) Oral two times a day  lactated ringers. 1000 milliLiter(s) (100 mL/Hr) IV Continuous <Continuous>  multivitamin/minerals/iron Oral Solution (CENTRUM) 15 milliLiter(s) Oral daily  pantoprazole  Injectable 40 milliGRAM(s) IV Push two times a day  tamsulosin 0.4 milliGRAM(s) Oral at bedtime  vitamin B complex with vitamin C 1 Tablet(s) Oral daily    MEDICATIONS  (PRN):  acetaminophen     Tablet .. 650 milliGRAM(s) Oral every 6 hours PRN Temp greater or equal to 38C (100.4F), Mild Pain (1 - 3)  aluminum hydroxide/magnesium hydroxide/simethicone Suspension 30 milliLiter(s) Oral every 4 hours PRN Dyspepsia  LORazepam     Tablet 0.5 milliGRAM(s) Oral three times a day PRN Anxiety  meclizine 12.5 milliGRAM(s) Oral three times a day PRN Dizziness  melatonin 3 milliGRAM(s) Oral at bedtime PRN Insomnia  metoprolol tartrate Injectable 5 milliGRAM(s) IV Push every 8 hours PRN hr over 120  OLANZapine Injectable 2.5 milliGRAM(s) IntraMuscular daily PRN Acxiety  ondansetron Injectable 4 milliGRAM(s) IV Push every 8 hours PRN Nausea and/or Vomiting  polyethylene glycol 3350 17 Gram(s) Oral daily PRN Constipation  senna 2 Tablet(s) Oral at bedtime PRN Constipation      Allergies    No Known Allergies    Intolerances      REVIEW OF SYSTEMS:  CONSTITUTIONAL: No fever or fatigue; no diplopia  RESPIRATORY: No cough; No shortness of breath  CARDIOVASCULAR: No chest pain, palpitations, dizziness, or leg swelling  GASTROINTESTINAL: No abdominal pain. No nausea, vomiting, or hematemesis; + constipation.   NEUROLOGICAL: No headaches, + numbness from lower abdomen to thighs, + sharp pain in lower legs  MUSCULOSKELETAL: No joint pain; No muscle, back, or extremity pain      Vital Signs Last 24 Hrs  T(C): 36.6 (16 Apr 2024 08:00), Max: 37.2 (15 Apr 2024 13:00)  T(F): 97.8 (16 Apr 2024 08:00), Max: 98.9 (15 Apr 2024 13:00)  HR: 80 (16 Apr 2024 08:00) (75 - 92)  BP: 139/86 (16 Apr 2024 08:00) (123/84 - 153/80)  BP(mean): --  RR: 18 (16 Apr 2024 08:00) (18 - 18)  SpO2: 96% (16 Apr 2024 08:00) (96% - 98%)    Parameters below as of 16 Apr 2024 08:00  Patient On (Oxygen Delivery Method): room air        PHYSICAL EXAM:  GENERAL: NAD, lying in bed comfortably  HEAD:  Atraumatic, Normocephalic  EYES: EOMI, PERRLA, conjunctiva and sclera clear  NERVOUS SYSTEM:  Awake, alert, oriented x3, no focal deficits  CHEST/LUNG: Clear to auscultation bilaterally; No wheezes  HEART: Regular rate and rhythm; No murmurs  ABDOMEN: Soft, Nontender, Nondistended; Bowel sounds present  GENITOURINARY: schaffer insertion site clean, dry, intact  EXTREMITIES:  No edema    LABS:                        13.0   8.25  )-----------( 362      ( 16 Apr 2024 07:30 )             38.9     04-16    139  |  104  |  5.3<L>  ----------------------------<  99  3.7   |  23.0  |  0.44<L>    Ca    9.3      16 Apr 2024 07:30  Phos  4.6     04-16  Mg     1.8     04-16    TPro  6.6  /  Alb  3.6  /  TBili  1.6  /  DBili  x   /  AST  75<H>  /  ALT  167<H>  /  AlkPhos  96  04-16      Urinalysis Basic - ( 16 Apr 2024 07:30 )    Color: x / Appearance: x / SG: x / pH: x  Gluc: 99 mg/dL / Ketone: x  / Bili: x / Urobili: x   Blood: x / Protein: x / Nitrite: x   Leuk Esterase: x / RBC: x / WBC x   Sq Epi: x / Non Sq Epi: x / Bacteria: x      CAPILLARY BLOOD GLUCOSE          RADIOLOGY & ADDITIONAL TESTS:

## 2024-04-16 NOTE — BH CONSULTATION LIAISON ASSESSMENT NOTE - NSBHCHARTREVIEWVS_PSY_A_CORE FT
Vital Signs Last 24 Hrs  T(C): 36.6 (16 Apr 2024 08:00), Max: 37.2 (15 Apr 2024 17:28)  T(F): 97.8 (16 Apr 2024 08:00), Max: 98.9 (15 Apr 2024 17:28)  HR: 80 (16 Apr 2024 08:00) (80 - 92)  BP: 139/86 (16 Apr 2024 08:00) (123/84 - 139/86)  BP(mean): --  RR: 18 (16 Apr 2024 08:00) (18 - 18)  SpO2: 96% (16 Apr 2024 08:00) (96% - 98%)    Parameters below as of 16 Apr 2024 08:00  Patient On (Oxygen Delivery Method): room air

## 2024-04-16 NOTE — PROGRESS NOTE ADULT - ATTENDING COMMENTS
24M with PMHx obesity, anxiety, depression, s/p gastric sleeve surgery presenting for diplopia, leg weakness, decreased PO intake, and urinary retention. Imaging negative. Awaiting psychiatry evaluation.    Acute PE  Altered Mental status  Leg Weakness? Diplopia?    Neuro consulted  F/u vitamin panel pending   Psych consulted - Meds adjusted      Pending dispo home.  Daily PT

## 2024-04-16 NOTE — CONSULT NOTE ADULT - SUBJECTIVE AND OBJECTIVE BOX
24yM was admitted on 04-08 presents with weakness and poor PO intake after recent gastric sleeve.      Imaging Reviewed Today:  MRI BRAIN/MRA BRAIN -  MR of the brain. 2.  ANTERIOR CIRCULATION:    Intact. 3. POSTERIOR CIRCULATION:  Intact.  ----------------------------  Patient with limited participation.  Mother at bedside.   Discussed rehab.       VITALS  T(C): 36.6 (04-16-24 @ 05:04), Max: 37.2 (04-15-24 @ 13:00)  HR: 86 (04-16-24 @ 05:04) (75 - 92)  BP: 138/86 (04-16-24 @ 05:04) (120/83 - 153/80)  RR: 18 (04-16-24 @ 05:04) (18 - 18)  SpO2: 97% (04-16-24 @ 05:04) (96% - 98%)  Wt(kg): --    PAST MEDICAL & SURGICAL HISTORY  S/P gastric sleeve procedure    Obesity    Obstructive sleep apnea    Anxiety and depression    H/O gastric sleeve    Obstructive sleep apnea         RECENT LABS REVIEWED    CBC Full  -  ( 16 Apr 2024 07:30 )  WBC Count : 8.25 K/uL  RBC Count : 4.81 M/uL  Hemoglobin : 13.0 g/dL  Hematocrit : 38.9 %  Platelet Count - Automated : 362 K/uL  Mean Cell Volume : 80.9 fl  Mean Cell Hemoglobin : 27.0 pg  Mean Cell Hemoglobin Concentration : 33.4 gm/dL  Auto Neutrophil # : 4.80 K/uL  Auto Lymphocyte # : 2.30 K/uL  Auto Monocyte # : 0.87 K/uL  Auto Eosinophil # : 0.15 K/uL  Auto Basophil # : 0.05 K/uL  Auto Neutrophil % : 58.2 %  Auto Lymphocyte % : 27.9 %  Auto Monocyte % : 10.5 %  Auto Eosinophil % : 1.8 %  Auto Basophil % : 0.6 %              ALLERGIES  No Known Allergies      MEDICATIONS   acetaminophen     Tablet .. 650 milliGRAM(s) Oral every 6 hours PRN  aluminum hydroxide/magnesium hydroxide/simethicone Suspension 30 milliLiter(s) Oral every 4 hours PRN  apixaban 10 milliGRAM(s) Oral every 12 hours  atorvastatin 40 milliGRAM(s) Oral at bedtime  cyanocobalamin 1000 MICROGram(s) Oral daily  ergocalciferol 15207 Unit(s) Oral every week  folic acid 1 milliGRAM(s) Oral daily  gabapentin 100 milliGRAM(s) Oral two times a day  lactated ringers. 1000 milliLiter(s) IV Continuous <Continuous>  LORazepam     Tablet 0.5 milliGRAM(s) Oral three times a day PRN  meclizine 12.5 milliGRAM(s) Oral three times a day PRN  melatonin 3 milliGRAM(s) Oral at bedtime PRN  metoprolol tartrate Injectable 5 milliGRAM(s) IV Push every 8 hours PRN  multivitamin/minerals/iron Oral Solution (CENTRUM) 15 milliLiter(s) Oral daily  OLANZapine Injectable 2.5 milliGRAM(s) IntraMuscular daily PRN  ondansetron Injectable 4 milliGRAM(s) IV Push every 8 hours PRN  pantoprazole  Injectable 40 milliGRAM(s) IV Push two times a day  polyethylene glycol 3350 17 Gram(s) Oral daily PRN  senna 2 Tablet(s) Oral at bedtime PRN  tamsulosin 0.4 milliGRAM(s) Oral at bedtime  vitamin B complex with vitamin C 1 Tablet(s) Oral daily      ----------------------------------------------------------------------------------------  FUNCTIONAL HISTORY  Lives   Independent    FUNCTIONAL STATUS/PROGRESS  4/15 PT  Bed Mobility  Bed Mobility Training Sit-to-Supine: maximum assist (25% patient effort)  Bed Mobility Training Limitations: decreased ability to use legs for bridging/pushing;  decreased ability to use arms for pushing/pulling;  decreased flexibility;  decreased strength;  impaired balance;  pain;  cognitive, decreased safety awareness    Sit-Stand Transfer Training  Transfer Training Sit-to-Stand Transfer: minimum assist (75% patient effort);  2 person assist;  weight-bearing as tolerated   rolling walker  Transfer Training Stand-to-Sit Transfer: minimum assist (75% patient effort);  2 person assist;  weight-bearing as tolerated   rolling walker  Sit-to-Stand Transfer Training Transfer Safety Analysis: decreased balance;  decreased step length;  decreased flexibility;  decreased strength;  impaired balance;  pain;  cognitive, decreased safety awareness    Gait Training  Gait Training: minimum assist (75% patient effort);  1 person assist;  weight-bearing as tolerated   rolling walker;  12 ft   Gait Analysis: 3-point gait   decreased latesha;  decreased step length;  decreased stride length;  decreased flexibility;  decreased strength;  impaired balance;  pain;  cognitive, decreased safety awareness    4/15 OT  Bed Mobility  Bed Mobility Training Sit-to-Supine: maximum assist (25% patient effort)    Bed-Chair Transfer Training  Transfer Training Bed-to-Chair Transfer: minimum assist (75% patient effort);  2 person assist;  weight-bearing as tolerated   rolling walker;  + chair follow  Transfer Training Chair-to-Bed Transfer: + chair follow;  minimum assist (75% patient effort);  2 person assist;  rolling walker    Sit-Stand Transfer Training  Transfer Training Sit-to-Stand Transfer: minimum assist (75% patient effort);  2 person assist;  rolling walker  Transfer Training Stand-to-Sit Transfer: minimum assist (75% patient effort);  2 person assist;  rolling walker    Lower Body Dressing Training  Lower Body Dressing Training Assistance: maximum assist (25% patient effort)        4/8 SLP  · Diagnosis	Receptive/expressive language and motor speech skills are WFL for basic information. Pt able to respond to questions, follow mult-step commands, identify objects/pictures. No deficits in naming demonstrated with adequate syntax and semantic content during conversational exchange. Intermittent delayed processing/formulation, likely 2* reported fatigue and dizziness.      ----------------------------------------------------------------------------------------  PHYSICAL EXAM  Constitutional - NAD, Comfortable  HEENT - NCAT, EOMI  Neck - Supple, No limited ROM  Chest - Breathing comfortably, No wheezing  Cardiovascular - S1S2   Abdomen - Soft   Extremities - No C/C/E, No calf tenderness   Neurologic Exam -                    Cognitive - Keeps eyes closed     Communication - Groans  Psychiatric - Somnolent, Withdrawn  ----------------------------------------------------------------------------------------  ASSESSMENT/PLAN  24yMale with functional deficits after developing weakness s/p gastric sleeve  Vitamin Deficiencies - Vit D, MVI, B12  HTN - Lopressor  CAD - Lipitor  Pain - Tylenol, Neurontin  Dizziness - Meclizine  Mood - Ativan, Zyprexa  PE & DVT PPX - SCDs, Eliquis  Rehab/Impaired mobility and function - Patient continues to require hospitalization for the above diagnoses and ongoing active management of comorbid complications  that are substantially impairing functional ability and impairing quality of life.     RECOMMEND - OOB daily, Aggressive mobilization, daytime wakefulness, Patient with polypharmacy and needs meds such as ativan, meclizine, and zyprexa discontinued.     Based on the patient's diagnosis, clinical exam, current functional status and  potential for progress, expect patient to achieve DC HOME. If patient is unable to achieve discharge home, recommend [facility]. Patient does NOT meet criteria for acute rehab.     Will sign off at this time. Thank you for allowing me to be part of your patient's care. Please reconsult PMR for additional rehab recommendations or dispo needs if functional status changes. Discussed the specific management and recommendations above with rehab clinical care team/rehab liaison.       Total Time Spent on Encounter (reviewing clinical notes, labs, radiology, medications, patient history/exam, assessment and plan) - 75 minutes

## 2024-04-16 NOTE — BH CONSULTATION LIAISON ASSESSMENT NOTE - HPI (INCLUDE ILLNESS QUALITY, SEVERITY, DURATION, TIMING, CONTEXT, MODIFYING FACTORS, ASSOCIATED SIGNS AND SYMPTOMS)
Patient is a 24 year old male, originally from [place] and now living with aunt and mother on , unemployed, past medical history of obesity, RADHA, gastric sleeve (2/1/24), past psychiatric history of anxiety, depression, PTSD, and autism spectrum d/o, no prior psychiatric hospitalizations, OPP include therapist and psychiatrist based in [place], was previously prescribed Topamax, Wellbutrin, and Klonopin most recently but non-compliant due to reported side effects, no history of SA/NSSIB, substance history significant for past use of ?hemp, no legal history, remote family history of anxiety and depression, admitted to [facility] for diplopia, leg weakness, decreased PO intake, and urinary retention   Psychiatry consulted due to anxiety.     Patient seen and found to be calm and cooperative while seen sitting up in chair. Patient talking of his stomach problems and how he has problems with holding down food but has been doing better and now concerned due to urinary problems and expressing anxiety related to urinating and the thought of having to have a schaffer. patient anxious, with some depression and poor sleep but denies any s/h ideation, with no symptoms of titus or AVH     Collateral from aunt and mom at bedside who expressed concern over patients anxiety and urinary problems.

## 2024-04-17 ENCOUNTER — TRANSCRIPTION ENCOUNTER (OUTPATIENT)
Age: 24
End: 2024-04-17

## 2024-04-17 LAB
ALBUMIN SERPL ELPH-MCNC: 3.5 G/DL — SIGNIFICANT CHANGE UP (ref 3.3–5.2)
ALP SERPL-CCNC: 97 U/L — SIGNIFICANT CHANGE UP (ref 40–120)
ALT FLD-CCNC: 136 U/L — HIGH
ANION GAP SERPL CALC-SCNC: 11 MMOL/L — SIGNIFICANT CHANGE UP (ref 5–17)
AST SERPL-CCNC: 57 U/L — HIGH
BASOPHILS # BLD AUTO: 0.04 K/UL — SIGNIFICANT CHANGE UP (ref 0–0.2)
BASOPHILS NFR BLD AUTO: 0.4 % — SIGNIFICANT CHANGE UP (ref 0–2)
BILIRUB SERPL-MCNC: 1.3 MG/DL — SIGNIFICANT CHANGE UP (ref 0.4–2)
BUN SERPL-MCNC: 7.3 MG/DL — LOW (ref 8–20)
CALCIUM SERPL-MCNC: 9.1 MG/DL — SIGNIFICANT CHANGE UP (ref 8.4–10.5)
CHLORIDE SERPL-SCNC: 102 MMOL/L — SIGNIFICANT CHANGE UP (ref 96–108)
CO2 SERPL-SCNC: 25 MMOL/L — SIGNIFICANT CHANGE UP (ref 22–29)
CREAT SERPL-MCNC: 0.52 MG/DL — SIGNIFICANT CHANGE UP (ref 0.5–1.3)
EGFR: 143 ML/MIN/1.73M2 — SIGNIFICANT CHANGE UP
EOSINOPHIL # BLD AUTO: 0.13 K/UL — SIGNIFICANT CHANGE UP (ref 0–0.5)
EOSINOPHIL NFR BLD AUTO: 1.3 % — SIGNIFICANT CHANGE UP (ref 0–6)
GLUCOSE SERPL-MCNC: 101 MG/DL — HIGH (ref 70–99)
HCT VFR BLD CALC: 35.4 % — LOW (ref 39–50)
HGB BLD-MCNC: 11.8 G/DL — LOW (ref 13–17)
IMM GRANULOCYTES NFR BLD AUTO: 0.8 % — SIGNIFICANT CHANGE UP (ref 0–0.9)
LYMPHOCYTES # BLD AUTO: 2.15 K/UL — SIGNIFICANT CHANGE UP (ref 1–3.3)
LYMPHOCYTES # BLD AUTO: 20.9 % — SIGNIFICANT CHANGE UP (ref 13–44)
MAGNESIUM SERPL-MCNC: 1.7 MG/DL — SIGNIFICANT CHANGE UP (ref 1.6–2.6)
MCHC RBC-ENTMCNC: 27 PG — SIGNIFICANT CHANGE UP (ref 27–34)
MCHC RBC-ENTMCNC: 33.3 GM/DL — SIGNIFICANT CHANGE UP (ref 32–36)
MCV RBC AUTO: 81 FL — SIGNIFICANT CHANGE UP (ref 80–100)
MONOCYTES # BLD AUTO: 0.88 K/UL — SIGNIFICANT CHANGE UP (ref 0–0.9)
MONOCYTES NFR BLD AUTO: 8.5 % — SIGNIFICANT CHANGE UP (ref 2–14)
NEUTROPHILS # BLD AUTO: 7.02 K/UL — SIGNIFICANT CHANGE UP (ref 1.8–7.4)
NEUTROPHILS NFR BLD AUTO: 68.1 % — SIGNIFICANT CHANGE UP (ref 43–77)
PHOSPHATE SERPL-MCNC: 4.5 MG/DL — SIGNIFICANT CHANGE UP (ref 2.4–4.7)
PLATELET # BLD AUTO: 351 K/UL — SIGNIFICANT CHANGE UP (ref 150–400)
POTASSIUM SERPL-MCNC: 3.8 MMOL/L — SIGNIFICANT CHANGE UP (ref 3.5–5.3)
POTASSIUM SERPL-SCNC: 3.8 MMOL/L — SIGNIFICANT CHANGE UP (ref 3.5–5.3)
PROT SERPL-MCNC: 6.1 G/DL — LOW (ref 6.6–8.7)
RBC # BLD: 4.37 M/UL — SIGNIFICANT CHANGE UP (ref 4.2–5.8)
RBC # FLD: 15.7 % — HIGH (ref 10.3–14.5)
SODIUM SERPL-SCNC: 137 MMOL/L — SIGNIFICANT CHANGE UP (ref 135–145)
WBC # BLD: 10.3 K/UL — SIGNIFICANT CHANGE UP (ref 3.8–10.5)
WBC # FLD AUTO: 10.3 K/UL — SIGNIFICANT CHANGE UP (ref 3.8–10.5)

## 2024-04-17 PROCEDURE — 99232 SBSQ HOSP IP/OBS MODERATE 35: CPT

## 2024-04-17 RX ORDER — SENNA PLUS 8.6 MG/1
1 TABLET ORAL ONCE
Refills: 0 | Status: COMPLETED | OUTPATIENT
Start: 2024-04-17 | End: 2024-04-19

## 2024-04-17 RX ORDER — LIDOCAINE HCL 20 MG/ML
15 VIAL (ML) INJECTION ONCE
Refills: 0 | Status: COMPLETED | OUTPATIENT
Start: 2024-04-17 | End: 2024-04-17

## 2024-04-17 RX ADMIN — ATORVASTATIN CALCIUM 40 MILLIGRAM(S): 80 TABLET, FILM COATED ORAL at 21:35

## 2024-04-17 RX ADMIN — Medication 3 MILLIGRAM(S): at 21:35

## 2024-04-17 RX ADMIN — PANTOPRAZOLE SODIUM 40 MILLIGRAM(S): 20 TABLET, DELAYED RELEASE ORAL at 17:16

## 2024-04-17 RX ADMIN — SENNA PLUS 2 TABLET(S): 8.6 TABLET ORAL at 21:35

## 2024-04-17 RX ADMIN — GABAPENTIN 100 MILLIGRAM(S): 400 CAPSULE ORAL at 17:15

## 2024-04-17 RX ADMIN — Medication 10 MILLIGRAM(S): at 13:11

## 2024-04-17 RX ADMIN — Medication 15 MILLILITER(S): at 13:11

## 2024-04-17 RX ADMIN — TAMSULOSIN HYDROCHLORIDE 0.4 MILLIGRAM(S): 0.4 CAPSULE ORAL at 21:35

## 2024-04-17 RX ADMIN — Medication 0.5 MILLIGRAM(S): at 17:50

## 2024-04-17 RX ADMIN — Medication 0.1 MILLIGRAM(S): at 05:01

## 2024-04-17 RX ADMIN — GABAPENTIN 100 MILLIGRAM(S): 400 CAPSULE ORAL at 05:01

## 2024-04-17 RX ADMIN — PREGABALIN 1000 MICROGRAM(S): 225 CAPSULE ORAL at 13:10

## 2024-04-17 RX ADMIN — ERGOCALCIFEROL 50000 UNIT(S): 1.25 CAPSULE ORAL at 13:11

## 2024-04-17 RX ADMIN — Medication 1 MILLIGRAM(S): at 13:10

## 2024-04-17 RX ADMIN — Medication 15 MILLILITER(S): at 17:46

## 2024-04-17 RX ADMIN — APIXABAN 10 MILLIGRAM(S): 2.5 TABLET, FILM COATED ORAL at 05:01

## 2024-04-17 RX ADMIN — APIXABAN 10 MILLIGRAM(S): 2.5 TABLET, FILM COATED ORAL at 17:15

## 2024-04-17 RX ADMIN — Medication 0.1 MILLIGRAM(S): at 21:35

## 2024-04-17 RX ADMIN — Medication 10 MILLILITER(S): at 01:44

## 2024-04-17 RX ADMIN — Medication 1 TABLET(S): at 13:11

## 2024-04-17 RX ADMIN — POLYETHYLENE GLYCOL 3350 17 GRAM(S): 17 POWDER, FOR SOLUTION ORAL at 17:50

## 2024-04-17 RX ADMIN — PANTOPRAZOLE SODIUM 40 MILLIGRAM(S): 20 TABLET, DELAYED RELEASE ORAL at 05:00

## 2024-04-17 RX ADMIN — Medication 0.1 MILLIGRAM(S): at 13:10

## 2024-04-17 NOTE — BH CONSULTATION LIAISON PROGRESS NOTE - NSBHCHARTREVIEWVS_PSY_A_CORE FT
Vital Signs Last 24 Hrs  T(C): 36.3 (17 Apr 2024 17:55), Max: 37 (17 Apr 2024 13:00)  T(F): 97.3 (17 Apr 2024 17:55), Max: 98.6 (17 Apr 2024 13:00)  HR: 100 (17 Apr 2024 17:55) (84 - 102)  BP: 110/78 (17 Apr 2024 17:55) (106/71 - 115/80)  BP(mean): --  RR: 19 (17 Apr 2024 17:55) (18 - 19)  SpO2: 95% (17 Apr 2024 17:55) (95% - 98%)    Parameters below as of 17 Apr 2024 17:55  Patient On (Oxygen Delivery Method): room air

## 2024-04-17 NOTE — PROGRESS NOTE ADULT - ATTENDING COMMENTS
24M with PMHx obesity, anxiety, depression, s/p gastric sleeve surgery presenting for diplopia, leg weakness, decreased PO intake, and urinary retention. Imaging negative. Awaiting psychiatry evaluation.    Acute PE  Altered Mental status  Leg Weakness? Diplopia?  Urinary retention    Neuro consulted  F/u vitamin panel pending   Psych consulted - Meds adjusted  Cont tamsulosin. q6h bladder scans with straight caths if needed. may need schaffer prior to discharge    Pending dispo home.  Daily PT .

## 2024-04-17 NOTE — PROGRESS NOTE ADULT - SUBJECTIVE AND OBJECTIVE BOX
Patient is a 24y old  Male who presents with a chief complaint of Visual changes with abnormal MRI (16 Apr 2024 11:23)      INTERVAL HPI/OVERNIGHT EVENTS: No acute events overnight. No new complaints.     MEDICATIONS  (STANDING):  apixaban 10 milliGRAM(s) Oral every 12 hours  atorvastatin 40 milliGRAM(s) Oral at bedtime  cloNIDine 0.1 milliGRAM(s) Oral every 8 hours  cyanocobalamin 1000 MICROGram(s) Oral daily  ergocalciferol 80711 Unit(s) Oral every week  FLUoxetine 10 milliGRAM(s) Oral daily  folic acid 1 milliGRAM(s) Oral daily  gabapentin 100 milliGRAM(s) Oral two times a day  lactated ringers. 1000 milliLiter(s) (100 mL/Hr) IV Continuous <Continuous>  multivitamin/minerals/iron Oral Solution (CENTRUM) 15 milliLiter(s) Oral daily  pantoprazole  Injectable 40 milliGRAM(s) IV Push two times a day  senna 1 Tablet(s) Oral once  tamsulosin 0.4 milliGRAM(s) Oral at bedtime  vitamin B complex with vitamin C 1 Tablet(s) Oral daily    MEDICATIONS  (PRN):  acetaminophen     Tablet .. 650 milliGRAM(s) Oral every 6 hours PRN Temp greater or equal to 38C (100.4F), Mild Pain (1 - 3)  aluminum hydroxide/magnesium hydroxide/simethicone Suspension 30 milliLiter(s) Oral every 4 hours PRN Dyspepsia  LORazepam     Tablet 0.5 milliGRAM(s) Oral three times a day PRN Anxiety  meclizine 12.5 milliGRAM(s) Oral three times a day PRN Dizziness  melatonin 3 milliGRAM(s) Oral at bedtime PRN Insomnia  metoprolol tartrate Injectable 5 milliGRAM(s) IV Push every 8 hours PRN hr over 120  OLANZapine Injectable 2.5 milliGRAM(s) IntraMuscular daily PRN Acxiety  ondansetron Injectable 4 milliGRAM(s) IV Push every 8 hours PRN Nausea and/or Vomiting  polyethylene glycol 3350 17 Gram(s) Oral daily PRN Constipation  senna 2 Tablet(s) Oral at bedtime PRN Constipation      Allergies    No Known Allergies    Intolerances          REVIEW OF SYSTEMS:  CONSTITUTIONAL: No fever or fatigue; no diplopia  RESPIRATORY: No cough; No shortness of breath  CARDIOVASCULAR: No chest pain, palpitations, dizziness, or leg swelling  GASTROINTESTINAL: No abdominal pain. No nausea, vomiting, or hematemesis; + constipation.   NEUROLOGICAL: No headaches, + numbness from lower abdomen to thighs, + sharp pain in lower legs  MUSCULOSKELETAL: No joint pain; No muscle, back, or extremity pain        Vital Signs Last 24 Hrs  T(C): 36.8 (17 Apr 2024 09:55), Max: 36.8 (17 Apr 2024 09:55)  T(F): 98.2 (17 Apr 2024 09:55), Max: 98.2 (17 Apr 2024 09:55)  HR: 84 (17 Apr 2024 09:55) (84 - 102)  BP: 109/74 (17 Apr 2024 09:55) (102/70 - 115/80)  BP(mean): --  RR: 18 (17 Apr 2024 09:55) (18 - 18)  SpO2: 98% (17 Apr 2024 09:55) (96% - 100%)    Parameters below as of 17 Apr 2024 09:55  Patient On (Oxygen Delivery Method): room air        PHYSICAL EXAM:  GENERAL: NAD, sitting upright out of bed in chair  HEAD:  Atraumatic, Normocephalic  EYES: conjunctiva and sclera clear  NERVOUS SYSTEM:  Awake, alert, oriented x3, no focal deficits  CHEST/LUNG: Clear to auscultation bilaterally; No wheezes  HEART: Regular rate and rhythm; No murmurs  ABDOMEN: Soft, Nontender, Nondistended; Bowel sounds present  EXTREMITIES:  No edema    LABS:                        11.8   10.30 )-----------( 351      ( 17 Apr 2024 07:15 )             35.4     04-17    137  |  102  |  7.3<L>  ----------------------------<  101<H>  3.8   |  25.0  |  0.52    Ca    9.1      17 Apr 2024 07:15  Phos  4.5     04-17  Mg     1.7     04-17    TPro  6.1<L>  /  Alb  3.5  /  TBili  1.3  /  DBili  x   /  AST  57<H>  /  ALT  136<H>  /  AlkPhos  97  04-17      Urinalysis Basic - ( 17 Apr 2024 07:15 )    Color: x / Appearance: x / SG: x / pH: x  Gluc: 101 mg/dL / Ketone: x  / Bili: x / Urobili: x   Blood: x / Protein: x / Nitrite: x   Leuk Esterase: x / RBC: x / WBC x   Sq Epi: x / Non Sq Epi: x / Bacteria: x      CAPILLARY BLOOD GLUCOSE          RADIOLOGY & ADDITIONAL TESTS:

## 2024-04-17 NOTE — DISCHARGE NOTE PROVIDER - CARE PROVIDER_API CALL
Outpatient, Psychiatrist  Phone: (   )    -  Fax: (   )    -  Follow Up Time:     Outpatient, PCP  Phone: (   )    -  Fax: (   )    -  Follow Up Time:    Outpatient, Psychiatrist  Phone: (   )    -  Fax: (   )    -  Follow Up Time:     Outpatient, PCP  Phone: (   )    -  Fax: (   )    -  Follow Up Time:     Ginna Trujillo  Urology  [address], [address]  [place] [address]  Phone: [phone]  Fax: [phone]  Follow Up Time:    Ginna Trujillo  Urology  [address], [address]  [place] [address]  Phone: [phone]  Fax: [phone]  Follow Up Time:     Yaakov Santamaria  Surgery  [address] [place] [address]  Phone: [phone]  Fax: [phone]  Follow Up Time:     Outpatient, Psychiatrist  Phone: (   )    -  Fax: (   )    -  Follow Up Time:     Outpatient, PCP  Phone: (   )    -  Fax: (   )    -  Follow Up Time:    Yaakov Santamaria  Surgery  [address] [place] [address]  Phone: [phone]  Fax: [phone]  Follow Up Time:     Outpatient, Psychiatrist  Phone: (   )    -  Fax: (   )    -  Follow Up Time:     Outpatient, PCP  Phone: (   )    -  Fax: (   )    -  Follow Up Time:     Lucia Vazquez  Colon/Rectal Surgery  [address] [facility] [place] [address]  Phone: [phone]  Fax: [phone]  Follow Up Time:

## 2024-04-17 NOTE — DISCHARGE NOTE PROVIDER - CARE PROVIDERS DIRECT ADDRESSES
,DirectAddress_Unknown,DirectAddress_Unknown ,DirectAddress_Unknown,DirectAddress_Unknown,DirectAddress_Unknown ,DirectAddress_Unknown,[email or web address],DirectAddress_Unknown,DirectAddress_Unknown ,[email or web address],DirectAddress_Unknown,DirectAddress_Unknown,[email or web address]

## 2024-04-17 NOTE — PROGRESS NOTE ADULT - ASSESSMENT
Assessment: 24M PMHx obesity, anxiety, depression, s/p gastric sleeve surgery presenting for diplopia, leg weakness, decreased PO intake, and urinary retention. Imaging negative. Etiology possibly vitamin deficiency after gastric sleeve surgery; awaiting vitamin panel results.     Plan:    AMS, Leg Weakness, Diplopia, Urinary Retention  - CT head: hypodensities in R parietal and temporal lobes  - MRI brain/MRA head/MRI lumbosacral spine performed  - No stroke, mass, hemorrhage, aneurysm, LVO, critical stenosis, or cauda equina  - Repeat MRI t-spine: no T3-T4 edema; slight disc protrusion in T6-7  - No intervention per neurosurgery  - VBG w/ lytes: unremarkable  - Repeat CTH, EEG non contributory  - Ammonia level wnl  - Follow up with psychiatrist outpatient for possible CBT  - US b/l LE to r/o DVT: negative  - Gabapentin 100 mg BID   - PM&R consulted, recs noted  - Daily PT    Anxiety  - Prozac 10 mg qd  - Clonidine 0.1 mg q8h  -  recs noted    Possible Vitamin Deficiency  - Had poor PO intake for past 2 months: possible vitamin deficiency after bariatric surgery  - Vitamin D weekly, folic acid and vitamin B supplements  - Vitamin B12 wnl  - Repeat Vitamin B2 level due to lab request (was not kept away from light) ---> Pending  - Thiamine, niacin, riboflavin levels ----> Pending  - May be contributing to bilateral leg weakness/lethargy    Urinary Retention  - Tamsulosin 0.4 mg PO qhs  - Bladder scan q6h  - Rodriguez removed  - Straight cath x1 overnight    PE  - CT chest (4/9): pulmonary embolism, no R heart strain  - US LE: no DVT  - Eliquis 10 mg BID x7 days, then 5 mg PO BID  - On room air  - Telemetry monitoring  - TTE to r/o R heart strain: wnl    HLD  - Atorvastatin 40 mg PO qhs    Vertigo  - Meclizine 12.5 mg PO TID PRN    Nausea/vomiting  - Resolved  - CT abdomen: no acute abdominal finding or visceral injury  - IV LR 1000 mL @ 100 cc/h  - Barium scan: wnl  - Simethicone suspension q4h PRN  - Zofran 4 mg IV q8h pRN  - Protonix 40 mg IV BID  - Miralax, senna  - Bariatric surgeon recommending to continue IV fluids  - Started Soft Bariatric phase 2 diet    Dispo: Discharge home with home care, likely 4/19

## 2024-04-17 NOTE — DISCHARGE NOTE PROVIDER - NPI NUMBER (FOR SYSADMIN USE ONLY) :
[UNKNOWN],[UNKNOWN] [UNKNOWN],[UNKNOWN],[[id number]] [[id number]],[[id number]],[UNKNOWN],[UNKNOWN] [[id number]],[UNKNOWN],[UNKNOWN],[[id number]]

## 2024-04-17 NOTE — DISCHARGE NOTE PROVIDER - NSDCCPCAREPLAN_GEN_ALL_CORE_FT
PRINCIPAL DISCHARGE DIAGNOSIS  Diagnosis: Pulmonary embolism  Assessment and Plan of Treatment: - Continue Eliquis  - Follow up with PCP outpatient      SECONDARY DISCHARGE DIAGNOSES  Diagnosis: Anxiety  Assessment and Plan of Treatment: Continue prozac and clonidine  Follow up with psychiatrist outpatient     PRINCIPAL DISCHARGE DIAGNOSIS  Diagnosis: Pulmonary embolism  Assessment and Plan of Treatment: - Continue Eliquis  - Follow up with PCP outpatient      SECONDARY DISCHARGE DIAGNOSES  Diagnosis: Adjustment disorder  Assessment and Plan of Treatment: Continue prozac and clonidine  Follow up with psychiatrist outpatient    Diagnosis: Urinary retention  Assessment and Plan of Treatment: - Continue schaffer  - Follow up with urologist outpatient     PRINCIPAL DISCHARGE DIAGNOSIS  Diagnosis: Pulmonary embolism  Assessment and Plan of Treatment: - Continue Eliquis  - Follow up with PCP outpatient      SECONDARY DISCHARGE DIAGNOSES  Diagnosis: Adjustment disorder  Assessment and Plan of Treatment: Continue prozac and clonidine  Follow up with psychiatrist outpatient    Diagnosis: Urinary retention  Assessment and Plan of Treatment: - Resolved    Diagnosis: Rectal spasm  Assessment and Plan of Treatment: - Continue laxative and anusol suppository as needed  - Avoid enemas, digital manipulation of anus  - Follow up with Dr. Lucia Vazquez outpatient    Diagnosis: Diplopia  Assessment and Plan of Treatment: - Resolved    Diagnosis: Thiamine deficiency  Assessment and Plan of Treatment: - Continue vitamin supplementation  - Continue physical therapy in subacute rehab

## 2024-04-17 NOTE — DISCHARGE NOTE PROVIDER - PROVIDER TOKENS
FREE:[LAST:[Outpatient],FIRST:[Psychiatrist],PHONE:[(   )    -],FAX:[(   )    -]],FREE:[LAST:[Outpatient],FIRST:[PCP],PHONE:[(   )    -],FAX:[(   )    -]] FREE:[LAST:[Outpatient],FIRST:[Psychiatrist],PHONE:[(   )    -],FAX:[(   )    -]],FREE:[LAST:[Outpatient],FIRST:[PCP],PHONE:[(   )    -],FAX:[(   )    -]],PROVIDER:[TOKEN:[[id number]]] PROVIDER:[TOKEN:[[id number]]],PROVIDER:[TOKEN:[[id number]]],FREE:[LAST:[Outpatient],FIRST:[Psychiatrist],PHONE:[(   )    -],FAX:[(   )    -]],FREE:[LAST:[Outpatient],FIRST:[PCP],PHONE:[(   )    -],FAX:[(   )    -]] PROVIDER:[TOKEN:[[id number]]],FREE:[LAST:[Outpatient],FIRST:[Psychiatrist],PHONE:[(   )    -],FAX:[(   )    -]],FREE:[LAST:[Outpatient],FIRST:[PCP],PHONE:[(   )    -],FAX:[(   )    -]],PROVIDER:[TOKEN:[[id number]]]

## 2024-04-17 NOTE — DISCHARGE NOTE PROVIDER - ATTENDING DISCHARGE PHYSICAL EXAMINATION:
PHYSICAL EXAM:  GENERAL: NAD, obese male lying in bed comfortably  HEAD:  Atraumatic, Normocephalic  EYES: conjunctiva and sclera clear  NERVOUS SYSTEM:  Alert & Oriented X3, No gross focal deficits  CHEST/LUNG: Clear to auscultation bilaterally; No wheezes  HEART: Regular rate and rhythm; No murmurs  ABDOMEN: Soft, obese, Nontender; Bowel sounds present  EXTREMITIES:  No cyanosis, or edema PHYSICAL EXAM:  GENERAL: NAD, obese male lying in bed comfortably  HEAD:  Atraumatic, Normocephalic  EYES: conjunctiva and sclera clear  NERVOUS SYSTEM:  Alert & Oriented X3, No gross focal deficits  CHEST/LUNG: Clear to auscultation bilaterally; No wheezes  HEART: Regular rate and rhythm; No murmurs  ABDOMEN: Soft, obese, Nontender; Bowel sounds present  EXTREMITIES:  No cyanosis, or edema  ICU Vital Signs Last 24 Hrs  T(C): 36.6 (29 Apr 2024 14:00), Max: 36.9 (28 Apr 2024 20:45)  T(F): 97.8 (29 Apr 2024 14:00), Max: 98.4 (28 Apr 2024 20:45)  HR: 66 (29 Apr 2024 14:00) (62 - 91)  BP: 108/70 (29 Apr 2024 14:00) (96/62 - 132/88)  BP(mean): --  ABP: --  ABP(mean): --  RR: 18 (29 Apr 2024 14:00) (17 - 18)  SpO2: 97% (29 Apr 2024 14:00) (96% - 97%)

## 2024-04-17 NOTE — DISCHARGE NOTE PROVIDER - HOSPITAL COURSE
24M with PMHx of obesity, anxiety, depression, s/p gastric sleeve surgery (Feb 2024) presenting due to diplopia, leg weakness, decreased oral intake, and urinary retention. CT chest revealed pulmonary embolism with no R heart strain; TTE confirmed no R heart strain. Neurology consulted. MRI brain/MRA head/MRI lumbosacral spine were negative for stroke, mass, hemorrhage, aneurysm, LVO, critical stenosis or cauda equina. CT head revealed hypodensities in R parietal and temporal lobes. MRI t-spine revealed no T3-T4 edema but slight disc protrusion in T6-T7. Neurosurgery consulted; no intervention recommended. Repeat CT head and EEG were noncontributory. CT abdomen revealed no acute abdominal finding or visceral injury. Barium scan was within normal limits. Bariatric surgery consulted; recommended to continue IV fluids and no acute interventions needed. Ultrasound of bilateral LE were negative for DVT. CT chest revealed PE and patient was started on heparin drip then transitioned to Eliquis. Due to poor PO intake after gastric sleeve surgery, neurology recommended obtaining vitamin panel levels. Behavioral health consulted; recommended adding prozac and clonidine. Rodrigeuz was placed for urinary retention. Patient is stable for discharge home with continuation of Eliquis and followup with bariatric surgeon and psychiatrist outpatient. 24M with PMHx of obesity, anxiety, depression, s/p gastric sleeve surgery (Feb 2024) presenting due to diplopia, leg weakness, decreased oral intake, and urinary retention. CT chest revealed pulmonary embolism with no R heart strain; TTE confirmed no R heart strain. Neurology consulted. MRI brain/MRA head/MRI lumbosacral spine were negative for stroke, mass, hemorrhage, aneurysm, LVO, critical stenosis or cauda equina. CT head revealed hypodensities in R parietal and temporal lobes. MRI t-spine revealed no T3-T4 edema but slight disc protrusion in T6-T7. Neurosurgery consulted; no intervention recommended. Repeat CT head and EEG were noncontributory. CT abdomen revealed no acute abdominal finding or visceral injury. Barium scan was within normal limits. Bariatric surgery consulted; recommended to continue IV fluids and no acute interventions needed. Ultrasound of bilateral LE were negative for DVT. CT chest revealed PE and patient was started on heparin drip then transitioned to Eliquis. Due to poor PO intake after gastric sleeve surgery, neurology recommended obtaining vitamin panel levels. Behavioral health consulted; recommended adding prozac and clonidine. Rodriguez was placed for urinary retention. Patient is stable for discharge home with home care with continuation of Eliquis and followup with bariatric surgeon and psychiatrist outpatient. 24M with PMHx of obesity, anxiety, depression, s/p gastric sleeve surgery (Feb 2024) presenting due to diplopia, leg weakness, decreased oral intake, and urinary retention. CT chest revealed pulmonary embolism with no R heart strain; TTE confirmed no R heart strain. Neurology consulted. MRI brain/MRA head/MRI lumbosacral spine were negative for stroke, mass, hemorrhage, aneurysm, LVO, critical stenosis or cauda equina. CT head revealed hypodensities in R parietal and temporal lobes. MRI t-spine revealed no T3-T4 edema but slight disc protrusion in T6-T7. Neurosurgery consulted; no intervention recommended. Repeat CT head and EEG were noncontributory. CT abdomen revealed no acute abdominal finding or visceral injury. Barium scan was within normal limits. Bariatric surgery consulted; recommended to continue IV fluids and no acute interventions needed. Ultrasound of bilateral LE were negative for DVT. CT chest revealed PE and patient was started on heparin drip then transitioned to Eliquis. Due to poor PO intake after gastric sleeve surgery, neurology recommended obtaining vitamin panel levels. Behavioral health consulted; recommended adding prozac and clonidine. Rodriguez was placed for urinary retention. Patient was found to have decreased B1, D, and B7 levels, although it is not likely the cause of his neurological symptoms, is receiving vitamin supplementation. Patient is stable for discharge home with home care with continuation of Eliquis and followup with bariatric surgeon and psychiatrist outpatient. 24M with PMHx of obesity, anxiety, depression, s/p gastric sleeve surgery (Feb 2024) presenting due to diplopia, leg weakness, decreased oral intake, and urinary retention. CT chest revealed pulmonary embolism with no R heart strain; TTE confirmed no R heart strain. Neurology consulted. MRI brain/MRA head/MRI lumbosacral spine were negative for stroke, mass, hemorrhage, aneurysm, LVO, critical stenosis or cauda equina. CT head revealed hypodensities in R parietal and temporal lobes. MRI t-spine revealed no T3-T4 edema but slight disc protrusion in T6-T7. Neurosurgery consulted; no intervention recommended. Repeat CT head and EEG were noncontributory. CT abdomen revealed no acute abdominal finding or visceral injury. Barium scan was within normal limits. Bariatric surgery consulted; recommended to continue IV fluids and no acute interventions needed. Ultrasound of bilateral LE were negative for DVT. CT chest revealed PE and patient was started on heparin drip then transitioned to Eliquis. Due to poor PO intake after gastric sleeve surgery, neurology recommended obtaining vitamin panel levels. Behavioral health consulted; recommended adding prozac and clonidine. Rodriguez was placed for urinary retention but urinary retention has now resolved. Patient was found to have decreased B1, D, and B7 levels, although it is not likely the cause of his neurological symptoms, is receiving vitamin supplementation. Patient also reported rectal spasms and pain; colorectal surgery was consulted, who did not find external or prolapsed internal hemorrhoids. Recommended softening stools, anusol suppositories, topical calcium channel blocker for spasms. Patient is stable for discharge to [facility] with continuation of Eliquis and followup with bariatric surgeon, psychiatrist, and colorectal surgery outpatient.

## 2024-04-17 NOTE — BH CONSULTATION LIAISON PROGRESS NOTE - NSBHFUPINTERVALHXFT_PSY_A_CORE
Patient seen for f/u and found to be calm and cooperative while seen sitting in chair eating lunch. Patient expresses doing ok and reports taking medication with no AE. patient still with anxiety but hopeful and hopes to go home soon while denying any s/h ideation or AVH

## 2024-04-17 NOTE — BH CONSULTATION LIAISON PROGRESS NOTE - NSBHMSEJUDGE_PSY_A_CORE
- Patient with symptoms concerning for MS exacerbation, received IV solumedrol in ED  - Will obtain neurology eval, will order for MRI Head/cervical/thoracic/lumbar w/wo contrast for evaluation  - c/w gabapentin, f/u neurology for further medications  - c/w vesicare (therapeutic interchance to oxybutynin)  - PT eval Fair

## 2024-04-17 NOTE — DISCHARGE NOTE PROVIDER - NSDCMRMEDTOKEN_GEN_ALL_CORE_FT
Atarax 25 mg oral tablet: 1 tab(s) orally 3 times a day as needed for  anxiety  cyanocobalamin 1000 mcg/15 mL oral liquid: 15 milliliter(s) orally once a day  famotidine 20 mg oral tablet: 1 tab(s) orally 2 times a day  Flomax 0.4 mg oral capsule: 1 cap(s) orally once a day  simethicone 80 mg oral tablet, chewable: 1 tab(s) chewed 2 times a day as needed for spasms  Zofran 4 mg/5 mL oral solution: 5 milliliter(s) orally every 8 hours as needed for  nausea/ vomiting   Atarax 25 mg oral tablet: 1 tab(s) orally 3 times a day as needed for  anxiety  cyanocobalamin 1000 mcg/15 mL oral liquid: 15 milliliter(s) orally once a day  famotidine 20 mg oral tablet: 1 tab(s) orally 2 times a day  Flomax 0.4 mg oral capsule: 1 cap(s) orally once a day (at bedtime)  simethicone 80 mg oral tablet, chewable: 1 tab(s) chewed 2 times a day as needed for spasms  Zofran 4 mg/5 mL oral solution: 5 milliliter(s) orally every 8 hours as needed for  nausea/ vomiting   Atarax 25 mg oral tablet: 1 tab(s) orally 3 times a day as needed for  anxiety  cloNIDine 0.1 mg oral tablet: 1 tab(s) orally every 8 hours  cyanocobalamin 1000 mcg/15 mL oral liquid: 15 milliliter(s) orally once a day  famotidine 20 mg oral tablet: 1 tab(s) orally 2 times a day  Flomax 0.4 mg oral capsule: 1 cap(s) orally once a day (at bedtime)  simethicone 80 mg oral tablet, chewable: 1 tab(s) chewed 2 times a day as needed for spasms  Zofran 4 mg/5 mL oral solution: 5 milliliter(s) orally every 8 hours as needed for  nausea/ vomiting   Atarax 25 mg oral tablet: 1 tab(s) orally 3 times a day as needed for  anxiety  bisacodyl 10 mg rectal suppository: 1 suppository(ies) rectal once a day As needed Constipation  cloNIDine 0.1 mg oral tablet: 1 tab(s) orally every 8 hours  cyanocobalamin 1000 mcg/15 mL oral liquid: 15 milliliter(s) orally once a day  Eliquis 5 mg oral tablet: 1 tab(s) orally 2 times a day  famotidine 20 mg oral tablet: 1 tab(s) orally 2 times a day  Flomax 0.4 mg oral capsule: 1 cap(s) orally once a day (at bedtime)  psyllium 3.4 g/7 g oral powder for reconstitution: 1 dose(s) orally once a day  simethicone 80 mg oral tablet, chewable: 1 tab(s) chewed 2 times a day as needed for spasms  witch hazel 50% topical pad: 1 Apply topically to affected area 4 times a day  zinc oxide 40% topical ointment: Apply topically to affected area 2 times a day  Zofran 4 mg/5 mL oral solution: 5 milliliter(s) orally every 8 hours as needed for  nausea/ vomiting

## 2024-04-17 NOTE — BH CONSULTATION LIAISON PROGRESS NOTE - CURRENT MEDICATION
MEDICATIONS  (STANDING):  apixaban 10 milliGRAM(s) Oral every 12 hours  atorvastatin 40 milliGRAM(s) Oral at bedtime  cloNIDine 0.1 milliGRAM(s) Oral every 8 hours  cyanocobalamin 1000 MICROGram(s) Oral daily  ergocalciferol 97907 Unit(s) Oral every week  FLUoxetine 10 milliGRAM(s) Oral daily  folic acid 1 milliGRAM(s) Oral daily  gabapentin 100 milliGRAM(s) Oral two times a day  multivitamin/minerals/iron Oral Solution (CENTRUM) 15 milliLiter(s) Oral daily  pantoprazole  Injectable 40 milliGRAM(s) IV Push two times a day  senna 1 Tablet(s) Oral once  tamsulosin 0.4 milliGRAM(s) Oral at bedtime  vitamin B complex with vitamin C 1 Tablet(s) Oral daily    MEDICATIONS  (PRN):  acetaminophen     Tablet .. 650 milliGRAM(s) Oral every 6 hours PRN Temp greater or equal to 38C (100.4F), Mild Pain (1 - 3)  aluminum hydroxide/magnesium hydroxide/simethicone Suspension 30 milliLiter(s) Oral every 4 hours PRN Dyspepsia  LORazepam     Tablet 0.5 milliGRAM(s) Oral three times a day PRN Anxiety  meclizine 12.5 milliGRAM(s) Oral three times a day PRN Dizziness  melatonin 3 milliGRAM(s) Oral at bedtime PRN Insomnia  metoprolol tartrate Injectable 5 milliGRAM(s) IV Push every 8 hours PRN hr over 120  OLANZapine Injectable 2.5 milliGRAM(s) IntraMuscular daily PRN Acxiety  ondansetron Injectable 4 milliGRAM(s) IV Push every 8 hours PRN Nausea and/or Vomiting  polyethylene glycol 3350 17 Gram(s) Oral daily PRN Constipation  senna 2 Tablet(s) Oral at bedtime PRN Constipation

## 2024-04-17 NOTE — BH CONSULTATION LIAISON PROGRESS NOTE - NSBHASSESSMENTFT_PSY_ALL_CORE
Patient is a 24 year old male, originally from [place] and now living with aunt and mother on , unemployed, past medical history of obesity, RADHA, gastric sleeve (2/1/24), past psychiatric history of anxiety, depression, PTSD, and autism spectrum d/o, no prior psychiatric hospitalizations, OPP include therapist and psychiatrist based in [place], was previously prescribed Topamax, Wellbutrin, and Klonopin most recently but non-compliant due to reported side effects, no history of SA/NSSIB, substance history significant for past use of ?hemp, no legal history, remote family history of anxiety and depression, admitted to [facility] for diplopia, leg weakness, decreased PO intake, and urinary retention   Psychiatry consulted due to anxiety.     Assessment:  Patient presenting with symptoms of anxiety that has gradually worsened since gastric sleeve operation in February 2024 that he states manifests as nausea/vomiting. He states he was initially able to tolerate liquids but this has gradually become difficult for him as well due to the nausea. He reports at baseline he has had anxiety "for years" but symptoms have escalated significantly since the procedure.     Recommendations:  - Continue Prozac 10mg po daily with plan to titrate   - Continue  Clonidine 0.1mg Q8 hours   - will follow as needed   Mohs Histo Method Verbiage: Each section was then chromacoded and processed in the Mohs lab using the Mohs protocol and submitted for frozen section. IV intact

## 2024-04-18 LAB
ALBUMIN SERPL ELPH-MCNC: 3.4 G/DL — SIGNIFICANT CHANGE UP (ref 3.3–5.2)
ALP SERPL-CCNC: 97 U/L — SIGNIFICANT CHANGE UP (ref 40–120)
ALT FLD-CCNC: 135 U/L — HIGH
ANION GAP SERPL CALC-SCNC: 12 MMOL/L — SIGNIFICANT CHANGE UP (ref 5–17)
AST SERPL-CCNC: 71 U/L — HIGH
BASOPHILS # BLD AUTO: 0.05 K/UL — SIGNIFICANT CHANGE UP (ref 0–0.2)
BASOPHILS NFR BLD AUTO: 0.6 % — SIGNIFICANT CHANGE UP (ref 0–2)
BILIRUB SERPL-MCNC: 1.3 MG/DL — SIGNIFICANT CHANGE UP (ref 0.4–2)
BIOTIN SERPL-MCNC: <0.05 NG/ML — LOW (ref 0.05–0.83)
BUN SERPL-MCNC: 10.5 MG/DL — SIGNIFICANT CHANGE UP (ref 8–20)
CALCIUM SERPL-MCNC: 8.9 MG/DL — SIGNIFICANT CHANGE UP (ref 8.4–10.5)
CHLORIDE SERPL-SCNC: 104 MMOL/L — SIGNIFICANT CHANGE UP (ref 96–108)
CO2 SERPL-SCNC: 25 MMOL/L — SIGNIFICANT CHANGE UP (ref 22–29)
CREAT SERPL-MCNC: 0.49 MG/DL — LOW (ref 0.5–1.3)
EGFR: 145 ML/MIN/1.73M2 — SIGNIFICANT CHANGE UP
EOSINOPHIL # BLD AUTO: 0.14 K/UL — SIGNIFICANT CHANGE UP (ref 0–0.5)
EOSINOPHIL NFR BLD AUTO: 1.8 % — SIGNIFICANT CHANGE UP (ref 0–6)
GLUCOSE SERPL-MCNC: 95 MG/DL — SIGNIFICANT CHANGE UP (ref 70–99)
HCT VFR BLD CALC: 35.2 % — LOW (ref 39–50)
HGB BLD-MCNC: 11.5 G/DL — LOW (ref 13–17)
IMM GRANULOCYTES NFR BLD AUTO: 1 % — HIGH (ref 0–0.9)
LYMPHOCYTES # BLD AUTO: 2.25 K/UL — SIGNIFICANT CHANGE UP (ref 1–3.3)
LYMPHOCYTES # BLD AUTO: 28.3 % — SIGNIFICANT CHANGE UP (ref 13–44)
MAGNESIUM SERPL-MCNC: 1.7 MG/DL — SIGNIFICANT CHANGE UP (ref 1.6–2.6)
MCHC RBC-ENTMCNC: 26.9 PG — LOW (ref 27–34)
MCHC RBC-ENTMCNC: 32.7 GM/DL — SIGNIFICANT CHANGE UP (ref 32–36)
MCV RBC AUTO: 82.2 FL — SIGNIFICANT CHANGE UP (ref 80–100)
MONOCYTES # BLD AUTO: 0.77 K/UL — SIGNIFICANT CHANGE UP (ref 0–0.9)
MONOCYTES NFR BLD AUTO: 9.7 % — SIGNIFICANT CHANGE UP (ref 2–14)
NEUTROPHILS # BLD AUTO: 4.66 K/UL — SIGNIFICANT CHANGE UP (ref 1.8–7.4)
NEUTROPHILS NFR BLD AUTO: 58.6 % — SIGNIFICANT CHANGE UP (ref 43–77)
PHOSPHATE SERPL-MCNC: 5.4 MG/DL — HIGH (ref 2.4–4.7)
PLATELET # BLD AUTO: 360 K/UL — SIGNIFICANT CHANGE UP (ref 150–400)
POTASSIUM SERPL-MCNC: 3.6 MMOL/L — SIGNIFICANT CHANGE UP (ref 3.5–5.3)
POTASSIUM SERPL-SCNC: 3.6 MMOL/L — SIGNIFICANT CHANGE UP (ref 3.5–5.3)
PROT SERPL-MCNC: 6.2 G/DL — LOW (ref 6.6–8.7)
RBC # BLD: 4.28 M/UL — SIGNIFICANT CHANGE UP (ref 4.2–5.8)
RBC # FLD: 16.1 % — HIGH (ref 10.3–14.5)
SODIUM SERPL-SCNC: 141 MMOL/L — SIGNIFICANT CHANGE UP (ref 135–145)
VIT B1 SERPL-MCNC: 32.2 NMOL/L — LOW (ref 66.5–200)
WBC # BLD: 7.95 K/UL — SIGNIFICANT CHANGE UP (ref 3.8–10.5)
WBC # FLD AUTO: 7.95 K/UL — SIGNIFICANT CHANGE UP (ref 3.8–10.5)

## 2024-04-18 PROCEDURE — 99232 SBSQ HOSP IP/OBS MODERATE 35: CPT

## 2024-04-18 RX ORDER — THIAMINE MONONITRATE (VIT B1) 100 MG
500 TABLET ORAL ONCE
Refills: 0 | Status: COMPLETED | OUTPATIENT
Start: 2024-04-18 | End: 2024-04-19

## 2024-04-18 RX ORDER — THIAMINE MONONITRATE (VIT B1) 100 MG
100 TABLET ORAL DAILY
Refills: 0 | Status: DISCONTINUED | OUTPATIENT
Start: 2024-04-19 | End: 2024-04-19

## 2024-04-18 RX ADMIN — Medication 0.1 MILLIGRAM(S): at 22:36

## 2024-04-18 RX ADMIN — Medication 650 MILLIGRAM(S): at 09:44

## 2024-04-18 RX ADMIN — TAMSULOSIN HYDROCHLORIDE 0.4 MILLIGRAM(S): 0.4 CAPSULE ORAL at 22:36

## 2024-04-18 RX ADMIN — Medication 0.1 MILLIGRAM(S): at 12:58

## 2024-04-18 RX ADMIN — GABAPENTIN 100 MILLIGRAM(S): 400 CAPSULE ORAL at 05:01

## 2024-04-18 RX ADMIN — PANTOPRAZOLE SODIUM 40 MILLIGRAM(S): 20 TABLET, DELAYED RELEASE ORAL at 17:42

## 2024-04-18 RX ADMIN — Medication 0.1 MILLIGRAM(S): at 05:01

## 2024-04-18 RX ADMIN — GABAPENTIN 100 MILLIGRAM(S): 400 CAPSULE ORAL at 17:43

## 2024-04-18 RX ADMIN — ATORVASTATIN CALCIUM 40 MILLIGRAM(S): 80 TABLET, FILM COATED ORAL at 22:36

## 2024-04-18 RX ADMIN — APIXABAN 10 MILLIGRAM(S): 2.5 TABLET, FILM COATED ORAL at 05:01

## 2024-04-18 RX ADMIN — Medication 650 MILLIGRAM(S): at 10:45

## 2024-04-18 RX ADMIN — APIXABAN 10 MILLIGRAM(S): 2.5 TABLET, FILM COATED ORAL at 17:43

## 2024-04-18 RX ADMIN — PANTOPRAZOLE SODIUM 40 MILLIGRAM(S): 20 TABLET, DELAYED RELEASE ORAL at 05:01

## 2024-04-18 NOTE — PROGRESS NOTE ADULT - SUBJECTIVE AND OBJECTIVE BOX
[facility] Division of Hospital Medicine    SUBJECTIVE / OVERNIGHT EVENTS:  No events overnight  Episodes of arguing between mother and patient this am resulted in complaints of right leg pain that improved with breathing exercises     Patient denies chest pain, SOB, abd pain, N/V, fever, chills, dysuria or any other complaints. All remainder ROS negative.     MEDICATIONS  (STANDING):  apixaban 10 milliGRAM(s) Oral every 12 hours  atorvastatin 40 milliGRAM(s) Oral at bedtime  cloNIDine 0.1 milliGRAM(s) Oral every 8 hours  cyanocobalamin 1000 MICROGram(s) Oral daily  ergocalciferol 94725 Unit(s) Oral every week  FLUoxetine 10 milliGRAM(s) Oral daily  folic acid 1 milliGRAM(s) Oral daily  gabapentin 100 milliGRAM(s) Oral two times a day  multivitamin/minerals/iron Oral Solution (CENTRUM) 15 milliLiter(s) Oral daily  pantoprazole  Injectable 40 milliGRAM(s) IV Push two times a day  senna 1 Tablet(s) Oral once  tamsulosin 0.4 milliGRAM(s) Oral at bedtime  vitamin B complex with vitamin C 1 Tablet(s) Oral daily    MEDICATIONS  (PRN):  acetaminophen     Tablet .. 650 milliGRAM(s) Oral every 6 hours PRN Temp greater or equal to 38C (100.4F), Mild Pain (1 - 3)  aluminum hydroxide/magnesium hydroxide/simethicone Suspension 30 milliLiter(s) Oral every 4 hours PRN Dyspepsia  LORazepam     Tablet 0.5 milliGRAM(s) Oral three times a day PRN Anxiety  meclizine 12.5 milliGRAM(s) Oral three times a day PRN Dizziness  melatonin 3 milliGRAM(s) Oral at bedtime PRN Insomnia  metoprolol tartrate Injectable 5 milliGRAM(s) IV Push every 8 hours PRN hr over 120  OLANZapine Injectable 2.5 milliGRAM(s) IntraMuscular daily PRN Acxiety  ondansetron Injectable 4 milliGRAM(s) IV Push every 8 hours PRN Nausea and/or Vomiting  polyethylene glycol 3350 17 Gram(s) Oral daily PRN Constipation  senna 2 Tablet(s) Oral at bedtime PRN Constipation        I&O's Summary    17 Apr 2024 07:01  -  18 Apr 2024 07:00  --------------------------------------------------------  IN: 0 mL / OUT: 600 mL / NET: -600 mL        PHYSICAL EXAM:  Vital Signs Last 24 Hrs  T(C): 36.3 (18 Apr 2024 08:55), Max: 37 (17 Apr 2024 13:00)  T(F): 97.4 (18 Apr 2024 08:55), Max: 98.6 (17 Apr 2024 13:00)  HR: 91 (18 Apr 2024 08:55) (74 - 100)  BP: 118/83 (18 Apr 2024 08:55) (103/67 - 126/75)  BP(mean): --  RR: 19 (18 Apr 2024 08:55) (18 - 19)  SpO2: 95% (18 Apr 2024 08:55) (95% - 98%)    Parameters below as of 18 Apr 2024 08:55  Patient On (Oxygen Delivery Method): room air            CONSTITUTIONAL: NAD, appears stated age  ENMT: Moist oral mucosa, no pharyngeal injection or exudates; normal dentition  RESPIRATORY: Normal respiratory effort; clear to auscultation bilaterally  CARDIOVASCULAR: Regular rate and rhythm, normal S1 and S2, no murmur/rub/gallop; Peripheral pulses are 2+ bilaterally  ABDOMEN: Nontender to palpation, normoactive bowel sounds, no rebound/guarding;   MUSCLOSKELETAL:  No clubbing or cyanosis of digits; no joint swelling or tenderness to palpation  PSYCH: A+O to person, place, and time; affect appropriate  NEUROLOGY: CN 2-12 are intact and symmetric; no gross sensory deficits;   SKIN: No rashes; no palpable lesions    LABS:                        11.5   7.95  )-----------( 360      ( 18 Apr 2024 07:00 )             35.2     04-18    141  |  104  |  10.5  ----------------------------<  95  3.6   |  25.0  |  0.49<L>    Ca    8.9      18 Apr 2024 07:00  Phos  5.4     04-18  Mg     1.7     04-18    TPro  6.2<L>  /  Alb  3.4  /  TBili  1.3  /  DBili  x   /  AST  71<H>  /  ALT  135<H>  /  AlkPhos  97  04-18          Urinalysis Basic - ( 18 Apr 2024 07:00 )    Color: x / Appearance: x / SG: x / pH: x  Gluc: 95 mg/dL / Ketone: x  / Bili: x / Urobili: x   Blood: x / Protein: x / Nitrite: x   Leuk Esterase: x / RBC: x / WBC x   Sq Epi: x / Non Sq Epi: x / Bacteria: x        CAPILLARY BLOOD GLUCOSE            RADIOLOGY & ADDITIONAL TESTS:  Results Reviewed:   Imaging Personally Reviewed:  Electrocardiogram Personally Reviewed:

## 2024-04-18 NOTE — CHART NOTE - NSCHARTNOTEFT_GEN_A_CORE
Bariatric Surgery    In review of labs ordered prior in hospital stay, it has become evident that the patient's thiamine level is lower than desired. I have spoken with Dr. Castle from the primary medicine team and have recommended IV thiamine supplementation today, as well as thiamine supplementation on discharge. This has been discussed with the attending bariatric surgeon, Dr. Santamaria.

## 2024-04-18 NOTE — PROGRESS NOTE ADULT - ASSESSMENT
24M PMHx obesity, anxiety, depression, s/p gastric sleeve surgery presenting for diplopia, leg weakness, decreased PO intake, and urinary retention. Imaging negative. Etiology possibly vitamin deficiency after gastric sleeve surgery; awaiting vitamin panel results.     Plan:    AMS, Leg Weakness, Diplopia, Urinary Retention  - CT head: hypodensities in R parietal and temporal lobes  - MRI brain/MRA head/MRI lumbosacral spine performed  - No stroke, mass, hemorrhage, aneurysm, LVO, critical stenosis, or cauda equina  - Repeat MRI t-spine: no T3-T4 edema; slight disc protrusion in T6-7  - No intervention per neurosurgery  - VBG w/ lytes: unremarkable  - Repeat CTH, EEG non contributory  - Ammonia level wnl  - Follow up with psychiatrist outpatient for possible CBT  - US b/l LE to r/o DVT: negative  - Gabapentin 100 mg BID   - PM&R consulted, recs noted  - Daily PT    Anxiety  - Prozac 10 mg qd  - Clonidine 0.1 mg q8h  -  recs noted    Possible Vitamin Deficiency  - Had poor PO intake for past 2 months: possible vitamin deficiency after bariatric surgery  - Vitamin D weekly, folic acid and vitamin B supplements  - Vitamin B12 wnl  - Repeat Vitamin B2 level due to lab request (was not kept away from light) ---> Pending  - Thiamine, niacin, riboflavin levels ----> Pending  - May be contributing to bilateral leg weakness/lethargy    Urinary Retention  - Tamsulosin 0.4 mg PO qhs  - Bladder scan q6h  - Rodriguez removed  - Straight cath x1 overnight  Likely needs Ordriguez prior too discharge     PE  - CT chest (4/9): pulmonary embolism, no R heart strain  - US LE: no DVT  - Eliquis 10 mg BID x7 days, then 5 mg PO BID  - On room air  - Telemetry monitoring  - TTE to r/o R heart strain: wnl    HLD  - Atorvastatin 40 mg PO qhs    Vertigo  - Meclizine 12.5 mg PO TID PRN    Nausea/vomiting  - Resolved  - CT abdomen: no acute abdominal finding or visceral injury  - IV LR 1000 mL @ 100 cc/h  - Barium scan: wnl  - Simethicone suspension q4h PRN  - Zofran 4 mg IV q8h pRN  - Protonix 40 mg IV BID  - Miralax, senna  - Bariatric surgeon recommending to continue IV fluids  - Started Soft Bariatric phase 2 diet    Dispo: Discharge home with home care  Family unable to take patient today. Plan for tomorrow

## 2024-04-18 NOTE — CHART NOTE - NSCHARTNOTEFT_GEN_A_CORE
Called by general surgery for additional recs regarding routine thiamine supplementation in a bariatric pt  - lower suspicion thiamine deficiency is related to his neuro symptoms  - d/c with thiamine supplementation 100mg daily  - ordered iv B1 500mg and added po B1 100mg daily Called by general surgery for additional recs regarding routine thiamine supplementation to be continued after dc in a bariatric pt  - lower suspicion thiamine deficiency is related to his neuro symptoms  - d/c with thiamine supplementation 100mg daily  - ordered iv B1 500mg and added po B1 100mg daily

## 2024-04-19 LAB
ALBUMIN SERPL ELPH-MCNC: 3.5 G/DL — SIGNIFICANT CHANGE UP (ref 3.3–5.2)
ALP SERPL-CCNC: 96 U/L — SIGNIFICANT CHANGE UP (ref 40–120)
ALT FLD-CCNC: 137 U/L — HIGH
ANION GAP SERPL CALC-SCNC: 12 MMOL/L — SIGNIFICANT CHANGE UP (ref 5–17)
AST SERPL-CCNC: 80 U/L — HIGH
BASOPHILS # BLD AUTO: 0.04 K/UL — SIGNIFICANT CHANGE UP (ref 0–0.2)
BASOPHILS NFR BLD AUTO: 0.5 % — SIGNIFICANT CHANGE UP (ref 0–2)
BILIRUB SERPL-MCNC: 1.3 MG/DL — SIGNIFICANT CHANGE UP (ref 0.4–2)
BUN SERPL-MCNC: 11 MG/DL — SIGNIFICANT CHANGE UP (ref 8–20)
CALCIUM SERPL-MCNC: 9 MG/DL — SIGNIFICANT CHANGE UP (ref 8.4–10.5)
CHLORIDE SERPL-SCNC: 104 MMOL/L — SIGNIFICANT CHANGE UP (ref 96–108)
CO2 SERPL-SCNC: 25 MMOL/L — SIGNIFICANT CHANGE UP (ref 22–29)
CREAT SERPL-MCNC: 0.47 MG/DL — LOW (ref 0.5–1.3)
EGFR: 147 ML/MIN/1.73M2 — SIGNIFICANT CHANGE UP
EOSINOPHIL # BLD AUTO: 0.15 K/UL — SIGNIFICANT CHANGE UP (ref 0–0.5)
EOSINOPHIL NFR BLD AUTO: 1.9 % — SIGNIFICANT CHANGE UP (ref 0–6)
GLUCOSE SERPL-MCNC: 86 MG/DL — SIGNIFICANT CHANGE UP (ref 70–99)
HCT VFR BLD CALC: 35.7 % — LOW (ref 39–50)
HGB BLD-MCNC: 11.8 G/DL — LOW (ref 13–17)
IMM GRANULOCYTES NFR BLD AUTO: 0.9 % — SIGNIFICANT CHANGE UP (ref 0–0.9)
LYMPHOCYTES # BLD AUTO: 2.31 K/UL — SIGNIFICANT CHANGE UP (ref 1–3.3)
LYMPHOCYTES # BLD AUTO: 28.7 % — SIGNIFICANT CHANGE UP (ref 13–44)
MAGNESIUM SERPL-MCNC: 1.9 MG/DL — SIGNIFICANT CHANGE UP (ref 1.6–2.6)
MCHC RBC-ENTMCNC: 27.1 PG — SIGNIFICANT CHANGE UP (ref 27–34)
MCHC RBC-ENTMCNC: 33.1 GM/DL — SIGNIFICANT CHANGE UP (ref 32–36)
MCV RBC AUTO: 82.1 FL — SIGNIFICANT CHANGE UP (ref 80–100)
MONOCYTES # BLD AUTO: 0.68 K/UL — SIGNIFICANT CHANGE UP (ref 0–0.9)
MONOCYTES NFR BLD AUTO: 8.4 % — SIGNIFICANT CHANGE UP (ref 2–14)
NEUTROPHILS # BLD AUTO: 4.8 K/UL — SIGNIFICANT CHANGE UP (ref 1.8–7.4)
NEUTROPHILS NFR BLD AUTO: 59.6 % — SIGNIFICANT CHANGE UP (ref 43–77)
PHOSPHATE SERPL-MCNC: 5.9 MG/DL — HIGH (ref 2.4–4.7)
PLATELET # BLD AUTO: 374 K/UL — SIGNIFICANT CHANGE UP (ref 150–400)
POTASSIUM SERPL-MCNC: 3.8 MMOL/L — SIGNIFICANT CHANGE UP (ref 3.5–5.3)
POTASSIUM SERPL-SCNC: 3.8 MMOL/L — SIGNIFICANT CHANGE UP (ref 3.5–5.3)
PROT SERPL-MCNC: 6.5 G/DL — LOW (ref 6.6–8.7)
RBC # BLD: 4.35 M/UL — SIGNIFICANT CHANGE UP (ref 4.2–5.8)
RBC # FLD: 16.3 % — HIGH (ref 10.3–14.5)
SODIUM SERPL-SCNC: 141 MMOL/L — SIGNIFICANT CHANGE UP (ref 135–145)
WBC # BLD: 8.05 K/UL — SIGNIFICANT CHANGE UP (ref 3.8–10.5)
WBC # FLD AUTO: 8.05 K/UL — SIGNIFICANT CHANGE UP (ref 3.8–10.5)

## 2024-04-19 PROCEDURE — 99232 SBSQ HOSP IP/OBS MODERATE 35: CPT

## 2024-04-19 RX ORDER — APIXABAN 2.5 MG/1
5 TABLET, FILM COATED ORAL EVERY 12 HOURS
Refills: 0 | Status: DISCONTINUED | OUTPATIENT
Start: 2024-04-20 | End: 2024-04-29

## 2024-04-19 RX ORDER — TAMSULOSIN HYDROCHLORIDE 0.4 MG/1
1 CAPSULE ORAL
Qty: 0 | Refills: 0 | DISCHARGE
Start: 2024-04-19

## 2024-04-19 RX ORDER — APIXABAN 2.5 MG/1
2 TABLET, FILM COATED ORAL
Qty: 6 | Refills: 0
Start: 2024-04-19 | End: 2024-04-20

## 2024-04-19 RX ORDER — TAMSULOSIN HYDROCHLORIDE 0.4 MG/1
1 CAPSULE ORAL
Refills: 0 | DISCHARGE

## 2024-04-19 RX ORDER — THIAMINE MONONITRATE (VIT B1) 100 MG
500 TABLET ORAL EVERY 8 HOURS
Refills: 0 | Status: DISCONTINUED | OUTPATIENT
Start: 2024-04-19 | End: 2024-04-24

## 2024-04-19 RX ADMIN — ATORVASTATIN CALCIUM 40 MILLIGRAM(S): 80 TABLET, FILM COATED ORAL at 22:07

## 2024-04-19 RX ADMIN — GABAPENTIN 100 MILLIGRAM(S): 400 CAPSULE ORAL at 06:19

## 2024-04-19 RX ADMIN — Medication 1 MILLIGRAM(S): at 13:10

## 2024-04-19 RX ADMIN — Medication 10 MILLIGRAM(S): at 13:11

## 2024-04-19 RX ADMIN — Medication 0.1 MILLIGRAM(S): at 22:07

## 2024-04-19 RX ADMIN — APIXABAN 10 MILLIGRAM(S): 2.5 TABLET, FILM COATED ORAL at 06:16

## 2024-04-19 RX ADMIN — Medication 1 TABLET(S): at 13:10

## 2024-04-19 RX ADMIN — Medication 105 MILLIGRAM(S): at 15:14

## 2024-04-19 RX ADMIN — APIXABAN 10 MILLIGRAM(S): 2.5 TABLET, FILM COATED ORAL at 17:15

## 2024-04-19 RX ADMIN — PANTOPRAZOLE SODIUM 40 MILLIGRAM(S): 20 TABLET, DELAYED RELEASE ORAL at 17:16

## 2024-04-19 RX ADMIN — SENNA PLUS 1 TABLET(S): 8.6 TABLET ORAL at 22:09

## 2024-04-19 RX ADMIN — Medication 0.1 MILLIGRAM(S): at 06:16

## 2024-04-19 RX ADMIN — PREGABALIN 1000 MICROGRAM(S): 225 CAPSULE ORAL at 13:10

## 2024-04-19 RX ADMIN — Medication 105 MILLIGRAM(S): at 06:16

## 2024-04-19 RX ADMIN — Medication 0.1 MILLIGRAM(S): at 13:10

## 2024-04-19 RX ADMIN — Medication 105 MILLIGRAM(S): at 23:12

## 2024-04-19 RX ADMIN — Medication 15 MILLILITER(S): at 13:11

## 2024-04-19 RX ADMIN — TAMSULOSIN HYDROCHLORIDE 0.4 MILLIGRAM(S): 0.4 CAPSULE ORAL at 22:08

## 2024-04-19 RX ADMIN — PANTOPRAZOLE SODIUM 40 MILLIGRAM(S): 20 TABLET, DELAYED RELEASE ORAL at 06:16

## 2024-04-19 RX ADMIN — GABAPENTIN 100 MILLIGRAM(S): 400 CAPSULE ORAL at 17:12

## 2024-04-19 RX ADMIN — Medication 100 MILLIGRAM(S): at 13:10

## 2024-04-19 NOTE — PROGRESS NOTE ADULT - ATTENDING COMMENTS
24M with PMHx obesity, anxiety, depression, s/p gastric sleeve surgery presenting for diplopia, leg weakness, decreased PO intake, and urinary retention. Neurologic work  + for thiamine deficiency. Imaging demonstrated acute pe    Acute PE  Thiamine Deficiency sec to bariatric surgery - Altered Mental status/ Leg Weakness? Diplopia?  Urinary retention - resolved with tamsulosin.     Psych consulted - Meds adjusted    Patient was pending dispo home. Family opting for [facility] this am.  Now pending acceptance to [facility]   High dose thiamine   PT, ambulate out of bed to chair 24M with PMHx obesity, anxiety, depression, s/p gastric sleeve surgery presenting for diplopia, leg weakness, decreased PO intake, and urinary retention. Neurologic work  + for thiamine deficiency. Imaging demonstrated acute pe    Acute PE  Thiamine Deficiency sec to bariatric surgery - Altered Mental status/ Leg Weakness? Diplopia?  Urinary retention - resolved with tamsulosin.     Psych consulted - Meds adjusted    Patient was pending dispo home. Family opting for [facility] this am.  Now pending acceptance to [facility]   High dose thiamine.  Cont Eliquis. Need to transition to maintenance dosing on 4/20.   PT, ambulate out of bed to chair

## 2024-04-19 NOTE — PROGRESS NOTE ADULT - SUBJECTIVE AND OBJECTIVE BOX
[facility]                                     [facility] [place]                                 Allegra Sweet, & Xu                                  [address]                                        [place], [place], [address]                                             [phone]    CC: diplopia dizziness, leg weakness/numbness/pain and urine retention  HPI:  The patient is a 24y Male who presented with complaints of dizziness for several days, leg weakness/numbness and pain for several weeks and urine retention for 30 hours, needing schaffer catheter.  He had gastric sleeve done in February and has had poor po intake since the surgery.  He is not taking hois vitamins post gastric sleeve surgery, but has had two injections of B12. He went to [facility] for symptoms related to his gastric sleeve and was discharged two days ago with c/o dizziness-mostly lightheaded  He also c/o b/l leg weakness/numbness worse on left than right.  He has been retaining urine.  He c/o diplopia, not resolved with lateral gaze on either side, improved with single eye closure.  Neurology is asked to evaluate.    Interval history: sleepy,  awakens easily. feels better with vision and legs    Review of systems (neurology): unable to assess      MEDICATIONS  (STANDING):  apixaban 10 milliGRAM(s) Oral every 12 hours  atorvastatin 40 milliGRAM(s) Oral at bedtime  cloNIDine 0.1 milliGRAM(s) Oral every 8 hours  cyanocobalamin 1000 MICROGram(s) Oral daily  ergocalciferol 68771 Unit(s) Oral every week  FLUoxetine 10 milliGRAM(s) Oral daily  folic acid 1 milliGRAM(s) Oral daily  gabapentin 100 milliGRAM(s) Oral two times a day  multivitamin/minerals/iron Oral Solution (CENTRUM) 15 milliLiter(s) Oral daily  pantoprazole  Injectable 40 milliGRAM(s) IV Push two times a day  senna 1 Tablet(s) Oral once  tamsulosin 0.4 milliGRAM(s) Oral at bedtime  thiamine IVPB 500 milliGRAM(s) IV Intermittent every 8 hours  vitamin B complex with vitamin C 1 Tablet(s) Oral daily    MEDICATIONS  (PRN):  acetaminophen     Tablet .. 650 milliGRAM(s) Oral every 6 hours PRN Temp greater or equal to 38C (100.4F), Mild Pain (1 - 3)  aluminum hydroxide/magnesium hydroxide/simethicone Suspension 30 milliLiter(s) Oral every 4 hours PRN Dyspepsia  LORazepam     Tablet 0.5 milliGRAM(s) Oral three times a day PRN Anxiety  meclizine 12.5 milliGRAM(s) Oral three times a day PRN Dizziness  melatonin 3 milliGRAM(s) Oral at bedtime PRN Insomnia  metoprolol tartrate Injectable 5 milliGRAM(s) IV Push every 8 hours PRN hr over 120  OLANZapine Injectable 2.5 milliGRAM(s) IntraMuscular daily PRN Acxiety  ondansetron Injectable 4 milliGRAM(s) IV Push every 8 hours PRN Nausea and/or Vomiting  polyethylene glycol 3350 17 Gram(s) Oral daily PRN Constipation  senna 2 Tablet(s) Oral at bedtime PRN Constipation      Vital Signs Last 24 Hrs  T(C): 36.7 (19 Apr 2024 09:05), Max: 37.2 (18 Apr 2024 20:24)  T(F): 98 (19 Apr 2024 09:05), Max: 98.9 (18 Apr 2024 20:24)  HR: 96 (19 Apr 2024 09:05) (87 - 96)  BP: 114/66 (19 Apr 2024 09:05) (114/61 - 127/84)  BP(mean): --  RR: 18 (19 Apr 2024 09:05) (18 - 18)  SpO2: 100% (19 Apr 2024 09:05) (95% - 100%)    Parameters below as of 19 Apr 2024 09:05  Patient On (Oxygen Delivery Method): room air    General: NAD    Detailed Neurologic Exam:    Mental status: The patient is sleepy, awakens easily. Oriented to hospital, self, follows commands names objects    Cranial nerves: Pupils equal and react symmetrically to light. (+) blink to threat b/l,  Extraocular motion full . Facial sensation can not be assessed. Facial musculature is symmetric.     Motor: There is normal bulk and tone.  There is no tremor.  Moves extremities to stimuli,     Sensation: intact FT in 4 ext except for mild decrease at bottom on left LE    Cerebellar: no dysmetria on finger to nose testing.    Gait : deferred    LABS:                         11.8   8.05  )-----------( 374      ( 19 Apr 2024 06:04 )             35.7     04-19    141  |  104  |  11.0  ----------------------------<  86  3.8   |  25.0  |  0.47<L>    Ca    9.0      19 Apr 2024 06:04  Phos  5.9     04-19  Mg     1.9     04-19    TPro  6.5<L>  /  Alb  3.5  /  TBili  1.3  /  DBili  x   /  AST  80<H>  /  ALT  137<H>  /  AlkPhos  96  04-19    LIVER FUNCTIONS - ( 19 Apr 2024 06:04 )  Alb: 3.5 g/dL / Pro: 6.5 g/dL / ALK PHOS: 96 U/L / ALT: 137 U/L / AST: 80 U/L / GGT: x           Vitamin B1, Serum (04.11.24 @ 10:00)    Vitamin B1, Serum: 32.2:     Vitamin B7, Serum (04.08.24 @ 13:22)    Vitamin B7, Serum: <0.05:     Vitamin B6 (04.09.24 @ 09:26)    Vitamin B6: 8.6:   Vitamin B12, Serum (04.08.24 @ 06:45)   Vitamin B12, Serum: 1173 pg/mL  Folate, Serum (04.08.24 @ 06:45)   Folate, Serum: 7.9 ng/mL  Ammonia, Serum (04.13.24 @ 13:03)    Ammonia, Serum: 41: Ammonia levels will be falsely elevated if specimen is NOT collected,  processed and maintained at 4-8 C umol/L    EEG Classification / Summary 4/13/24  Normal  EEG in the awake / drowsy / asleep state(s).    Clinical Impression:  There were no epileptiform abnormalities recorded.    RADIOLOGY & ADDITIONAL STUDIES (independently reviewed unless otherwise noted):    MRI/MRA Head No Cont (04.08.24 @ 19:34)   IMPRESSION:  1.  BRAIN:   Unremarkable MR of the brain.  2.  ANTERIOR CIRCULATION:    Intact.  3. POSTERIOR CIRCULATION:  Intact.    MR Thoracic and Lumbar Spine No Cont (04.08.24 @ 19:37)   IMPRESSION:  1. THORACIC SPINE:   Multiple thoracic disc protrusions (disc herniation)    cause ventral cord deformity and at T3-T4 causes cord edema/gliosis  2. LUMBAR SPINE:   Unremarkable MR of the lumbar spine.      CT Head No Cont (04.08.24 @ 04:04)   IMPRESSION:  Ill-defined hypodensities in the right temporal and right occipital   lobes. It is unclear if these are artifactual in nature.In the presence   of acute also focalized neurological deficits, ischemia may be   entertained. Consider follow-up by MRI imaging, if clinically indicated.

## 2024-04-19 NOTE — PROGRESS NOTE ADULT - ASSESSMENT
The patient is a 24y Male who is followed by neurology because of diplopia, dizziness, b/l LE weakness/numbness and urine retention.  CT head showed possible hypodensities in right par/temp lobes.  Possible nutritional deficiency given his poor PO intake following gastric bypass and unable to take vitamins.  Also concerned for etiology of hypogenicities seen on head CT.  I am also concerned about lumbar spine given his BLE complaints and urine retention    Thiamine deficiency noted on labs.  Doing better post supplementation.    Suggest to check:   - MRI brain, eval for hypodensities seen on CT head      ** no acute stroke mass or blood, likely artifact on CT head   - MRA head, eval for aneurysm given diplopia     ** no evidence for aneurysm or LVO or critical stenosis   - MRI LS spine, eval for cauda equina syndrome give urine retention and BLE numb/weak     ** no cauda equina  - MRI Thoracic spine    ** multiple disk protrusions with contact and deformity at T3/4 with underlying cord signal changes    ** request spine surgery evaluation to determine if intervention is indicated    ** concern for nutritional deficiency given his poor PO intake and lack of consistent intake of vitamin supplements post bariatric surgery    ** B6 is normal, further studies as above    ** Thiamine deficiency- doing better post supplememation    AMS  Resolved  Ammonia and EEG unrevealing for cause of AMS    Discussed with Dr Smith    There is no further inpatient neurologic workup suggested at this time.  We will be available for reconsultation as needed.    Thank you.   David Dinh MD, PhD  [id number]

## 2024-04-19 NOTE — PROGRESS NOTE ADULT - ASSESSMENT
ASSESSMENT: 24M PMHx obesity, anxiety, depression, s/p gastric sleeve surgery presenting for diplopia, leg weakness, decreased PO intake, and urinary retention. Imaging negative. Etiology likely adjustment disorder and vitamin deficiency after surgery.     Plan:    AMS, Leg Weakness, Diplopia, Urinary Retention  - CT head: hypodensities in R parietal and temporal lobes  - MRI brain/MRA head/MRI lumbosacral spine performed  - No stroke, mass, hemorrhage, aneurysm, LVO, critical stenosis, or cauda equina  - Repeat MRI t-spine: no T3-T4 edema; slight disc protrusion in T6-7  - No intervention per neurosurgery  - VBG w/ lytes: unremarkable  - Repeat CTH, EEG non contributory  - Ammonia level wnl  - Follow up with psychiatrist outpatient for possible CBT  - US b/l LE to r/o DVT: negative  - Gabapentin 100 mg BID   - PM&R consulted, recs noted  - Daily PT    Anxiety  - Prozac 10 mg qd  - Clonidine 0.1 mg q8h  -  recs noted    Possible Vitamin Deficiency  - Had poor PO intake for past 2 months: possible vitamin deficiency after bariatric surgery  - Vitamin D weekly, folic acid and vitamin B supplements  - Vitamin B12 wnl  - Repeat Vitamin B2 level due to lab request (was not kept away from light) ---> Pending  - B1 and B7 low; on daily vitamins  - May be contributing to bilateral leg weakness/lethargy    Urinary Retention  - Tamsulosin 0.4 mg PO qhs  - Bladder scan q6h  - Rodriguez removed  - Urinary incontinence now    PE  - CT chest (4/9): pulmonary embolism, no R heart strain  - US LE: no DVT  - Eliquis 10 mg BID x7 days, then 5 mg PO BID  - On room air  - Telemetry monitoring  - TTE to r/o R heart strain: wnl    HLD  - Atorvastatin 40 mg PO qhs    Vertigo  - Meclizine 12.5 mg PO TID PRN    Nausea/vomiting  - Resolved  - CT abdomen: no acute abdominal finding or visceral injury  - IV LR 1000 mL @ 100 cc/h  - Barium scan: wnl  - Simethicone suspension q4h PRN  - Zofran 4 mg IV q8h pRN  - Protonix 40 mg IV BID  - Miralax, senna  - Bariatric surgeon recommending to continue IV fluids  - Started Soft Bariatric phase 2 diet    Dispo: Discharge to [facility], likely today. Patient is medically stable.

## 2024-04-19 NOTE — PROGRESS NOTE ADULT - SUBJECTIVE AND OBJECTIVE BOX
Patient is a 24y old  Male who presents with a chief complaint of Visual changes with abnormal MRI (18 Apr 2024 11:29)      INTERVAL HPI/OVERNIGHT EVENTS: No acute events overnight. No new complaints.     MEDICATIONS  (STANDING):  apixaban 10 milliGRAM(s) Oral every 12 hours  atorvastatin 40 milliGRAM(s) Oral at bedtime  cloNIDine 0.1 milliGRAM(s) Oral every 8 hours  cyanocobalamin 1000 MICROGram(s) Oral daily  ergocalciferol 72935 Unit(s) Oral every week  FLUoxetine 10 milliGRAM(s) Oral daily  folic acid 1 milliGRAM(s) Oral daily  gabapentin 100 milliGRAM(s) Oral two times a day  multivitamin/minerals/iron Oral Solution (CENTRUM) 15 milliLiter(s) Oral daily  pantoprazole  Injectable 40 milliGRAM(s) IV Push two times a day  senna 1 Tablet(s) Oral once  tamsulosin 0.4 milliGRAM(s) Oral at bedtime  thiamine 100 milliGRAM(s) Oral daily  vitamin B complex with vitamin C 1 Tablet(s) Oral daily    MEDICATIONS  (PRN):  acetaminophen     Tablet .. 650 milliGRAM(s) Oral every 6 hours PRN Temp greater or equal to 38C (100.4F), Mild Pain (1 - 3)  aluminum hydroxide/magnesium hydroxide/simethicone Suspension 30 milliLiter(s) Oral every 4 hours PRN Dyspepsia  LORazepam     Tablet 0.5 milliGRAM(s) Oral three times a day PRN Anxiety  meclizine 12.5 milliGRAM(s) Oral three times a day PRN Dizziness  melatonin 3 milliGRAM(s) Oral at bedtime PRN Insomnia  metoprolol tartrate Injectable 5 milliGRAM(s) IV Push every 8 hours PRN hr over 120  OLANZapine Injectable 2.5 milliGRAM(s) IntraMuscular daily PRN Acxiety  ondansetron Injectable 4 milliGRAM(s) IV Push every 8 hours PRN Nausea and/or Vomiting  polyethylene glycol 3350 17 Gram(s) Oral daily PRN Constipation  senna 2 Tablet(s) Oral at bedtime PRN Constipation      Allergies    No Known Allergies    Intolerances        REVIEW OF SYSTEMS:  CONSTITUTIONAL: No fever or fatigue; no diplopia  RESPIRATORY: No cough; No shortness of breath  CARDIOVASCULAR: No chest pain, palpitations, dizziness, or leg swelling  GASTROINTESTINAL: No abdominal pain. No nausea, vomiting, or hematemesis; no constipation, + urinary incontinence  NEUROLOGICAL: No headaches, + numbness from lower abdomen to thighs  MUSCULOSKELETAL: No joint pain; No muscle, back, or extremity pain      Vital Signs Last 24 Hrs  T(C): 36.7 (19 Apr 2024 09:05), Max: 37.2 (18 Apr 2024 20:24)  T(F): 98 (19 Apr 2024 09:05), Max: 98.9 (18 Apr 2024 20:24)  HR: 96 (19 Apr 2024 09:05) (87 - 96)  BP: 114/66 (19 Apr 2024 09:05) (114/61 - 127/84)  BP(mean): --  RR: 18 (19 Apr 2024 09:05) (18 - 18)  SpO2: 100% (19 Apr 2024 09:05) (95% - 100%)    Parameters below as of 19 Apr 2024 09:05  Patient On (Oxygen Delivery Method): room air        PHYSICAL EXAM:  GENERAL: NAD, lying comfortably in bed  HEAD:  Atraumatic, Normocephalic  EYES: conjunctiva and sclera clear  NERVOUS SYSTEM:  Awake, alert, oriented x3, no focal deficits  CHEST/LUNG: Clear to auscultation bilaterally; No wheezes  HEART: Regular rate and rhythm; No murmurs  ABDOMEN: Soft, Nontender, Nondistended; Bowel sounds present  EXTREMITIES:  No edema      LABS:                        11.8   8.05  )-----------( 374      ( 19 Apr 2024 06:04 )             35.7     04-19    141  |  104  |  11.0  ----------------------------<  86  3.8   |  25.0  |  0.47<L>    Ca    9.0      19 Apr 2024 06:04  Phos  5.9     04-19  Mg     1.9     04-19    TPro  6.5<L>  /  Alb  3.5  /  TBili  1.3  /  DBili  x   /  AST  80<H>  /  ALT  137<H>  /  AlkPhos  96  04-19      Urinalysis Basic - ( 19 Apr 2024 06:04 )    Color: x / Appearance: x / SG: x / pH: x  Gluc: 86 mg/dL / Ketone: x  / Bili: x / Urobili: x   Blood: x / Protein: x / Nitrite: x   Leuk Esterase: x / RBC: x / WBC x   Sq Epi: x / Non Sq Epi: x / Bacteria: x      CAPILLARY BLOOD GLUCOSE          RADIOLOGY & ADDITIONAL TESTS:

## 2024-04-20 LAB
ALBUMIN SERPL ELPH-MCNC: 3.6 G/DL — SIGNIFICANT CHANGE UP (ref 3.3–5.2)
ALP SERPL-CCNC: 104 U/L — SIGNIFICANT CHANGE UP (ref 40–120)
ALT FLD-CCNC: 139 U/L — HIGH
ANION GAP SERPL CALC-SCNC: 11 MMOL/L — SIGNIFICANT CHANGE UP (ref 5–17)
APPEARANCE UR: ABNORMAL
AST SERPL-CCNC: 79 U/L — HIGH
BASOPHILS # BLD AUTO: 0.04 K/UL — SIGNIFICANT CHANGE UP (ref 0–0.2)
BASOPHILS NFR BLD AUTO: 0.4 % — SIGNIFICANT CHANGE UP (ref 0–2)
BILIRUB SERPL-MCNC: 1.4 MG/DL — SIGNIFICANT CHANGE UP (ref 0.4–2)
BILIRUB UR-MCNC: ABNORMAL
BUN SERPL-MCNC: 11.6 MG/DL — SIGNIFICANT CHANGE UP (ref 8–20)
CALCIUM SERPL-MCNC: 9.2 MG/DL — SIGNIFICANT CHANGE UP (ref 8.4–10.5)
CHLORIDE SERPL-SCNC: 102 MMOL/L — SIGNIFICANT CHANGE UP (ref 96–108)
CO2 SERPL-SCNC: 26 MMOL/L — SIGNIFICANT CHANGE UP (ref 22–29)
COLOR SPEC: SIGNIFICANT CHANGE UP
CREAT SERPL-MCNC: 0.49 MG/DL — LOW (ref 0.5–1.3)
DIFF PNL FLD: NEGATIVE — SIGNIFICANT CHANGE UP
EGFR: 145 ML/MIN/1.73M2 — SIGNIFICANT CHANGE UP
EOSINOPHIL # BLD AUTO: 0.16 K/UL — SIGNIFICANT CHANGE UP (ref 0–0.5)
EOSINOPHIL NFR BLD AUTO: 1.8 % — SIGNIFICANT CHANGE UP (ref 0–6)
GLUCOSE SERPL-MCNC: 89 MG/DL — SIGNIFICANT CHANGE UP (ref 70–99)
GLUCOSE UR QL: NEGATIVE MG/DL — SIGNIFICANT CHANGE UP
HCT VFR BLD CALC: 36 % — LOW (ref 39–50)
HGB BLD-MCNC: 12 G/DL — LOW (ref 13–17)
IMM GRANULOCYTES NFR BLD AUTO: 0.9 % — SIGNIFICANT CHANGE UP (ref 0–0.9)
KETONES UR-MCNC: NEGATIVE MG/DL — SIGNIFICANT CHANGE UP
LEUKOCYTE ESTERASE UR-ACNC: ABNORMAL
LYMPHOCYTES # BLD AUTO: 2.32 K/UL — SIGNIFICANT CHANGE UP (ref 1–3.3)
LYMPHOCYTES # BLD AUTO: 25.5 % — SIGNIFICANT CHANGE UP (ref 13–44)
MAGNESIUM SERPL-MCNC: 1.9 MG/DL — SIGNIFICANT CHANGE UP (ref 1.6–2.6)
MCHC RBC-ENTMCNC: 27.1 PG — SIGNIFICANT CHANGE UP (ref 27–34)
MCHC RBC-ENTMCNC: 33.3 GM/DL — SIGNIFICANT CHANGE UP (ref 32–36)
MCV RBC AUTO: 81.3 FL — SIGNIFICANT CHANGE UP (ref 80–100)
MONOCYTES # BLD AUTO: 0.81 K/UL — SIGNIFICANT CHANGE UP (ref 0–0.9)
MONOCYTES NFR BLD AUTO: 8.9 % — SIGNIFICANT CHANGE UP (ref 2–14)
NEUTROPHILS # BLD AUTO: 5.7 K/UL — SIGNIFICANT CHANGE UP (ref 1.8–7.4)
NEUTROPHILS NFR BLD AUTO: 62.5 % — SIGNIFICANT CHANGE UP (ref 43–77)
NITRITE UR-MCNC: NEGATIVE — SIGNIFICANT CHANGE UP
PH UR: 6 — SIGNIFICANT CHANGE UP (ref 5–8)
PHOSPHATE SERPL-MCNC: 5.5 MG/DL — HIGH (ref 2.4–4.7)
PLATELET # BLD AUTO: 386 K/UL — SIGNIFICANT CHANGE UP (ref 150–400)
POTASSIUM SERPL-MCNC: 3.7 MMOL/L — SIGNIFICANT CHANGE UP (ref 3.5–5.3)
POTASSIUM SERPL-SCNC: 3.7 MMOL/L — SIGNIFICANT CHANGE UP (ref 3.5–5.3)
PROT SERPL-MCNC: 6.7 G/DL — SIGNIFICANT CHANGE UP (ref 6.6–8.7)
PROT UR-MCNC: 30 MG/DL
RBC # BLD: 4.43 M/UL — SIGNIFICANT CHANGE UP (ref 4.2–5.8)
RBC # FLD: 16.2 % — HIGH (ref 10.3–14.5)
SODIUM SERPL-SCNC: 139 MMOL/L — SIGNIFICANT CHANGE UP (ref 135–145)
SP GR SPEC: 1.02 — SIGNIFICANT CHANGE UP (ref 1–1.03)
UROBILINOGEN FLD QL: 1 MG/DL — SIGNIFICANT CHANGE UP (ref 0.2–1)
WBC # BLD: 9.11 K/UL — SIGNIFICANT CHANGE UP (ref 3.8–10.5)
WBC # FLD AUTO: 9.11 K/UL — SIGNIFICANT CHANGE UP (ref 3.8–10.5)

## 2024-04-20 PROCEDURE — 99232 SBSQ HOSP IP/OBS MODERATE 35: CPT

## 2024-04-20 RX ADMIN — Medication 650 MILLIGRAM(S): at 23:04

## 2024-04-20 RX ADMIN — GABAPENTIN 100 MILLIGRAM(S): 400 CAPSULE ORAL at 17:55

## 2024-04-20 RX ADMIN — Medication 1 TABLET(S): at 12:55

## 2024-04-20 RX ADMIN — APIXABAN 5 MILLIGRAM(S): 2.5 TABLET, FILM COATED ORAL at 13:00

## 2024-04-20 RX ADMIN — PANTOPRAZOLE SODIUM 40 MILLIGRAM(S): 20 TABLET, DELAYED RELEASE ORAL at 17:56

## 2024-04-20 RX ADMIN — Medication 0.1 MILLIGRAM(S): at 23:05

## 2024-04-20 RX ADMIN — ATORVASTATIN CALCIUM 40 MILLIGRAM(S): 80 TABLET, FILM COATED ORAL at 23:05

## 2024-04-20 RX ADMIN — PREGABALIN 1000 MICROGRAM(S): 225 CAPSULE ORAL at 12:57

## 2024-04-20 RX ADMIN — PANTOPRAZOLE SODIUM 40 MILLIGRAM(S): 20 TABLET, DELAYED RELEASE ORAL at 06:12

## 2024-04-20 RX ADMIN — Medication 105 MILLIGRAM(S): at 23:09

## 2024-04-20 RX ADMIN — GABAPENTIN 100 MILLIGRAM(S): 400 CAPSULE ORAL at 06:13

## 2024-04-20 RX ADMIN — Medication 105 MILLIGRAM(S): at 06:12

## 2024-04-20 RX ADMIN — TAMSULOSIN HYDROCHLORIDE 0.4 MILLIGRAM(S): 0.4 CAPSULE ORAL at 23:05

## 2024-04-20 RX ADMIN — Medication 0.1 MILLIGRAM(S): at 13:02

## 2024-04-20 RX ADMIN — Medication 0.1 MILLIGRAM(S): at 06:12

## 2024-04-20 RX ADMIN — Medication 15 MILLILITER(S): at 12:56

## 2024-04-20 RX ADMIN — APIXABAN 10 MILLIGRAM(S): 2.5 TABLET, FILM COATED ORAL at 06:15

## 2024-04-20 RX ADMIN — Medication 105 MILLIGRAM(S): at 13:18

## 2024-04-20 RX ADMIN — Medication 10 MILLIGRAM(S): at 12:55

## 2024-04-20 RX ADMIN — APIXABAN 5 MILLIGRAM(S): 2.5 TABLET, FILM COATED ORAL at 17:55

## 2024-04-20 RX ADMIN — Medication 1 MILLIGRAM(S): at 12:56

## 2024-04-20 NOTE — PROGRESS NOTE ADULT - SUBJECTIVE AND OBJECTIVE BOX
Patient is a 24y old  Male who presents with a chief complaint of Visual changes with abnormal MRI (19 Apr 2024 15:41)      Patient seen and examined at bedside. No overnight events reported.     ALLERGIES:  No Known Allergies    MEDICATIONS  (STANDING):  apixaban 5 milliGRAM(s) Oral every 12 hours  atorvastatin 40 milliGRAM(s) Oral at bedtime  cloNIDine 0.1 milliGRAM(s) Oral every 8 hours  cyanocobalamin 1000 MICROGram(s) Oral daily  ergocalciferol 62701 Unit(s) Oral every week  FLUoxetine 10 milliGRAM(s) Oral daily  folic acid 1 milliGRAM(s) Oral daily  gabapentin 100 milliGRAM(s) Oral two times a day  multivitamin/minerals/iron Oral Solution (CENTRUM) 15 milliLiter(s) Oral daily  pantoprazole  Injectable 40 milliGRAM(s) IV Push two times a day  tamsulosin 0.4 milliGRAM(s) Oral at bedtime  thiamine IVPB 500 milliGRAM(s) IV Intermittent every 8 hours  vitamin B complex with vitamin C 1 Tablet(s) Oral daily    MEDICATIONS  (PRN):  acetaminophen     Tablet .. 650 milliGRAM(s) Oral every 6 hours PRN Temp greater or equal to 38C (100.4F), Mild Pain (1 - 3)  aluminum hydroxide/magnesium hydroxide/simethicone Suspension 30 milliLiter(s) Oral every 4 hours PRN Dyspepsia  LORazepam     Tablet 0.5 milliGRAM(s) Oral three times a day PRN Anxiety  meclizine 12.5 milliGRAM(s) Oral three times a day PRN Dizziness  melatonin 3 milliGRAM(s) Oral at bedtime PRN Insomnia  metoprolol tartrate Injectable 5 milliGRAM(s) IV Push every 8 hours PRN hr over 120  OLANZapine Injectable 2.5 milliGRAM(s) IntraMuscular daily PRN Acxiety  ondansetron Injectable 4 milliGRAM(s) IV Push every 8 hours PRN Nausea and/or Vomiting  polyethylene glycol 3350 17 Gram(s) Oral daily PRN Constipation  senna 2 Tablet(s) Oral at bedtime PRN Constipation    Vital Signs Last 24 Hrs  T(F): 97.5 (20 Apr 2024 12:54), Max: 98.5 (20 Apr 2024 05:44)  HR: 64 (20 Apr 2024 12:54) (60 - 81)  BP: 105/64 (20 Apr 2024 12:54) (100/67 - 119/62)  RR: 18 (20 Apr 2024 12:54) (17 - 18)  SpO2: 97% (20 Apr 2024 12:54) (96% - 98%)  I&O's Summary        PHYSICAL EXAM:  GENERAL: NAD, lying comfortably in bed  HEAD:  Atraumatic, Normocephalic  EYES: conjunctiva and sclera clear  NERVOUS SYSTEM:  Awake, alert, oriented x3, no focal deficits  CHEST/LUNG: Clear to auscultation bilaterally; No wheezes  HEART: Regular rate and rhythm; No murmurs  ABDOMEN: Soft, Nontender, Nondistended; Bowel sounds present  EXTREMITIES:  No edema  Detailed Neurologic Exam:  Mental status: The patient is sleepy, awakens easily. Oriented to hospital, self, follows commands, Extraocular motion full . Facial sensation can not be assessed. no facial droop ,  There is no tremor., Moves extremities to stimuli, Sensation: intact FT in 4 ext except for mild decrease at bottom on left LE    Cerebellar: no dysmetria on finger to nose testing.    Gait : deferred        LABS:                        12.0   9.11  )-----------( 386      ( 20 Apr 2024 06:25 )             36.0     04-20    139  |  102  |  11.6  ----------------------------<  89  3.7   |  26.0  |  0.49    Ca    9.2      20 Apr 2024 06:25  Phos  5.5     04-20  Mg     1.9     04-20    TPro  6.7  /  Alb  3.6  /  TBili  1.4  /  DBili  x   /  AST  79  /  ALT  139  /  AlkPhos  104  04-20          04-08 Chol 168 mg/dL LDL -- HDL 30 mg/dL Trig 110 mg/dL        Urinalysis Basic - ( 20 Apr 2024 06:25 )    Color: x / Appearance: x / SG: x / pH: x  Gluc: 89 mg/dL / Ketone: x  / Bili: x / Urobili: x   Blood: x / Protein: x / Nitrite: x   Leuk Esterase: x / RBC: x / WBC x   Sq Epi: x / Non Sq Epi: x / Bacteria: x          RADIOLOGY & ADDITIONAL TESTS:    Care Discussed with Consultants/Other Providers:    Patient is a 24y old  Male who presents with a chief complaint of Visual changes with abnormal MRI (19 Apr 2024 15:41)      Patient seen and examined at bedside. c/o constipation. was given miralax but states enema works better.   no new neuro deficits   mother at bedside.  plan for dc to [facility] / awaiting family's decision re facility     ALLERGIES:  No Known Allergies    MEDICATIONS  (STANDING):  apixaban 5 milliGRAM(s) Oral every 12 hours  atorvastatin 40 milliGRAM(s) Oral at bedtime  cloNIDine 0.1 milliGRAM(s) Oral every 8 hours  cyanocobalamin 1000 MICROGram(s) Oral daily  ergocalciferol 75322 Unit(s) Oral every week  FLUoxetine 10 milliGRAM(s) Oral daily  folic acid 1 milliGRAM(s) Oral daily  gabapentin 100 milliGRAM(s) Oral two times a day  multivitamin/minerals/iron Oral Solution (CENTRUM) 15 milliLiter(s) Oral daily  pantoprazole  Injectable 40 milliGRAM(s) IV Push two times a day  tamsulosin 0.4 milliGRAM(s) Oral at bedtime  thiamine IVPB 500 milliGRAM(s) IV Intermittent every 8 hours  vitamin B complex with vitamin C 1 Tablet(s) Oral daily    MEDICATIONS  (PRN):  acetaminophen     Tablet .. 650 milliGRAM(s) Oral every 6 hours PRN Temp greater or equal to 38C (100.4F), Mild Pain (1 - 3)  aluminum hydroxide/magnesium hydroxide/simethicone Suspension 30 milliLiter(s) Oral every 4 hours PRN Dyspepsia  LORazepam     Tablet 0.5 milliGRAM(s) Oral three times a day PRN Anxiety  meclizine 12.5 milliGRAM(s) Oral three times a day PRN Dizziness  melatonin 3 milliGRAM(s) Oral at bedtime PRN Insomnia  metoprolol tartrate Injectable 5 milliGRAM(s) IV Push every 8 hours PRN hr over 120  OLANZapine Injectable 2.5 milliGRAM(s) IntraMuscular daily PRN Acxiety  ondansetron Injectable 4 milliGRAM(s) IV Push every 8 hours PRN Nausea and/or Vomiting  polyethylene glycol 3350 17 Gram(s) Oral daily PRN Constipation  senna 2 Tablet(s) Oral at bedtime PRN Constipation    Vital Signs Last 24 Hrs  T(F): 97.5 (20 Apr 2024 12:54), Max: 98.5 (20 Apr 2024 05:44)  HR: 64 (20 Apr 2024 12:54) (60 - 81)  BP: 105/64 (20 Apr 2024 12:54) (100/67 - 119/62)  RR: 18 (20 Apr 2024 12:54) (17 - 18)  SpO2: 97% (20 Apr 2024 12:54) (96% - 98%)  I&O's Summary        PHYSICAL EXAM:  GENERAL: NAD, lying comfortably in bed  HEAD:  Atraumatic, Normocephalic  EYES: conjunctiva and sclera clear  NERVOUS SYSTEM:  Awake, alert, oriented x3, no focal deficits  CHEST/LUNG: Clear to auscultation bilaterally; No wheezes  HEART: Regular rate and rhythm; No murmurs  ABDOMEN: Soft, Nontender, Nondistended; Bowel sounds present  EXTREMITIES:  No edema  Detailed Neurologic Exam:  Mental status: The patient is sleepy, awakens easily. Oriented to hospital, self, follows commands, Extraocular motion full . Facial sensation can not be assessed. no facial droop ,  There is no tremor., Moves extremities to stimuli, Sensation: intact FT in 4 ext except for mild decrease at bottom on left LE      LABS:                        12.0   9.11  )-----------( 386      ( 20 Apr 2024 06:25 )             36.0     04-20    139  |  102  |  11.6  ----------------------------<  89  3.7   |  26.0  |  0.49    Ca    9.2      20 Apr 2024 06:25  Phos  5.5     04-20  Mg     1.9     04-20    TPro  6.7  /  Alb  3.6  /  TBili  1.4  /  DBili  x   /  AST  79  /  ALT  139  /  AlkPhos  104  04-20          04-08 Chol 168 mg/dL LDL -- HDL 30 mg/dL Trig 110 mg/dL        Urinalysis Basic - ( 20 Apr 2024 06:25 )    Color: x / Appearance: x / SG: x / pH: x  Gluc: 89 mg/dL / Ketone: x  / Bili: x / Urobili: x   Blood: x / Protein: x / Nitrite: x   Leuk Esterase: x / RBC: x / WBC x   Sq Epi: x / Non Sq Epi: x / Bacteria: x          RADIOLOGY & ADDITIONAL TESTS:    Care Discussed with Consultants/Other Providers:

## 2024-04-20 NOTE — PROGRESS NOTE ADULT - ASSESSMENT
24M PMHx obesity, anxiety, depression, s/p gastric sleeve surgery presenting for diplopia, leg weakness, decreased PO intake, and urinary retention. Imaging negative. Etiology likely adjustment disorder and vitamin deficiency after surgery.     AMS, Leg Weakness, Diplopia, Urinary Retention  - CT head: hypodensities in R parietal and temporal lobes  - MRI brain/MRA head/MRI lumbosacral spine performed  - No stroke, mass, hemorrhage, aneurysm, LVO, critical stenosis, or cauda equina  - Repeat MRI t-spine: no T3-T4 edema; slight disc protrusion in T6-7  - No intervention per neurosurgery  - VBG w/ lytes: unremarkable  - Repeat CTH, EEG non contributory  - Ammonia level wnl  - Follow up with psychiatrist outpatient for possible CBT  - US b/l LE to r/o DVT: negative  - Gabapentin 100 mg BID   - PM&R consulted, recs noted  - Daily PT    Anxiety  - Prozac 10 mg qd  - Clonidine 0.1 mg q8h  -  recs noted    Possible Vitamin Deficiency  - Had poor PO intake for past 2 months: possible vitamin deficiency after bariatric surgery  - Vitamin D weekly, folic acid and vitamin B supplements  - Vitamin B12 wnl  - Repeat Vitamin B2 level due to lab request (was not kept away from light) ---> Pending  - B1 and B7 low; on daily vitamins  - May be contributing to bilateral leg weakness/lethargy    Urinary Retention  - Tamsulosin 0.4 mg PO qhs  - Bladder scan q6h  - Rodriguez removed  - Urinary incontinence now    PE  - CT chest (4/9): pulmonary embolism, no R heart strain  - US LE: no DVT  - Eliquis 10 mg BID x7 days, then 5 mg PO BID  - On room air  - Telemetry monitoring  - TTE to r/o R heart strain: wnl    HLD  - Atorvastatin 40 mg PO qhs    Vertigo  - Meclizine 12.5 mg PO TID PRN    Nausea/vomiting  - Resolved  - CT abdomen: no acute abdominal finding or visceral injury  - IV LR 1000 mL @ 100 cc/h  - Barium scan: wnl  - Simethicone suspension q4h PRN  - Zofran 4 mg IV q8h pRN  - Protonix 40 mg IV BID  - Miralax, senna  - Bariatric surgeon recommending to continue IV fluids  - Started Soft Bariatric phase 2 diet    Dispo: Discharge to [facility], likely today. Patient is medically stable      24M PMHx obesity, anxiety, depression, s/p gastric sleeve surgery presenting for diplopia, leg weakness, decreased PO intake, and urinary retention. Imaging negative. Etiology likely adjustment disorder and vitamin deficiency after surgery.     AMS, Leg Weakness, Diplopia, Urinary Retention  - CT head: hypodensity in R parietal and temporal lobes  - MRI brain/MRA head/MRI lumbosacral spine performed:  No stroke, mass, hemorrhage, aneurysm, LVO, critical stenosis, or cauda equina  - Repeat MRI t-spine: no T3-T4 edema; slight disc protrusion in T6-7: seen by neuro sx ,  No intervention per neurosurgery  - Repeat CTH, EEG non contributory  - Ammonia level wnl  - Follow up with psychiatrist outpatient for possible CBT  - US b/l LE to r/o DVT: negative  - Gabapentin 100 mg BID   - PM&R consulted, recs noted  - Daily PT    Anxiety  - Prozac 10 mg qd  - Clonidine 0.1 mg q8h  -  recs noted    Vitamin Deficiency  - Had poor PO intake for past 2 months: possible vitamin deficiency after bariatric surgery  - Vitamin D weekly, folic acid and vitamin B supplements  - Vitamin B12 wnl  - Repeat Vitamin B2 level due to lab request (was not kept away from light) ---> Pending  - B1 and B7 low; on daily vitamins  - May be contributing to bilateral leg weakness/lethargy    Urinary Retention  - Tamsulosin 0.4 mg PO qhs  - Bladder scan q6h  - Rodriguez removed  - Urinary incontinence now    PE  - CT chest (4/9): pulmonary embolism, no R heart strain  - US LE: no DVT  - Eliquis 10 mg BID x7 days, then 5 mg PO BID  - On room air  - Telemetry monitoring  - TTE to r/o R heart strain: wnl    HLD  - Atorvastatin 40 mg PO qhs    Vertigo  - Meclizine 12.5 mg PO TID PRN    Nausea/vomiting  - Resolved  - CT abdomen: no acute abdominal finding or visceral injury  - IV LR 1000 mL @ 100 cc/h  - Barium scan: wnl  - Simethicone suspension q4h PRN  - Zofran 4 mg IV q8h pRN  - Protonix 40 mg IV BID  - Miralax, senna  - Bariatric surgeon recommending to continue IV fluids  - Started Soft Bariatric phase 2 diet    constipation   - po miraalax   - will give enema x 1     Dispo: plan of care discussed with patient , mother at bedside.  Patient is medically stable . awaiting family's decision to pick a [facility] facility

## 2024-04-21 LAB
ALBUMIN SERPL ELPH-MCNC: 3.6 G/DL — SIGNIFICANT CHANGE UP (ref 3.3–5.2)
ALP SERPL-CCNC: 102 U/L — SIGNIFICANT CHANGE UP (ref 40–120)
ALT FLD-CCNC: 130 U/L — HIGH
ANION GAP SERPL CALC-SCNC: 13 MMOL/L — SIGNIFICANT CHANGE UP (ref 5–17)
AST SERPL-CCNC: 73 U/L — HIGH
BACTERIA # UR AUTO: ABNORMAL /HPF
BILIRUB SERPL-MCNC: 1.4 MG/DL — SIGNIFICANT CHANGE UP (ref 0.4–2)
BUN SERPL-MCNC: 9.2 MG/DL — SIGNIFICANT CHANGE UP (ref 8–20)
CALCIUM SERPL-MCNC: 9.1 MG/DL — SIGNIFICANT CHANGE UP (ref 8.4–10.5)
CHLORIDE SERPL-SCNC: 101 MMOL/L — SIGNIFICANT CHANGE UP (ref 96–108)
CO2 SERPL-SCNC: 24 MMOL/L — SIGNIFICANT CHANGE UP (ref 22–29)
CREAT SERPL-MCNC: 0.47 MG/DL — LOW (ref 0.5–1.3)
EGFR: 147 ML/MIN/1.73M2 — SIGNIFICANT CHANGE UP
GLUCOSE SERPL-MCNC: 90 MG/DL — SIGNIFICANT CHANGE UP (ref 70–99)
HCT VFR BLD CALC: 37.6 % — LOW (ref 39–50)
HGB BLD-MCNC: 12.5 G/DL — LOW (ref 13–17)
MAGNESIUM SERPL-MCNC: 1.8 MG/DL — SIGNIFICANT CHANGE UP (ref 1.6–2.6)
MCHC RBC-ENTMCNC: 27.2 PG — SIGNIFICANT CHANGE UP (ref 27–34)
MCHC RBC-ENTMCNC: 33.2 GM/DL — SIGNIFICANT CHANGE UP (ref 32–36)
MCV RBC AUTO: 81.7 FL — SIGNIFICANT CHANGE UP (ref 80–100)
PLATELET # BLD AUTO: 403 K/UL — HIGH (ref 150–400)
POTASSIUM SERPL-MCNC: 3.7 MMOL/L — SIGNIFICANT CHANGE UP (ref 3.5–5.3)
POTASSIUM SERPL-SCNC: 3.7 MMOL/L — SIGNIFICANT CHANGE UP (ref 3.5–5.3)
PROT SERPL-MCNC: 6.5 G/DL — LOW (ref 6.6–8.7)
RBC # BLD: 4.6 M/UL — SIGNIFICANT CHANGE UP (ref 4.2–5.8)
RBC # FLD: 16.4 % — HIGH (ref 10.3–14.5)
RBC CASTS # UR COMP ASSIST: 1 /HPF — SIGNIFICANT CHANGE UP (ref 0–4)
SODIUM SERPL-SCNC: 138 MMOL/L — SIGNIFICANT CHANGE UP (ref 135–145)
SQUAMOUS # UR AUTO: 4 /HPF — SIGNIFICANT CHANGE UP (ref 0–5)
VIT B2 SERPL-MCNC: 253 UG/L — SIGNIFICANT CHANGE UP (ref 137–370)
WBC # BLD: 8.6 K/UL — SIGNIFICANT CHANGE UP (ref 3.8–10.5)
WBC # FLD AUTO: 8.6 K/UL — SIGNIFICANT CHANGE UP (ref 3.8–10.5)
WBC UR QL: 58 /HPF — HIGH (ref 0–5)

## 2024-04-21 PROCEDURE — 99232 SBSQ HOSP IP/OBS MODERATE 35: CPT | Mod: GC

## 2024-04-21 RX ORDER — APIXABAN 2.5 MG/1
1 TABLET, FILM COATED ORAL
Qty: 28 | Refills: 0
Start: 2024-04-21 | End: 2024-05-04

## 2024-04-21 RX ORDER — LACTULOSE 10 G/15ML
15 SOLUTION ORAL EVERY 6 HOURS
Refills: 0 | Status: COMPLETED | OUTPATIENT
Start: 2024-04-21 | End: 2024-04-22

## 2024-04-21 RX ORDER — CEFTRIAXONE 500 MG/1
1000 INJECTION, POWDER, FOR SOLUTION INTRAMUSCULAR; INTRAVENOUS ONCE
Refills: 0 | Status: COMPLETED | OUTPATIENT
Start: 2024-04-21 | End: 2024-04-21

## 2024-04-21 RX ORDER — CEFTRIAXONE 500 MG/1
1000 INJECTION, POWDER, FOR SOLUTION INTRAMUSCULAR; INTRAVENOUS EVERY 24 HOURS
Refills: 0 | Status: DISCONTINUED | OUTPATIENT
Start: 2024-04-22 | End: 2024-04-23

## 2024-04-21 RX ORDER — CEFTRIAXONE 500 MG/1
INJECTION, POWDER, FOR SOLUTION INTRAMUSCULAR; INTRAVENOUS
Refills: 0 | Status: DISCONTINUED | OUTPATIENT
Start: 2024-04-21 | End: 2024-04-23

## 2024-04-21 RX ADMIN — Medication 105 MILLIGRAM(S): at 06:25

## 2024-04-21 RX ADMIN — Medication 650 MILLIGRAM(S): at 07:30

## 2024-04-21 RX ADMIN — PANTOPRAZOLE SODIUM 40 MILLIGRAM(S): 20 TABLET, DELAYED RELEASE ORAL at 18:09

## 2024-04-21 RX ADMIN — GABAPENTIN 100 MILLIGRAM(S): 400 CAPSULE ORAL at 18:09

## 2024-04-21 RX ADMIN — Medication 12.5 MILLIGRAM(S): at 12:16

## 2024-04-21 RX ADMIN — ATORVASTATIN CALCIUM 40 MILLIGRAM(S): 80 TABLET, FILM COATED ORAL at 22:00

## 2024-04-21 RX ADMIN — Medication 10 MILLIGRAM(S): at 12:17

## 2024-04-21 RX ADMIN — TAMSULOSIN HYDROCHLORIDE 0.4 MILLIGRAM(S): 0.4 CAPSULE ORAL at 22:00

## 2024-04-21 RX ADMIN — Medication 0.1 MILLIGRAM(S): at 14:54

## 2024-04-21 RX ADMIN — Medication 15 MILLILITER(S): at 12:16

## 2024-04-21 RX ADMIN — Medication 0.5 MILLIGRAM(S): at 02:08

## 2024-04-21 RX ADMIN — APIXABAN 5 MILLIGRAM(S): 2.5 TABLET, FILM COATED ORAL at 06:25

## 2024-04-21 RX ADMIN — Medication 105 MILLIGRAM(S): at 14:55

## 2024-04-21 RX ADMIN — GABAPENTIN 100 MILLIGRAM(S): 400 CAPSULE ORAL at 06:25

## 2024-04-21 RX ADMIN — Medication 650 MILLIGRAM(S): at 00:30

## 2024-04-21 RX ADMIN — Medication 0.1 MILLIGRAM(S): at 22:00

## 2024-04-21 RX ADMIN — Medication 650 MILLIGRAM(S): at 06:27

## 2024-04-21 RX ADMIN — LACTULOSE 15 GRAM(S): 10 SOLUTION ORAL at 18:10

## 2024-04-21 RX ADMIN — PREGABALIN 1000 MICROGRAM(S): 225 CAPSULE ORAL at 12:21

## 2024-04-21 RX ADMIN — PANTOPRAZOLE SODIUM 40 MILLIGRAM(S): 20 TABLET, DELAYED RELEASE ORAL at 06:25

## 2024-04-21 RX ADMIN — CEFTRIAXONE 1000 MILLIGRAM(S): 500 INJECTION, POWDER, FOR SOLUTION INTRAMUSCULAR; INTRAVENOUS at 01:47

## 2024-04-21 RX ADMIN — Medication 105 MILLIGRAM(S): at 23:03

## 2024-04-21 RX ADMIN — Medication 1 TABLET(S): at 12:17

## 2024-04-21 RX ADMIN — Medication 0.5 MILLIGRAM(S): at 10:07

## 2024-04-21 RX ADMIN — APIXABAN 5 MILLIGRAM(S): 2.5 TABLET, FILM COATED ORAL at 18:13

## 2024-04-21 RX ADMIN — Medication 1 MILLIGRAM(S): at 12:21

## 2024-04-21 NOTE — PROGRESS NOTE ADULT - ASSESSMENT
ASSESSMENT: 24M PMHx obesity, anxiety, depression, s/p gastric sleeve surgery presenting for diplopia, leg weakness, decreased PO intake, and urinary retention. Imaging negative. Etiology likely adjustment disorder and thiamine deficiency after surgery.     PLAN:     UTI  - Urinalysis positive  - Afebrile, no leukocytosis  - Ceftriaxone until 4/25/2024  - Urine culture and sensitivity ---> Pending    Constipation  - s/p 2 enemas but no BM  - Miralax, senna  - Lactulose 15g q6h x 2 doses  - Monitor for BM    AMS, Leg Weakness, Diplopia, Urinary Retention  - CT head: hypodensity in R parietal and temporal lobes  - MRI brain/MRA head/MRI lumbosacral spine performed:  No stroke, mass, hemorrhage, aneurysm, LVO, critical stenosis, or cauda equina  - Repeat MRI t-spine: no T3-T4 edema; slight disc protrusion in T6-7: seen by neuro sx ,  No intervention per neurosurgery  - Repeat CTH, EEG non contributory  - Ammonia level wnl  - Follow up with psychiatrist outpatient for possible CBT  - US b/l LE to r/o DVT: negative  - Gabapentin 100 mg BID   - PM&R consulted, recs noted  - Daily PT for leg weakness  - AMS, diplopia, and urinary retention resolved    Anxiety  - Prozac 10 mg qd  - Clonidine 0.1 mg q8h  - Lorazepam 0.5 mg TID PRN  -  recs noted    Vitamin Deficiency  - Had poor PO intake for past 2 months: possible vitamin deficiency after bariatric surgery  - Vitamin D weekly, folic acid and vitamin B supplements  - Vitamin B12 wnl  - High dose thiamine  - Continue daily vitamins  - May be contributing to bilateral leg weakness/lethargy    Urinary Retention - resolved  - Tamsulosin 0.4 mg PO qhs  - Rodriguez removed    PE  - CT chest (4/9): pulmonary embolism, no R heart strain  - US LE: no DVT  - Eliquis 10 mg BID x7 days, then 5 mg PO BID x 90 days  - On room air  - Telemetry monitoring  - TTE to r/o R heart strain: wnl    HLD  - Atorvastatin 40 mg PO qhs    Vertigo  - Meclizine 12.5 mg PO TID PRN    Nausea/vomiting  - Resolved  - CT abdomen: no acute abdominal finding or visceral injury  - IV LR 1000 mL @ 100 cc/h  - Barium scan: wnl  - Simethicone suspension q4h PRN  - Zofran 4 mg IV q8h pRN  - Protonix 40 mg IV BID  - Miralax, senna  - Bariatric surgeon recommending to continue IV fluids  - Started Soft Bariatric phase 2 diet    Dispo: Patient is medically stable for d/c. Waiting for family to select a [facility] facility

## 2024-04-21 NOTE — PROGRESS NOTE ADULT - ATTENDING COMMENTS
c/o dysuria , ua pos   awaiting ucxs   started on ceftriaxone   started on lactulose / enema for constipation   family deciding about [facility] facility

## 2024-04-21 NOTE — PROGRESS NOTE ADULT - SUBJECTIVE AND OBJECTIVE BOX
Patient is a 24y old  Male who presents with a chief complaint of Visual changes with abnormal MRI (20 Apr 2024 14:27)      INTERVAL HPI/OVERNIGHT EVENTS: Patient's mother reported that patient was having dark, foul smelling urine. UA found to be positive and patient was started on Ceftriaxone overnight. Patient reports feeling constipated today despite receiving 2 enemas. Patient also reports feeling anxious due to not having a BM.     MEDICATIONS  (STANDING):  apixaban 5 milliGRAM(s) Oral every 12 hours  atorvastatin 40 milliGRAM(s) Oral at bedtime  cefTRIAXone Injectable.      cloNIDine 0.1 milliGRAM(s) Oral every 8 hours  cyanocobalamin 1000 MICROGram(s) Oral daily  ergocalciferol 20430 Unit(s) Oral every week  FLUoxetine 10 milliGRAM(s) Oral daily  folic acid 1 milliGRAM(s) Oral daily  gabapentin 100 milliGRAM(s) Oral two times a day  lactulose Syrup 15 Gram(s) Oral every 6 hours  multivitamin/minerals/iron Oral Solution (CENTRUM) 15 milliLiter(s) Oral daily  pantoprazole  Injectable 40 milliGRAM(s) IV Push two times a day  tamsulosin 0.4 milliGRAM(s) Oral at bedtime  thiamine IVPB 500 milliGRAM(s) IV Intermittent every 8 hours  vitamin B complex with vitamin C 1 Tablet(s) Oral daily    MEDICATIONS  (PRN):  acetaminophen     Tablet .. 650 milliGRAM(s) Oral every 6 hours PRN Temp greater or equal to 38C (100.4F), Mild Pain (1 - 3)  aluminum hydroxide/magnesium hydroxide/simethicone Suspension 30 milliLiter(s) Oral every 4 hours PRN Dyspepsia  LORazepam     Tablet 0.5 milliGRAM(s) Oral three times a day PRN Anxiety  meclizine 12.5 milliGRAM(s) Oral three times a day PRN Dizziness  melatonin 3 milliGRAM(s) Oral at bedtime PRN Insomnia  metoprolol tartrate Injectable 5 milliGRAM(s) IV Push every 8 hours PRN hr over 120  OLANZapine Injectable 2.5 milliGRAM(s) IntraMuscular daily PRN Acxiety  ondansetron Injectable 4 milliGRAM(s) IV Push every 8 hours PRN Nausea and/or Vomiting  polyethylene glycol 3350 17 Gram(s) Oral daily PRN Constipation  senna 2 Tablet(s) Oral at bedtime PRN Constipation      Allergies    No Known Allergies    Intolerances        REVIEW OF SYSTEMS:  CONSTITUTIONAL: No fever or fatigue  RESPIRATORY: No cough; No shortness of breath  CARDIOVASCULAR: No chest pain,  dizziness, or leg swelling  GASTROINTESTINAL: + abdominal bloating, no abdominal pain. No nausea or vomiting; + constipation  NEUROLOGICAL: No headaches, numbness, or tremors  MUSCULOSKELETAL: No joint pain; No muscle, back, or extremity pain      Vital Signs Last 24 Hrs  T(C): 36.4 (21 Apr 2024 09:00), Max: 36.8 (20 Apr 2024 16:33)  T(F): 97.6 (21 Apr 2024 09:00), Max: 98.2 (20 Apr 2024 16:33)  HR: 69 (21 Apr 2024 09:00) (69 - 92)  BP: 110/71 (21 Apr 2024 09:00) (86/55 - 140/73)  BP(mean): --  RR: 18 (21 Apr 2024 09:00) (18 - 18)  SpO2: 99% (21 Apr 2024 09:00) (97% - 99%)    Parameters below as of 21 Apr 2024 09:00  Patient On (Oxygen Delivery Method): room air        PHYSICAL EXAM:  GENERAL: NAD, lying in bed comfortably, anxious affect  HEAD:  Atraumatic, Normocephalic  EYES: conjunctiva and sclera clear  NERVOUS SYSTEM:  Alert & Oriented X3, No gross focal deficits  CHEST/LUNG: Clear to auscultation bilaterally; No wheezes  HEART: Regular rate and rhythm; No murmurs  ABDOMEN: Soft, obese, Nontender, +distended; Bowel sounds present  EXTREMITIES:  No cyanosis, or edema    LABS:                        12.5   8.60  )-----------( 403      ( 21 Apr 2024 07:50 )             37.6     04-21    138  |  101  |  9.2  ----------------------------<  90  3.7   |  24.0  |  0.47<L>    Ca    9.1      21 Apr 2024 07:50  Phos  5.5     04-20  Mg     1.8     04-21    TPro  6.5<L>  /  Alb  3.6  /  TBili  1.4  /  DBili  x   /  AST  73<H>  /  ALT  130<H>  /  AlkPhos  102  04-21      Urinalysis Basic - ( 21 Apr 2024 07:50 )    Color: x / Appearance: x / SG: x / pH: x  Gluc: 90 mg/dL / Ketone: x  / Bili: x / Urobili: x   Blood: x / Protein: x / Nitrite: x   Leuk Esterase: x / RBC: x / WBC x   Sq Epi: x / Non Sq Epi: x / Bacteria: x      CAPILLARY BLOOD GLUCOSE          RADIOLOGY & ADDITIONAL TESTS:

## 2024-04-21 NOTE — CHART NOTE - NSCHARTNOTEFT_GEN_A_CORE
Pt mother concerned pt having dark, foul smelling urine,   Requesting UA sent  Otherwise asymptomatic, resting comfortably in NAD  All vital signs stable, afebrile  UA positive  UCx sent   Start Rocephin

## 2024-04-22 LAB
ALBUMIN SERPL ELPH-MCNC: 3.8 G/DL — SIGNIFICANT CHANGE UP (ref 3.3–5.2)
ALP SERPL-CCNC: 108 U/L — SIGNIFICANT CHANGE UP (ref 40–120)
ALT FLD-CCNC: 124 U/L — HIGH
ANION GAP SERPL CALC-SCNC: 15 MMOL/L — SIGNIFICANT CHANGE UP (ref 5–17)
AST SERPL-CCNC: 68 U/L — HIGH
BASOPHILS # BLD AUTO: 0.04 K/UL — SIGNIFICANT CHANGE UP (ref 0–0.2)
BASOPHILS NFR BLD AUTO: 0.4 % — SIGNIFICANT CHANGE UP (ref 0–2)
BILIRUB SERPL-MCNC: 1.5 MG/DL — SIGNIFICANT CHANGE UP (ref 0.4–2)
BUN SERPL-MCNC: 8 MG/DL — SIGNIFICANT CHANGE UP (ref 8–20)
CALCIUM SERPL-MCNC: 9.5 MG/DL — SIGNIFICANT CHANGE UP (ref 8.4–10.5)
CHLORIDE SERPL-SCNC: 104 MMOL/L — SIGNIFICANT CHANGE UP (ref 96–108)
CO2 SERPL-SCNC: 23 MMOL/L — SIGNIFICANT CHANGE UP (ref 22–29)
CREAT SERPL-MCNC: 0.47 MG/DL — LOW (ref 0.5–1.3)
EGFR: 147 ML/MIN/1.73M2 — SIGNIFICANT CHANGE UP
EOSINOPHIL # BLD AUTO: 0.11 K/UL — SIGNIFICANT CHANGE UP (ref 0–0.5)
EOSINOPHIL NFR BLD AUTO: 1.1 % — SIGNIFICANT CHANGE UP (ref 0–6)
GLUCOSE SERPL-MCNC: 94 MG/DL — SIGNIFICANT CHANGE UP (ref 70–99)
HCT VFR BLD CALC: 39.3 % — SIGNIFICANT CHANGE UP (ref 39–50)
HGB BLD-MCNC: 12.7 G/DL — LOW (ref 13–17)
IMM GRANULOCYTES NFR BLD AUTO: 0.5 % — SIGNIFICANT CHANGE UP (ref 0–0.9)
LYMPHOCYTES # BLD AUTO: 1.86 K/UL — SIGNIFICANT CHANGE UP (ref 1–3.3)
LYMPHOCYTES # BLD AUTO: 19.1 % — SIGNIFICANT CHANGE UP (ref 13–44)
MAGNESIUM SERPL-MCNC: 1.9 MG/DL — SIGNIFICANT CHANGE UP (ref 1.6–2.6)
MCHC RBC-ENTMCNC: 26.7 PG — LOW (ref 27–34)
MCHC RBC-ENTMCNC: 32.3 GM/DL — SIGNIFICANT CHANGE UP (ref 32–36)
MCV RBC AUTO: 82.7 FL — SIGNIFICANT CHANGE UP (ref 80–100)
MONOCYTES # BLD AUTO: 0.59 K/UL — SIGNIFICANT CHANGE UP (ref 0–0.9)
MONOCYTES NFR BLD AUTO: 6.1 % — SIGNIFICANT CHANGE UP (ref 2–14)
NEUTROPHILS # BLD AUTO: 7.07 K/UL — SIGNIFICANT CHANGE UP (ref 1.8–7.4)
NEUTROPHILS NFR BLD AUTO: 72.8 % — SIGNIFICANT CHANGE UP (ref 43–77)
NICOTINAMIDE: 29.7 NG/ML — SIGNIFICANT CHANGE UP (ref 5.2–72.1)
NICOTINAMIDE: 64.4 NG/ML — SIGNIFICANT CHANGE UP (ref 5.2–72.1)
NICOTINIC ACID: <5 NG/ML — SIGNIFICANT CHANGE UP (ref 0–5)
NICOTINIC ACID: <5 NG/ML — SIGNIFICANT CHANGE UP (ref 0–5)
PHOSPHATE SERPL-MCNC: 5.2 MG/DL — HIGH (ref 2.4–4.7)
PLATELET # BLD AUTO: 427 K/UL — HIGH (ref 150–400)
POTASSIUM SERPL-MCNC: 3.9 MMOL/L — SIGNIFICANT CHANGE UP (ref 3.5–5.3)
POTASSIUM SERPL-SCNC: 3.9 MMOL/L — SIGNIFICANT CHANGE UP (ref 3.5–5.3)
PROT SERPL-MCNC: 7 G/DL — SIGNIFICANT CHANGE UP (ref 6.6–8.7)
RBC # BLD: 4.75 M/UL — SIGNIFICANT CHANGE UP (ref 4.2–5.8)
RBC # FLD: 16.4 % — HIGH (ref 10.3–14.5)
SODIUM SERPL-SCNC: 141 MMOL/L — SIGNIFICANT CHANGE UP (ref 135–145)
WBC # BLD: 9.72 K/UL — SIGNIFICANT CHANGE UP (ref 3.8–10.5)
WBC # FLD AUTO: 9.72 K/UL — SIGNIFICANT CHANGE UP (ref 3.8–10.5)

## 2024-04-22 PROCEDURE — 99232 SBSQ HOSP IP/OBS MODERATE 35: CPT | Mod: GC

## 2024-04-22 PROCEDURE — 74176 CT ABD & PELVIS W/O CONTRAST: CPT | Mod: 26

## 2024-04-22 PROCEDURE — 73562 X-RAY EXAM OF KNEE 3: CPT | Mod: 26,RT

## 2024-04-22 RX ORDER — LACTULOSE 10 G/15ML
15 SOLUTION ORAL ONCE
Refills: 0 | Status: DISCONTINUED | OUTPATIENT
Start: 2024-04-22 | End: 2024-04-22

## 2024-04-22 RX ORDER — ACETAMINOPHEN 500 MG
1000 TABLET ORAL ONCE
Refills: 0 | Status: COMPLETED | OUTPATIENT
Start: 2024-04-22 | End: 2024-04-23

## 2024-04-22 RX ORDER — LACTULOSE 10 G/15ML
15 SOLUTION ORAL DAILY
Refills: 0 | Status: DISCONTINUED | OUTPATIENT
Start: 2024-04-22 | End: 2024-04-29

## 2024-04-22 RX ORDER — PHENYLEPHRINE-SHARK LIVER OIL-MINERAL OIL-PETROLATUM RECTAL OINTMENT
1 OINTMENT (GRAM) RECTAL DAILY
Refills: 0 | Status: DISCONTINUED | OUTPATIENT
Start: 2024-04-22 | End: 2024-04-24

## 2024-04-22 RX ORDER — ACETAMINOPHEN 500 MG
1000 TABLET ORAL ONCE
Refills: 0 | Status: COMPLETED | OUTPATIENT
Start: 2024-04-22 | End: 2024-04-22

## 2024-04-22 RX ORDER — LIDOCAINE HCL 20 MG/ML
10 VIAL (ML) INJECTION ONCE
Refills: 0 | Status: DISCONTINUED | OUTPATIENT
Start: 2024-04-22 | End: 2024-04-29

## 2024-04-22 RX ORDER — PHENYLEPHRINE-SHARK LIVER OIL-MINERAL OIL-PETROLATUM RECTAL OINTMENT
1 OINTMENT (GRAM) RECTAL ONCE
Refills: 0 | Status: COMPLETED | OUTPATIENT
Start: 2024-04-22 | End: 2024-04-22

## 2024-04-22 RX ORDER — MAGNESIUM SULFATE 500 MG/ML
2 VIAL (ML) INJECTION ONCE
Refills: 0 | Status: COMPLETED | OUTPATIENT
Start: 2024-04-22 | End: 2024-04-22

## 2024-04-22 RX ADMIN — TAMSULOSIN HYDROCHLORIDE 0.4 MILLIGRAM(S): 0.4 CAPSULE ORAL at 21:38

## 2024-04-22 RX ADMIN — APIXABAN 5 MILLIGRAM(S): 2.5 TABLET, FILM COATED ORAL at 18:05

## 2024-04-22 RX ADMIN — Medication 10 MILLIGRAM(S): at 11:53

## 2024-04-22 RX ADMIN — Medication 105 MILLIGRAM(S): at 06:00

## 2024-04-22 RX ADMIN — Medication 1 MILLIGRAM(S): at 11:51

## 2024-04-22 RX ADMIN — Medication 400 MILLIGRAM(S): at 04:20

## 2024-04-22 RX ADMIN — Medication 0.1 MILLIGRAM(S): at 15:20

## 2024-04-22 RX ADMIN — Medication 1 TABLET(S): at 11:51

## 2024-04-22 RX ADMIN — PANTOPRAZOLE SODIUM 40 MILLIGRAM(S): 20 TABLET, DELAYED RELEASE ORAL at 18:06

## 2024-04-22 RX ADMIN — LACTULOSE 15 GRAM(S): 10 SOLUTION ORAL at 00:43

## 2024-04-22 RX ADMIN — CEFTRIAXONE 1000 MILLIGRAM(S): 500 INJECTION, POWDER, FOR SOLUTION INTRAMUSCULAR; INTRAVENOUS at 00:43

## 2024-04-22 RX ADMIN — PREGABALIN 1000 MICROGRAM(S): 225 CAPSULE ORAL at 11:51

## 2024-04-22 RX ADMIN — PHENYLEPHRINE-SHARK LIVER OIL-MINERAL OIL-PETROLATUM RECTAL OINTMENT 1 APPLICATION(S): at 05:34

## 2024-04-22 RX ADMIN — Medication 0.1 MILLIGRAM(S): at 05:33

## 2024-04-22 RX ADMIN — APIXABAN 5 MILLIGRAM(S): 2.5 TABLET, FILM COATED ORAL at 05:33

## 2024-04-22 RX ADMIN — ATORVASTATIN CALCIUM 40 MILLIGRAM(S): 80 TABLET, FILM COATED ORAL at 21:38

## 2024-04-22 RX ADMIN — Medication 25 GRAM(S): at 11:50

## 2024-04-22 RX ADMIN — GABAPENTIN 100 MILLIGRAM(S): 400 CAPSULE ORAL at 05:33

## 2024-04-22 RX ADMIN — GABAPENTIN 100 MILLIGRAM(S): 400 CAPSULE ORAL at 18:05

## 2024-04-22 RX ADMIN — PANTOPRAZOLE SODIUM 40 MILLIGRAM(S): 20 TABLET, DELAYED RELEASE ORAL at 05:33

## 2024-04-22 RX ADMIN — Medication 105 MILLIGRAM(S): at 15:21

## 2024-04-22 RX ADMIN — PHENYLEPHRINE-SHARK LIVER OIL-MINERAL OIL-PETROLATUM RECTAL OINTMENT 1 APPLICATION(S): at 21:49

## 2024-04-22 RX ADMIN — Medication 15 MILLILITER(S): at 11:51

## 2024-04-22 RX ADMIN — Medication 105 MILLIGRAM(S): at 21:38

## 2024-04-22 RX ADMIN — Medication 0.1 MILLIGRAM(S): at 21:38

## 2024-04-22 RX ADMIN — Medication 1000 MILLIGRAM(S): at 05:40

## 2024-04-22 NOTE — PROGRESS NOTE ADULT - SUBJECTIVE AND OBJECTIVE BOX
Patient is a 24y old  Male who presents with a chief complaint of Visual changes with abnormal MRI (21 Apr 2024 13:00)      INTERVAL HPI/OVERNIGHT EVENTS: Patient reported worsening abdominal pain due to constipation, so CT a/p was performed overnight. Later at night, patient tried to get out of bed and ended up sliding off the bed, hitting his right knee, so Xray R knee was performed overnight. Patient continues to report right knee pain but that his constipation resolved as he had a large BM this AM.     MEDICATIONS  (STANDING):  apixaban 5 milliGRAM(s) Oral every 12 hours  atorvastatin 40 milliGRAM(s) Oral at bedtime  cefTRIAXone Injectable. 1000 milliGRAM(s) IV Push every 24 hours  cefTRIAXone Injectable.      cloNIDine 0.1 milliGRAM(s) Oral every 8 hours  cyanocobalamin 1000 MICROGram(s) Oral daily  ergocalciferol 07819 Unit(s) Oral every week  FLUoxetine 10 milliGRAM(s) Oral daily  folic acid 1 milliGRAM(s) Oral daily  gabapentin 100 milliGRAM(s) Oral two times a day  lactulose Syrup 15 Gram(s) Oral once  lidocaine 2% Jelly 10 milliLiter(s) IntraUrethral once  multivitamin/minerals/iron Oral Solution (CENTRUM) 15 milliLiter(s) Oral daily  pantoprazole  Injectable 40 milliGRAM(s) IV Push two times a day  tamsulosin 0.4 milliGRAM(s) Oral at bedtime  thiamine IVPB 500 milliGRAM(s) IV Intermittent every 8 hours  vitamin B complex with vitamin C 1 Tablet(s) Oral daily    MEDICATIONS  (PRN):  acetaminophen     Tablet .. 650 milliGRAM(s) Oral every 6 hours PRN Temp greater or equal to 38C (100.4F), Mild Pain (1 - 3)  aluminum hydroxide/magnesium hydroxide/simethicone Suspension 30 milliLiter(s) Oral every 4 hours PRN Dyspepsia  meclizine 12.5 milliGRAM(s) Oral three times a day PRN Dizziness  melatonin 3 milliGRAM(s) Oral at bedtime PRN Insomnia  metoprolol tartrate Injectable 5 milliGRAM(s) IV Push every 8 hours PRN hr over 120  OLANZapine Injectable 2.5 milliGRAM(s) IntraMuscular daily PRN Acxiety  ondansetron Injectable 4 milliGRAM(s) IV Push every 8 hours PRN Nausea and/or Vomiting  polyethylene glycol 3350 17 Gram(s) Oral daily PRN Constipation  senna 2 Tablet(s) Oral at bedtime PRN Constipation      Allergies    No Known Allergies    Intolerances        REVIEW OF SYSTEMS:  CONSTITUTIONAL: No fever or fatigue  RESPIRATORY: No cough; No shortness of breath  CARDIOVASCULAR: No chest pain,  dizziness, or leg swelling  GASTROINTESTINAL:  no abdominal pain. No nausea or vomiting; no constipation or diarrhea  NEUROLOGICAL: No headaches, numbness, or tremors  MUSCULOSKELETAL: No joint pain; No muscle, back, or extremity pain      Vital Signs Last 24 Hrs  T(C): 36.4 (22 Apr 2024 08:14), Max: 36.7 (21 Apr 2024 18:47)  T(F): 97.5 (22 Apr 2024 08:14), Max: 98 (21 Apr 2024 18:47)  HR: 63 (22 Apr 2024 08:14) (63 - 80)  BP: 106/72 (22 Apr 2024 08:14) (106/72 - 123/78)  BP(mean): --  RR: 18 (22 Apr 2024 08:14) (18 - 20)  SpO2: 97% (22 Apr 2024 08:14) (97% - 98%)    Parameters below as of 22 Apr 2024 08:14  Patient On (Oxygen Delivery Method): room air        PHYSICAL EXAM:  GENERAL: NAD, lying in bed comfortably  HEAD:  Atraumatic, Normocephalic  EYES: conjunctiva and sclera clear  NERVOUS SYSTEM:  Alert & Oriented X3, No gross focal deficits  CHEST/LUNG: Clear to auscultation bilaterally; No wheezes  HEART: Regular rate and rhythm; No murmurs  ABDOMEN: Soft, obese, Nontender, +distended; Bowel sounds present  EXTREMITIES:  No cyanosis, or edema, + mild tenderness to palpation on R anterior knee    LABS:                        12.7   9.72  )-----------( 427      ( 22 Apr 2024 07:16 )             39.3     04-22    141  |  104  |  8.0  ----------------------------<  94  3.9   |  23.0  |  0.47<L>    Ca    9.5      22 Apr 2024 07:16  Phos  5.2     04-22  Mg     1.9     04-22    TPro  7.0  /  Alb  3.8  /  TBili  1.5  /  DBili  x   /  AST  68<H>  /  ALT  124<H>  /  AlkPhos  108  04-22      Urinalysis Basic - ( 22 Apr 2024 07:16 )    Color: x / Appearance: x / SG: x / pH: x  Gluc: 94 mg/dL / Ketone: x  / Bili: x / Urobili: x   Blood: x / Protein: x / Nitrite: x   Leuk Esterase: x / RBC: x / WBC x   Sq Epi: x / Non Sq Epi: x / Bacteria: x      CAPILLARY BLOOD GLUCOSE          RADIOLOGY & ADDITIONAL TESTS:    CT Abdomen and Pelvis No Cont (04.22.24 @ 03:50) >  No bowel obstruction.    Large amount of stool in the rectum which is dilated to 7.8 cm.

## 2024-04-22 NOTE — PROGRESS NOTE ADULT - ASSESSMENT
ASSESSMENT: 24M PMHx obesity, anxiety, depression, s/p gastric sleeve surgery presenting for diplopia, leg weakness, decreased PO intake, and urinary retention. Imaging negative. Etiology likely adjustment disorder and thiamine deficiency after surgery.     PLAN:     UTI  - Urinalysis positive  - Afebrile, no leukocytosis  - Ceftriaxone until 4/25/2024  - Urine culture and sensitivity ---> Pending    Constipation  - Resolved  - Miralax, senna  - Lactulose 15g qd    AMS, Leg Weakness, Diplopia, Urinary Retention  - CT head: hypodensity in R parietal and temporal lobes  - MRI brain/MRA head/MRI lumbosacral spine performed:  No stroke, mass, hemorrhage, aneurysm, LVO, critical stenosis, or cauda equina  - Repeat MRI t-spine: no T3-T4 edema; slight disc protrusion in T6-7: seen by neuro sx ,  No intervention per neurosurgery  - Repeat CTH, EEG non contributory  - Ammonia level wnl  - Follow up with psychiatrist outpatient for possible CBT  - US b/l LE to r/o DVT: negative  - Gabapentin 100 mg BID   - PM&R consulted, recs noted  - Daily PT for leg weakness  - AMS, diplopia, and urinary retention resolved    Anxiety  - Prozac 10 mg qd  - Clonidine 0.1 mg q8h  - Lorazepam 0.5 mg TID PRN  -  recs noted    Vitamin Deficiency  - Had poor PO intake for past 2 months: possible vitamin deficiency after bariatric surgery  - Vitamin D weekly, folic acid and vitamin B supplements  - Vitamin B12 wnl  - High dose thiamine  - Continue daily vitamins  - May be contributing to bilateral leg weakness/lethargy    Urinary Retention - resolved  - Tamsulosin 0.4 mg PO qhs  - Rodriguez removed    PE  - CT chest (4/9): pulmonary embolism, no R heart strain  - US LE: no DVT  - Eliquis 10 mg BID x7 days, then 5 mg PO BID x 90 days  - On room air  - Telemetry monitoring  - TTE to r/o R heart strain: wnl    HLD  - Atorvastatin 40 mg PO qhs    Vertigo  - Meclizine 12.5 mg PO TID PRN    Nausea/vomiting  - Resolved  - CT abdomen: no acute abdominal finding or visceral injury  - IV LR 1000 mL @ 100 cc/h  - Barium scan: wnl  - Simethicone suspension q4h PRN  - Zofran 4 mg IV q8h pRN  - Protonix 40 mg IV BID  - Miralax, senna  - Bariatric surgeon recommending to continue IV fluids  - Started Soft Bariatric phase 2 diet    Dispo: Patient is medically stable for d/c. Waiting for [facility] auth

## 2024-04-22 NOTE — PROGRESS NOTE ADULT - ATTENDING COMMENTS
patient self disimpacted himself this am , large bms x 2   slid at bedside while trying to get out of bed / hit knee on the floor   pending knee x ray   c/w lactulose for constipation  f/u ucxs   plan for [facility]   awaiting placement

## 2024-04-22 NOTE — CHART NOTE - NSCHARTNOTEFT_GEN_A_CORE
Called by RN, patient had a slid to the floor when ambulating to the bathroom, patient states he fell on his knees   Patient states his right knee hurts   Patient denies any head trauma     Neuro: AxOx3   PERLL, CN 2-12 grossly intact, moves all extremities without difficulty, speech clear  Head: NC/AT, no evidence of trauma, no lumps, bumps or ecchymotic areas  Neck: supple, non-tender  Back: non-tender, no evidence of trauma  Chest: non-tender chest wall and clavicles, no ecchymosis, cta b/l  Hips: non-tender, to cradle rock, positive abduction and adduction without discomfort, no leg length discrepancy, weight bearing not assessed due to weakness  Coccyx: non-tender, no evidence of trauma  Extremities:  Tender to palpation of right knee, no erythema, no swelling, no evidence of trauma, full range of motion    XRAY of right knee ordered   [facility]   Call bell placed in patient's hand and patient instructed not to get out of bed without assistance   Continue to monitor, notify PA of any changes in patient status

## 2024-04-22 NOTE — CHART NOTE - NSCHARTNOTEFT_GEN_A_CORE
24M PMHx obesity, anxiety, depression, s/p gastric sleeve surgery presenting for diplopia, leg weakness, decreased PO intake, and urinary retention. Imaging negative. Etiology likely adjustment disorder and thiamine deficiency after surgery.   Patient with complaint of constipation despite receiving 2 enemas. Patient also reports feeling anxious due to not having a BM.   Discussed with patient at bedside, Mother on the phone   Patient states he is nauseous w/ episode of vomiting   Patient states he is passing some gas but no stool     Vital Signs  T(F): 97.3   HR: 72   BP: 123/78  RR: 20   SpO2: 98%     GENERAL: NAD, lying comfortably  CHEST/LUNG: Clear to auscultation b/l; Unlabored respirations  HEART: S1S2+, Regular rate and rhythm  ABDOMEN: Soft, Nontender, Nondistended; BS+   EXTREMITIES:  2+ Peripheral Pulses, No LE edema, no tenderness to palpation of b/l LE  SKIN: Warm and dry 24M PMHx obesity, anxiety, depression, s/p gastric sleeve surgery presenting for diplopia, leg weakness, decreased PO intake, and urinary retention. Imaging negative. Etiology likely adjustment disorder and thiamine deficiency after surgery.   Patient with complaint of constipation despite receiving 2 enemas. Patient also reports feeling anxious due to not having a BM.   Discussed with patient at bedside, Mother on the phone   Patient states he is nauseous w/ episode of vomiting   Patient states he is passing some gas but no stool  Patient states he also has pain with pushing    Patient also states he has history of hemorrhoids     Vital Signs  T(F): 97.3   HR: 72   BP: 123/78  RR: 20   SpO2: 98%     GENERAL: NAD, lying comfortably  CHEST/LUNG: Clear to auscultation b/l; Unlabored respirations  HEART: S1S2+, Regular rate and rhythm  ABDOMEN: Soft, Nontender, Nondistended; BS+   EXTREMITIES:  2+ Peripheral Pulses, No LE edema, no tenderness to palpation of b/l LE  SKIN: Warm and dry    Preparation H cream x1   CT ab/pelvis r/o obstruction   C/w bowel regimen - due for Lactulose now    RN to notify with any acute changes 24M PMHx obesity, anxiety, depression, s/p gastric sleeve surgery presenting for diplopia, leg weakness, decreased PO intake, and urinary retention. Imaging negative. Etiology likely adjustment disorder and thiamine deficiency after surgery.   Patient with complaint of constipation x 3 days despite receiving 2 enemas. Patient also reports feeling anxious due to not having a BM.   Discussed with patient at bedside w/ Mother on the phone   Patient states he is nauseous w/ episode of vomiting   Patient states he is passing some gas but no stool  Patient states he also has pain with pushing    Patient also states he has history of hemorrhoids     Vital Signs  T(F): 97.3   HR: 72   BP: 123/78  RR: 20   SpO2: 98%     GENERAL: NAD, lying comfortably  CHEST/LUNG: Clear to auscultation b/l; Unlabored respirations  HEART: S1S2+, Regular rate and rhythm  ABDOMEN: Soft, Nontender, Nondistended; BS+   EXTREMITIES:  2+ Peripheral Pulses, No LE edema, no tenderness to palpation of b/l LE  SKIN: Warm and dry    Preparation H cream x1   CT ab/pelvis r/o obstruction   C/w bowel regimen - due for Lactulose now    RN to notify with any acute changes 24M PMHx obesity, anxiety, depression, s/p gastric sleeve surgery presenting for diplopia, leg weakness, decreased PO intake, and urinary retention. Imaging negative. Etiology likely adjustment disorder and thiamine deficiency after surgery.   Patient with complaint of constipation x 3 days despite receiving 2 enemas. Patient also reports feeling anxious due to not having a BM.   Discussed with patient at bedside w/ Mother on the phone   Patient states he is nauseous w/ episode of vomiting x1   Patient states he is passing some gas but no stool  Patient states he also has pain with pushing    Patient also states he has history of hemorrhoids     Vital Signs  T(F): 97.3   HR: 72   BP: 123/78  RR: 20   SpO2: 98%     GENERAL: NAD, lying comfortably  CHEST/LUNG: Clear to auscultation b/l; Unlabored respirations  HEART: S1S2+, Regular rate and rhythm  ABDOMEN: Soft, Nontender, Nondistended; BS+   EXTREMITIES:  No LE edema, no tenderness to palpation of b/l LE  SKIN: Warm and dry    Preparation H cream x1   CT ab/pelvis r/o obstruction   C/w bowel regimen - due for Lactulose now    RN to notify with any acute changes 24M PMHx obesity, anxiety, depression, s/p gastric sleeve surgery presenting for diplopia, leg weakness, decreased PO intake, and urinary retention. Imaging negative. Etiology likely adjustment disorder and thiamine deficiency after surgery.   Patient with complaint of constipation x 3 days despite receiving 2 enemas. Patient also reports feeling anxious due to not having a BM.   Discussed with patient at bedside w/ Mother on the phone   Patient states he is nauseous w/ episode of vomiting x1, he is passing some gas but no BM  +pain with pushing when attempting to have BMs  Patient also states he has history of hemorrhoids     Vital Signs  T(F): 97.3   HR: 72   BP: 123/78  RR: 20   SpO2: 98%     GENERAL: NAD, lying comfortably  CHEST/LUNG: Clear to auscultation b/l; Unlabored respirations  HEART: S1S2+, Regular rate and rhythm  ABDOMEN: Soft, Nontender, Nondistended; BS+   EXTREMITIES:  No LE edema, no tenderness to palpation of b/l LE  SKIN: Warm and dry    Preparation H cream x1   CT ab/pelvis r/o obstruction   C/w bowel regimen - due for Lactulose now    RN to notify with any acute changes

## 2024-04-23 LAB
ALBUMIN SERPL ELPH-MCNC: 3.4 G/DL — SIGNIFICANT CHANGE UP (ref 3.3–5.2)
ALP SERPL-CCNC: 121 U/L — HIGH (ref 40–120)
ALT FLD-CCNC: 110 U/L — HIGH
ANION GAP SERPL CALC-SCNC: 16 MMOL/L — SIGNIFICANT CHANGE UP (ref 5–17)
AST SERPL-CCNC: 78 U/L — HIGH
BASOPHILS # BLD AUTO: 0.05 K/UL — SIGNIFICANT CHANGE UP (ref 0–0.2)
BASOPHILS NFR BLD AUTO: 0.5 % — SIGNIFICANT CHANGE UP (ref 0–2)
BILIRUB SERPL-MCNC: 1.5 MG/DL — SIGNIFICANT CHANGE UP (ref 0.4–2)
BUN SERPL-MCNC: 6.8 MG/DL — LOW (ref 8–20)
CALCIUM SERPL-MCNC: 9.3 MG/DL — SIGNIFICANT CHANGE UP (ref 8.4–10.5)
CHLORIDE SERPL-SCNC: 101 MMOL/L — SIGNIFICANT CHANGE UP (ref 96–108)
CO2 SERPL-SCNC: 19 MMOL/L — LOW (ref 22–29)
CREAT SERPL-MCNC: 0.44 MG/DL — LOW (ref 0.5–1.3)
EGFR: 150 ML/MIN/1.73M2 — SIGNIFICANT CHANGE UP
EOSINOPHIL # BLD AUTO: 0.12 K/UL — SIGNIFICANT CHANGE UP (ref 0–0.5)
EOSINOPHIL NFR BLD AUTO: 1.3 % — SIGNIFICANT CHANGE UP (ref 0–6)
GLUCOSE SERPL-MCNC: 78 MG/DL — SIGNIFICANT CHANGE UP (ref 70–99)
HCT VFR BLD CALC: 43 % — SIGNIFICANT CHANGE UP (ref 39–50)
HGB BLD-MCNC: 13.9 G/DL — SIGNIFICANT CHANGE UP (ref 13–17)
IMM GRANULOCYTES NFR BLD AUTO: 0.5 % — SIGNIFICANT CHANGE UP (ref 0–0.9)
LYMPHOCYTES # BLD AUTO: 2.15 K/UL — SIGNIFICANT CHANGE UP (ref 1–3.3)
LYMPHOCYTES # BLD AUTO: 23.2 % — SIGNIFICANT CHANGE UP (ref 13–44)
MAGNESIUM SERPL-MCNC: 2 MG/DL — SIGNIFICANT CHANGE UP (ref 1.6–2.6)
MCHC RBC-ENTMCNC: 26.9 PG — LOW (ref 27–34)
MCHC RBC-ENTMCNC: 32.3 GM/DL — SIGNIFICANT CHANGE UP (ref 32–36)
MCV RBC AUTO: 83.2 FL — SIGNIFICANT CHANGE UP (ref 80–100)
MONOCYTES # BLD AUTO: 0.72 K/UL — SIGNIFICANT CHANGE UP (ref 0–0.9)
MONOCYTES NFR BLD AUTO: 7.8 % — SIGNIFICANT CHANGE UP (ref 2–14)
NEUTROPHILS # BLD AUTO: 6.17 K/UL — SIGNIFICANT CHANGE UP (ref 1.8–7.4)
NEUTROPHILS NFR BLD AUTO: 66.7 % — SIGNIFICANT CHANGE UP (ref 43–77)
PHOSPHATE SERPL-MCNC: 5 MG/DL — HIGH (ref 2.4–4.7)
PLATELET # BLD AUTO: 306 K/UL — SIGNIFICANT CHANGE UP (ref 150–400)
POTASSIUM SERPL-MCNC: 4.9 MMOL/L — SIGNIFICANT CHANGE UP (ref 3.5–5.3)
POTASSIUM SERPL-SCNC: 4.9 MMOL/L — SIGNIFICANT CHANGE UP (ref 3.5–5.3)
PROT SERPL-MCNC: 7 G/DL — SIGNIFICANT CHANGE UP (ref 6.6–8.7)
RBC # BLD: 5.17 M/UL — SIGNIFICANT CHANGE UP (ref 4.2–5.8)
RBC # FLD: 16.8 % — HIGH (ref 10.3–14.5)
SODIUM SERPL-SCNC: 136 MMOL/L — SIGNIFICANT CHANGE UP (ref 135–145)
WBC # BLD: 9.26 K/UL — SIGNIFICANT CHANGE UP (ref 3.8–10.5)
WBC # FLD AUTO: 9.26 K/UL — SIGNIFICANT CHANGE UP (ref 3.8–10.5)

## 2024-04-23 PROCEDURE — 99232 SBSQ HOSP IP/OBS MODERATE 35: CPT | Mod: GC

## 2024-04-23 RX ORDER — KETOROLAC TROMETHAMINE 30 MG/ML
15 SYRINGE (ML) INJECTION ONCE
Refills: 0 | Status: DISCONTINUED | OUTPATIENT
Start: 2024-04-23 | End: 2024-04-23

## 2024-04-23 RX ORDER — PSYLLIUM SEED (WITH DEXTROSE)
1 POWDER (GRAM) ORAL DAILY
Refills: 0 | Status: DISCONTINUED | OUTPATIENT
Start: 2024-04-23 | End: 2024-04-29

## 2024-04-23 RX ORDER — CEFPODOXIME PROXETIL 100 MG
200 TABLET ORAL EVERY 12 HOURS
Refills: 0 | Status: COMPLETED | OUTPATIENT
Start: 2024-04-23 | End: 2024-04-27

## 2024-04-23 RX ORDER — APIXABAN 2.5 MG/1
1 TABLET, FILM COATED ORAL
Qty: 60 | Refills: 3
Start: 2024-04-23 | End: 2024-08-20

## 2024-04-23 RX ORDER — ACETAMINOPHEN 500 MG
1000 TABLET ORAL ONCE
Refills: 0 | Status: COMPLETED | OUTPATIENT
Start: 2024-04-23 | End: 2024-04-23

## 2024-04-23 RX ADMIN — Medication 10 MILLIGRAM(S): at 12:30

## 2024-04-23 RX ADMIN — Medication 1000 MILLIGRAM(S): at 02:00

## 2024-04-23 RX ADMIN — APIXABAN 5 MILLIGRAM(S): 2.5 TABLET, FILM COATED ORAL at 05:49

## 2024-04-23 RX ADMIN — ATORVASTATIN CALCIUM 40 MILLIGRAM(S): 80 TABLET, FILM COATED ORAL at 21:20

## 2024-04-23 RX ADMIN — CEFTRIAXONE 1000 MILLIGRAM(S): 500 INJECTION, POWDER, FOR SOLUTION INTRAMUSCULAR; INTRAVENOUS at 01:17

## 2024-04-23 RX ADMIN — Medication 1 TABLET(S): at 12:31

## 2024-04-23 RX ADMIN — Medication 1000 MILLIGRAM(S): at 21:48

## 2024-04-23 RX ADMIN — TAMSULOSIN HYDROCHLORIDE 0.4 MILLIGRAM(S): 0.4 CAPSULE ORAL at 21:20

## 2024-04-23 RX ADMIN — Medication 0.1 MILLIGRAM(S): at 21:19

## 2024-04-23 RX ADMIN — PANTOPRAZOLE SODIUM 40 MILLIGRAM(S): 20 TABLET, DELAYED RELEASE ORAL at 18:19

## 2024-04-23 RX ADMIN — Medication 105 MILLIGRAM(S): at 14:16

## 2024-04-23 RX ADMIN — Medication 0.1 MILLIGRAM(S): at 05:49

## 2024-04-23 RX ADMIN — APIXABAN 5 MILLIGRAM(S): 2.5 TABLET, FILM COATED ORAL at 18:19

## 2024-04-23 RX ADMIN — Medication 400 MILLIGRAM(S): at 01:17

## 2024-04-23 RX ADMIN — PANTOPRAZOLE SODIUM 40 MILLIGRAM(S): 20 TABLET, DELAYED RELEASE ORAL at 05:49

## 2024-04-23 RX ADMIN — Medication 105 MILLIGRAM(S): at 21:37

## 2024-04-23 RX ADMIN — Medication 400 MILLIGRAM(S): at 21:18

## 2024-04-23 RX ADMIN — GABAPENTIN 100 MILLIGRAM(S): 400 CAPSULE ORAL at 18:19

## 2024-04-23 RX ADMIN — Medication 15 MILLIGRAM(S): at 23:32

## 2024-04-23 RX ADMIN — Medication 200 MILLIGRAM(S): at 18:20

## 2024-04-23 RX ADMIN — Medication 1 MILLIGRAM(S): at 12:29

## 2024-04-23 RX ADMIN — PREGABALIN 1000 MICROGRAM(S): 225 CAPSULE ORAL at 12:30

## 2024-04-23 RX ADMIN — Medication 1 PACKET(S): at 12:32

## 2024-04-23 RX ADMIN — GABAPENTIN 100 MILLIGRAM(S): 400 CAPSULE ORAL at 05:50

## 2024-04-23 RX ADMIN — Medication 15 MILLILITER(S): at 12:30

## 2024-04-23 RX ADMIN — Medication 0.1 MILLIGRAM(S): at 13:19

## 2024-04-23 RX ADMIN — Medication 105 MILLIGRAM(S): at 05:48

## 2024-04-23 NOTE — CHART NOTE - NSCHARTNOTEFT_GEN_A_CORE
Nutrition Note:   Pt with improved po intake noted taking % of soft and bite sized tray meals.    Continue thiamine, folic acid, MVI, Vit B complex, Vit D, Vit B12  .

## 2024-04-23 NOTE — PROGRESS NOTE ADULT - ATTENDING COMMENTS
change iv ctx to po vantin to complete x 7 days on dc   start on metamucil , c/w daily lactulose , hemorrhoid h cream   patient medically stable   patient not accepted at [facility] as requested by family.   family now opting for home with home care / home pt

## 2024-04-23 NOTE — PROGRESS NOTE ADULT - ASSESSMENT
ASSESSMENT: 24M PMHx obesity, anxiety, depression, s/p gastric sleeve surgery presenting for diplopia, leg weakness, decreased PO intake, and urinary retention. Imaging negative. Etiology likely adjustment disorder and thiamine deficiency after surgery.     PLAN:     UTI  - Urinalysis positive  - Afebrile, no leukocytosis  - Ceftriaxone until 4/25/2024  - Urine culture and sensitivity ---> Pending    Constipation  - Resolved  - Miralax, senna  - Lactulose 15g qd    AMS, Leg Weakness, Diplopia, Urinary Retention  - CT head: hypodensity in R parietal and temporal lobes  - MRI brain/MRA head/MRI lumbosacral spine performed:  No stroke, mass, hemorrhage, aneurysm, LVO, critical stenosis, or cauda equina  - Repeat MRI t-spine: no T3-T4 edema; slight disc protrusion in T6-7: seen by neuro sx ,  No intervention per neurosurgery  - Repeat CTH, EEG non contributory  - Ammonia level wnl  - Follow up with psychiatrist outpatient for possible CBT  - US b/l LE to r/o DVT: negative  - Gabapentin 100 mg BID   - PM&R consulted, recs noted  - Daily PT for leg weakness  - AMS, diplopia, and urinary retention resolved    Anxiety  - Prozac 10 mg qd  - Clonidine 0.1 mg q8h  - Lorazepam 0.5 mg TID PRN  -  recs noted    Vitamin Deficiency  - Had poor PO intake for past 2 months: possible vitamin deficiency after bariatric surgery  - Vitamin D weekly, folic acid and vitamin B supplements  - Vitamin B12 wnl  - High dose thiamine  - Continue daily vitamins  - May be contributing to bilateral leg weakness/lethargy    Urinary Retention - resolved  - Tamsulosin 0.4 mg PO qhs  - Rodriguez removed    PE  - CT chest (4/9): pulmonary embolism, no R heart strain  - US LE: no DVT  - Eliquis 10 mg BID x7 days, then 5 mg PO BID x 90 days  - On room air  - Telemetry monitoring  - TTE to r/o R heart strain: wnl    HLD  - Atorvastatin 40 mg PO qhs    Vertigo  - Meclizine 12.5 mg PO TID PRN    Nausea/vomiting  - Resolved  - CT abdomen: no acute abdominal finding or visceral injury  - IV LR 1000 mL @ 100 cc/h  - Barium scan: wnl  - Simethicone suspension q4h PRN  - Zofran 4 mg IV q8h pRN  - Protonix 40 mg IV BID  - Miralax, senna  - Bariatric surgeon recommending to continue IV fluids  - Started Soft Bariatric phase 2 diet    Dispo: Patient is medically stable for d/c. Waiting for [facility] auth      ASSESSMENT: 24M PMHx obesity, anxiety, depression, s/p gastric sleeve surgery presenting for diplopia, leg weakness, decreased PO intake, and urinary retention. Imaging negative. Etiology likely adjustment disorder and thiamine deficiency after surgery.     PLAN:     UTI  - Urinalysis positive  - Afebrile, no leukocytosis  - Continue Ceftriaxone while in hospital  - Transition to PO vantin 200 mg BID to complete 7d course    Constipation/Hemorrhoids  - Resolved  - Metamucil  - Miralax, senna  - Lactulose 15g qd    AMS, Leg Weakness, Diplopia, Urinary Retention  - CT head: hypodensity in R parietal and temporal lobes  - MRI brain/MRA head/MRI lumbosacral spine performed:  No stroke, mass, hemorrhage, aneurysm, LVO, critical stenosis, or cauda equina  - Repeat MRI t-spine: no T3-T4 edema; slight disc protrusion in T6-7: seen by neuro sx ,  No intervention per neurosurgery  - Repeat CTH, EEG non contributory  - Ammonia level wnl  - US b/l LE to r/o DVT: negative  - Gabapentin 100 mg BID   - PM&R consulted, recs noted  - Daily PT for leg weakness  - AMS, diplopia, and urinary retention resolved    Anxiety  - Cooperative, no behavioral issues  - Prozac 10 mg qd  - Clonidine 0.1 mg q8h  - Lorazepam 0.5 mg TID PRN  -  recs noted  - Follow up with psychiatrist outpatient    Vitamin Deficiency  - Had poor PO intake for past 2 months: possible vitamin deficiency after bariatric surgery  - Vitamin D weekly, folic acid and vitamin B supplements  - Vitamin B12 wnl  - High dose thiamine  - Continue daily vitamins  - May be contributing to bilateral leg weakness/lethargy    Urinary Retention - resolved  - Tamsulosin 0.4 mg PO qhs  - Rodriguez removed    PE  - CT chest (4/9): pulmonary embolism, no R heart strain  - US LE: no DVT  - Eliquis 10 mg BID x7 days, then 5 mg PO BID x 90 days  - On room air  - Telemetry monitoring  - TTE to r/o R heart strain: wnl    HLD  - Atorvastatin 40 mg PO qhs    Vertigo  - Meclizine 12.5 mg PO TID PRN    Nausea/vomiting  - Resolved  - CT abdomen: no acute abdominal finding or visceral injury  - IV LR 1000 mL @ 100 cc/h  - Barium scan: wnl  - Simethicone suspension q4h PRN  - Zofran 4 mg IV q8h pRN  - Protonix 40 mg IV BID  - Miralax, senna  - Bariatric surgeon recommending to continue IV fluids  - Started Soft Bariatric phase 2 diet    Dispo: Patient is medically stable for d/c. Waiting for [facility] auth      ASSESSMENT: 24M PMHx obesity, anxiety, depression, s/p gastric sleeve surgery presenting for diplopia, leg weakness, decreased PO intake, and urinary retention. Imaging negative. Etiology likely adjustment disorder and thiamine deficiency after surgery.     PLAN:     UTI  - Urinalysis positive  - Afebrile, no leukocytosis  - Continue Ceftriaxone while in hospital  - Transition to PO vantin 200 mg BID to complete 7d course    Constipation/Hemorrhoids  - Resolved  - Metamucil  - Miralax, senna  - Lactulose 15g qd    AMS, Leg Weakness, Diplopia, Urinary Retention  - CT head: hypodensity in R parietal and temporal lobes  - MRI brain/MRA head/MRI lumbosacral spine performed:  No stroke, mass, hemorrhage, aneurysm, LVO, critical stenosis, or cauda equina  - Repeat MRI t-spine: no T3-T4 edema; slight disc protrusion in T6-7: seen by neuro sx ,  No intervention per neurosurgery  - Repeat CTH, EEG non contributory  - Ammonia level wnl  - US b/l LE to r/o DVT: negative  - Gabapentin 100 mg BID   - PM&R consulted, recs noted  - Daily PT for leg weakness  - AMS, diplopia, and urinary retention resolved    Anxiety  - Cooperative, no behavioral issues  - Prozac 10 mg qd  - Clonidine 0.1 mg q8h  - Lorazepam 0.5 mg TID PRN  -  recs noted  - Follow up with psychiatrist outpatient    Vitamin Deficiency  - Had poor PO intake for past 2 months: possible vitamin deficiency after bariatric surgery  - Vitamin D weekly, folic acid and vitamin B supplements  - Vitamin B12 wnl  - High dose thiamine  - Continue daily vitamins  - May be contributing to bilateral leg weakness/lethargy    Urinary Retention - resolved  - Tamsulosin 0.4 mg PO qhs  - Rodriguez removed    PE  - CT chest (4/9): pulmonary embolism, no R heart strain  - US LE: no DVT  - Eliquis 10 mg BID x7 days, then 5 mg PO BID x 90 days  - On room air  - Telemetry monitoring  - TTE to r/o R heart strain: wnl    HLD  - Atorvastatin 40 mg PO qhs    Vertigo  - Meclizine 12.5 mg PO TID PRN    Nausea/vomiting  - Resolved  - CT abdomen: no acute abdominal finding or visceral injury  - IV LR 1000 mL @ 100 cc/h  - Barium scan: wnl  - Simethicone suspension q4h PRN  - Zofran 4 mg IV q8h pRN  - Protonix 40 mg IV BID  - Miralax, senna  - Bariatric surgeon recommending to continue IV fluids  - Started Soft Bariatric phase 2 diet    Dispo: Patient is medically stable for d/c. Family now wishes to take patient home with home care; declining the [facility] facilities that accepted pt.

## 2024-04-23 NOTE — PROGRESS NOTE ADULT - SUBJECTIVE AND OBJECTIVE BOX
Patient is a 24y old  Male who presents with a chief complaint of Visual changes with abnormal MRI (22 Apr 2024 12:13)      INTERVAL HPI/OVERNIGHT EVENTS: No acute events overnight. Patient had multiple large bowel movements yesterday per RN and is continuing on lactulose but reports that he did not have any bowel movements.     MEDICATIONS  (STANDING):  apixaban 5 milliGRAM(s) Oral every 12 hours  atorvastatin 40 milliGRAM(s) Oral at bedtime  cefTRIAXone Injectable. 1000 milliGRAM(s) IV Push every 24 hours  cefTRIAXone Injectable.      cloNIDine 0.1 milliGRAM(s) Oral every 8 hours  cyanocobalamin 1000 MICROGram(s) Oral daily  ergocalciferol 27533 Unit(s) Oral every week  FLUoxetine 10 milliGRAM(s) Oral daily  folic acid 1 milliGRAM(s) Oral daily  gabapentin 100 milliGRAM(s) Oral two times a day  lactulose Syrup 15 Gram(s) Oral daily  lidocaine 2% Jelly 10 milliLiter(s) IntraUrethral once  multivitamin/minerals/iron Oral Solution (CENTRUM) 15 milliLiter(s) Oral daily  pantoprazole  Injectable 40 milliGRAM(s) IV Push two times a day  tamsulosin 0.4 milliGRAM(s) Oral at bedtime  thiamine IVPB 500 milliGRAM(s) IV Intermittent every 8 hours  vitamin B complex with vitamin C 1 Tablet(s) Oral daily    MEDICATIONS  (PRN):  acetaminophen     Tablet .. 650 milliGRAM(s) Oral every 6 hours PRN Temp greater or equal to 38C (100.4F), Mild Pain (1 - 3)  aluminum hydroxide/magnesium hydroxide/simethicone Suspension 30 milliLiter(s) Oral every 4 hours PRN Dyspepsia  hemorrhoidal Ointment 1 Application(s) Rectal daily PRN hemorrhoids  meclizine 12.5 milliGRAM(s) Oral three times a day PRN Dizziness  melatonin 3 milliGRAM(s) Oral at bedtime PRN Insomnia  metoprolol tartrate Injectable 5 milliGRAM(s) IV Push every 8 hours PRN hr over 120  OLANZapine Injectable 2.5 milliGRAM(s) IntraMuscular daily PRN Acxiety  ondansetron Injectable 4 milliGRAM(s) IV Push every 8 hours PRN Nausea and/or Vomiting  polyethylene glycol 3350 17 Gram(s) Oral daily PRN Constipation  senna 2 Tablet(s) Oral at bedtime PRN Constipation      Allergies    No Known Allergies    Intolerances      REVIEW OF SYSTEMS:  CONSTITUTIONAL: No fever or fatigue  RESPIRATORY: No cough; No shortness of breath  CARDIOVASCULAR: No chest pain,  dizziness, or leg swelling  GASTROINTESTINAL:  no abdominal pain. No nausea or vomiting; + constipation, no  diarrhea  NEUROLOGICAL: No headaches, numbness, or tremors  MUSCULOSKELETAL: No joint pain; No muscle, back, or extremity pain      Vital Signs Last 24 Hrs  T(C): 36.4 (23 Apr 2024 08:06), Max: 36.4 (22 Apr 2024 16:54)  T(F): 97.5 (23 Apr 2024 08:06), Max: 97.6 (22 Apr 2024 16:54)  HR: 73 (23 Apr 2024 08:06) (66 - 73)  BP: 105/70 (23 Apr 2024 08:06) (103/73 - 122/70)  BP(mean): --  RR: 20 (23 Apr 2024 08:06) (18 - 20)  SpO2: 98% (23 Apr 2024 08:06) (97% - 99%)    Parameters below as of 23 Apr 2024 08:06  Patient On (Oxygen Delivery Method): room air        PHYSICAL EXAM:  GENERAL: NAD, lying in bed comfortably  HEAD:  Atraumatic, Normocephalic  EYES: conjunctiva and sclera clear  NERVOUS SYSTEM:  Alert & Oriented X3, No gross focal deficits  CHEST/LUNG: Clear to auscultation bilaterally; No wheezes  HEART: Regular rate and rhythm; No murmurs  ABDOMEN: Soft, obese, Nontender; Bowel sounds present  EXTREMITIES:  No cyanosis, or edema, no tenderness to palpation on R anterior knee  PSYCH: + perseverating on having hemorrhoids     LABS:                        13.9   9.26  )-----------( 306      ( 23 Apr 2024 05:11 )             43.0     04-23    136  |  101  |  6.8<L>  ----------------------------<  78  4.9   |  19.0<L>  |  0.44<L>    Ca    9.3      23 Apr 2024 05:11  Phos  5.0     04-23  Mg     2.0     04-23    TPro  7.0  /  Alb  3.4  /  TBili  1.5  /  DBili  x   /  AST  78<H>  /  ALT  110<H>  /  AlkPhos  121<H>  04-23      Urinalysis Basic - ( 23 Apr 2024 05:11 )    Color: x / Appearance: x / SG: x / pH: x  Gluc: 78 mg/dL / Ketone: x  / Bili: x / Urobili: x   Blood: x / Protein: x / Nitrite: x   Leuk Esterase: x / RBC: x / WBC x   Sq Epi: x / Non Sq Epi: x / Bacteria: x      CAPILLARY BLOOD GLUCOSE          RADIOLOGY & ADDITIONAL TESTS:    Xray Knee 3 Views, Right (04.22.24 @ 05:03) >  No acute bony pathology.    CT Abdomen and Pelvis No Cont (04.22.24 @ 03:50) >  No bowel obstruction.    Large amount of stool in the rectum which is dilated to 7.8 cm.       Patient is a 24y old  Male who presents with a chief complaint of Visual changes with abnormal MRI (22 Apr 2024 12:13)      INTERVAL HPI/OVERNIGHT EVENTS: No acute events overnight. Patient had multiple large bowel movements yesterday per RN and is continuing on lactulose.    MEDICATIONS  (STANDING):  apixaban 5 milliGRAM(s) Oral every 12 hours  atorvastatin 40 milliGRAM(s) Oral at bedtime  cefTRIAXone Injectable. 1000 milliGRAM(s) IV Push every 24 hours  cefTRIAXone Injectable.      cloNIDine 0.1 milliGRAM(s) Oral every 8 hours  cyanocobalamin 1000 MICROGram(s) Oral daily  ergocalciferol 23513 Unit(s) Oral every week  FLUoxetine 10 milliGRAM(s) Oral daily  folic acid 1 milliGRAM(s) Oral daily  gabapentin 100 milliGRAM(s) Oral two times a day  lactulose Syrup 15 Gram(s) Oral daily  lidocaine 2% Jelly 10 milliLiter(s) IntraUrethral once  multivitamin/minerals/iron Oral Solution (CENTRUM) 15 milliLiter(s) Oral daily  pantoprazole  Injectable 40 milliGRAM(s) IV Push two times a day  tamsulosin 0.4 milliGRAM(s) Oral at bedtime  thiamine IVPB 500 milliGRAM(s) IV Intermittent every 8 hours  vitamin B complex with vitamin C 1 Tablet(s) Oral daily    MEDICATIONS  (PRN):  acetaminophen     Tablet .. 650 milliGRAM(s) Oral every 6 hours PRN Temp greater or equal to 38C (100.4F), Mild Pain (1 - 3)  aluminum hydroxide/magnesium hydroxide/simethicone Suspension 30 milliLiter(s) Oral every 4 hours PRN Dyspepsia  hemorrhoidal Ointment 1 Application(s) Rectal daily PRN hemorrhoids  meclizine 12.5 milliGRAM(s) Oral three times a day PRN Dizziness  melatonin 3 milliGRAM(s) Oral at bedtime PRN Insomnia  metoprolol tartrate Injectable 5 milliGRAM(s) IV Push every 8 hours PRN hr over 120  OLANZapine Injectable 2.5 milliGRAM(s) IntraMuscular daily PRN Acxiety  ondansetron Injectable 4 milliGRAM(s) IV Push every 8 hours PRN Nausea and/or Vomiting  polyethylene glycol 3350 17 Gram(s) Oral daily PRN Constipation  senna 2 Tablet(s) Oral at bedtime PRN Constipation      Allergies    No Known Allergies    Intolerances      REVIEW OF SYSTEMS:  CONSTITUTIONAL: No fever or fatigue  RESPIRATORY: No cough; No shortness of breath  CARDIOVASCULAR: No chest pain,  dizziness, or leg swelling  GASTROINTESTINAL:  no abdominal pain. No nausea or vomiting; + constipation, no  diarrhea  NEUROLOGICAL: No headaches, numbness, or tremors  MUSCULOSKELETAL: No joint pain; No muscle, back, or extremity pain      Vital Signs Last 24 Hrs  T(C): 36.4 (23 Apr 2024 08:06), Max: 36.4 (22 Apr 2024 16:54)  T(F): 97.5 (23 Apr 2024 08:06), Max: 97.6 (22 Apr 2024 16:54)  HR: 73 (23 Apr 2024 08:06) (66 - 73)  BP: 105/70 (23 Apr 2024 08:06) (103/73 - 122/70)  BP(mean): --  RR: 20 (23 Apr 2024 08:06) (18 - 20)  SpO2: 98% (23 Apr 2024 08:06) (97% - 99%)    Parameters below as of 23 Apr 2024 08:06  Patient On (Oxygen Delivery Method): room air        PHYSICAL EXAM:  GENERAL: NAD, lying in bed comfortably, cooperative, no behavioral issues  HEAD:  Atraumatic, Normocephalic  EYES: conjunctiva and sclera clear  NERVOUS SYSTEM:  Alert & Oriented X3, No gross focal deficits  CHEST/LUNG: Clear to auscultation bilaterally; No wheezes  HEART: Regular rate and rhythm; No murmurs  ABDOMEN: Soft, obese, Nontender; Bowel sounds present  EXTREMITIES:  No cyanosis, or edema, no tenderness to palpation on R anterior knee    LABS:                        13.9   9.26  )-----------( 306      ( 23 Apr 2024 05:11 )             43.0     04-23    136  |  101  |  6.8<L>  ----------------------------<  78  4.9   |  19.0<L>  |  0.44<L>    Ca    9.3      23 Apr 2024 05:11  Phos  5.0     04-23  Mg     2.0     04-23    TPro  7.0  /  Alb  3.4  /  TBili  1.5  /  DBili  x   /  AST  78<H>  /  ALT  110<H>  /  AlkPhos  121<H>  04-23      Urinalysis Basic - ( 23 Apr 2024 05:11 )    Color: x / Appearance: x / SG: x / pH: x  Gluc: 78 mg/dL / Ketone: x  / Bili: x / Urobili: x   Blood: x / Protein: x / Nitrite: x   Leuk Esterase: x / RBC: x / WBC x   Sq Epi: x / Non Sq Epi: x / Bacteria: x      CAPILLARY BLOOD GLUCOSE          RADIOLOGY & ADDITIONAL TESTS:    Xray Knee 3 Views, Right (04.22.24 @ 05:03) >  No acute bony pathology.    CT Abdomen and Pelvis No Cont (04.22.24 @ 03:50) >  No bowel obstruction.    Large amount of stool in the rectum which is dilated to 7.8 cm.

## 2024-04-24 LAB
ALBUMIN SERPL ELPH-MCNC: 3.6 G/DL — SIGNIFICANT CHANGE UP (ref 3.3–5.2)
ALP SERPL-CCNC: 111 U/L — SIGNIFICANT CHANGE UP (ref 40–120)
ALT FLD-CCNC: 101 U/L — HIGH
ANION GAP SERPL CALC-SCNC: 16 MMOL/L — SIGNIFICANT CHANGE UP (ref 5–17)
AST SERPL-CCNC: 61 U/L — HIGH
BASOPHILS # BLD AUTO: 0.04 K/UL — SIGNIFICANT CHANGE UP (ref 0–0.2)
BASOPHILS NFR BLD AUTO: 0.5 % — SIGNIFICANT CHANGE UP (ref 0–2)
BILIRUB SERPL-MCNC: 1.6 MG/DL — SIGNIFICANT CHANGE UP (ref 0.4–2)
BUN SERPL-MCNC: 8.7 MG/DL — SIGNIFICANT CHANGE UP (ref 8–20)
CALCIUM SERPL-MCNC: 9.4 MG/DL — SIGNIFICANT CHANGE UP (ref 8.4–10.5)
CHLORIDE SERPL-SCNC: 99 MMOL/L — SIGNIFICANT CHANGE UP (ref 96–108)
CO2 SERPL-SCNC: 22 MMOL/L — SIGNIFICANT CHANGE UP (ref 22–29)
CREAT SERPL-MCNC: 0.62 MG/DL — SIGNIFICANT CHANGE UP (ref 0.5–1.3)
EGFR: 135 ML/MIN/1.73M2 — SIGNIFICANT CHANGE UP
EOSINOPHIL # BLD AUTO: 0.12 K/UL — SIGNIFICANT CHANGE UP (ref 0–0.5)
EOSINOPHIL NFR BLD AUTO: 1.5 % — SIGNIFICANT CHANGE UP (ref 0–6)
GLUCOSE SERPL-MCNC: 90 MG/DL — SIGNIFICANT CHANGE UP (ref 70–99)
HCT VFR BLD CALC: 38.1 % — LOW (ref 39–50)
HGB BLD-MCNC: 12.6 G/DL — LOW (ref 13–17)
IMM GRANULOCYTES NFR BLD AUTO: 0.6 % — SIGNIFICANT CHANGE UP (ref 0–0.9)
LYMPHOCYTES # BLD AUTO: 2.26 K/UL — SIGNIFICANT CHANGE UP (ref 1–3.3)
LYMPHOCYTES # BLD AUTO: 27.9 % — SIGNIFICANT CHANGE UP (ref 13–44)
MAGNESIUM SERPL-MCNC: 1.9 MG/DL — SIGNIFICANT CHANGE UP (ref 1.6–2.6)
MCHC RBC-ENTMCNC: 26.8 PG — LOW (ref 27–34)
MCHC RBC-ENTMCNC: 33.1 GM/DL — SIGNIFICANT CHANGE UP (ref 32–36)
MCV RBC AUTO: 81.1 FL — SIGNIFICANT CHANGE UP (ref 80–100)
MONOCYTES # BLD AUTO: 0.63 K/UL — SIGNIFICANT CHANGE UP (ref 0–0.9)
MONOCYTES NFR BLD AUTO: 7.8 % — SIGNIFICANT CHANGE UP (ref 2–14)
NEUTROPHILS # BLD AUTO: 5.01 K/UL — SIGNIFICANT CHANGE UP (ref 1.8–7.4)
NEUTROPHILS NFR BLD AUTO: 61.7 % — SIGNIFICANT CHANGE UP (ref 43–77)
PHOSPHATE SERPL-MCNC: 5.1 MG/DL — HIGH (ref 2.4–4.7)
PLATELET # BLD AUTO: 437 K/UL — HIGH (ref 150–400)
POTASSIUM SERPL-MCNC: 3.6 MMOL/L — SIGNIFICANT CHANGE UP (ref 3.5–5.3)
POTASSIUM SERPL-SCNC: 3.6 MMOL/L — SIGNIFICANT CHANGE UP (ref 3.5–5.3)
PROT SERPL-MCNC: 7 G/DL — SIGNIFICANT CHANGE UP (ref 6.6–8.7)
RBC # BLD: 4.7 M/UL — SIGNIFICANT CHANGE UP (ref 4.2–5.8)
RBC # FLD: 16.5 % — HIGH (ref 10.3–14.5)
SODIUM SERPL-SCNC: 137 MMOL/L — SIGNIFICANT CHANGE UP (ref 135–145)
WBC # BLD: 8.11 K/UL — SIGNIFICANT CHANGE UP (ref 3.8–10.5)
WBC # FLD AUTO: 8.11 K/UL — SIGNIFICANT CHANGE UP (ref 3.8–10.5)

## 2024-04-24 PROCEDURE — 99232 SBSQ HOSP IP/OBS MODERATE 35: CPT | Mod: GC

## 2024-04-24 RX ORDER — ACETAMINOPHEN 500 MG
1000 TABLET ORAL ONCE
Refills: 0 | Status: COMPLETED | OUTPATIENT
Start: 2024-04-24 | End: 2024-04-24

## 2024-04-24 RX ORDER — THIAMINE MONONITRATE (VIT B1) 100 MG
500 TABLET ORAL DAILY
Refills: 0 | Status: DISCONTINUED | OUTPATIENT
Start: 2024-04-24 | End: 2024-04-29

## 2024-04-24 RX ORDER — LACTULOSE 10 G/15ML
15 SOLUTION ORAL ONCE
Refills: 0 | Status: COMPLETED | OUTPATIENT
Start: 2024-04-24 | End: 2024-04-24

## 2024-04-24 RX ORDER — PHENYLEPHRINE-SHARK LIVER OIL-MINERAL OIL-PETROLATUM RECTAL OINTMENT
1 OINTMENT (GRAM) RECTAL DAILY
Refills: 0 | Status: DISCONTINUED | OUTPATIENT
Start: 2024-04-24 | End: 2024-04-27

## 2024-04-24 RX ADMIN — Medication 200 MILLIGRAM(S): at 06:19

## 2024-04-24 RX ADMIN — Medication 0.1 MILLIGRAM(S): at 22:33

## 2024-04-24 RX ADMIN — APIXABAN 5 MILLIGRAM(S): 2.5 TABLET, FILM COATED ORAL at 06:20

## 2024-04-24 RX ADMIN — GABAPENTIN 100 MILLIGRAM(S): 400 CAPSULE ORAL at 06:20

## 2024-04-24 RX ADMIN — Medication 500 MILLIGRAM(S): at 11:19

## 2024-04-24 RX ADMIN — Medication 15 MILLIGRAM(S): at 00:02

## 2024-04-24 RX ADMIN — APIXABAN 5 MILLIGRAM(S): 2.5 TABLET, FILM COATED ORAL at 19:03

## 2024-04-24 RX ADMIN — POLYETHYLENE GLYCOL 3350 17 GRAM(S): 17 POWDER, FOR SOLUTION ORAL at 22:33

## 2024-04-24 RX ADMIN — Medication 400 MILLIGRAM(S): at 22:33

## 2024-04-24 RX ADMIN — Medication 1 TABLET(S): at 11:18

## 2024-04-24 RX ADMIN — Medication 10 MILLIGRAM(S): at 11:19

## 2024-04-24 RX ADMIN — GABAPENTIN 100 MILLIGRAM(S): 400 CAPSULE ORAL at 19:03

## 2024-04-24 RX ADMIN — Medication 0.1 MILLIGRAM(S): at 06:20

## 2024-04-24 RX ADMIN — LACTULOSE 15 GRAM(S): 10 SOLUTION ORAL at 14:18

## 2024-04-24 RX ADMIN — Medication 200 MILLIGRAM(S): at 19:03

## 2024-04-24 RX ADMIN — Medication 105 MILLIGRAM(S): at 06:27

## 2024-04-24 RX ADMIN — TAMSULOSIN HYDROCHLORIDE 0.4 MILLIGRAM(S): 0.4 CAPSULE ORAL at 22:33

## 2024-04-24 RX ADMIN — PREGABALIN 1000 MICROGRAM(S): 225 CAPSULE ORAL at 11:20

## 2024-04-24 RX ADMIN — ATORVASTATIN CALCIUM 40 MILLIGRAM(S): 80 TABLET, FILM COATED ORAL at 22:33

## 2024-04-24 RX ADMIN — PANTOPRAZOLE SODIUM 40 MILLIGRAM(S): 20 TABLET, DELAYED RELEASE ORAL at 19:03

## 2024-04-24 RX ADMIN — Medication 400 MILLIGRAM(S): at 11:16

## 2024-04-24 RX ADMIN — Medication 1 MILLIGRAM(S): at 11:20

## 2024-04-24 RX ADMIN — Medication 0.1 MILLIGRAM(S): at 14:17

## 2024-04-24 RX ADMIN — ERGOCALCIFEROL 50000 UNIT(S): 1.25 CAPSULE ORAL at 11:19

## 2024-04-24 RX ADMIN — Medication 1 PACKET(S): at 11:23

## 2024-04-24 RX ADMIN — PHENYLEPHRINE-SHARK LIVER OIL-MINERAL OIL-PETROLATUM RECTAL OINTMENT 1 APPLICATION(S): at 11:22

## 2024-04-24 RX ADMIN — PANTOPRAZOLE SODIUM 40 MILLIGRAM(S): 20 TABLET, DELAYED RELEASE ORAL at 06:19

## 2024-04-24 RX ADMIN — Medication 15 MILLILITER(S): at 11:20

## 2024-04-24 NOTE — PROGRESS NOTE ADULT - SUBJECTIVE AND OBJECTIVE BOX
Patient is a 24y old  Male who presents with a chief complaint of Visual changes with abnormal MRI (23 Apr 2024 09:46)      INTERVAL HPI/OVERNIGHT EVENTS: No acute events overnight. Patient reported constipation and rectal pain yesterday but refused lactulose yesterday and received toradol overnight.     MEDICATIONS  (STANDING):  acetaminophen   IVPB .. 1000 milliGRAM(s) IV Intermittent once  apixaban 5 milliGRAM(s) Oral every 12 hours  atorvastatin 40 milliGRAM(s) Oral at bedtime  cefpodoxime 200 milliGRAM(s) Oral every 12 hours  cloNIDine 0.1 milliGRAM(s) Oral every 8 hours  cyanocobalamin 1000 MICROGram(s) Oral daily  ergocalciferol 39520 Unit(s) Oral every week  FLUoxetine 10 milliGRAM(s) Oral daily  folic acid 1 milliGRAM(s) Oral daily  gabapentin 100 milliGRAM(s) Oral two times a day  lactulose Syrup 15 Gram(s) Oral daily  lidocaine 2% Jelly 10 milliLiter(s) IntraUrethral once  multivitamin/minerals/iron Oral Solution (CENTRUM) 15 milliLiter(s) Oral daily  pantoprazole  Injectable 40 milliGRAM(s) IV Push two times a day  psyllium Powder 1 Packet(s) Oral daily  tamsulosin 0.4 milliGRAM(s) Oral at bedtime  thiamine 500 milliGRAM(s) Oral daily  vitamin B complex with vitamin C 1 Tablet(s) Oral daily    MEDICATIONS  (PRN):  acetaminophen     Tablet .. 650 milliGRAM(s) Oral every 6 hours PRN Temp greater or equal to 38C (100.4F), Mild Pain (1 - 3)  aluminum hydroxide/magnesium hydroxide/simethicone Suspension 30 milliLiter(s) Oral every 4 hours PRN Dyspepsia  hemorrhoidal Ointment 1 Application(s) Rectal daily PRN hemorrhoids  meclizine 12.5 milliGRAM(s) Oral three times a day PRN Dizziness  melatonin 3 milliGRAM(s) Oral at bedtime PRN Insomnia  metoprolol tartrate Injectable 5 milliGRAM(s) IV Push every 8 hours PRN hr over 120  OLANZapine Injectable 2.5 milliGRAM(s) IntraMuscular daily PRN Acxiety  ondansetron Injectable 4 milliGRAM(s) IV Push every 8 hours PRN Nausea and/or Vomiting  polyethylene glycol 3350 17 Gram(s) Oral daily PRN Constipation  senna 2 Tablet(s) Oral at bedtime PRN Constipation      Allergies    No Known Allergies    Intolerances      REVIEW OF SYSTEMS:  CONSTITUTIONAL: No fever or fatigue  RESPIRATORY: No cough; No shortness of breath  CARDIOVASCULAR: No chest pain,  dizziness, or leg swelling  GASTROINTESTINAL:  no abdominal pain. No nausea or vomiting; + constipation, no  diarrhea  NEUROLOGICAL: No headaches, numbness, or tremors  MUSCULOSKELETAL: No joint pain; No muscle, back, or extremity pain      Vital Signs Last 24 Hrs  T(C): 36.9 (24 Apr 2024 10:20), Max: 36.9 (24 Apr 2024 10:20)  T(F): 98.4 (24 Apr 2024 10:20), Max: 98.4 (24 Apr 2024 10:20)  HR: 64 (24 Apr 2024 10:20) (64 - 98)  BP: 141/76 (24 Apr 2024 10:20) (111/71 - 141/76)  BP(mean): --  RR: 20 (24 Apr 2024 10:20) (18 - 20)  SpO2: 98% (24 Apr 2024 10:20) (96% - 99%)    Parameters below as of 24 Apr 2024 10:20  Patient On (Oxygen Delivery Method): room air      PHYSICAL EXAM:  GENERAL: NAD, obese male lying in bed comfortably  HEAD:  Atraumatic, Normocephalic  EYES: conjunctiva and sclera clear  NERVOUS SYSTEM:  Alert & Oriented X3, No gross focal deficits  CHEST/LUNG: Clear to auscultation bilaterally; No wheezes  HEART: Regular rate and rhythm; No murmurs  ABDOMEN: Soft, obese, Nontender; Bowel sounds present  EXTREMITIES:  No cyanosis, or edema, no tenderness to palpation on R anterior knee    LABS:                        12.6   8.11  )-----------( 437      ( 24 Apr 2024 05:16 )             38.1     04-24    137  |  99  |  8.7  ----------------------------<  90  3.6   |  22.0  |  0.62    Ca    9.4      24 Apr 2024 05:16  Phos  5.1     04-24  Mg     1.9     04-24    TPro  7.0  /  Alb  3.6  /  TBili  1.6  /  DBili  x   /  AST  61<H>  /  ALT  101<H>  /  AlkPhos  111  04-24      Urinalysis Basic - ( 24 Apr 2024 05:16 )    Color: x / Appearance: x / SG: x / pH: x  Gluc: 90 mg/dL / Ketone: x  / Bili: x / Urobili: x   Blood: x / Protein: x / Nitrite: x   Leuk Esterase: x / RBC: x / WBC x   Sq Epi: x / Non Sq Epi: x / Bacteria: x      CAPILLARY BLOOD GLUCOSE          RADIOLOGY & ADDITIONAL TESTS:

## 2024-04-24 NOTE — PROGRESS NOTE ADULT - ATTENDING COMMENTS
patient c/o rectal pain / likely spasms   avoid enema use   c/w metamucil / lactulose daily  change to po thiamine high dose   educated about daily supplements   family ( mother and aunt ) concerned about patient not getting daily pt  patient not accepted at [facility] facility of choice , family now plans to take patient home.    oob to chair daily / nees daily pt   updated cm/ sw / pt   likely dc home in 1-2 days pending progress  updated patient , mother and aunt at bedside

## 2024-04-24 NOTE — PROGRESS NOTE ADULT - ASSESSMENT
ASSESSMENT: 24M PMHx obesity, anxiety, depression, s/p gastric sleeve surgery presenting for diplopia, leg weakness, decreased PO intake, and urinary retention. Imaging negative. Etiology likely adjustment disorder and thiamine deficiency after surgery.     PLAN:     Constipation/Hemorrhoids  - Metamucil  - Miralax, senna  - Lactulose 15g qd  - Preparation H daily    UTI  - Urinalysis positive  - Afebrile, no leukocytosis  - Vantin 200 mg PO BID to complete 7d course, s/p ceftriaxone    AMS, Leg Weakness, Diplopia, Urinary Retention  - CT head: hypodensity in R parietal and temporal lobes  - MRI brain/MRA head/MRI lumbosacral spine performed:  No stroke, mass, hemorrhage, aneurysm, LVO, critical stenosis, or cauda equina  - Repeat MRI t-spine: no T3-T4 edema; slight disc protrusion in T6-7: seen by neuro sx ,  No intervention per neurosurgery  - Repeat CTH, EEG non contributory  - Ammonia level wnl  - US b/l LE to r/o DVT: negative  - Gabapentin 100 mg BID   - PM&R consulted, recs noted  - Daily PT for leg weakness  - AMS, diplopia, and urinary retention resolved    Anxiety  - Prozac 10 mg qd  - Clonidine 0.1 mg q8h  - Lorazepam 0.5 mg TID PRN  -  recs noted  - Follow up with psychiatrist outpatient    Vitamin Deficiency  - Had poor PO intake for past 2 months: possible vitamin deficiency after bariatric surgery  - Vitamin D weekly, folic acid and vitamin B supplements  - Vitamin B12 wnl  - High dose thiamine PO  - Continue daily vitamins  - May be contributing to bilateral leg weakness/lethargy    Urinary Retention - resolved  - Tamsulosin 0.4 mg PO qhs  - Rodriguez removed    PE  - CT chest (4/9): pulmonary embolism, no R heart strain  - US LE: no DVT  - Eliquis 10 mg BID x7 days, then 5 mg PO BID x 90 days  - On room air  - Telemetry monitoring  - TTE to r/o R heart strain: wnl    HLD  - Atorvastatin 40 mg PO qhs    Vertigo  - Meclizine 12.5 mg PO TID PRN    Nausea/vomiting  - Resolved  - CT abdomen: no acute abdominal finding or visceral injury  - IV LR 1000 mL @ 100 cc/h  - Barium scan: wnl  - Simethicone suspension q4h PRN  - Zofran 4 mg IV q8h pRN  - Protonix 40 mg IV BID  - Miralax, senna  - Bariatric surgeon recommending to continue IV fluids  - Started Soft Bariatric phase 2 diet    Dispo: Patient is medically stable for d/c. Family now wishes to take patient home with home care; declining the [facility] facilities that accepted pt.      ASSESSMENT: 24M PMHx obesity, anxiety, depression, s/p gastric sleeve surgery presenting for diplopia, leg weakness, decreased PO intake, and urinary retention. Imaging negative. Etiology likely adjustment disorder and thiamine deficiency after surgery.     PLAN:     Constipation/Hemorrhoids  - Metamucil  - Miralax, senna  - Lactulose 15g this AM  - If no BM, give another Lactulose 15 mg in evening  - Preparation H daily    UTI  - Urinalysis positive  - Afebrile, no leukocytosis  - Vantin 200 mg PO BID to complete 7d course, s/p ceftriaxone    AMS, Leg Weakness, Diplopia, Urinary Retention  - CT head: hypodensity in R parietal and temporal lobes  - MRI brain/MRA head/MRI lumbosacral spine performed:  No stroke, mass, hemorrhage, aneurysm, LVO, critical stenosis, or cauda equina  - Repeat MRI t-spine: no T3-T4 edema; slight disc protrusion in T6-7: seen by neuro sx ,  No intervention per neurosurgery  - Repeat CTH, EEG non contributory  - Ammonia level wnl  - US b/l LE to r/o DVT: negative  - Gabapentin 100 mg BID   - PM&R consulted, recs noted  - Daily PT for leg weakness ordered  - AMS, diplopia, and urinary retention resolved    Anxiety  - Prozac 10 mg qd  - Clonidine 0.1 mg q8h  - Lorazepam 0.5 mg TID PRN  -  recs noted  - Follow up with psychiatrist outpatient    Vitamin Deficiency  - Had poor PO intake for past 2 months: possible vitamin deficiency after bariatric surgery  - Vitamin D weekly, folic acid and vitamin B supplements  - Vitamin B12 wnl  - High dose thiamine PO  - Continue daily vitamins  - May be contributing to bilateral leg weakness/lethargy    Urinary Retention - resolved  - Tamsulosin 0.4 mg PO qhs  - Rodriguez removed    PE  - CT chest (4/9): pulmonary embolism, no R heart strain  - US LE: no DVT  - Eliquis 10 mg BID x7 days, then 5 mg PO BID x 90 days  - On room air  - Telemetry monitoring  - TTE to r/o R heart strain: wnl    HLD  - Atorvastatin 40 mg PO qhs    Vertigo  - Meclizine 12.5 mg PO TID PRN    Nausea/vomiting  - Resolved  - CT abdomen: no acute abdominal finding or visceral injury  - Barium scan: wnl  - Simethicone suspension q4h PRN  - Zofran 4 mg IV q8h PRN  - Protonix 40 mg IV BID  - Bariatric surgeon recommending to continue IV fluids  - Started Soft Bariatric phase 2 diet    Dispo: Patient is medically stable for d/c. Family now wishes to take patient home with home care; declining the [facility] facilities that accepted pt. PT called for treatment.

## 2024-04-25 LAB
ALBUMIN SERPL ELPH-MCNC: 4 G/DL — SIGNIFICANT CHANGE UP (ref 3.3–5.2)
ALP SERPL-CCNC: 117 U/L — SIGNIFICANT CHANGE UP (ref 40–120)
ALT FLD-CCNC: 93 U/L — HIGH
ANION GAP SERPL CALC-SCNC: 14 MMOL/L — SIGNIFICANT CHANGE UP (ref 5–17)
AST SERPL-CCNC: 59 U/L — HIGH
BASOPHILS # BLD AUTO: 0.03 K/UL — SIGNIFICANT CHANGE UP (ref 0–0.2)
BASOPHILS NFR BLD AUTO: 0.3 % — SIGNIFICANT CHANGE UP (ref 0–2)
BILIRUB SERPL-MCNC: 1.9 MG/DL — SIGNIFICANT CHANGE UP (ref 0.4–2)
BUN SERPL-MCNC: 12.1 MG/DL — SIGNIFICANT CHANGE UP (ref 8–20)
CALCIUM SERPL-MCNC: 9.4 MG/DL — SIGNIFICANT CHANGE UP (ref 8.4–10.5)
CHLORIDE SERPL-SCNC: 101 MMOL/L — SIGNIFICANT CHANGE UP (ref 96–108)
CO2 SERPL-SCNC: 23 MMOL/L — SIGNIFICANT CHANGE UP (ref 22–29)
CREAT SERPL-MCNC: 0.56 MG/DL — SIGNIFICANT CHANGE UP (ref 0.5–1.3)
EGFR: 139 ML/MIN/1.73M2 — SIGNIFICANT CHANGE UP
EOSINOPHIL # BLD AUTO: 0.09 K/UL — SIGNIFICANT CHANGE UP (ref 0–0.5)
EOSINOPHIL NFR BLD AUTO: 0.9 % — SIGNIFICANT CHANGE UP (ref 0–6)
GLUCOSE SERPL-MCNC: 92 MG/DL — SIGNIFICANT CHANGE UP (ref 70–99)
HCT VFR BLD CALC: 39.8 % — SIGNIFICANT CHANGE UP (ref 39–50)
HGB BLD-MCNC: 13 G/DL — SIGNIFICANT CHANGE UP (ref 13–17)
IMM GRANULOCYTES NFR BLD AUTO: 0.4 % — SIGNIFICANT CHANGE UP (ref 0–0.9)
LYMPHOCYTES # BLD AUTO: 1.31 K/UL — SIGNIFICANT CHANGE UP (ref 1–3.3)
LYMPHOCYTES # BLD AUTO: 12.5 % — LOW (ref 13–44)
MAGNESIUM SERPL-MCNC: 1.8 MG/DL — SIGNIFICANT CHANGE UP (ref 1.6–2.6)
MCHC RBC-ENTMCNC: 26.7 PG — LOW (ref 27–34)
MCHC RBC-ENTMCNC: 32.7 GM/DL — SIGNIFICANT CHANGE UP (ref 32–36)
MCV RBC AUTO: 81.9 FL — SIGNIFICANT CHANGE UP (ref 80–100)
MONOCYTES # BLD AUTO: 0.77 K/UL — SIGNIFICANT CHANGE UP (ref 0–0.9)
MONOCYTES NFR BLD AUTO: 7.3 % — SIGNIFICANT CHANGE UP (ref 2–14)
NEUTROPHILS # BLD AUTO: 8.26 K/UL — HIGH (ref 1.8–7.4)
NEUTROPHILS NFR BLD AUTO: 78.6 % — HIGH (ref 43–77)
PHOSPHATE SERPL-MCNC: 5 MG/DL — HIGH (ref 2.4–4.7)
PLATELET # BLD AUTO: 407 K/UL — HIGH (ref 150–400)
POTASSIUM SERPL-MCNC: 4 MMOL/L — SIGNIFICANT CHANGE UP (ref 3.5–5.3)
POTASSIUM SERPL-SCNC: 4 MMOL/L — SIGNIFICANT CHANGE UP (ref 3.5–5.3)
PROT SERPL-MCNC: 7.3 G/DL — SIGNIFICANT CHANGE UP (ref 6.6–8.7)
RBC # BLD: 4.86 M/UL — SIGNIFICANT CHANGE UP (ref 4.2–5.8)
RBC # FLD: 17 % — HIGH (ref 10.3–14.5)
SODIUM SERPL-SCNC: 138 MMOL/L — SIGNIFICANT CHANGE UP (ref 135–145)
WBC # BLD: 10.5 K/UL — SIGNIFICANT CHANGE UP (ref 3.8–10.5)
WBC # FLD AUTO: 10.5 K/UL — SIGNIFICANT CHANGE UP (ref 3.8–10.5)

## 2024-04-25 PROCEDURE — 99232 SBSQ HOSP IP/OBS MODERATE 35: CPT | Mod: GC

## 2024-04-25 RX ORDER — PHENYLEPHRINE-SHARK LIVER OIL-MINERAL OIL-PETROLATUM RECTAL OINTMENT
1 OINTMENT (GRAM) RECTAL ONCE
Refills: 0 | Status: COMPLETED | OUTPATIENT
Start: 2024-04-25 | End: 2024-04-25

## 2024-04-25 RX ORDER — AER TRAVELER 0.5 G/1
1 SOLUTION RECTAL; TOPICAL
Refills: 0 | Status: DISCONTINUED | OUTPATIENT
Start: 2024-04-25 | End: 2024-04-29

## 2024-04-25 RX ORDER — ACETAMINOPHEN 500 MG
1000 TABLET ORAL ONCE
Refills: 0 | Status: COMPLETED | OUTPATIENT
Start: 2024-04-25 | End: 2024-04-25

## 2024-04-25 RX ORDER — ZINC OXIDE 200 MG/G
1 OINTMENT TOPICAL DAILY
Refills: 0 | Status: DISCONTINUED | OUTPATIENT
Start: 2024-04-25 | End: 2024-04-29

## 2024-04-25 RX ADMIN — Medication 0.1 MILLIGRAM(S): at 21:49

## 2024-04-25 RX ADMIN — Medication 0.1 MILLIGRAM(S): at 15:26

## 2024-04-25 RX ADMIN — APIXABAN 5 MILLIGRAM(S): 2.5 TABLET, FILM COATED ORAL at 17:08

## 2024-04-25 RX ADMIN — Medication 10 MILLIGRAM(S): at 17:09

## 2024-04-25 RX ADMIN — Medication 200 MILLIGRAM(S): at 05:48

## 2024-04-25 RX ADMIN — Medication 500 MILLIGRAM(S): at 11:46

## 2024-04-25 RX ADMIN — Medication 1 MILLIGRAM(S): at 11:46

## 2024-04-25 RX ADMIN — Medication 200 MILLIGRAM(S): at 17:08

## 2024-04-25 RX ADMIN — APIXABAN 5 MILLIGRAM(S): 2.5 TABLET, FILM COATED ORAL at 05:48

## 2024-04-25 RX ADMIN — Medication 0.1 MILLIGRAM(S): at 05:48

## 2024-04-25 RX ADMIN — GABAPENTIN 100 MILLIGRAM(S): 400 CAPSULE ORAL at 05:48

## 2024-04-25 RX ADMIN — PHENYLEPHRINE-SHARK LIVER OIL-MINERAL OIL-PETROLATUM RECTAL OINTMENT 1 APPLICATION(S): at 22:55

## 2024-04-25 RX ADMIN — ZINC OXIDE 1 APPLICATION(S): 200 OINTMENT TOPICAL at 17:08

## 2024-04-25 RX ADMIN — ATORVASTATIN CALCIUM 40 MILLIGRAM(S): 80 TABLET, FILM COATED ORAL at 21:49

## 2024-04-25 RX ADMIN — Medication 15 MILLILITER(S): at 11:46

## 2024-04-25 RX ADMIN — AER TRAVELER 1 APPLICATION(S): 0.5 SOLUTION RECTAL; TOPICAL at 17:08

## 2024-04-25 RX ADMIN — TAMSULOSIN HYDROCHLORIDE 0.4 MILLIGRAM(S): 0.4 CAPSULE ORAL at 21:55

## 2024-04-25 RX ADMIN — AER TRAVELER 1 APPLICATION(S): 0.5 SOLUTION RECTAL; TOPICAL at 22:55

## 2024-04-25 RX ADMIN — GABAPENTIN 100 MILLIGRAM(S): 400 CAPSULE ORAL at 17:08

## 2024-04-25 RX ADMIN — PANTOPRAZOLE SODIUM 40 MILLIGRAM(S): 20 TABLET, DELAYED RELEASE ORAL at 05:48

## 2024-04-25 RX ADMIN — Medication 1 TABLET(S): at 11:46

## 2024-04-25 RX ADMIN — Medication 10 MILLIGRAM(S): at 11:47

## 2024-04-25 RX ADMIN — Medication 650 MILLIGRAM(S): at 11:45

## 2024-04-25 RX ADMIN — PANTOPRAZOLE SODIUM 40 MILLIGRAM(S): 20 TABLET, DELAYED RELEASE ORAL at 17:09

## 2024-04-25 RX ADMIN — Medication 3 MILLIGRAM(S): at 21:49

## 2024-04-25 RX ADMIN — PREGABALIN 1000 MICROGRAM(S): 225 CAPSULE ORAL at 11:46

## 2024-04-25 NOTE — CHART NOTE - NSCHARTNOTEFT_GEN_A_CORE
Called to bedside by RN at around 6:15 PM on 4/25/2024 as patient slid out of his chair onto the floor while trying to stand up, which was witnessed by his mother at bedside. Patient and mother deny loss of consciousness, palpitations, dizziness, extremity, or back pain. Monica lift was used to return patient to bed.     ICU Vital Signs Last 24 Hrs  T(C): 36.3 (25 Apr 2024 17:15), Max: 36.9 (25 Apr 2024 04:03)  T(F): 97.3 (25 Apr 2024 17:15), Max: 98.5 (25 Apr 2024 04:03)  HR: 82 (25 Apr 2024 17:15) (65 - 85)  BP: 130/81 (25 Apr 2024 17:15) (117/79 - 131/85)  RR: 18 (25 Apr 2024 17:15) (18 - 18)  SpO2: 96% (25 Apr 2024 17:15) (95% - 97%)    O2 Parameters below as of 25 Apr 2024 17:15  Patient On (Oxygen Delivery Method): room air      PHYSICAL EXAM:  GENERAL: NAD, obese male lying in bed comfortably  HEAD:  Atraumatic, Normocephalic  EYES: conjunctiva and sclera clear  NERVOUS SYSTEM:  Alert & Oriented X3, No gross focal deficits  CHEST/LUNG: Clear to auscultation bilaterally; No wheezes  HEART: Regular rate and rhythm; No murmurs  ABDOMEN: Soft, obese, Nontender; Bowel sounds present  EXTREMITIES:  No cyanosis, or edema, no tenderness to palpation on all extremities, full ROM in all 4 extremities and neck, no ecchymoses, no midline spinal tenderness    A/P:    - Instructed patient on importance of calling nurse/physician to bedside before trying to leave bed/chair  - Continue fall risk protocol  - Vital signs within normal limits  - Continue daily PT  - Continue vitamin supplementation

## 2024-04-25 NOTE — PROGRESS NOTE ADULT - ATTENDING COMMENTS
patient sleeping comfortable in bed this am   states has some rectal pain / spasms   ? noted with orange  stuff mixed with stools by nursing staff   had bm today   c/w daily pt / pt and nursing aware   plan for home with hc when optimized from pt standpoint

## 2024-04-25 NOTE — PROGRESS NOTE ADULT - SUBJECTIVE AND OBJECTIVE BOX
Patient is a 24y old  Male who presents with a chief complaint of Visual changes with abnormal MRI (24 Apr 2024 10:40)      INTERVAL HPI/OVERNIGHT EVENTS: No acute events overnight. Patient reports having bowel movements overnight and was able to ambulate with PT yesterday. No new complaints, reports having rectal spasms, which are chronic.     MEDICATIONS  (STANDING):  apixaban 5 milliGRAM(s) Oral every 12 hours  atorvastatin 40 milliGRAM(s) Oral at bedtime  cefpodoxime 200 milliGRAM(s) Oral every 12 hours  cloNIDine 0.1 milliGRAM(s) Oral every 8 hours  cyanocobalamin 1000 MICROGram(s) Oral daily  ergocalciferol 09687 Unit(s) Oral every week  FLUoxetine 10 milliGRAM(s) Oral daily  folic acid 1 milliGRAM(s) Oral daily  gabapentin 100 milliGRAM(s) Oral two times a day  hemorrhoidal Ointment 1 Application(s) Rectal daily  lactulose Syrup 15 Gram(s) Oral daily  lidocaine 2% Jelly 10 milliLiter(s) IntraUrethral once  multivitamin/minerals/iron Oral Solution (CENTRUM) 15 milliLiter(s) Oral daily  pantoprazole  Injectable 40 milliGRAM(s) IV Push two times a day  psyllium Powder 1 Packet(s) Oral daily  tamsulosin 0.4 milliGRAM(s) Oral at bedtime  thiamine 500 milliGRAM(s) Oral daily  vitamin B complex with vitamin C 1 Tablet(s) Oral daily    MEDICATIONS  (PRN):  acetaminophen     Tablet .. 650 milliGRAM(s) Oral every 6 hours PRN Temp greater or equal to 38C (100.4F), Mild Pain (1 - 3)  aluminum hydroxide/magnesium hydroxide/simethicone Suspension 30 milliLiter(s) Oral every 4 hours PRN Dyspepsia  meclizine 12.5 milliGRAM(s) Oral three times a day PRN Dizziness  melatonin 3 milliGRAM(s) Oral at bedtime PRN Insomnia  metoprolol tartrate Injectable 5 milliGRAM(s) IV Push every 8 hours PRN hr over 120  OLANZapine Injectable 2.5 milliGRAM(s) IntraMuscular daily PRN Acxiety  ondansetron Injectable 4 milliGRAM(s) IV Push every 8 hours PRN Nausea and/or Vomiting  polyethylene glycol 3350 17 Gram(s) Oral daily PRN Constipation  senna 2 Tablet(s) Oral at bedtime PRN Constipation      Allergies    No Known Allergies    Intolerances        REVIEW OF SYSTEMS:  CONSTITUTIONAL: No fever or fatigue  RESPIRATORY: No cough; No shortness of breath  CARDIOVASCULAR: No chest pain,  dizziness, or leg swelling  GASTROINTESTINAL:  no abdominal pain. No nausea or vomiting; no constipation, no  diarrhea, + rectal spasms  NEUROLOGICAL: No headaches, numbness, or tremors  MUSCULOSKELETAL: No joint pain; No muscle, back, or extremity pain      Vital Signs Last 24 Hrs  T(C): 36.9 (25 Apr 2024 10:30), Max: 37.1 (24 Apr 2024 16:57)  T(F): 98.5 (25 Apr 2024 10:30), Max: 98.7 (24 Apr 2024 16:57)  HR: 68 (25 Apr 2024 10:30) (65 - 85)  BP: 131/85 (25 Apr 2024 10:30) (107/75 - 131/85)  BP(mean): --  RR: 18 (25 Apr 2024 10:30) (18 - 18)  SpO2: 97% (25 Apr 2024 10:30) (95% - 97%)    Parameters below as of 25 Apr 2024 10:30  Patient On (Oxygen Delivery Method): room air      PHYSICAL EXAM:  GENERAL: NAD, obese male lying in bed comfortably  HEAD:  Atraumatic, Normocephalic  EYES: conjunctiva and sclera clear  NERVOUS SYSTEM:  Alert & Oriented X3, No gross focal deficits  CHEST/LUNG: Clear to auscultation bilaterally; No wheezes  HEART: Regular rate and rhythm; No murmurs  ABDOMEN: Soft, obese, Nontender; Bowel sounds present  EXTREMITIES:  No cyanosis, or edema, no tenderness to palpation on R anterior knee    LABS:                        12.6   8.11  )-----------( 437      ( 24 Apr 2024 05:16 )             38.1     04-24    137  |  99  |  8.7  ----------------------------<  90  3.6   |  22.0  |  0.62    Ca    9.4      24 Apr 2024 05:16  Phos  5.1     04-24  Mg     1.9     04-24    TPro  7.0  /  Alb  3.6  /  TBili  1.6  /  DBili  x   /  AST  61<H>  /  ALT  101<H>  /  AlkPhos  111  04-24      Urinalysis Basic - ( 24 Apr 2024 05:16 )    Color: x / Appearance: x / SG: x / pH: x  Gluc: 90 mg/dL / Ketone: x  / Bili: x / Urobili: x   Blood: x / Protein: x / Nitrite: x   Leuk Esterase: x / RBC: x / WBC x   Sq Epi: x / Non Sq Epi: x / Bacteria: x      CAPILLARY BLOOD GLUCOSE          RADIOLOGY & ADDITIONAL TESTS:    Imaging Personally Reviewed:  [ ] YES  [ ] NO    Consultant(s) Notes Reviewed:  [ ] YES  [ ] NO    Care Discussed with Consultants/Other Providers [ ] YES  [ ] NO    Plan of Care discussed with Housestaff [ ]YES [ ] NO Patient is a 24y old  Male who presents with a chief complaint of Visual changes with abnormal MRI (24 Apr 2024 10:40)      INTERVAL HPI/OVERNIGHT EVENTS: No acute events overnight. Patient reports having bowel movements overnight and was able to ambulate with PT yesterday. No new complaints, reports having rectal spasms, which are chronic.     MEDICATIONS  (STANDING):  apixaban 5 milliGRAM(s) Oral every 12 hours  atorvastatin 40 milliGRAM(s) Oral at bedtime  cefpodoxime 200 milliGRAM(s) Oral every 12 hours  cloNIDine 0.1 milliGRAM(s) Oral every 8 hours  cyanocobalamin 1000 MICROGram(s) Oral daily  ergocalciferol 12239 Unit(s) Oral every week  FLUoxetine 10 milliGRAM(s) Oral daily  folic acid 1 milliGRAM(s) Oral daily  gabapentin 100 milliGRAM(s) Oral two times a day  hemorrhoidal Ointment 1 Application(s) Rectal daily  lactulose Syrup 15 Gram(s) Oral daily  lidocaine 2% Jelly 10 milliLiter(s) IntraUrethral once  multivitamin/minerals/iron Oral Solution (CENTRUM) 15 milliLiter(s) Oral daily  pantoprazole  Injectable 40 milliGRAM(s) IV Push two times a day  psyllium Powder 1 Packet(s) Oral daily  tamsulosin 0.4 milliGRAM(s) Oral at bedtime  thiamine 500 milliGRAM(s) Oral daily  vitamin B complex with vitamin C 1 Tablet(s) Oral daily    MEDICATIONS  (PRN):  acetaminophen     Tablet .. 650 milliGRAM(s) Oral every 6 hours PRN Temp greater or equal to 38C (100.4F), Mild Pain (1 - 3)  aluminum hydroxide/magnesium hydroxide/simethicone Suspension 30 milliLiter(s) Oral every 4 hours PRN Dyspepsia  meclizine 12.5 milliGRAM(s) Oral three times a day PRN Dizziness  melatonin 3 milliGRAM(s) Oral at bedtime PRN Insomnia  metoprolol tartrate Injectable 5 milliGRAM(s) IV Push every 8 hours PRN hr over 120  OLANZapine Injectable 2.5 milliGRAM(s) IntraMuscular daily PRN Acxiety  ondansetron Injectable 4 milliGRAM(s) IV Push every 8 hours PRN Nausea and/or Vomiting  polyethylene glycol 3350 17 Gram(s) Oral daily PRN Constipation  senna 2 Tablet(s) Oral at bedtime PRN Constipation      Allergies    No Known Allergies    Intolerances        REVIEW OF SYSTEMS:  CONSTITUTIONAL: No fever or fatigue  RESPIRATORY: No cough; No shortness of breath  CARDIOVASCULAR: No chest pain,  dizziness, or leg swelling  GASTROINTESTINAL:  no abdominal pain. No nausea or vomiting; no constipation, no  diarrhea, + rectal spasms  NEUROLOGICAL: No headaches, numbness, or tremors  MUSCULOSKELETAL: No joint pain; No muscle, back, or extremity pain      Vital Signs Last 24 Hrs  T(C): 36.9 (25 Apr 2024 10:30), Max: 37.1 (24 Apr 2024 16:57)  T(F): 98.5 (25 Apr 2024 10:30), Max: 98.7 (24 Apr 2024 16:57)  HR: 68 (25 Apr 2024 10:30) (65 - 85)  BP: 131/85 (25 Apr 2024 10:30) (107/75 - 131/85)  BP(mean): --  RR: 18 (25 Apr 2024 10:30) (18 - 18)  SpO2: 97% (25 Apr 2024 10:30) (95% - 97%)    Parameters below as of 25 Apr 2024 10:30  Patient On (Oxygen Delivery Method): room air      PHYSICAL EXAM:  GENERAL: NAD, obese male lying in bed comfortably  HEAD:  Atraumatic, Normocephalic  EYES: conjunctiva and sclera clear  NERVOUS SYSTEM:  Alert & Oriented X3, No gross focal deficits  CHEST/LUNG: Clear to auscultation bilaterally; No wheezes  HEART: Regular rate and rhythm; No murmurs  ABDOMEN: Soft, obese, Nontender; Bowel sounds present  EXTREMITIES:  No cyanosis, or edema, no tenderness to palpation on R anterior knee        LABS:                        12.6   8.11  )-----------( 437      ( 24 Apr 2024 05:16 )             38.1     04-24    137  |  99  |  8.7  ----------------------------<  90  3.6   |  22.0  |  0.62    Ca    9.4      24 Apr 2024 05:16  Phos  5.1     04-24  Mg     1.9     04-24    TPro  7.0  /  Alb  3.6  /  TBili  1.6  /  DBili  x   /  AST  61<H>  /  ALT  101<H>  /  AlkPhos  111  04-24      Urinalysis Basic - ( 24 Apr 2024 05:16 )    Color: x / Appearance: x / SG: x / pH: x  Gluc: 90 mg/dL / Ketone: x  / Bili: x / Urobili: x   Blood: x / Protein: x / Nitrite: x   Leuk Esterase: x / RBC: x / WBC x   Sq Epi: x / Non Sq Epi: x / Bacteria: x      CAPILLARY BLOOD GLUCOSE          RADIOLOGY & ADDITIONAL TESTS:

## 2024-04-25 NOTE — PROGRESS NOTE ADULT - ASSESSMENT
ASSESSMENT: 24M PMHx obesity, anxiety, depression, s/p gastric sleeve surgery presenting for diplopia, leg weakness, decreased PO intake, and urinary retention. Imaging negative. Etiology likely adjustment disorder and thiamine deficiency after surgery.     PLAN:     Constipation/Hemorrhoids  - Metamucil  - Miralax, senna  - Lactulose 15g qd  - Preparation H daily    UTI  - Urinalysis positive  - Afebrile, no leukocytosis  - Vantin 200 mg PO BID to complete 7d course, s/p ceftriaxone    AMS, Leg Weakness, Diplopia, Urinary Retention  - CT head: hypodensity in R parietal and temporal lobes  - MRI brain/MRA head/MRI lumbosacral spine performed:  No stroke, mass, hemorrhage, aneurysm, LVO, critical stenosis, or cauda equina  - Repeat MRI t-spine: no T3-T4 edema; slight disc protrusion in T6-7: seen by neuro sx ,  No intervention per neurosurgery  - Repeat CTH, EEG non contributory  - Ammonia level wnl  - US b/l LE to r/o DVT: negative  - Gabapentin 100 mg BID   - PM&R consulted, no acute inpatient rehab recommended  - Daily PT for leg weakness  - AMS, diplopia, and urinary retention resolved    Anxiety  - Prozac 10 mg qd  - Clonidine 0.1 mg q8h  - Lorazepam 0.5 mg TID PRN  - BH recs noted  - Follow up with psychiatrist outpatient    Vitamin Deficiency  - Had poor PO intake for past 2 months: possible vitamin deficiency after bariatric surgery  - Vitamin D weekly, folic acid and vitamin B supplements  - Vitamin B12 wnl  - High dose thiamine PO  - Continue daily vitamins  - May be contributing to bilateral leg weakness/lethargy    Urinary Retention - resolved  - Tamsulosin 0.4 mg PO qhs  - Rodriguez removed    PE  - CT chest (4/9): pulmonary embolism, no R heart strain  - US LE: no DVT  - Eliquis 10 mg BID x7 days, then 5 mg PO BID x 90 days  - On room air  - Telemetry monitoring  - TTE to r/o R heart strain: wnl    HLD  - Atorvastatin 40 mg PO qhs    Vertigo  - Meclizine 12.5 mg PO TID PRN    Nausea/vomiting  - Resolved  - CT abdomen: no acute abdominal finding or visceral injury  - Barium scan: wnl  - Simethicone suspension q4h PRN  - Zofran 4 mg IV q8h PRN  - Protonix 40 mg IV BID  - Bariatric surgeon recommending to continue IV fluids  - Started Soft Bariatric phase 2 diet    Dispo: Pending daily PT for optimization prior to discharge home. Family now wishes to take patient home with home care; declining the [facility] facilities that accepted pt. PT called for treatment.      ASSESSMENT: 24M PMHx obesity, anxiety, depression, s/p gastric sleeve surgery presenting for diplopia, leg weakness, decreased PO intake, and urinary retention. Imaging negative. Etiology likely adjustment disorder and thiamine deficiency after surgery.     PLAN:     Constipation/Hemorrhoids  - Metamucil  - Miralax, senna  - Lactulose 15g qd  - Preparation H daily    UTI  - Urinalysis positive  - Afebrile, no leukocytosis  - Vantin 200 mg PO BID to complete 7d course, s/p ceftriaxone    AMS, Leg Weakness, Diplopia, Urinary Retention  - CT head: hypodensity in R parietal and temporal lobes  - MRI brain/MRA head/MRI lumbosacral spine performed:  No stroke, mass, hemorrhage, aneurysm, LVO, critical stenosis, or cauda equina  - Repeat MRI t-spine: no T3-T4 edema; slight disc protrusion in T6-7: seen by neuro sx ,  No intervention per neurosurgery  - Repeat CTH, EEG non contributory  - Ammonia level wnl  - US b/l LE to r/o DVT: negative  - Gabapentin 100 mg BID   - PM&R consulted, no acute inpatient rehab recommended  - Daily PT for leg weakness  - AMS, diplopia, and urinary retention resolved    Anxiety  - Prozac 10 mg qd  - Clonidine 0.1 mg q8h  - Lorazepam 0.5 mg TID PRN  - BH recs noted  - Follow up with psychiatrist outpatient    Vitamin Deficiency  - Had poor PO intake for past 2 months: possible vitamin deficiency after bariatric surgery  - Vitamin D weekly, folic acid and vitamin B supplements  - Vitamin B12 wnl  - High dose thiamine PO  - Continue daily vitamins  - May be contributing to bilateral leg weakness/lethargy    Urinary Retention - resolved  - Tamsulosin 0.4 mg PO qhs  - Rodriguez removed    PE  - CT chest (4/9): pulmonary embolism, no R heart strain  - US LE: no DVT  - Eliquis 10 mg BID x7 days, then 5 mg PO BID x 90 days  - On room air  - Telemetry monitoring  - TTE to r/o R heart strain: wnl    HLD  - Atorvastatin 40 mg PO qhs    Vertigo  - Meclizine 12.5 mg PO TID PRN    Nausea/vomiting  - Resolved  - CT abdomen: no acute abdominal finding or visceral injury  - Barium scan: wnl  - Simethicone suspension q4h PRN  - Zofran 4 mg IV q8h PRN  - Protonix 40 mg IV BID  - Bariatric surgeon recommending to continue IV fluids  - Started Soft Bariatric phase 2 diet    Dispo: Pending daily PT for optimization prior to discharge home. Medically stable. Family now wishes to take patient home with home care; declining the [facility] facilities that accepted pt. PT called for treatment.

## 2024-04-26 LAB
ALBUMIN SERPL ELPH-MCNC: 4 G/DL — SIGNIFICANT CHANGE UP (ref 3.3–5.2)
ALP SERPL-CCNC: 109 U/L — SIGNIFICANT CHANGE UP (ref 40–120)
ALT FLD-CCNC: 81 U/L — HIGH
ANION GAP SERPL CALC-SCNC: 14 MMOL/L — SIGNIFICANT CHANGE UP (ref 5–17)
AST SERPL-CCNC: 56 U/L — HIGH
BILIRUB SERPL-MCNC: 1.8 MG/DL — SIGNIFICANT CHANGE UP (ref 0.4–2)
BUN SERPL-MCNC: 12.9 MG/DL — SIGNIFICANT CHANGE UP (ref 8–20)
CALCIUM SERPL-MCNC: 9.5 MG/DL — SIGNIFICANT CHANGE UP (ref 8.4–10.5)
CHLORIDE SERPL-SCNC: 103 MMOL/L — SIGNIFICANT CHANGE UP (ref 96–108)
CO2 SERPL-SCNC: 23 MMOL/L — SIGNIFICANT CHANGE UP (ref 22–29)
CREAT SERPL-MCNC: 0.54 MG/DL — SIGNIFICANT CHANGE UP (ref 0.5–1.3)
EGFR: 141 ML/MIN/1.73M2 — SIGNIFICANT CHANGE UP
GLUCOSE SERPL-MCNC: 92 MG/DL — SIGNIFICANT CHANGE UP (ref 70–99)
HCT VFR BLD CALC: 39.5 % — SIGNIFICANT CHANGE UP (ref 39–50)
HGB BLD-MCNC: 13.1 G/DL — SIGNIFICANT CHANGE UP (ref 13–17)
MAGNESIUM SERPL-MCNC: 1.8 MG/DL — SIGNIFICANT CHANGE UP (ref 1.6–2.6)
MCHC RBC-ENTMCNC: 27.2 PG — SIGNIFICANT CHANGE UP (ref 27–34)
MCHC RBC-ENTMCNC: 33.2 GM/DL — SIGNIFICANT CHANGE UP (ref 32–36)
MCV RBC AUTO: 82 FL — SIGNIFICANT CHANGE UP (ref 80–100)
PLATELET # BLD AUTO: 380 K/UL — SIGNIFICANT CHANGE UP (ref 150–400)
POTASSIUM SERPL-MCNC: 3.9 MMOL/L — SIGNIFICANT CHANGE UP (ref 3.5–5.3)
POTASSIUM SERPL-SCNC: 3.9 MMOL/L — SIGNIFICANT CHANGE UP (ref 3.5–5.3)
PROT SERPL-MCNC: 7 G/DL — SIGNIFICANT CHANGE UP (ref 6.6–8.7)
RBC # BLD: 4.82 M/UL — SIGNIFICANT CHANGE UP (ref 4.2–5.8)
RBC # FLD: 17 % — HIGH (ref 10.3–14.5)
SODIUM SERPL-SCNC: 140 MMOL/L — SIGNIFICANT CHANGE UP (ref 135–145)
WBC # BLD: 11.13 K/UL — HIGH (ref 3.8–10.5)
WBC # FLD AUTO: 11.13 K/UL — HIGH (ref 3.8–10.5)

## 2024-04-26 PROCEDURE — 99232 SBSQ HOSP IP/OBS MODERATE 35: CPT

## 2024-04-26 PROCEDURE — 74018 RADEX ABDOMEN 1 VIEW: CPT | Mod: 26

## 2024-04-26 RX ORDER — MORPHINE SULFATE 50 MG/1
1 CAPSULE, EXTENDED RELEASE ORAL ONCE
Refills: 0 | Status: DISCONTINUED | OUTPATIENT
Start: 2024-04-26 | End: 2024-04-27

## 2024-04-26 RX ADMIN — PREGABALIN 1000 MICROGRAM(S): 225 CAPSULE ORAL at 14:38

## 2024-04-26 RX ADMIN — Medication 400 MILLIGRAM(S): at 00:12

## 2024-04-26 RX ADMIN — AER TRAVELER 1 APPLICATION(S): 0.5 SOLUTION RECTAL; TOPICAL at 23:34

## 2024-04-26 RX ADMIN — Medication 0.1 MILLIGRAM(S): at 06:13

## 2024-04-26 RX ADMIN — Medication 200 MILLIGRAM(S): at 18:53

## 2024-04-26 RX ADMIN — Medication 10 MILLIGRAM(S): at 14:37

## 2024-04-26 RX ADMIN — APIXABAN 5 MILLIGRAM(S): 2.5 TABLET, FILM COATED ORAL at 06:13

## 2024-04-26 RX ADMIN — ZINC OXIDE 1 APPLICATION(S): 200 OINTMENT TOPICAL at 14:39

## 2024-04-26 RX ADMIN — Medication 0.1 MILLIGRAM(S): at 14:38

## 2024-04-26 RX ADMIN — Medication 200 MILLIGRAM(S): at 06:12

## 2024-04-26 RX ADMIN — GABAPENTIN 100 MILLIGRAM(S): 400 CAPSULE ORAL at 06:12

## 2024-04-26 RX ADMIN — PANTOPRAZOLE SODIUM 40 MILLIGRAM(S): 20 TABLET, DELAYED RELEASE ORAL at 06:13

## 2024-04-26 RX ADMIN — Medication 0.1 MILLIGRAM(S): at 23:35

## 2024-04-26 RX ADMIN — Medication 1 MILLIGRAM(S): at 14:38

## 2024-04-26 RX ADMIN — GABAPENTIN 100 MILLIGRAM(S): 400 CAPSULE ORAL at 18:53

## 2024-04-26 RX ADMIN — ATORVASTATIN CALCIUM 40 MILLIGRAM(S): 80 TABLET, FILM COATED ORAL at 23:35

## 2024-04-26 RX ADMIN — TAMSULOSIN HYDROCHLORIDE 0.4 MILLIGRAM(S): 0.4 CAPSULE ORAL at 23:34

## 2024-04-26 RX ADMIN — AER TRAVELER 1 APPLICATION(S): 0.5 SOLUTION RECTAL; TOPICAL at 06:13

## 2024-04-26 RX ADMIN — Medication 3 MILLIGRAM(S): at 23:35

## 2024-04-26 RX ADMIN — Medication 1000 MILLIGRAM(S): at 00:27

## 2024-04-26 RX ADMIN — PANTOPRAZOLE SODIUM 40 MILLIGRAM(S): 20 TABLET, DELAYED RELEASE ORAL at 18:53

## 2024-04-26 RX ADMIN — Medication 1 TABLET(S): at 14:37

## 2024-04-26 RX ADMIN — AER TRAVELER 1 APPLICATION(S): 0.5 SOLUTION RECTAL; TOPICAL at 18:54

## 2024-04-26 RX ADMIN — APIXABAN 5 MILLIGRAM(S): 2.5 TABLET, FILM COATED ORAL at 18:53

## 2024-04-26 RX ADMIN — Medication 500 MILLIGRAM(S): at 14:38

## 2024-04-26 RX ADMIN — Medication 15 MILLILITER(S): at 14:37

## 2024-04-26 RX ADMIN — AER TRAVELER 1 APPLICATION(S): 0.5 SOLUTION RECTAL; TOPICAL at 14:38

## 2024-04-26 NOTE — PROGRESS NOTE ADULT - ASSESSMENT
24M PMHx obesity, anxiety, depression, s/p gastric sleeve surgery presenting for diplopia, leg weakness, decreased PO intake, and urinary retention. Imaging negative. Etiology likely adjustment disorder and thiamine deficiency after surgery.       Constipation/Hemorrhoids  - Metamucil  - Miralax, senna  - Lactulose 15g qd  - Preparation H daily  - check kub   - colorectal consulted     UTI  - Urinalysis positive  - Afebrile, no leukocytosis  - Vantin 200 mg PO BID to complete 7d course, s/p ceftriaxone    AMS, Leg Weakness, Diplopia, Urinary Retention  - CT head: hypodensity in R parietal and temporal lobes  - MRI brain/MRA head/MRI lumbosacral spine performed:  No stroke, mass, hemorrhage, aneurysm, LVO, critical stenosis, or cauda equina  - Repeat MRI t-spine: no T3-T4 edema; slight disc protrusion in T6-7: seen by neuro sx ,  No intervention per neurosurgery  - Repeat CTH, EEG non contributory  - Ammonia level wnl  - US b/l LE to r/o DVT: negative  - Gabapentin 100 mg BID   - PM&R consulted, no acute inpatient rehab recommended  - cw high dose thiamine , Bcomplex   - Daily PT for leg weakness  - AMS, diplopia, and urinary retention resolved    Anxiety  - Prozac 10 mg qd  - Clonidine 0.1 mg q8h  - Lorazepam 0.5 mg TID PRN  - BH recs noted  - Follow up with psychiatrist outpatient    Vitamin Deficiency  - Had poor PO intake for past 2 months: possible vitamin deficiency after bariatric surgery  - Vitamin D weekly, folic acid and vitamin B supplements  - Vitamin B12 wnl  - High dose thiamine PO  - Continue daily vitamins  - May be contributing to bilateral leg weakness/lethargy  c/w bcomplex supplements     Urinary Retention - resolved  - Tamsulosin 0.4 mg PO qhs  - Rodriguez removed    PE  - CT chest (4/9): pulmonary embolism, no R heart strain  - US LE: no DVT  - Eliquis 10 mg BID x7 days, then 5 mg PO BID x 90 days  - On room air  - Telemetry monitoring  - TTE to r/o R heart strain: wnl    HLD  - Atorvastatin 40 mg PO qhs    Vertigo  - Meclizine 12.5 mg PO TID PRN    Nausea/vomiting  - Resolved  - CT abdomen: no acute abdominal finding or visceral injury  - Barium scan: wnl  - Simethicone suspension q4h PRN  - Zofran 4 mg IV q8h PRN  - Protonix 40 mg IV BID  - Bariatric surgeon recommending to continue IV fluids  - Started Soft Bariatric phase 2 diet    Dispo: Pending daily PT for optimization prior to discharge home. Medically stable. Family now wishes to take patient home with home care; declining the [facility] facilities that accepted pt. daily PT called requested

## 2024-04-26 NOTE — PROGRESS NOTE ADULT - SUBJECTIVE AND OBJECTIVE BOX
Patient is a 24y old  Male who presents with a chief complaint of Visual changes with abnormal MRI (26 Apr 2024 17:55)      Patient seen and examined at bedside. overnight events noted , patient was leaning on floor next to bed , unclear if hit his knees.   c/o severe rectal spasms and pain ,states feels constipated even though patient had 2 large bms yesterday and overnight,   worried about hemorrhoids , requesting to be evluated by colorectal team       ALLERGIES:  No Known Allergies    MEDICATIONS  (STANDING):  apixaban 5 milliGRAM(s) Oral every 12 hours  atorvastatin 40 milliGRAM(s) Oral at bedtime  cefpodoxime 200 milliGRAM(s) Oral every 12 hours  cloNIDine 0.1 milliGRAM(s) Oral every 8 hours  cyanocobalamin 1000 MICROGram(s) Oral daily  ergocalciferol 57584 Unit(s) Oral every week  FLUoxetine 10 milliGRAM(s) Oral daily  folic acid 1 milliGRAM(s) Oral daily  gabapentin 100 milliGRAM(s) Oral two times a day  hemorrhoidal Ointment 1 Application(s) Rectal daily  lactulose Syrup 15 Gram(s) Oral daily  lidocaine 2% Jelly 10 milliLiter(s) IntraUrethral once  morphine  - Injectable 1 milliGRAM(s) IV Push once  multivitamin/minerals/iron Oral Solution (CENTRUM) 15 milliLiter(s) Oral daily  pantoprazole  Injectable 40 milliGRAM(s) IV Push two times a day  psyllium Powder 1 Packet(s) Oral daily  tamsulosin 0.4 milliGRAM(s) Oral at bedtime  thiamine 500 milliGRAM(s) Oral daily  vitamin B complex with vitamin C 1 Tablet(s) Oral daily  witch hazel Pads 1 Application(s) Topical four times a day  zinc oxide 40% Paste 1 Application(s) Topical daily    MEDICATIONS  (PRN):  acetaminophen     Tablet .. 650 milliGRAM(s) Oral every 6 hours PRN Temp greater or equal to 38C (100.4F), Mild Pain (1 - 3)  aluminum hydroxide/magnesium hydroxide/simethicone Suspension 30 milliLiter(s) Oral every 4 hours PRN Dyspepsia  meclizine 12.5 milliGRAM(s) Oral three times a day PRN Dizziness  melatonin 3 milliGRAM(s) Oral at bedtime PRN Insomnia  metoprolol tartrate Injectable 5 milliGRAM(s) IV Push every 8 hours PRN hr over 120  ondansetron Injectable 4 milliGRAM(s) IV Push every 8 hours PRN Nausea and/or Vomiting  polyethylene glycol 3350 17 Gram(s) Oral daily PRN Constipation  senna 2 Tablet(s) Oral at bedtime PRN Constipation    Vital Signs Last 24 Hrs  T(F): 97.6 (26 Apr 2024 21:51), Max: 98.4 (26 Apr 2024 10:45)  HR: 94 (26 Apr 2024 21:51) (78 - 100)  BP: 133/84 (26 Apr 2024 21:51) (103/68 - 133/84)  RR: 18 (26 Apr 2024 21:51) (18 - 18)  SpO2: 96% (26 Apr 2024 21:51) (95% - 97%)  I&O's Summary    PHYSICAL EXAM:  General: NAD, A/O x 3, obese   ENT: MMM, no thrush  Neck: Supple, No JVD  Lungs: Clear to auscultation bilaterally, good air entry, non-labored breathing  Cardio: RRR, S1/S2, No murmur  Abdomen: Soft, Nontender, Nondistended; Bowel sounds present  Extremities: No calf tenderness, No pitting edema    LABS:                        13.1   11.13 )-----------( 380      ( 26 Apr 2024 19:53 )             39.5     04-26    140  |  103  |  12.9  ----------------------------<  92  3.9   |  23.0  |  0.54    Ca    9.5      26 Apr 2024 19:53  Phos  5.0     04-25  Mg     1.8     04-26    TPro  7.0  /  Alb  4.0  /  TBili  1.8  /  DBili  x   /  AST  56  /  ALT  81  /  AlkPhos  109  04-26      04-08 Chol 168 mg/dL LDL -- HDL 30 mg/dL Trig 110 mg/dL      Urinalysis Basic - ( 26 Apr 2024 19:53 )    Color: x / Appearance: x / SG: x / pH: x  Gluc: 92 mg/dL / Ketone: x  / Bili: x / Urobili: x   Blood: x / Protein: x / Nitrite: x   Leuk Esterase: x / RBC: x / WBC x   Sq Epi: x / Non Sq Epi: x / Bacteria: x          RADIOLOGY & ADDITIONAL TESTS:    Care Discussed with Consultants/Other Providers:

## 2024-04-26 NOTE — CONSULT NOTE ADULT - REASON FOR ADMISSION
Visual changes with abnormal MRI

## 2024-04-26 NOTE — CONSULT NOTE ADULT - CONSULT REASON
hemorrhoids
Debility
diplopia, dizziness,BLE weakness/numbness, urine retention
vomiting
dizziness, lower extrem weakness/ sensory changes.
Tachycardia, R/O Afib

## 2024-04-26 NOTE — CONSULT NOTE ADULT - CONSULT REQUESTED DATE/TIME
26-Apr-2024 17:58
09-Apr-2024 10:15
09-Apr-2024 15:06
08-Apr-2024 10:58
16-Apr-2024 07:01
09-Apr-2024 14:34

## 2024-04-26 NOTE — CONSULT NOTE ADULT - SUBJECTIVE AND OBJECTIVE BOX
Colorectal CONSULT  ==============================================================================================================  HPI:  24 year old male with a past medical history of obesity, anxiety and depression status post recent Gastric sleeve surgery at [facility] Discharged from the hospital 2 days ago presents with double vision, dizziness, room spinning sensation, weakness in the leg, and decreased PO intake. Patient has had the symptoms of weakness and poor PO intake since prior admission. His workup was negative then including EGD, emptying study. He was seen by PT and recommended outpatient CT. He has also been having recurrent urinary retention for which he has required recurrent catheterizations. Today, he comes back to the ED with complaint of blurry vision/double vision. Patient is living with mother and aunt who are caring for him.  (08 Apr 2024 06:28)    Subjective: Colorectal surgery consulted by request of patient for hemorrhoids. Pt states that he is having pain with defecation and cramps/spasms. He states that he had some sort of hemorrhoid treatment 3-4 years ago by his PCP but is unsure of the procedure. States that his symptoms began 3-4 weeks ago and has worsened.      PAST MEDICAL & SURGICAL HISTORY:  S/P gastric sleeve procedure      Obesity      Obstructive sleep apnea      Anxiety and depression      H/O gastric sleeve        Home Meds: Home Medications:  Atarax 25 mg oral tablet: 1 tab(s) orally 3 times a day as needed for  anxiety (08 Apr 2024 07:05)  cyanocobalamin 1000 mcg/15 mL oral liquid: 15 milliliter(s) orally once a day (08 Apr 2024 07:05)  famotidine 20 mg oral tablet: 1 tab(s) orally 2 times a day (08 Apr 2024 07:05)  Flomax 0.4 mg oral capsule: 1 cap(s) orally once a day (at bedtime) (19 Apr 2024 06:54)  simethicone 80 mg oral tablet, chewable: 1 tab(s) chewed 2 times a day as needed for spasms (08 Apr 2024 07:05)  Zofran 4 mg/5 mL oral solution: 5 milliliter(s) orally every 8 hours as needed for  nausea/ vomiting (08 Apr 2024 07:05)    Allergies: Allergies    No Known Allergies    Intolerances      Soc:   Advanced Directives: Presumed Full Code     CURRENT MEDICATIONS:   --------------------------------------------------------------------------------------  Neurologic Medications  acetaminophen     Tablet .. 650 milliGRAM(s) Oral every 6 hours PRN Temp greater or equal to 38C (100.4F), Mild Pain (1 - 3)  FLUoxetine 10 milliGRAM(s) Oral daily  gabapentin 100 milliGRAM(s) Oral two times a day  meclizine 12.5 milliGRAM(s) Oral three times a day PRN Dizziness  melatonin 3 milliGRAM(s) Oral at bedtime PRN Insomnia  morphine  - Injectable 1 milliGRAM(s) IV Push once  ondansetron Injectable 4 milliGRAM(s) IV Push every 8 hours PRN Nausea and/or Vomiting    Respiratory Medications    Cardiovascular Medications  cloNIDine 0.1 milliGRAM(s) Oral every 8 hours  metoprolol tartrate Injectable 5 milliGRAM(s) IV Push every 8 hours PRN hr over 120    Gastrointestinal Medications  aluminum hydroxide/magnesium hydroxide/simethicone Suspension 30 milliLiter(s) Oral every 4 hours PRN Dyspepsia  cyanocobalamin 1000 MICROGram(s) Oral daily  ergocalciferol 64543 Unit(s) Oral every week  folic acid 1 milliGRAM(s) Oral daily  lactulose Syrup 15 Gram(s) Oral daily  multivitamin/minerals/iron Oral Solution (CENTRUM) 15 milliLiter(s) Oral daily  pantoprazole  Injectable 40 milliGRAM(s) IV Push two times a day  polyethylene glycol 3350 17 Gram(s) Oral daily PRN Constipation  psyllium Powder 1 Packet(s) Oral daily  senna 2 Tablet(s) Oral at bedtime PRN Constipation  thiamine 500 milliGRAM(s) Oral daily  vitamin B complex with vitamin C 1 Tablet(s) Oral daily    Genitourinary Medications  tamsulosin 0.4 milliGRAM(s) Oral at bedtime    Hematologic/Oncologic Medications  apixaban 5 milliGRAM(s) Oral every 12 hours    Antimicrobial/Immunologic Medications  cefpodoxime 200 milliGRAM(s) Oral every 12 hours    Endocrine/Metabolic Medications  atorvastatin 40 milliGRAM(s) Oral at bedtime    Topical/Other Medications  hemorrhoidal Ointment 1 Application(s) Rectal daily  lidocaine 2% Jelly 10 milliLiter(s) IntraUrethral once  witch hazel Pads 1 Application(s) Topical four times a day  zinc oxide 40% Paste 1 Application(s) Topical daily    --------------------------------------------------------------------------------------    VITAL SIGNS, INS/OUTS (last 24 hours):  --------------------------------------------------------------------------------------  ICU Vital Signs Last 24 Hrs  T(C): 36.3 (26 Apr 2024 16:53), Max: 36.9 (26 Apr 2024 10:45)  T(F): 97.3 (26 Apr 2024 16:53), Max: 98.4 (26 Apr 2024 10:45)  HR: 90 (26 Apr 2024 16:53) (78 - 100)  BP: 114/75 (26 Apr 2024 16:53) (103/68 - 130/78)  BP(mean): --  ABP: --  ABP(mean): --  RR: 18 (26 Apr 2024 16:53) (18 - 18)  SpO2: 95% (26 Apr 2024 16:53) (95% - 98%)    O2 Parameters below as of 26 Apr 2024 16:53  Patient On (Oxygen Delivery Method): room air          I&O's Summary    --------------------------------------------------------------------------------------    EXAM:  General/Neuro  Exam: Normal, NAD, alert, oriented x 3, no focal deficits.    Respiratory  Exam: Lungs clear to auscultation, Normal expansion/effort.      GI  Exam: Abdomen soft, Non-tender, Non-distended.      Rectal  Exam: brown nonbloody stool, no sign of external hemorrhoids, no prolapsed internal hemorrhoids    Extremities  Exam: Extremities warm, pink, well-perfused.      Derm:  Exam: Good skin turgor, no skin breakdown.      LABS  --------------------------------------------------------------------------------------  Labs:  CAPILLARY BLOOD GLUCOSE                              13.0   10.50 )-----------( 407      ( 25 Apr 2024 11:30 )             39.8         04-25    138  |  101  |  12.1  ----------------------------<  92  4.0   |  23.0  |  0.56          LFTs:             7.3  | 1.9  | 59       ------------------[117     ( 25 Apr 2024 11:30 )  4.0  | x    | 93          Lipase:x      Amylase:x             Coags:            Urinalysis Basic - ( 25 Apr 2024 11:30 )    Color: x / Appearance: x / SG: x / pH: x  Gluc: 92 mg/dL / Ketone: x  / Bili: x / Urobili: x   Blood: x / Protein: x / Nitrite: x   Leuk Esterase: x / RBC: x / WBC x   Sq Epi: x / Non Sq Epi: x / Bacteria: x            ASSESSMENT/ PLAN:  24y Male admitted for vision changes. CRS consulted by request of patient for hemorrhoid management. Cramping and spasm pain likely 2/2 pain and fear of defecating.    PLAN  -Continue bowel regimen  -Can add dulcolax suppositories as needed for constipation  -Pt should f/u outpatient with colorectal surgeon to discuss care and treatment  -Pt stable for discharge from medicine standpoint  -No acute surgical intervention indicated  -Rest of care per primary team      Attending aware and agrees with plan   Colorectal CONSULT  ==============================================================================================================  HPI:  24 year old male with a past medical history of obesity, anxiety and depression status post recent Gastric sleeve surgery at [facility] Discharged from the hospital 2 days ago presents with double vision, dizziness, room spinning sensation, weakness in the leg, and decreased PO intake. Patient has had the symptoms of weakness and poor PO intake since prior admission. His workup was negative then including EGD, emptying study. He was seen by PT and recommended outpatient CT. He has also been having recurrent urinary retention for which he has required recurrent catheterizations. Today, he comes back to the ED with complaint of blurry vision/double vision. Patient is living with mother and aunt who are caring for him.  (08 Apr 2024 06:28)    Subjective: Colorectal surgery consulted by request of patient for hemorrhoids. Pt states that he is having pain with defecation and cramps/spasms. He states that he had some sort of hemorrhoid treatment 3-4 years ago by his PCP but is unsure of the procedure. States that his symptoms began 3-4 weeks ago and has worsened.      PAST MEDICAL & SURGICAL HISTORY:  S/P gastric sleeve procedure      Obesity      Obstructive sleep apnea      Anxiety and depression      H/O gastric sleeve        Home Meds: Home Medications:  Atarax 25 mg oral tablet: 1 tab(s) orally 3 times a day as needed for  anxiety (08 Apr 2024 07:05)  cyanocobalamin 1000 mcg/15 mL oral liquid: 15 milliliter(s) orally once a day (08 Apr 2024 07:05)  famotidine 20 mg oral tablet: 1 tab(s) orally 2 times a day (08 Apr 2024 07:05)  Flomax 0.4 mg oral capsule: 1 cap(s) orally once a day (at bedtime) (19 Apr 2024 06:54)  simethicone 80 mg oral tablet, chewable: 1 tab(s) chewed 2 times a day as needed for spasms (08 Apr 2024 07:05)  Zofran 4 mg/5 mL oral solution: 5 milliliter(s) orally every 8 hours as needed for  nausea/ vomiting (08 Apr 2024 07:05)    Allergies: Allergies    No Known Allergies    Intolerances      Soc:   Advanced Directives: Presumed Full Code     CURRENT MEDICATIONS:   --------------------------------------------------------------------------------------  Neurologic Medications  acetaminophen     Tablet .. 650 milliGRAM(s) Oral every 6 hours PRN Temp greater or equal to 38C (100.4F), Mild Pain (1 - 3)  FLUoxetine 10 milliGRAM(s) Oral daily  gabapentin 100 milliGRAM(s) Oral two times a day  meclizine 12.5 milliGRAM(s) Oral three times a day PRN Dizziness  melatonin 3 milliGRAM(s) Oral at bedtime PRN Insomnia  morphine  - Injectable 1 milliGRAM(s) IV Push once  ondansetron Injectable 4 milliGRAM(s) IV Push every 8 hours PRN Nausea and/or Vomiting    Respiratory Medications    Cardiovascular Medications  cloNIDine 0.1 milliGRAM(s) Oral every 8 hours  metoprolol tartrate Injectable 5 milliGRAM(s) IV Push every 8 hours PRN hr over 120    Gastrointestinal Medications  aluminum hydroxide/magnesium hydroxide/simethicone Suspension 30 milliLiter(s) Oral every 4 hours PRN Dyspepsia  cyanocobalamin 1000 MICROGram(s) Oral daily  ergocalciferol 30867 Unit(s) Oral every week  folic acid 1 milliGRAM(s) Oral daily  lactulose Syrup 15 Gram(s) Oral daily  multivitamin/minerals/iron Oral Solution (CENTRUM) 15 milliLiter(s) Oral daily  pantoprazole  Injectable 40 milliGRAM(s) IV Push two times a day  polyethylene glycol 3350 17 Gram(s) Oral daily PRN Constipation  psyllium Powder 1 Packet(s) Oral daily  senna 2 Tablet(s) Oral at bedtime PRN Constipation  thiamine 500 milliGRAM(s) Oral daily  vitamin B complex with vitamin C 1 Tablet(s) Oral daily    Genitourinary Medications  tamsulosin 0.4 milliGRAM(s) Oral at bedtime    Hematologic/Oncologic Medications  apixaban 5 milliGRAM(s) Oral every 12 hours    Antimicrobial/Immunologic Medications  cefpodoxime 200 milliGRAM(s) Oral every 12 hours    Endocrine/Metabolic Medications  atorvastatin 40 milliGRAM(s) Oral at bedtime    Topical/Other Medications  hemorrhoidal Ointment 1 Application(s) Rectal daily  lidocaine 2% Jelly 10 milliLiter(s) IntraUrethral once  witch hazel Pads 1 Application(s) Topical four times a day  zinc oxide 40% Paste 1 Application(s) Topical daily    --------------------------------------------------------------------------------------    VITAL SIGNS, INS/OUTS (last 24 hours):  --------------------------------------------------------------------------------------  ICU Vital Signs Last 24 Hrs  T(C): 36.3 (26 Apr 2024 16:53), Max: 36.9 (26 Apr 2024 10:45)  T(F): 97.3 (26 Apr 2024 16:53), Max: 98.4 (26 Apr 2024 10:45)  HR: 90 (26 Apr 2024 16:53) (78 - 100)  BP: 114/75 (26 Apr 2024 16:53) (103/68 - 130/78)  BP(mean): --  ABP: --  ABP(mean): --  RR: 18 (26 Apr 2024 16:53) (18 - 18)  SpO2: 95% (26 Apr 2024 16:53) (95% - 98%)    O2 Parameters below as of 26 Apr 2024 16:53  Patient On (Oxygen Delivery Method): room air          I&O's Summary    --------------------------------------------------------------------------------------    EXAM:  General/Neuro  Exam: Normal, NAD, alert, oriented x 3, no focal deficits.    Respiratory  Exam: Lungs clear to auscultation, Normal expansion/effort.      GI  Exam: Abdomen soft, Non-tender, Non-distended.      Rectal  Exam: brown nonbloody stool, no sign of external hemorrhoids, no prolapsed internal hemorrhoids    Extremities  Exam: Extremities warm, pink, well-perfused.      Derm:  Exam: Good skin turgor, no skin breakdown.      LABS  --------------------------------------------------------------------------------------  Labs:  CAPILLARY BLOOD GLUCOSE                              13.0   10.50 )-----------( 407      ( 25 Apr 2024 11:30 )             39.8         04-25    138  |  101  |  12.1  ----------------------------<  92  4.0   |  23.0  |  0.56          LFTs:             7.3  | 1.9  | 59       ------------------[117     ( 25 Apr 2024 11:30 )  4.0  | x    | 93          Lipase:x      Amylase:x             Coags:            Urinalysis Basic - ( 25 Apr 2024 11:30 )    Color: x / Appearance: x / SG: x / pH: x  Gluc: 92 mg/dL / Ketone: x  / Bili: x / Urobili: x   Blood: x / Protein: x / Nitrite: x   Leuk Esterase: x / RBC: x / WBC x   Sq Epi: x / Non Sq Epi: x / Bacteria: x            ASSESSMENT/ PLAN:  24y Male admitted for vision changes. CRS consulted by request of patient for hemorrhoid management. Cramping and spasm pain likely 2/2 pain and fear of defecating.    PLAN  -Continue bowel regimen  -Can add dulcolax suppositories as needed for constipation  -Pt should f/u outpatient with Dr. Lucia Vazquez to discuss care and treatment  -Pt stable for discharge from medicine standpoint  -No acute surgical intervention indicated  -Rest of care per primary team      Attending aware and agrees with plan

## 2024-04-27 LAB
ALBUMIN SERPL ELPH-MCNC: 3.6 G/DL — SIGNIFICANT CHANGE UP (ref 3.3–5.2)
ALP SERPL-CCNC: 110 U/L — SIGNIFICANT CHANGE UP (ref 40–120)
ALT FLD-CCNC: 84 U/L — HIGH
ANION GAP SERPL CALC-SCNC: 17 MMOL/L — SIGNIFICANT CHANGE UP (ref 5–17)
AST SERPL-CCNC: 61 U/L — HIGH
BILIRUB SERPL-MCNC: 1.8 MG/DL — SIGNIFICANT CHANGE UP (ref 0.4–2)
BUN SERPL-MCNC: 12.5 MG/DL — SIGNIFICANT CHANGE UP (ref 8–20)
CALCIUM SERPL-MCNC: 9.2 MG/DL — SIGNIFICANT CHANGE UP (ref 8.4–10.5)
CHLORIDE SERPL-SCNC: 105 MMOL/L — SIGNIFICANT CHANGE UP (ref 96–108)
CO2 SERPL-SCNC: 22 MMOL/L — SIGNIFICANT CHANGE UP (ref 22–29)
CREAT SERPL-MCNC: 0.46 MG/DL — LOW (ref 0.5–1.3)
EGFR: 148 ML/MIN/1.73M2 — SIGNIFICANT CHANGE UP
GLUCOSE SERPL-MCNC: 81 MG/DL — SIGNIFICANT CHANGE UP (ref 70–99)
HCT VFR BLD CALC: 39 % — SIGNIFICANT CHANGE UP (ref 39–50)
HGB BLD-MCNC: 12.8 G/DL — LOW (ref 13–17)
MAGNESIUM SERPL-MCNC: 1.9 MG/DL — SIGNIFICANT CHANGE UP (ref 1.6–2.6)
MCHC RBC-ENTMCNC: 27.2 PG — SIGNIFICANT CHANGE UP (ref 27–34)
MCHC RBC-ENTMCNC: 32.8 GM/DL — SIGNIFICANT CHANGE UP (ref 32–36)
MCV RBC AUTO: 83 FL — SIGNIFICANT CHANGE UP (ref 80–100)
PLATELET # BLD AUTO: 370 K/UL — SIGNIFICANT CHANGE UP (ref 150–400)
POTASSIUM SERPL-MCNC: 3.8 MMOL/L — SIGNIFICANT CHANGE UP (ref 3.5–5.3)
POTASSIUM SERPL-SCNC: 3.8 MMOL/L — SIGNIFICANT CHANGE UP (ref 3.5–5.3)
PROT SERPL-MCNC: 6.8 G/DL — SIGNIFICANT CHANGE UP (ref 6.6–8.7)
RBC # BLD: 4.7 M/UL — SIGNIFICANT CHANGE UP (ref 4.2–5.8)
RBC # FLD: 17.1 % — HIGH (ref 10.3–14.5)
SODIUM SERPL-SCNC: 144 MMOL/L — SIGNIFICANT CHANGE UP (ref 135–145)
WBC # BLD: 9.02 K/UL — SIGNIFICANT CHANGE UP (ref 3.8–10.5)
WBC # FLD AUTO: 9.02 K/UL — SIGNIFICANT CHANGE UP (ref 3.8–10.5)

## 2024-04-27 PROCEDURE — 99233 SBSQ HOSP IP/OBS HIGH 50: CPT | Mod: GC

## 2024-04-27 RX ADMIN — Medication 15 MILLILITER(S): at 12:20

## 2024-04-27 RX ADMIN — Medication 0.1 MILLIGRAM(S): at 05:08

## 2024-04-27 RX ADMIN — Medication 650 MILLIGRAM(S): at 21:17

## 2024-04-27 RX ADMIN — Medication 10 MILLIGRAM(S): at 12:10

## 2024-04-27 RX ADMIN — Medication 500 MILLIGRAM(S): at 12:10

## 2024-04-27 RX ADMIN — Medication 1 MILLIGRAM(S): at 12:10

## 2024-04-27 RX ADMIN — Medication 1 TABLET(S): at 12:10

## 2024-04-27 RX ADMIN — Medication 650 MILLIGRAM(S): at 20:26

## 2024-04-27 RX ADMIN — APIXABAN 5 MILLIGRAM(S): 2.5 TABLET, FILM COATED ORAL at 18:45

## 2024-04-27 RX ADMIN — Medication 200 MILLIGRAM(S): at 05:08

## 2024-04-27 RX ADMIN — PANTOPRAZOLE SODIUM 40 MILLIGRAM(S): 20 TABLET, DELAYED RELEASE ORAL at 18:45

## 2024-04-27 RX ADMIN — Medication 30 MILLILITER(S): at 21:32

## 2024-04-27 RX ADMIN — TAMSULOSIN HYDROCHLORIDE 0.4 MILLIGRAM(S): 0.4 CAPSULE ORAL at 21:32

## 2024-04-27 RX ADMIN — AER TRAVELER 1 APPLICATION(S): 0.5 SOLUTION RECTAL; TOPICAL at 18:45

## 2024-04-27 RX ADMIN — LACTULOSE 15 GRAM(S): 10 SOLUTION ORAL at 12:10

## 2024-04-27 RX ADMIN — Medication 3 MILLIGRAM(S): at 23:25

## 2024-04-27 RX ADMIN — SENNA PLUS 2 TABLET(S): 8.6 TABLET ORAL at 21:32

## 2024-04-27 RX ADMIN — Medication 0.1 MILLIGRAM(S): at 12:30

## 2024-04-27 RX ADMIN — PANTOPRAZOLE SODIUM 40 MILLIGRAM(S): 20 TABLET, DELAYED RELEASE ORAL at 05:08

## 2024-04-27 RX ADMIN — Medication 0.1 MILLIGRAM(S): at 21:32

## 2024-04-27 RX ADMIN — Medication 1 PACKET(S): at 12:10

## 2024-04-27 RX ADMIN — AER TRAVELER 1 APPLICATION(S): 0.5 SOLUTION RECTAL; TOPICAL at 12:20

## 2024-04-27 RX ADMIN — GABAPENTIN 100 MILLIGRAM(S): 400 CAPSULE ORAL at 05:07

## 2024-04-27 RX ADMIN — AER TRAVELER 1 APPLICATION(S): 0.5 SOLUTION RECTAL; TOPICAL at 05:07

## 2024-04-27 RX ADMIN — ZINC OXIDE 1 APPLICATION(S): 200 OINTMENT TOPICAL at 12:20

## 2024-04-27 RX ADMIN — APIXABAN 5 MILLIGRAM(S): 2.5 TABLET, FILM COATED ORAL at 05:08

## 2024-04-27 RX ADMIN — ATORVASTATIN CALCIUM 40 MILLIGRAM(S): 80 TABLET, FILM COATED ORAL at 21:32

## 2024-04-27 RX ADMIN — PREGABALIN 1000 MICROGRAM(S): 225 CAPSULE ORAL at 12:10

## 2024-04-27 RX ADMIN — GABAPENTIN 100 MILLIGRAM(S): 400 CAPSULE ORAL at 18:45

## 2024-04-27 NOTE — PROGRESS NOTE ADULT - SUBJECTIVE AND OBJECTIVE BOX
Patient is a 24y old  Male who presents with a chief complaint of Visual changes with abnormal MRI (26 Apr 2024 22:12)      INTERVAL HPI/OVERNIGHT EVENTS: No acute events overnight. No new complaints.     MEDICATIONS  (STANDING):  apixaban 5 milliGRAM(s) Oral every 12 hours  atorvastatin 40 milliGRAM(s) Oral at bedtime  cloNIDine 0.1 milliGRAM(s) Oral every 8 hours  cyanocobalamin 1000 MICROGram(s) Oral daily  ergocalciferol 26714 Unit(s) Oral every week  FLUoxetine 10 milliGRAM(s) Oral daily  folic acid 1 milliGRAM(s) Oral daily  gabapentin 100 milliGRAM(s) Oral two times a day  lactulose Syrup 15 Gram(s) Oral daily  lidocaine 2% Jelly 10 milliLiter(s) IntraUrethral once  multivitamin/minerals/iron Oral Solution (CENTRUM) 15 milliLiter(s) Oral daily  pantoprazole  Injectable 40 milliGRAM(s) IV Push two times a day  psyllium Powder 1 Packet(s) Oral daily  tamsulosin 0.4 milliGRAM(s) Oral at bedtime  thiamine 500 milliGRAM(s) Oral daily  vitamin B complex with vitamin C 1 Tablet(s) Oral daily  witch hazel Pads 1 Application(s) Topical four times a day  zinc oxide 40% Paste 1 Application(s) Topical daily    MEDICATIONS  (PRN):  acetaminophen     Tablet .. 650 milliGRAM(s) Oral every 6 hours PRN Temp greater or equal to 38C (100.4F), Mild Pain (1 - 3)  aluminum hydroxide/magnesium hydroxide/simethicone Suspension 30 milliLiter(s) Oral every 4 hours PRN Dyspepsia  meclizine 12.5 milliGRAM(s) Oral three times a day PRN Dizziness  melatonin 3 milliGRAM(s) Oral at bedtime PRN Insomnia  metoprolol tartrate Injectable 5 milliGRAM(s) IV Push every 8 hours PRN hr over 120  ondansetron Injectable 4 milliGRAM(s) IV Push every 8 hours PRN Nausea and/or Vomiting  polyethylene glycol 3350 17 Gram(s) Oral daily PRN Constipation  senna 2 Tablet(s) Oral at bedtime PRN Constipation      Allergies    No Known Allergies    Intolerances      REVIEW OF SYSTEMS:  CONSTITUTIONAL: No fever or fatigue  RESPIRATORY: No cough; No shortness of breath  CARDIOVASCULAR: No chest pain,  dizziness, or leg swelling  GASTROINTESTINAL:  no abdominal pain. No nausea or vomiting; no constipation, no  diarrhea, + rectal spasms  NEUROLOGICAL: No headaches, numbness, or tremors  MUSCULOSKELETAL: No joint pain; No muscle, back, or extremity pain      Vital Signs Last 24 Hrs  T(C): 36.7 (27 Apr 2024 09:23), Max: 36.7 (26 Apr 2024 14:00)  T(F): 98 (27 Apr 2024 09:23), Max: 98 (26 Apr 2024 14:00)  HR: 70 (27 Apr 2024 09:23) (70 - 94)  BP: 92/44 (27 Apr 2024 09:23) (92/44 - 133/84)  BP(mean): --  RR: 19 (27 Apr 2024 09:23) (18 - 19)  SpO2: 96% (27 Apr 2024 09:23) (95% - 96%)    Parameters below as of 27 Apr 2024 09:23  Patient On (Oxygen Delivery Method): room air      PHYSICAL EXAM:  GENERAL: NAD, obese male lying in bed comfortably  HEAD:  Atraumatic, Normocephalic  EYES: conjunctiva and sclera clear  NERVOUS SYSTEM:  Alert & Oriented X3, No gross focal deficits  CHEST/LUNG: Clear to auscultation bilaterally; No wheezes  HEART: Regular rate and rhythm; No murmurs  ABDOMEN: Soft, obese, Nontender; Bowel sounds present  EXTREMITIES:  No cyanosis, or edema, no tenderness to palpation on R anterior knee    LABS:                        12.8   9.02  )-----------( 370      ( 27 Apr 2024 05:05 )             39.0     04-27    144  |  105  |  12.5  ----------------------------<  81  3.8   |  22.0  |  0.46<L>    Ca    9.2      27 Apr 2024 05:05  Mg     1.9     04-27    TPro  6.8  /  Alb  3.6  /  TBili  1.8  /  DBili  x   /  AST  61<H>  /  ALT  84<H>  /  AlkPhos  110  04-27      Urinalysis Basic - ( 27 Apr 2024 05:05 )    Color: x / Appearance: x / SG: x / pH: x  Gluc: 81 mg/dL / Ketone: x  / Bili: x / Urobili: x   Blood: x / Protein: x / Nitrite: x   Leuk Esterase: x / RBC: x / WBC x   Sq Epi: x / Non Sq Epi: x / Bacteria: x      CAPILLARY BLOOD GLUCOSE          RADIOLOGY & ADDITIONAL TESTS:

## 2024-04-27 NOTE — PROGRESS NOTE ADULT - ASSESSMENT
24M PMHx obesity, anxiety, depression, s/p gastric sleeve surgery presenting for diplopia, leg weakness, decreased PO intake, and urinary retention. Imaging negative. Etiology likely adjustment disorder and thiamine deficiency after surgery.       Rectal Spasms  - Constipation resolved  - Metamucil  - Miralax, senna  - Lactulose 15g qd  - Dulcolax suppositories as needed for constipation  - Discontinued Preparation H  - KUB: nonobstructive pattern  - No external or prolapsed internal hemorrhoids, cramping and spasm pain likely 2/2 pain and fear of defecating per colorectal surgery  - No acute surgical intervention indicated   - Follow up outpatient with Dr. Lucia Vazquez    UTI  - Urinalysis positive  - Afebrile, no leukocytosis  - Vantin 200 mg PO BID to complete 7d course, s/p ceftriaxone    AMS, Leg Weakness, Diplopia, Urinary Retention  - CT head: hypodensity in R parietal and temporal lobes  - MRI brain/MRA head/MRI lumbosacral spine performed:  No stroke, mass, hemorrhage, aneurysm, LVO, critical stenosis, or cauda equina  - Repeat MRI t-spine: no T3-T4 edema; slight disc protrusion in T6-7: No intervention per neurosurgery  - Repeat CTH, EEG non contributory  - Ammonia level wnl  - US b/l LE to r/o DVT: negative  - Gabapentin 100 mg BID   - PM&R consulted, no acute inpatient rehab recommended  - Continue high dose thiamine, vitamin B complex   - Daily PT for leg weakness  - AMS, diplopia, and urinary retention resolved    Anxiety  - Prozac 10 mg qd  - Clonidine 0.1 mg q8h  - Lorazepam 0.5 mg TID PRN  - BH recs noted  - Follow up with psychiatrist outpatient    Vitamin Deficiency  - Had poor PO intake for past 2 months: possible vitamin deficiency after bariatric surgery  - Continue Vitamin D weekly, daily folic acid and vitamin B complex supplements  - Vitamin B12 wnl  - High dose thiamine PO  - May be contributing to bilateral leg weakness/lethargy    Urinary Retention - resolved  - Tamsulosin 0.4 mg PO qhs  - Rodriguez removed    PE  - CT chest (4/9): pulmonary embolism, no R heart strain  - US LE: no DVT  - Eliquis 10 mg BID x7 days, then 5 mg PO BID x 90 days  - On room air  - Telemetry monitoring  - TTE to r/o R heart strain: wnl    HLD  - Atorvastatin 40 mg PO qhs    Vertigo  - Meclizine 12.5 mg PO TID PRN    Nausea/vomiting  - Resolved  - CT abdomen: no acute abdominal finding or visceral injury  - Barium scan: wnl  - Simethicone suspension q4h PRN  - Zofran 4 mg IV q8h PRN  - Protonix 40 mg IV BID  - Bariatric surgeon recommending to continue IV fluids  - Started Soft Bariatric phase 2 diet    Dispo: Daily PT ongoing for optimization prior to discharge home. PT recommending [facility] but family wishes to take patient home with home care as they are declining the [facility] facilities that accepted pt. Complex SW involved to retry [facility] auth for preferred [facility] in [facility].

## 2024-04-28 PROCEDURE — 99232 SBSQ HOSP IP/OBS MODERATE 35: CPT | Mod: GC

## 2024-04-28 RX ADMIN — Medication 10 MILLIGRAM(S): at 12:55

## 2024-04-28 RX ADMIN — PANTOPRAZOLE SODIUM 40 MILLIGRAM(S): 20 TABLET, DELAYED RELEASE ORAL at 18:55

## 2024-04-28 RX ADMIN — ZINC OXIDE 1 APPLICATION(S): 200 OINTMENT TOPICAL at 12:55

## 2024-04-28 RX ADMIN — Medication 3 MILLIGRAM(S): at 22:25

## 2024-04-28 RX ADMIN — TAMSULOSIN HYDROCHLORIDE 0.4 MILLIGRAM(S): 0.4 CAPSULE ORAL at 22:26

## 2024-04-28 RX ADMIN — Medication 0.1 MILLIGRAM(S): at 22:25

## 2024-04-28 RX ADMIN — AER TRAVELER 1 APPLICATION(S): 0.5 SOLUTION RECTAL; TOPICAL at 18:55

## 2024-04-28 RX ADMIN — APIXABAN 5 MILLIGRAM(S): 2.5 TABLET, FILM COATED ORAL at 05:46

## 2024-04-28 RX ADMIN — AER TRAVELER 1 APPLICATION(S): 0.5 SOLUTION RECTAL; TOPICAL at 05:45

## 2024-04-28 RX ADMIN — Medication 15 MILLILITER(S): at 12:50

## 2024-04-28 RX ADMIN — ATORVASTATIN CALCIUM 40 MILLIGRAM(S): 80 TABLET, FILM COATED ORAL at 22:25

## 2024-04-28 RX ADMIN — GABAPENTIN 100 MILLIGRAM(S): 400 CAPSULE ORAL at 18:55

## 2024-04-28 RX ADMIN — AER TRAVELER 1 APPLICATION(S): 0.5 SOLUTION RECTAL; TOPICAL at 12:55

## 2024-04-28 RX ADMIN — Medication 0.1 MILLIGRAM(S): at 13:55

## 2024-04-28 RX ADMIN — Medication 1 MILLIGRAM(S): at 13:55

## 2024-04-28 RX ADMIN — Medication 650 MILLIGRAM(S): at 23:23

## 2024-04-28 RX ADMIN — GABAPENTIN 100 MILLIGRAM(S): 400 CAPSULE ORAL at 05:46

## 2024-04-28 RX ADMIN — Medication 650 MILLIGRAM(S): at 22:25

## 2024-04-28 RX ADMIN — PREGABALIN 1000 MICROGRAM(S): 225 CAPSULE ORAL at 13:55

## 2024-04-28 RX ADMIN — PANTOPRAZOLE SODIUM 40 MILLIGRAM(S): 20 TABLET, DELAYED RELEASE ORAL at 05:45

## 2024-04-28 RX ADMIN — Medication 500 MILLIGRAM(S): at 13:55

## 2024-04-28 RX ADMIN — Medication 1 TABLET(S): at 13:55

## 2024-04-28 RX ADMIN — AER TRAVELER 1 APPLICATION(S): 0.5 SOLUTION RECTAL; TOPICAL at 00:28

## 2024-04-28 RX ADMIN — APIXABAN 5 MILLIGRAM(S): 2.5 TABLET, FILM COATED ORAL at 18:55

## 2024-04-28 RX ADMIN — Medication 30 MILLILITER(S): at 22:25

## 2024-04-28 RX ADMIN — Medication 1 PACKET(S): at 13:57

## 2024-04-28 RX ADMIN — Medication 0.1 MILLIGRAM(S): at 05:46

## 2024-04-28 NOTE — PROGRESS NOTE ADULT - SUBJECTIVE AND OBJECTIVE BOX
HEALTH ISSUES - PROBLEM Dx:    subjective c/o rectal spasms    INTERVAL HPI/ OVERNIGHT EVENTS:    comfortable,   difficult to get him OOB to chair  Obese  insists he has hemorrhoids and that bothers him  and has been found doing digital manipulation     REVIEW OF SYSTEMS:    as above    Vital Signs Last 24 Hrs  T(C): 36.5 (28 Apr 2024 11:55), Max: 36.9 (27 Apr 2024 20:45)  T(F): 97.7 (28 Apr 2024 11:55), Max: 98.5 (27 Apr 2024 20:45)  HR: 69 (28 Apr 2024 11:55) (57 - 86)  BP: 108/70 (28 Apr 2024 11:55) (107/70 - 119/77)  BP(mean): --  RR: 18 (28 Apr 2024 11:55) (17 - 18)  SpO2: 98% (28 Apr 2024 11:55) (95% - 99%)    Parameters below as of 28 Apr 2024 11:55  Patient On (Oxygen Delivery Method): room air      PHYSICAL EXAM-  GENERAL: obese male lying in bed comfortably  HEAD:  Atraumatic, Normocephalic  EYES: conjunctiva and sclera clear  NERVOUS SYSTEM:  Alert & Oriented X 3, No gross focal deficits  CHEST/LUNG: Clear to auscultation bilaterally; No wheezes  HEART: Regular rate and rhythm; No murmurs  ABDOMEN: Soft, obese, Nontender; Bowel sounds present  EXTREMITIES:  No cyanosis, or edema, no tenderness to palpation on R anterior knee    MEDICATIONS  (STANDING):  apixaban 5 milliGRAM(s) Oral every 12 hours  atorvastatin 40 milliGRAM(s) Oral at bedtime  cloNIDine 0.1 milliGRAM(s) Oral every 8 hours  cyanocobalamin 1000 MICROGram(s) Oral daily  ergocalciferol 36600 Unit(s) Oral every week  FLUoxetine 10 milliGRAM(s) Oral daily  folic acid 1 milliGRAM(s) Oral daily  gabapentin 100 milliGRAM(s) Oral two times a day  lactulose Syrup 15 Gram(s) Oral daily  lidocaine 2% Jelly 10 milliLiter(s) IntraUrethral once  multivitamin/minerals/iron Oral Solution (CENTRUM) 15 milliLiter(s) Oral daily  pantoprazole  Injectable 40 milliGRAM(s) IV Push two times a day  psyllium Powder 1 Packet(s) Oral daily  tamsulosin 0.4 milliGRAM(s) Oral at bedtime  thiamine 500 milliGRAM(s) Oral daily  vitamin B complex with vitamin C 1 Tablet(s) Oral daily  witch hazel Pads 1 Application(s) Topical four times a day  zinc oxide 40% Paste 1 Application(s) Topical daily    MEDICATIONS  (PRN):  acetaminophen     Tablet .. 650 milliGRAM(s) Oral every 6 hours PRN Temp greater or equal to 38C (100.4F), Mild Pain (1 - 3)  aluminum hydroxide/magnesium hydroxide/simethicone Suspension 30 milliLiter(s) Oral every 4 hours PRN Dyspepsia  bisacodyl Suppository 10 milliGRAM(s) Rectal daily PRN Constipation  meclizine 12.5 milliGRAM(s) Oral three times a day PRN Dizziness  melatonin 3 milliGRAM(s) Oral at bedtime PRN Insomnia  metoprolol tartrate Injectable 5 milliGRAM(s) IV Push every 8 hours PRN hr over 120  ondansetron Injectable 4 milliGRAM(s) IV Push every 8 hours PRN Nausea and/or Vomiting  polyethylene glycol 3350 17 Gram(s) Oral daily PRN Constipation  senna 2 Tablet(s) Oral at bedtime PRN Constipation      LABS:                        12.8   9.02  )-----------( 370      ( 27 Apr 2024 05:05 )             39.0     04-27    144  |  105  |  12.5  ----------------------------<  81  3.8   |  22.0  |  0.46<L>    Ca    9.2      27 Apr 2024 05:05  Mg     1.9     04-27    TPro  6.8  /  Alb  3.6  /  TBili  1.8  /  DBili  x   /  AST  61<H>  /  ALT  84<H>  /  AlkPhos  110  04-27      Urinalysis Basic - ( 27 Apr 2024 05:05 )    Color: x / Appearance: x / SG: x / pH: x  Gluc: 81 mg/dL / Ketone: x  / Bili: x / Urobili: x   Blood: x / Protein: x / Nitrite: x   Leuk Esterase: x / RBC: x / WBC x   Sq Epi: x / Non Sq Epi: x / Bacteria: x

## 2024-04-28 NOTE — PROGRESS NOTE ADULT - ASSESSMENT
24M PMHx obesity, anxiety, depression, s/p gastric sleeve surgery presenting for diplopia, leg weakness, decreased PO intake, and urinary retention. Imaging negative. Etiology likely adjustment disorder and thiamine deficiency after surgery.     c/o Rectal Spasms  - Constipation resolved  - Bowel regimen- Metamucil- Miralax, senna- Lactulose 15g qd  - Dulcolax suppositories as needed for constipation  - No external or prolapsed internal hemorrhoids, cramping and spasm pain likely 2/2 pain and fear of defecating per colorectal surgery  - No acute surgical intervention indicated   - Follow up outpatient with Dr. Lucia Vazquez    UTI  - Urinalysis positive  - Afebrile, no leukocytosis  - Vantin 200 mg PO BID to complete 7d course, s/p ceftriaxone    AMS, Leg Weakness, Diplopia, Urinary Retention- all resolved  - CT head: hypodensity in R parietal and temporal lobes  - MRI brain/MRA head/MRI lumbosacral spine performed:  No stroke, mass, hemorrhage, aneurysm, LVO, critical stenosis, or cauda equina  - Repeat MRI t-spine: no T3-T4 edema; slight disc protrusion in T6-7: No intervention per neurosurgery  - EEG non contributory  - Ammonia level wnl  - US b/l LE to r/o DVT: negative  - Gabapentin 100 mg BID   - Thiamine deficiency  - Continue high dose thiamine, vitamin B complex   - Daily PT for leg weakness  - PM&R consulted, no acute inpatient rehab recommended    Anxiety  - Prozac 10 mg qd  - Clonidine 0.1 mg q8h  - Lorazepam 0.5 mg TID PRN  - BH recs noted  - Follow up with psychiatrist outpatient    Vitamin Deficiency  - Had poor PO intake for past 2 months: possible vitamin deficiency after bariatric surgery  - Continue Vitamin D weekly, daily folic acid and vitamin B complex supplements  - Vitamin B12 wnl  - High dose thiamine PO  - May be contributing to bilateral leg weakness/lethargy    Urinary Retention - resolved  - Tamsulosin 0.4 mg PO qhs  - Rodriguez removed    PE  - CT chest (4/9): pulmonary embolism, no R heart strain  - US LE: no DVT  - Eliquis 10 mg BID x7 days, then 5 mg PO BID x 90 days  - On room air  - TTE to r/o R heart strain: wnl    HLD  - Atorvastatin 40 mg PO qhs    Vertigo  - Meclizine 12.5 mg PO TID PRN    Nausea/vomiting- resolved  - CT abdomen: no acute abdominal finding or visceral injury  - Barium scan: wnl  - Simethicone suspension q4h PRN  - Zofran 4 mg IV q8h PRN  - Protonix 40 mg IV BID  - Bariatric surgeon recommending to continue IV fluids  - Started Soft Bariatric phase 2 diet    Dispo: Daily PT ongoing for optimization prior to discharge home. PT recommending [facility] but family wishes to take patient home with home care as they are declining the [facility] facilities that accepted pt. Complex SW involved to retry [facility] auth for preferred [facility] in [facility].   Medically discharged.

## 2024-04-29 ENCOUNTER — TRANSCRIPTION ENCOUNTER (OUTPATIENT)
Age: 24
End: 2024-04-29

## 2024-04-29 VITALS
TEMPERATURE: 98 F | SYSTOLIC BLOOD PRESSURE: 126 MMHG | OXYGEN SATURATION: 96 % | RESPIRATION RATE: 18 BRPM | DIASTOLIC BLOOD PRESSURE: 80 MMHG | HEART RATE: 96 BPM

## 2024-04-29 PROCEDURE — 72146 MRI CHEST SPINE W/O DYE: CPT | Mod: MC

## 2024-04-29 PROCEDURE — 96375 TX/PRO/DX INJ NEW DRUG ADDON: CPT

## 2024-04-29 PROCEDURE — 93321 DOPPLER ECHO F-UP/LMTD STD: CPT

## 2024-04-29 PROCEDURE — 85025 COMPLETE CBC W/AUTO DIFF WBC: CPT

## 2024-04-29 PROCEDURE — 99285 EMERGENCY DEPT VISIT HI MDM: CPT | Mod: 25

## 2024-04-29 PROCEDURE — 84145 PROCALCITONIN (PCT): CPT

## 2024-04-29 PROCEDURE — 84484 ASSAY OF TROPONIN QUANT: CPT

## 2024-04-29 PROCEDURE — 84100 ASSAY OF PHOSPHORUS: CPT

## 2024-04-29 PROCEDURE — 97116 GAIT TRAINING THERAPY: CPT

## 2024-04-29 PROCEDURE — 92523 SPEECH SOUND LANG COMPREHEN: CPT

## 2024-04-29 PROCEDURE — 85730 THROMBOPLASTIN TIME PARTIAL: CPT

## 2024-04-29 PROCEDURE — 96374 THER/PROPH/DIAG INJ IV PUSH: CPT

## 2024-04-29 PROCEDURE — 83605 ASSAY OF LACTIC ACID: CPT

## 2024-04-29 PROCEDURE — 72148 MRI LUMBAR SPINE W/O DYE: CPT | Mod: MC

## 2024-04-29 PROCEDURE — 84252 ASSAY OF VITAMIN B-2: CPT

## 2024-04-29 PROCEDURE — 71275 CT ANGIOGRAPHY CHEST: CPT | Mod: MC

## 2024-04-29 PROCEDURE — 82330 ASSAY OF CALCIUM: CPT

## 2024-04-29 PROCEDURE — 74220 X-RAY XM ESOPHAGUS 1CNTRST: CPT

## 2024-04-29 PROCEDURE — 95700 EEG CONT REC W/VID EEG TECH: CPT

## 2024-04-29 PROCEDURE — 80076 HEPATIC FUNCTION PANEL: CPT

## 2024-04-29 PROCEDURE — 74177 CT ABD & PELVIS W/CONTRAST: CPT | Mod: MC

## 2024-04-29 PROCEDURE — 81001 URINALYSIS AUTO W/SCOPE: CPT

## 2024-04-29 PROCEDURE — C8924: CPT

## 2024-04-29 PROCEDURE — 84591 ASSAY OF NOS VITAMIN: CPT

## 2024-04-29 PROCEDURE — 84425 ASSAY OF VITAMIN B-1: CPT

## 2024-04-29 PROCEDURE — 84295 ASSAY OF SERUM SODIUM: CPT

## 2024-04-29 PROCEDURE — 82390 ASSAY OF CERULOPLASMIN: CPT

## 2024-04-29 PROCEDURE — 84480 ASSAY TRIIODOTHYRONINE (T3): CPT

## 2024-04-29 PROCEDURE — 85014 HEMATOCRIT: CPT

## 2024-04-29 PROCEDURE — 36415 COLL VENOUS BLD VENIPUNCTURE: CPT

## 2024-04-29 PROCEDURE — 87086 URINE CULTURE/COLONY COUNT: CPT

## 2024-04-29 PROCEDURE — 97535 SELF CARE MNGMENT TRAINING: CPT

## 2024-04-29 PROCEDURE — 74018 RADEX ABDOMEN 1 VIEW: CPT

## 2024-04-29 PROCEDURE — 80053 COMPREHEN METABOLIC PANEL: CPT

## 2024-04-29 PROCEDURE — 83735 ASSAY OF MAGNESIUM: CPT

## 2024-04-29 PROCEDURE — 70544 MR ANGIOGRAPHY HEAD W/O DYE: CPT

## 2024-04-29 PROCEDURE — C8929: CPT

## 2024-04-29 PROCEDURE — 95819 EEG AWAKE AND ASLEEP: CPT

## 2024-04-29 PROCEDURE — 95714 VEEG EA 12-26 HR UNMNTR: CPT

## 2024-04-29 PROCEDURE — 84439 ASSAY OF FREE THYROXINE: CPT

## 2024-04-29 PROCEDURE — 83036 HEMOGLOBIN GLYCOSYLATED A1C: CPT

## 2024-04-29 PROCEDURE — 82435 ASSAY OF BLOOD CHLORIDE: CPT

## 2024-04-29 PROCEDURE — 82607 VITAMIN B-12: CPT

## 2024-04-29 PROCEDURE — 93005 ELECTROCARDIOGRAM TRACING: CPT

## 2024-04-29 PROCEDURE — 97530 THERAPEUTIC ACTIVITIES: CPT

## 2024-04-29 PROCEDURE — 73562 X-RAY EXAM OF KNEE 3: CPT

## 2024-04-29 PROCEDURE — 99239 HOSP IP/OBS DSCHRG MGMT >30: CPT

## 2024-04-29 PROCEDURE — 82947 ASSAY GLUCOSE BLOOD QUANT: CPT

## 2024-04-29 PROCEDURE — 82746 ASSAY OF FOLIC ACID SERUM: CPT

## 2024-04-29 PROCEDURE — 84132 ASSAY OF SERUM POTASSIUM: CPT

## 2024-04-29 PROCEDURE — 82306 VITAMIN D 25 HYDROXY: CPT

## 2024-04-29 PROCEDURE — 70551 MRI BRAIN STEM W/O DYE: CPT | Mod: MC

## 2024-04-29 PROCEDURE — 97167 OT EVAL HIGH COMPLEX 60 MIN: CPT

## 2024-04-29 PROCEDURE — 82962 GLUCOSE BLOOD TEST: CPT

## 2024-04-29 PROCEDURE — 83690 ASSAY OF LIPASE: CPT

## 2024-04-29 PROCEDURE — 84443 ASSAY THYROID STIM HORMONE: CPT

## 2024-04-29 PROCEDURE — 85027 COMPLETE CBC AUTOMATED: CPT

## 2024-04-29 PROCEDURE — 82803 BLOOD GASES ANY COMBINATION: CPT

## 2024-04-29 PROCEDURE — 82140 ASSAY OF AMMONIA: CPT

## 2024-04-29 PROCEDURE — 80061 LIPID PANEL: CPT

## 2024-04-29 PROCEDURE — 70450 CT HEAD/BRAIN W/O DYE: CPT | Mod: MC

## 2024-04-29 PROCEDURE — 84207 ASSAY OF VITAMIN B-6: CPT

## 2024-04-29 PROCEDURE — 74176 CT ABD & PELVIS W/O CONTRAST: CPT | Mod: MC

## 2024-04-29 PROCEDURE — 80048 BASIC METABOLIC PNL TOTAL CA: CPT

## 2024-04-29 PROCEDURE — 93970 EXTREMITY STUDY: CPT

## 2024-04-29 PROCEDURE — 85018 HEMOGLOBIN: CPT

## 2024-04-29 RX ORDER — ERGOCALCIFEROL 1.25 MG/1
1 CAPSULE ORAL
Qty: 4 | Refills: 0
Start: 2024-04-29 | End: 2024-05-28

## 2024-04-29 RX ORDER — PSYLLIUM SEED (WITH DEXTROSE)
1 POWDER (GRAM) ORAL
Qty: 0 | Refills: 0 | DISCHARGE
Start: 2024-04-29

## 2024-04-29 RX ORDER — MECLIZINE HCL 12.5 MG
1 TABLET ORAL
Qty: 42 | Refills: 0
Start: 2024-04-29 | End: 2024-05-12

## 2024-04-29 RX ORDER — GABAPENTIN 400 MG/1
1 CAPSULE ORAL
Qty: 28 | Refills: 0
Start: 2024-04-29 | End: 2024-05-12

## 2024-04-29 RX ORDER — MULTIVIT-MIN/FERROUS GLUCONATE 9 MG/15 ML
15 LIQUID (ML) ORAL
Qty: 1 | Refills: 0
Start: 2024-04-29 | End: 2024-05-28

## 2024-04-29 RX ORDER — FLUOXETINE HCL 10 MG
1 CAPSULE ORAL
Qty: 14 | Refills: 0
Start: 2024-04-29 | End: 2024-05-12

## 2024-04-29 RX ORDER — ATORVASTATIN CALCIUM 80 MG/1
1 TABLET, FILM COATED ORAL
Qty: 30 | Refills: 0
Start: 2024-04-29 | End: 2024-05-28

## 2024-04-29 RX ORDER — AER TRAVELER 0.5 G/1
1 SOLUTION RECTAL; TOPICAL
Qty: 0 | Refills: 0 | DISCHARGE
Start: 2024-04-29

## 2024-04-29 RX ORDER — ZINC OXIDE 200 MG/G
1 OINTMENT TOPICAL
Qty: 0 | Refills: 0 | DISCHARGE
Start: 2024-04-29

## 2024-04-29 RX ORDER — HYDROXYZINE HCL 10 MG
25 TABLET ORAL ONCE
Refills: 0 | Status: COMPLETED | OUTPATIENT
Start: 2024-04-29 | End: 2024-04-29

## 2024-04-29 RX ORDER — THIAMINE MONONITRATE (VIT B1) 100 MG
1 TABLET ORAL
Qty: 30 | Refills: 0
Start: 2024-04-29 | End: 2024-05-28

## 2024-04-29 RX ORDER — PANTOPRAZOLE SODIUM 20 MG/1
1 TABLET, DELAYED RELEASE ORAL
Qty: 14 | Refills: 0
Start: 2024-04-29 | End: 2024-05-12

## 2024-04-29 RX ORDER — FOLIC ACID 0.8 MG
1 TABLET ORAL
Qty: 30 | Refills: 0
Start: 2024-04-29 | End: 2024-05-28

## 2024-04-29 RX ORDER — HYDROCORTISONE 1 %
1 OINTMENT (GRAM) TOPICAL
Qty: 1 | Refills: 0
Start: 2024-04-29 | End: 2024-05-13

## 2024-04-29 RX ADMIN — Medication 500 MILLIGRAM(S): at 14:02

## 2024-04-29 RX ADMIN — GABAPENTIN 100 MILLIGRAM(S): 400 CAPSULE ORAL at 18:25

## 2024-04-29 RX ADMIN — PREGABALIN 1000 MICROGRAM(S): 225 CAPSULE ORAL at 14:03

## 2024-04-29 RX ADMIN — Medication 0.1 MILLIGRAM(S): at 06:10

## 2024-04-29 RX ADMIN — Medication 1 PACKET(S): at 14:04

## 2024-04-29 RX ADMIN — AER TRAVELER 1 APPLICATION(S): 0.5 SOLUTION RECTAL; TOPICAL at 18:25

## 2024-04-29 RX ADMIN — GABAPENTIN 100 MILLIGRAM(S): 400 CAPSULE ORAL at 06:10

## 2024-04-29 RX ADMIN — Medication 25 MILLIGRAM(S): at 16:29

## 2024-04-29 RX ADMIN — AER TRAVELER 1 APPLICATION(S): 0.5 SOLUTION RECTAL; TOPICAL at 06:09

## 2024-04-29 RX ADMIN — Medication 10 MILLIGRAM(S): at 14:02

## 2024-04-29 RX ADMIN — Medication 0.1 MILLIGRAM(S): at 14:03

## 2024-04-29 RX ADMIN — PANTOPRAZOLE SODIUM 40 MILLIGRAM(S): 20 TABLET, DELAYED RELEASE ORAL at 06:10

## 2024-04-29 RX ADMIN — LACTULOSE 15 GRAM(S): 10 SOLUTION ORAL at 14:03

## 2024-04-29 RX ADMIN — Medication 1 TABLET(S): at 14:03

## 2024-04-29 RX ADMIN — APIXABAN 5 MILLIGRAM(S): 2.5 TABLET, FILM COATED ORAL at 18:25

## 2024-04-29 RX ADMIN — Medication 1 MILLIGRAM(S): at 14:00

## 2024-04-29 RX ADMIN — AER TRAVELER 1 APPLICATION(S): 0.5 SOLUTION RECTAL; TOPICAL at 14:05

## 2024-04-29 RX ADMIN — APIXABAN 5 MILLIGRAM(S): 2.5 TABLET, FILM COATED ORAL at 06:10

## 2024-04-29 RX ADMIN — ZINC OXIDE 1 APPLICATION(S): 200 OINTMENT TOPICAL at 14:05

## 2024-04-29 RX ADMIN — Medication 15 MILLILITER(S): at 14:04

## 2024-04-29 RX ADMIN — PANTOPRAZOLE SODIUM 40 MILLIGRAM(S): 20 TABLET, DELAYED RELEASE ORAL at 18:25

## 2024-04-29 NOTE — DISCHARGE NOTE NURSING/CASE MANAGEMENT/SOCIAL WORK - PATIENT PORTAL LINK FT
You can access the FollowMyHealth Patient Portal offered by [facility] by registering at the following website: [email or web address]. By joining [facility]’s FollowMyHealth portal, you will also be able to view your health information using other applications (apps) compatible with our system.

## 2024-04-29 NOTE — PROGRESS NOTE ADULT - PROVIDER SPECIALTY LIST ADULT
Internal Medicine
Neurology
Neurology
Bariatric Surgery
Bariatric Surgery
Hospitalist
Hospitalist
Internal Medicine
Neurosurgery
Hospitalist
Hospitalist
Internal Medicine
Internal Medicine
Neurology
Cardiology
Hospitalist
Internal Medicine
Neurosurgery

## 2024-04-29 NOTE — CHART NOTE - NSCHARTNOTEFT_GEN_A_CORE
Called to reevaluate patient, episode of bright red blood per rectum over weekend, still discomfort with defecation. on chart review hemoglobin has remained stable, per patient he endorses sensation of incomplete emptying with resistance when pushing causing discomfort, to point of needing self desimpaction. As of this moment (today) no red blood per rectum. but he does endorse discomfort and blood on top of stools, he endorses constipation, and suppositories help but not consistently. On physical examination perianal area seemed intact with no evidence of skin breakdown, external hemorrhoids, skin tags or anal fissures, deferred ASAEL due to discomfort. but no blood inspected. After further discussion with patient and family explained there are no external hemorrhoids and blood per rectum is usually because of this but we can not rule in or out without direct visualization however with stable hemoglobin that would not  at this time which should focus on softening stools and symptoms. encouraged of importance of office visit to be able to perform a better exam with equipment available only in office.     Recommendations:  Stool softening, suppositories might help but also miralax o decrease consistency avoid constipation  consider anusol suppositories to help with inflammation, topical CCB for spasms  Will need follow up in office: with Dr. Vazquez, number is [phone]

## 2024-04-29 NOTE — CHART NOTE - NSCHARTNOTEFT_GEN_A_CORE
Nutrition Note:   Met with pt and mom, pt looking for other food options. Pt on a soft and bite sized diet.   Menu options for dinner and breakfast obtained with the help of diet office staff.     Will continue to monitor

## 2024-04-29 NOTE — DISCHARGE NOTE NURSING/CASE MANAGEMENT/SOCIAL WORK - NSFLUVACAGEDISCH_IMM_ALL_CORE
Reports he was fine yesterday and then today he woke up with his Rt eye closed shut  He feels warm but no fevers  Reports nasal congestion but denies discharge   Denies coughing Adult

## 2024-04-29 NOTE — DISCHARGE NOTE NURSING/CASE MANAGEMENT/SOCIAL WORK - NSDCPEFALRISK_GEN_ALL_CORE
For information on Fall & Injury Prevention, visit: [email or web address] OR  [email or web address] OR  https://www.cdc.gov/steadi/patient.html

## 2024-04-29 NOTE — CHART NOTE - NSCHARTNOTESELECT_GEN_ALL_CORE
Bariatric Surgery Brief Note/Event Note
Event Note
Follow up/Event Note
Nutrition Services
Event Note
Nutrition Services
Nutrition Services
chart update note-Colorectal Surgery

## 2024-04-29 NOTE — PROGRESS NOTE ADULT - SUBJECTIVE AND OBJECTIVE BOX
Patient is a 24y old  Male who presents with a chief complaint of Visual changes with abnormal MRI (28 Apr 2024 14:57)      INTERVAL HPI/OVERNIGHT EVENTS: No acute events overnight. Patient is continuing to insist that he has hemorrhoids, that his mother examined his rectum and has photos of the hemorrhoids. Discussed results of colorectal surgery evaluation with patient at length.     MEDICATIONS  (STANDING):  apixaban 5 milliGRAM(s) Oral every 12 hours  atorvastatin 40 milliGRAM(s) Oral at bedtime  cloNIDine 0.1 milliGRAM(s) Oral every 8 hours  cyanocobalamin 1000 MICROGram(s) Oral daily  ergocalciferol 51645 Unit(s) Oral every week  FLUoxetine 10 milliGRAM(s) Oral daily  folic acid 1 milliGRAM(s) Oral daily  gabapentin 100 milliGRAM(s) Oral two times a day  lactulose Syrup 15 Gram(s) Oral daily  lidocaine 2% Jelly 10 milliLiter(s) IntraUrethral once  multivitamin/minerals/iron Oral Solution (CENTRUM) 15 milliLiter(s) Oral daily  pantoprazole  Injectable 40 milliGRAM(s) IV Push two times a day  psyllium Powder 1 Packet(s) Oral daily  tamsulosin 0.4 milliGRAM(s) Oral at bedtime  thiamine 500 milliGRAM(s) Oral daily  vitamin B complex with vitamin C 1 Tablet(s) Oral daily  witch hazel Pads 1 Application(s) Topical four times a day  zinc oxide 40% Paste 1 Application(s) Topical daily    MEDICATIONS  (PRN):  acetaminophen     Tablet .. 650 milliGRAM(s) Oral every 6 hours PRN Temp greater or equal to 38C (100.4F), Mild Pain (1 - 3)  aluminum hydroxide/magnesium hydroxide/simethicone Suspension 30 milliLiter(s) Oral every 4 hours PRN Dyspepsia  bisacodyl Suppository 10 milliGRAM(s) Rectal daily PRN Constipation  meclizine 12.5 milliGRAM(s) Oral three times a day PRN Dizziness  melatonin 3 milliGRAM(s) Oral at bedtime PRN Insomnia  metoprolol tartrate Injectable 5 milliGRAM(s) IV Push every 8 hours PRN hr over 120  ondansetron Injectable 4 milliGRAM(s) IV Push every 8 hours PRN Nausea and/or Vomiting  polyethylene glycol 3350 17 Gram(s) Oral daily PRN Constipation  senna 2 Tablet(s) Oral at bedtime PRN Constipation      Allergies    No Known Allergies    Intolerances      REVIEW OF SYSTEMS:  CONSTITUTIONAL: No fever or fatigue  RESPIRATORY: No cough; No shortness of breath  CARDIOVASCULAR: No chest pain,  dizziness, or leg swelling  GASTROINTESTINAL:  no abdominal pain. No nausea or vomiting; no constipation, no  diarrhea, + rectal spasms  NEUROLOGICAL: No headaches, numbness, or tremors  MUSCULOSKELETAL: No joint pain; No muscle, back, or extremity pain      Vital Signs Last 24 Hrs  T(C): 36.1 (29 Apr 2024 11:00), Max: 36.9 (28 Apr 2024 20:45)  T(F): 97 (29 Apr 2024 11:00), Max: 98.4 (28 Apr 2024 20:45)  HR: 91 (29 Apr 2024 11:00) (62 - 91)  BP: 132/88 (29 Apr 2024 11:00) (96/62 - 132/88)  RR: 18 (29 Apr 2024 11:00) (17 - 18)  SpO2: 97% (29 Apr 2024 11:00) (96% - 98%)    Parameters below as of 29 Apr 2024 11:00  Patient On (Oxygen Delivery Method): room air        PHYSICAL EXAM:  GENERAL: NAD, obese male lying in bed comfortably  HEAD:  Atraumatic, Normocephalic  EYES: conjunctiva and sclera clear  NERVOUS SYSTEM:  Alert & Oriented X3, No gross focal deficits  CHEST/LUNG: Clear to auscultation bilaterally; No wheezes  HEART: Regular rate and rhythm; No murmurs  ABDOMEN: Soft, obese, Nontender; Bowel sounds present  EXTREMITIES:  No cyanosis, or edema    LABS:              CAPILLARY BLOOD GLUCOSE          RADIOLOGY & ADDITIONAL TESTS:   Patient is a 24y old  Male who presents with a chief complaint of Visual changes with abnormal MRI (28 Apr 2024 14:57)      INTERVAL HPI/OVERNIGHT EVENTS: No acute events overnight. Patient is continuing to insist that he has hemorrhoids, that his mother examined his rectum and has photos of the hemorrhoids. Discussed results of colorectal surgery evaluation with patient at length. Reconsulted colorectal surgery this AM.    MEDICATIONS  (STANDING):  apixaban 5 milliGRAM(s) Oral every 12 hours  atorvastatin 40 milliGRAM(s) Oral at bedtime  cloNIDine 0.1 milliGRAM(s) Oral every 8 hours  cyanocobalamin 1000 MICROGram(s) Oral daily  ergocalciferol 86787 Unit(s) Oral every week  FLUoxetine 10 milliGRAM(s) Oral daily  folic acid 1 milliGRAM(s) Oral daily  gabapentin 100 milliGRAM(s) Oral two times a day  lactulose Syrup 15 Gram(s) Oral daily  lidocaine 2% Jelly 10 milliLiter(s) IntraUrethral once  multivitamin/minerals/iron Oral Solution (CENTRUM) 15 milliLiter(s) Oral daily  pantoprazole  Injectable 40 milliGRAM(s) IV Push two times a day  psyllium Powder 1 Packet(s) Oral daily  tamsulosin 0.4 milliGRAM(s) Oral at bedtime  thiamine 500 milliGRAM(s) Oral daily  vitamin B complex with vitamin C 1 Tablet(s) Oral daily  witch hazel Pads 1 Application(s) Topical four times a day  zinc oxide 40% Paste 1 Application(s) Topical daily    MEDICATIONS  (PRN):  acetaminophen     Tablet .. 650 milliGRAM(s) Oral every 6 hours PRN Temp greater or equal to 38C (100.4F), Mild Pain (1 - 3)  aluminum hydroxide/magnesium hydroxide/simethicone Suspension 30 milliLiter(s) Oral every 4 hours PRN Dyspepsia  bisacodyl Suppository 10 milliGRAM(s) Rectal daily PRN Constipation  meclizine 12.5 milliGRAM(s) Oral three times a day PRN Dizziness  melatonin 3 milliGRAM(s) Oral at bedtime PRN Insomnia  metoprolol tartrate Injectable 5 milliGRAM(s) IV Push every 8 hours PRN hr over 120  ondansetron Injectable 4 milliGRAM(s) IV Push every 8 hours PRN Nausea and/or Vomiting  polyethylene glycol 3350 17 Gram(s) Oral daily PRN Constipation  senna 2 Tablet(s) Oral at bedtime PRN Constipation      Allergies    No Known Allergies    Intolerances      REVIEW OF SYSTEMS:  CONSTITUTIONAL: No fever or fatigue  RESPIRATORY: No cough; No shortness of breath  CARDIOVASCULAR: No chest pain,  dizziness, or leg swelling  GASTROINTESTINAL:  no abdominal pain. No nausea or vomiting; no constipation, no  diarrhea, + rectal spasms  NEUROLOGICAL: No headaches, numbness, or tremors  MUSCULOSKELETAL: No joint pain; No muscle, back, or extremity pain      Vital Signs Last 24 Hrs  T(C): 36.1 (29 Apr 2024 11:00), Max: 36.9 (28 Apr 2024 20:45)  T(F): 97 (29 Apr 2024 11:00), Max: 98.4 (28 Apr 2024 20:45)  HR: 91 (29 Apr 2024 11:00) (62 - 91)  BP: 132/88 (29 Apr 2024 11:00) (96/62 - 132/88)  RR: 18 (29 Apr 2024 11:00) (17 - 18)  SpO2: 97% (29 Apr 2024 11:00) (96% - 98%)    Parameters below as of 29 Apr 2024 11:00  Patient On (Oxygen Delivery Method): room air        PHYSICAL EXAM:  GENERAL: NAD, obese male lying in bed comfortably  HEAD:  Atraumatic, Normocephalic  EYES: conjunctiva and sclera clear  NERVOUS SYSTEM:  Alert & Oriented X3, No gross focal deficits  CHEST/LUNG: Clear to auscultation bilaterally; No wheezes  HEART: Regular rate and rhythm; No murmurs  ABDOMEN: Soft, obese, Nontender; Bowel sounds present  EXTREMITIES:  No cyanosis, or edema    LABS:              CAPILLARY BLOOD GLUCOSE          RADIOLOGY & ADDITIONAL TESTS:

## 2024-04-29 NOTE — PROGRESS NOTE ADULT - REASON FOR ADMISSION
Visual changes with abnormal MRI

## 2024-04-29 NOTE — PROGRESS NOTE ADULT - ASSESSMENT
24M PMHx obesity, anxiety, depression, s/p gastric sleeve surgery presenting for diplopia, leg weakness, decreased PO intake, and urinary retention. Imaging negative. Etiology likely adjustment disorder and thiamine deficiency after surgery. Colorectal surgery consulted; no external or prolapsed internal hemorrhoids, cramping and spasms pain likely 2/2 pain and fear of defecating.     c/o Rectal Spasms  - Constipation resolved  - Bowel regimen- Metamucil- Miralax, senna- Lactulose 15g qd  - Dulcolax suppositories as needed for constipation  - No external or prolapsed internal hemorrhoids, cramping and spasm pain likely 2/2 pain and fear of defecating per colorectal surgery  - No acute surgical intervention indicated   - Follow up outpatient with Dr. Lucia Vazquez    UTI  - Urinalysis positive  - Afebrile, no leukocytosis  - Vantin 200 mg PO BID to complete 7d course, s/p ceftriaxone    AMS, Leg Weakness, Diplopia, Urinary Retention- all resolved  - CT head: hypodensity in R parietal and temporal lobes  - MRI brain/MRA head/MRI lumbosacral spine performed:  No stroke, mass, hemorrhage, aneurysm, LVO, critical stenosis, or cauda equina  - Repeat MRI t-spine: no T3-T4 edema; slight disc protrusion in T6-7: No intervention per neurosurgery  - EEG non contributory  - Ammonia level wnl  - US b/l LE to r/o DVT: negative  - Gabapentin 100 mg BID   - Thiamine deficiency  - Continue high dose thiamine, vitamin B complex   - Daily PT for leg weakness  - PM&R consulted, no acute inpatient rehab recommended    Anxiety  - Prozac 10 mg qd  - Clonidine 0.1 mg q8h  - Lorazepam 0.5 mg TID PRN  - BH recs noted  - Follow up with psychiatrist outpatient    Vitamin Deficiency  - Had poor PO intake for past 2 months: possible vitamin deficiency after bariatric surgery  - Continue Vitamin D weekly, daily folic acid and vitamin B complex supplements  - Vitamin B12 wnl  - High dose thiamine PO  - May be contributing to bilateral leg weakness/lethargy    Urinary Retention - resolved  - Tamsulosin 0.4 mg PO qhs  - Rodriguez removed    PE  - CT chest (4/9): pulmonary embolism, no R heart strain  - US LE: no DVT  - Eliquis 10 mg BID x7 days, then 5 mg PO BID x 90 days  - On room air  - TTE to r/o R heart strain: wnl    HLD  - Atorvastatin 40 mg PO qhs    Vertigo  - Meclizine 12.5 mg PO TID PRN    Nausea/vomiting- resolved  - CT abdomen: no acute abdominal finding or visceral injury  - Barium scan: wnl  - Simethicone suspension q4h PRN  - Zofran 4 mg IV q8h PRN  - Protonix 40 mg IV BID  - Bariatric surgeon recommending to continue IV fluids  - Started Soft Bariatric phase 2 diet    Discussed case with patient, mother, and aunt at bedside. Expressed understanding and gratitude regarding conversation.     Dispo: Daily PT ongoing for optimization prior to discharge home. PT recommending [facility] but family wishes to take patient home with home care as they are declining the [facility] facilities that accepted pt. Complex SW involved to retry [facility] auth for preferred [facility] in [facility].   Medically discharged.   24M PMHx obesity, anxiety, depression, s/p gastric sleeve surgery presenting for diplopia, leg weakness, decreased PO intake, and urinary retention. Imaging negative. Etiology likely adjustment disorder and thiamine deficiency after surgery. Colorectal surgery consulted; no external or prolapsed internal hemorrhoids, cramping and spasms pain likely 2/2 pain and fear of defecating.     c/o Rectal Spasms  - Constipation resolved  - Bowel regimen- Metamucil- Miralax, senna- Lactulose 15g qd  - Dulcolax suppositories as needed for constipation  - No external or prolapsed internal hemorrhoids, cramping and spasm pain likely 2/2 pain and fear of defecating per colorectal surgery  - No acute surgical intervention indicated   - Follow up outpatient with Dr. Lucia Vazquez  - Colorectal surgery reconsulted as family is showing photos of patient with some rectal blood. Possible tear as patient has been found manipulating his anorectal area/self-disimpacting on multiple occasions.     UTI  - Urinalysis positive  - Afebrile, no leukocytosis  - Vantin 200 mg PO BID to complete 7d course, s/p ceftriaxone    AMS, Leg Weakness, Diplopia, Urinary Retention- all resolved  - CT head: hypodensity in R parietal and temporal lobes  - MRI brain/MRA head/MRI lumbosacral spine performed:  No stroke, mass, hemorrhage, aneurysm, LVO, critical stenosis, or cauda equina  - Repeat MRI t-spine: no T3-T4 edema; slight disc protrusion in T6-7: No intervention per neurosurgery  - EEG non contributory  - Ammonia level wnl  - US b/l LE to r/o DVT: negative  - Gabapentin 100 mg BID   - Thiamine deficiency  - Continue high dose thiamine, vitamin B complex   - Daily PT for leg weakness  - PM&R consulted, no acute inpatient rehab recommended    Anxiety  - Prozac 10 mg qd  - Clonidine 0.1 mg q8h  - Lorazepam 0.5 mg TID PRN  -  recs noted  - Follow up with psychiatrist outpatient    Vitamin Deficiency  - Had poor PO intake for past 2 months: possible vitamin deficiency after bariatric surgery  - Continue Vitamin D weekly, daily folic acid and vitamin B complex supplements  - Vitamin B12 wnl  - High dose thiamine PO  - May be contributing to bilateral leg weakness/lethargy    Urinary Retention - resolved  - Tamsulosin 0.4 mg PO qhs  - Rodriguez removed    PE  - CT chest (4/9): pulmonary embolism, no R heart strain  - US LE: no DVT  - Eliquis 10 mg BID x7 days, then 5 mg PO BID x 90 days  - On room air  - TTE to r/o R heart strain: wnl    HLD  - Atorvastatin 40 mg PO qhs    Vertigo  - Meclizine 12.5 mg PO TID PRN    Nausea/vomiting- resolved  - CT abdomen: no acute abdominal finding or visceral injury  - Barium scan: wnl  - Simethicone suspension q4h PRN  - Zofran 4 mg IV q8h PRN  - Protonix 40 mg IV BID  - Bariatric surgeon recommending to continue IV fluids  - Started Soft Bariatric phase 2 diet    Discussed case with patient, mother, and aunt at bedside. Expressed understanding and gratitude regarding conversation.     Dispo: Daily PT ongoing for optimization prior to discharge home. PT recommending [facility] but family wishes to take patient home with home care as they are declining the [facility] facilities that accepted pt. Complex SW involved to retry [facility] auth for preferred [facility] in [facility].   Medically discharged.

## 2024-05-15 ENCOUNTER — EMERGENCY (EMERGENCY)
Facility: HOSPITAL | Age: 24
LOS: 1 days | Discharge: HOME | End: 2024-05-15
Attending: EMERGENCY MEDICINE
Payer: MEDICARE

## 2024-05-15 VITALS
OXYGEN SATURATION: 97 % | DIASTOLIC BLOOD PRESSURE: 53 MMHG | SYSTOLIC BLOOD PRESSURE: 80 MMHG | HEIGHT: 68 IN | TEMPERATURE: 99 F | HEART RATE: 78 BPM | RESPIRATION RATE: 18 BRPM

## 2024-05-15 DIAGNOSIS — Z90.3 ACQUIRED ABSENCE OF STOMACH [PART OF]: Chronic | ICD-10-CM

## 2024-05-15 PROCEDURE — 99284 EMERGENCY DEPT VISIT MOD MDM: CPT | Mod: GC

## 2024-05-15 NOTE — ED ADULT TRIAGE NOTE - ESI TRIAGE ACUITY LEVEL, MLM
Pt expressed understanding when I reviewed best for pt to request Methylphenidate about 5 days ahead of running out and ideally 4-5 day AHEAD OF Thurs in the week she is requesting refill -- so informed I will eprescribe next refill this MON 3/11.   2

## 2024-05-15 NOTE — ED ADULT TRIAGE NOTE - CHIEF COMPLAINT QUOTE
bibems from rehab for elevated LFT levels.  pt also c/o central abd pain.  denies nvd, cp, palpitaitons.  pt in rehab for t6-t7.  pt hypotensive in triage, 65/49 and 80/53, moved to . bibems from rehab for elevated LFT levels.  pt also c/o central abd pain.  denies nvd, cp, palpitaitons.  pt in rehab for t6-t7 dislocation.  pt hypotensive in triage, 65/49 and 80/53, moved to .

## 2024-05-16 VITALS
HEART RATE: 67 BPM | DIASTOLIC BLOOD PRESSURE: 76 MMHG | SYSTOLIC BLOOD PRESSURE: 112 MMHG | TEMPERATURE: 98 F | RESPIRATION RATE: 18 BRPM | OXYGEN SATURATION: 99 %

## 2024-05-16 LAB
ALBUMIN SERPL ELPH-MCNC: 3.9 G/DL — SIGNIFICANT CHANGE UP (ref 3.3–5.2)
ALP SERPL-CCNC: 125 U/L — HIGH (ref 40–120)
ALT FLD-CCNC: 182 U/L — HIGH
ANION GAP SERPL CALC-SCNC: 19 MMOL/L — HIGH (ref 5–17)
AST SERPL-CCNC: 134 U/L — HIGH
BASOPHILS # BLD AUTO: 0.04 K/UL — SIGNIFICANT CHANGE UP (ref 0–0.2)
BASOPHILS NFR BLD AUTO: 0.6 % — SIGNIFICANT CHANGE UP (ref 0–2)
BILIRUB SERPL-MCNC: 2 MG/DL — SIGNIFICANT CHANGE UP (ref 0.4–2)
BUN SERPL-MCNC: 9.3 MG/DL — SIGNIFICANT CHANGE UP (ref 8–20)
CALCIUM SERPL-MCNC: 9.5 MG/DL — SIGNIFICANT CHANGE UP (ref 8.4–10.5)
CHLORIDE SERPL-SCNC: 98 MMOL/L — SIGNIFICANT CHANGE UP (ref 96–108)
CO2 SERPL-SCNC: 22 MMOL/L — SIGNIFICANT CHANGE UP (ref 22–29)
CREAT SERPL-MCNC: 0.58 MG/DL — SIGNIFICANT CHANGE UP (ref 0.5–1.3)
EGFR: 138 ML/MIN/1.73M2 — SIGNIFICANT CHANGE UP
EOSINOPHIL # BLD AUTO: 0.07 K/UL — SIGNIFICANT CHANGE UP (ref 0–0.5)
EOSINOPHIL NFR BLD AUTO: 1 % — SIGNIFICANT CHANGE UP (ref 0–6)
GLUCOSE SERPL-MCNC: 83 MG/DL — SIGNIFICANT CHANGE UP (ref 70–99)
HCT VFR BLD CALC: 42.9 % — SIGNIFICANT CHANGE UP (ref 39–50)
HGB BLD-MCNC: 13.8 G/DL — SIGNIFICANT CHANGE UP (ref 13–17)
IMM GRANULOCYTES NFR BLD AUTO: 0.3 % — SIGNIFICANT CHANGE UP (ref 0–0.9)
LYMPHOCYTES # BLD AUTO: 2.59 K/UL — SIGNIFICANT CHANGE UP (ref 1–3.3)
LYMPHOCYTES # BLD AUTO: 35.9 % — SIGNIFICANT CHANGE UP (ref 13–44)
MCHC RBC-ENTMCNC: 27 PG — SIGNIFICANT CHANGE UP (ref 27–34)
MCHC RBC-ENTMCNC: 32.2 GM/DL — SIGNIFICANT CHANGE UP (ref 32–36)
MCV RBC AUTO: 83.8 FL — SIGNIFICANT CHANGE UP (ref 80–100)
MONOCYTES # BLD AUTO: 0.67 K/UL — SIGNIFICANT CHANGE UP (ref 0–0.9)
MONOCYTES NFR BLD AUTO: 9.3 % — SIGNIFICANT CHANGE UP (ref 2–14)
NEUTROPHILS # BLD AUTO: 3.82 K/UL — SIGNIFICANT CHANGE UP (ref 1.8–7.4)
NEUTROPHILS NFR BLD AUTO: 52.9 % — SIGNIFICANT CHANGE UP (ref 43–77)
PLATELET # BLD AUTO: 314 K/UL — SIGNIFICANT CHANGE UP (ref 150–400)
POTASSIUM SERPL-MCNC: 3.8 MMOL/L — SIGNIFICANT CHANGE UP (ref 3.5–5.3)
POTASSIUM SERPL-SCNC: 3.8 MMOL/L — SIGNIFICANT CHANGE UP (ref 3.5–5.3)
PROT SERPL-MCNC: 7.2 G/DL — SIGNIFICANT CHANGE UP (ref 6.6–8.7)
RBC # BLD: 5.12 M/UL — SIGNIFICANT CHANGE UP (ref 4.2–5.8)
RBC # FLD: 17.2 % — HIGH (ref 10.3–14.5)
SODIUM SERPL-SCNC: 139 MMOL/L — SIGNIFICANT CHANGE UP (ref 135–145)
WBC # BLD: 7.21 K/UL — SIGNIFICANT CHANGE UP (ref 3.8–10.5)
WBC # FLD AUTO: 7.21 K/UL — SIGNIFICANT CHANGE UP (ref 3.8–10.5)

## 2024-05-16 PROCEDURE — 99284 EMERGENCY DEPT VISIT MOD MDM: CPT

## 2024-05-16 PROCEDURE — 76705 ECHO EXAM OF ABDOMEN: CPT | Mod: 26

## 2024-05-16 PROCEDURE — 80053 COMPREHEN METABOLIC PANEL: CPT

## 2024-05-16 PROCEDURE — 76705 ECHO EXAM OF ABDOMEN: CPT

## 2024-05-16 PROCEDURE — 85025 COMPLETE CBC W/AUTO DIFF WBC: CPT

## 2024-05-16 PROCEDURE — 36415 COLL VENOUS BLD VENIPUNCTURE: CPT

## 2024-05-16 NOTE — ED PROVIDER NOTE - OBJECTIVE STATEMENT
24M with PMHx of obesity, anxiety, depression, s/p gastric sleeve surgery, currently at outpatient rehab presents to ED for elevated LFTs. pt had elevated LFTs during recent admission, unclear cause other than steatosis, follow up with GI for trending/further eval. Sent in from facility due to uptrending LFTs, AST/ALTs in the 100s and total bili above 2. Pt denies any abdominal pain, nausea, vomiting, fever or chills. Does deal with chronic constipation

## 2024-05-16 NOTE — ED PROVIDER NOTE - PROVIDER TOKENS
PROVIDER:[TOKEN:[[id number]],FOLLOWUP:[Urgent]],PROVIDER:[TOKEN:[[id number]],FOLLOWUP:[Urgent]]

## 2024-05-16 NOTE — ED PROVIDER NOTE - NSFOLLOWUPINSTRUCTIONS_ED_ALL_ED_FT
Please continue following outpatient with gastroenterology and hepatology in regards to your elevated liver enzymes. Please return to the ED for abdominal pain, nausea, vomiting, inability to eat or drink, inability to have bowel movements, or any other acute symptoms or concerns.    Fatty Liver Disease    The liver converts food into energy, removes toxic material from the blood, makes important proteins, and absorbs necessary vitamins from food. Fatty liver disease occurs when too much fat has built up in your liver cells. Fatty liver disease is also called hepatic steatosis.    In many cases, fatty liver disease does not cause symptoms or problems. It is often diagnosed when tests are being done for other reasons. However, over time, fatty liver can cause inflammation that may lead to more serious liver problems, such as scarring of the liver (cirrhosis) and liver failure.    Fatty liver is associated with insulin resistance, increased body fat, high blood pressure (hypertension), and high cholesterol. These are features of metabolic syndrome and increase your risk for stroke, diabetes, and heart disease.    What are the causes?  This condition may be caused by components of metabolic syndrome:  Obesity.  Insulin resistance.  High cholesterol.  Other causes:  Alcohol abuse.  Poor nutrition.  Cushing syndrome.  Pregnancy.  Certain drugs.  Poisons.  Some viral infections.  What increases the risk?  You are more likely to develop this condition if you:  Abuse alcohol.  Are overweight.  Have diabetes.  Have hepatitis.  Have a high triglyceride level.  Are pregnant.  What are the signs or symptoms?  Fatty liver disease often does not cause symptoms. If symptoms do develop, they can include:  Fatigue and weakness.  Weight loss.  Confusion.  Nausea, vomiting, or abdominal pain.  Yellowing of your skin and the white parts of your eyes (jaundice).  Itchy skin.  How is this diagnosed?  This condition may be diagnosed by:  A physical exam and your medical history.  Blood tests.  Imaging tests, such as an ultrasound, CT scan, or MRI.  A liver biopsy. A small sample of liver tissue is removed using a needle. The sample is then looked at under a microscope.  How is this treated?  Fatty liver disease is often caused by other health conditions. Treatment for fatty liver may involve medicines and lifestyle changes to manage conditions such as:  Alcoholism.  High cholesterol.  Diabetes.  Being overweight or obese.  Follow these instructions at home:    Do not drink alcohol. If you have trouble quitting, ask your health care provider how to safely quit with the help of medicine or a supervised program. This is important to keep your condition from getting worse.  Eat a healthy diet as told by your health care provider. Ask your health care provider about working with a dietitian to develop an eating plan.  Exercise regularly. This can help you lose weight and control your cholesterol and diabetes. Talk to your health care provider about an exercise plan and which activities are best for you.  Take over-the-counter and prescription medicines only as told by your health care provider.  Keep all follow-up visits. This is important.  Contact a health care provider if:  You have trouble controlling your:  Blood sugar. This is especially important if you have diabetes.  Cholesterol.  Drinking of alcohol.  Get help right away if:  You have abdominal pain.  You have jaundice.  You have nausea and are vomiting.  You vomit blood or material that looks like coffee grounds.  You have stools that are black, tar-like, or bloody.  Summary  Fatty liver disease develops when too much fat builds up in the cells of your liver.  Fatty liver disease often causes no symptoms or problems. However, over time, fatty liver can cause inflammation that may lead to more serious liver problems, such as scarring of the liver (cirrhosis).  You are more likely to develop this condition if you abuse alcohol, are pregnant, are overweight, have diabetes, have hepatitis, or have high triglyceride or cholesterol levels.  Contact your health care provider if you have trouble controlling your blood sugar, cholesterol, or drinking of alcohol.  This information is not intended to replace advice given to you by your health care provider. Make sure you discuss any questions you have with your health care provider.

## 2024-05-16 NOTE — ED PROVIDER NOTE - CARE PROVIDERS DIRECT ADDRESSES
[email or web address]

## 2024-05-16 NOTE — ED PROVIDER NOTE - PHYSICAL EXAMINATION
Const: Pt is well appearing. Awake, alert and oriented to person, place, & time. In no acute distress.   HEENT: Oropharynx clear without exudates or erythema. Moist mucous membranes  Eyes: PERRLA. No scleral icterus. EOMI.  Cardiac: Regular rate and regular rhythm. +S1/S2. Peripheral pulses 2+ and symmetric. No LE edema.  Resp: Speaking in full sentences, breath sounds equal and clear bilaterally. No wheezes, rales or rhonchi.  Abd: Soft, non-tender, non-distended. Normal bowel sounds in all 4 quadrants. No guarding or rebound.  MSK: Spine midline and non-tender. No CVAT.  Skin: No rashes, abrasions or lacerations.  Neuro: Moves all extremities symmetrically. No motor or sensory deficits

## 2024-05-16 NOTE — ED PROVIDER NOTE - CLINICAL SUMMARY MEDICAL DECISION MAKING FREE TEXT BOX
24 year old male presents with uptrending LFTs, no abd pain, nausea or vomiting, extensive inpatient workup done while admitted 3 weeks ago. Will repeat blood work, US, re-eval 24 year old male presents with uptrending LFTs, no abd pain, nausea or vomiting, extensive inpatient workup done while admitted 3 weeks ago. Will repeat blood work, US, re-eval    US shows no obstruction, LFTs slightly lower than outpatient, vitals stable, no abd pain or tenderness. Safe for DC back to facility for outpatient f/u which is already established. Facility agreeable to accept pt back. Pt to return to ED for abdominal pain, nausea, vomiting, inability to eat or drink, inability to have bowel movements, or any other acute symptoms or concerns. Ambulance pending for return to [place] rehab

## 2024-05-16 NOTE — ED PROVIDER NOTE - ATTENDING CONTRIBUTION TO CARE
Trey: I performed a face to face evaluation of this patient and performed a full history and physical examination on the patient.  I agree with the resident's history, physical examination, and plan of the patient unless otherwise noted. My brief assessment is as follows: 25 y/o male hx gastric sleeve, PE on eliquis on recent admission (chart reviewed) sent from rehab for uptrending lfts. had some elevation of lft's while here, diagnosed with hepatic steatosis. continuing to go up to a small degree, neg hepatitis labs in system from past month. pt denie sabd pain or fever. just reports chronic constipation with nl bm 2 days ago. no other complaints. non toxic, nad, ctab, rrr, abd soft, nt/nd. neuro intact. no jaundice/scleral icterus. will check labs, ruq u/s. reassess, possible gi/mrcp.

## 2024-05-16 NOTE — ED ADULT NURSE NOTE - OBJECTIVE STATEMENT
bibems from rehab for elevated LFT levels.  pt also c/o central abd pain.  denies nvd, cp, palpitaitons.  pt in rehab for t6-t7 dislocation

## 2024-05-16 NOTE — ED PROVIDER NOTE - PROGRESS NOTE DETAILS
Attempted to contact rehab facility/Dr. Agrawal for why pt was sent in without response Spoke with Facility, labs unchanged, US shows no obstruction. Safe for DC back to facility and f/u with GI outpatient. Facility agreeable to plan. Ambulance set up for transfer

## 2024-05-16 NOTE — ED PROVIDER NOTE - CARE PROVIDER_API CALL
Anny Johnson  Transplant Hepatology  [address], [address]  [place] [address]  Phone: [phone]  Fax: [phone]  Follow Up Time: Urgent    Kem Decker  Gastroenterology  [address], [address]  [place] [address]  Phone: [phone]  Fax: [phone]  Follow Up Time: Urgent

## 2024-07-10 ENCOUNTER — APPOINTMENT (OUTPATIENT)
Dept: ORTHOPEDIC SURGERY | Facility: CLINIC | Age: 24
End: 2024-07-10
Payer: MEDICARE

## 2024-07-10 VITALS — BODY MASS INDEX: 41.91 KG/M2 | WEIGHT: 267 LBS | HEIGHT: 67 IN

## 2024-07-10 PROCEDURE — 99203 OFFICE O/P NEW LOW 30 MIN: CPT

## 2024-07-10 RX ORDER — ATORVASTATIN CALCIUM 40 MG/1
40 TABLET, FILM COATED ORAL
Refills: 0 | Status: ACTIVE | COMMUNITY

## 2024-07-10 RX ORDER — APIXABAN 5 MG/1
5 TABLET, FILM COATED ORAL
Refills: 0 | Status: ACTIVE | COMMUNITY

## 2024-07-10 RX ORDER — TAMSULOSIN HYDROCHLORIDE 0.4 MG/1
CAPSULE ORAL
Refills: 0 | Status: ACTIVE | COMMUNITY

## 2024-07-10 RX ORDER — FAMOTIDINE 20 MG/1
20 TABLET, FILM COATED ORAL
Refills: 0 | Status: ACTIVE | COMMUNITY

## 2024-07-19 ENCOUNTER — APPOINTMENT (OUTPATIENT)
Dept: ORTHOPEDIC SURGERY | Facility: CLINIC | Age: 24
End: 2024-07-19

## 2024-07-19 VITALS — WEIGHT: 267 LBS | BODY MASS INDEX: 41.91 KG/M2 | HEIGHT: 67 IN

## 2024-07-19 DIAGNOSIS — M51.24 OTHER INTERVERTEBRAL DISC DISPLACEMENT, THORACIC REGION: ICD-10-CM

## 2024-07-19 PROCEDURE — 99214 OFFICE O/P EST MOD 30 MIN: CPT

## 2024-07-22 PROBLEM — M51.24 HNP (HERNIATED NUCLEUS PULPOSUS), THORACIC: Status: ACTIVE | Noted: 2024-07-10

## 2024-09-11 NOTE — ED ADULT NURSE NOTE - SUICIDE SCREENING QUESTION 2
The patient is Stable - Low risk of patient condition declining or worsening    Shift Goals  Clinical Goals: monitor femoral site, VSS  Patient Goals: rest    Progress made toward(s) clinical / shift goals:    Problem: Neuro Status  Goal: Neuro status will remain stable or improve  Outcome: Progressing  Note: Pt's neuro status remains intact throughout shift.      Problem: Mobility  Goal: Patient's capacity to carry out activities will improve  Outcome: Progressing  Note: Pt up self independently. No assistance required & tolerates well.        Patient is not progressing towards the following goals:      
No

## 2025-01-10 NOTE — ED ADULT NURSE NOTE - DRUG PRE-SCREENING (DAST -1)
Patient will be needing a new script for the albuterol nebulizer solution.  She will not have enough to get through the weekend since he increased her dose today when she was in for her appt.      Lucio Gay   21 Statement Selected
